# Patient Record
Sex: FEMALE | Race: WHITE | NOT HISPANIC OR LATINO | Employment: OTHER | URBAN - METROPOLITAN AREA
[De-identification: names, ages, dates, MRNs, and addresses within clinical notes are randomized per-mention and may not be internally consistent; named-entity substitution may affect disease eponyms.]

---

## 2019-09-09 ENCOUNTER — TELEPHONE (OUTPATIENT)
Dept: FAMILY MEDICINE | Facility: CLINIC | Age: 84
End: 2019-09-09

## 2019-09-09 NOTE — TELEPHONE ENCOUNTER
----- Message from Eliana Espino sent at 9/9/2019  9:25 AM CDT -----  Contact: Rosanna Raymundo  Pt would like to know can she be seen today? She has been dizzy and nauseated and she also had diarrhea this morning . Please give her a call back asap! She says she feels miserable. Pt# [phone]

## 2019-09-09 NOTE — TELEPHONE ENCOUNTER
----- Message from Juana Dexter LPN sent at 9/9/2019 10:02 AM CDT -----  Contact: Rosanna aRymundo      ----- Message -----  From: Eliana Espino  Sent: 9/9/2019   9:25 AM  To: Aamir [facility] Staff    Pt would like to know can she be seen today? She has been dizzy and nauseated and she also had diarrhea this morning . Please give her a call back asap! She says she feels miserable. Pt# [phone]

## 2019-09-10 ENCOUNTER — OFFICE VISIT (OUTPATIENT)
Dept: FAMILY MEDICINE | Facility: CLINIC | Age: 84
End: 2019-09-10
Payer: MEDICARE

## 2019-09-10 VITALS
HEART RATE: 68 BPM | SYSTOLIC BLOOD PRESSURE: 126 MMHG | BODY MASS INDEX: 31.34 KG/M2 | DIASTOLIC BLOOD PRESSURE: 80 MMHG | WEIGHT: 166 LBS | HEIGHT: 61 IN

## 2019-09-10 DIAGNOSIS — K52.9 GASTROENTERITIS: Primary | ICD-10-CM

## 2019-09-10 PROCEDURE — 99214 OFFICE O/P EST MOD 30 MIN: CPT | Mod: S$GLB,,, | Performed by: PHYSICIAN ASSISTANT

## 2019-09-10 PROCEDURE — 99214 PR OFFICE/OUTPT VISIT, EST, LEVL IV, 30-39 MIN: ICD-10-PCS | Mod: S$GLB,,, | Performed by: PHYSICIAN ASSISTANT

## 2019-09-10 RX ORDER — LORATADINE 10 MG/1
10 TABLET ORAL DAILY
COMMUNITY
Start: 2019-07-09 | End: 2019-10-03 | Stop reason: SDUPTHER

## 2019-09-10 RX ORDER — BUSPIRONE HYDROCHLORIDE 5 MG/1
5 TABLET ORAL DAILY
COMMUNITY
Start: 2019-07-09 | End: 2019-10-03 | Stop reason: SDUPTHER

## 2019-09-10 RX ORDER — ONDANSETRON 8 MG/1
8 TABLET, ORALLY DISINTEGRATING ORAL EVERY 6 HOURS PRN
Qty: 20 TABLET | Refills: 1 | Status: SHIPPED | OUTPATIENT
Start: 2019-09-10 | End: 2019-10-03 | Stop reason: SDUPTHER

## 2019-09-10 RX ORDER — ALLOPURINOL 100 MG/1
100 TABLET ORAL DAILY
COMMUNITY
Start: 2019-07-09 | End: 2019-10-03 | Stop reason: SDUPTHER

## 2019-09-10 RX ORDER — LOSARTAN POTASSIUM 100 MG/1
100 TABLET ORAL DAILY
COMMUNITY
Start: 2019-07-09 | End: 2019-10-03 | Stop reason: SDUPTHER

## 2019-09-10 RX ORDER — HYDROCHLOROTHIAZIDE 12.5 MG/1
12.5 TABLET ORAL DAILY
COMMUNITY
Start: 2019-07-09 | End: 2019-10-03 | Stop reason: SDUPTHER

## 2019-09-10 RX ORDER — AMLODIPINE BESYLATE 10 MG/1
10 TABLET ORAL DAILY
COMMUNITY
Start: 2019-07-09 | End: 2019-10-03 | Stop reason: SDUPTHER

## 2019-09-10 RX ORDER — ROSUVASTATIN CALCIUM 20 MG/1
20 TABLET, FILM COATED ORAL DAILY
COMMUNITY
Start: 2019-07-09 | End: 2019-10-03 | Stop reason: SDUPTHER

## 2019-09-10 RX ORDER — LORAZEPAM 1 MG/1
1 TABLET ORAL 2 TIMES DAILY
COMMUNITY
End: 2019-10-03 | Stop reason: SDUPTHER

## 2019-09-10 RX ORDER — FERROUS SULFATE 325(65) MG
325 TABLET ORAL DAILY
COMMUNITY
Start: 2015-02-13 | End: 2019-10-03 | Stop reason: SDUPTHER

## 2019-09-10 RX ORDER — OMEPRAZOLE 20 MG/1
20 CAPSULE, DELAYED RELEASE ORAL DAILY
COMMUNITY
Start: 2019-07-09 | End: 2019-10-03 | Stop reason: SDUPTHER

## 2019-09-10 RX ORDER — MAGNESIUM 250 MG
250 TABLET ORAL DAILY
COMMUNITY
End: 2019-10-03 | Stop reason: SDUPTHER

## 2019-09-10 NOTE — PATIENT INSTRUCTIONS
Noninfectious Gastroenteritis (Ages 6 Years to Adult)    Gastroenteritis can cause nausea, vomiting, diarrhea, and abdominal cramping. This may occur as a result of food sensitivity, inflammation of your gastrointestinal tract, medicines, stress, or other causes not related to infection. Your symptoms will usually last from 1 to 3 days, but can last longer. Antibiotics are not effective, but simple home treatment will be helpful.  Home care  Medicine  · You may use acetaminophen or NSAID medicines like ibuprofen or naproxen to control fever, unless another medicine is prescribed. (Note: If you have chronic liver or kidney disease, or ever had a stomach ulcer or gastrointestinalI bleeding, talk with your healthcare provider before using these medicines.) Aspirin should never be used in anyone under 18 years of age who is ill with a fever. It may cause severe liver damage. Don't increase your NSAID medicines if you are already taking these medicines for another condition (like arthritis). Don't use NSAIDS if you are on aspirin (such as for heart disease, or after a stroke).  · If medicines for diarrhea or vomiting are prescribed, take only as directed.  General care and preventing spread of the illness  · If symptoms are severe, rest at home for the next 24 hours or until you feel better.  · Hand washing with soap and water is the best way to prevent the spread of infection. Wash your hands after touching anyone who is sick.  · Wash your hands after using the toilet and before meals. Clean the toilet after each use.  · Caffeine, tobacco, and alcohol can make your diarrhea, cramping, and pain worse.  Diet  · Water and clear liquids are important so you do not get dehydrated. Drink a small amount at a time.  · Do not force yourself to eat, especially if you have cramps, vomiting, or diarrhea. When you finally decide to start eating, do not eat large amounts at a time, even if you are hungry.  · If you eat, avoid  fatty, greasy, spicy, or fried foods.  · Do not eat dairy products if you have diarrhea; they can make the diarrhea worse.  During the first 24 hours (the first full day), follow the diet below:  · Beverages: Water, clear liquids, soft drinks without caffeine, like ginger ale; mineral water (plain or flavored); decaffeinated tea and coffee.  · Soups: Clear broth, consommé, and bouillon Sports drinks aren't a good choice because they have too much sugar and not enough electrolytes. In this case, commercially available products called oral rehydration solutions are best.  · Desserts: Plain gelatin, popsicles, and fruit juice bars.  During the next 24 hours (the second day), you may add the following to the above if you have improved. If not, continue what you did the first day:  · Hot cereal, plain toast, bread, rolls, crackers  · Plain noodles, rice, mashed potatoes, chicken noodle or rice soup  · Unsweetened canned fruit (avoid pineapple), bananas  · Limit caffeine and chocolate. No spices or seasonings except salt.  During the next 24 hours  · Gradually resume a normal diet, as you feel better and your symptoms improve.  · If at any time your symptoms start getting worse, go back to clear liquids until you feel better.  Food preparation  · If you have diarrhea, you should not prepare food for others. When you  prepare food for yourself, wash your hands before and after.  · Wash your hands after using cutting boards, countertops, and knives that have been in contact with raw food.  · Keep uncooked meats away from cooked and ready-to-eat foods.  Follow-up care  Follow up with your healthcare provider if you are not improving over the next 2 to 3 days, or as advised. If a stool (diarrhea) sample was taken, call for the results as directed.  When to seek medical care  Call your healthcare provider right away if any of these occur:   · Increasing abdominal pain or constant lower right abdominal pain  · Continued  vomiting (unable to keep liquids down)  · Frequent diarrhea (more than 5 times a day)  · Blood in vomit or stool (black or red color)  · Inability to tolerate solid food after a few days.  · Dark urine, reduced urine output  · Weakness, dizziness  · Drowsiness  · Fever of 100.4ºF (38.0ºC) or higher, or as directed by your healthcare provider  · New rash  Call 911  Call 911 if any of these occur:  · Trouble breathing  · Chest pain  · Confusion  · Severe drowsiness or trouble awakening  · Seizure  · Stiff neck  Date Last Reviewed: 11/16/2015  © 0406-7857 [employer] [address] [place] [address], [place] [address]. All rights reserved. This information is not intended as a substitute for professional medical care. Always follow your healthcare professional's instructions.

## 2019-09-13 ENCOUNTER — TELEPHONE (OUTPATIENT)
Dept: FAMILY MEDICINE | Facility: CLINIC | Age: 84
End: 2019-09-13

## 2019-09-13 NOTE — TELEPHONE ENCOUNTER
Patient left voicemail stating Gilberto requested she give him a call today.      [phone]  -DN

## 2019-09-16 NOTE — TELEPHONE ENCOUNTER
Spoke with pt, pt states she is feeling much better. Every now and then she feels a little light headed but everything else is doing great.

## 2019-09-25 LAB
ALBUMIN SERPL-MCNC: 4.4 G/DL (ref 3.6–5.1)
ALBUMIN/GLOB SERPL: 1.5 (CALC) (ref 1–2.5)
ALP SERPL-CCNC: 55 U/L (ref 33–130)
ALT SERPL-CCNC: 9 U/L (ref 6–29)
AST SERPL-CCNC: 14 U/L (ref 10–35)
BASOPHILS # BLD AUTO: 49 CELLS/UL (ref 0–200)
BASOPHILS NFR BLD AUTO: 0.8 %
BILIRUB SERPL-MCNC: 0.5 MG/DL (ref 0.2–1.2)
BUN SERPL-MCNC: 23 MG/DL (ref 7–25)
BUN/CREAT SERPL: 25 (CALC) (ref 6–22)
CALCIUM SERPL-MCNC: 9.7 MG/DL (ref 8.6–10.4)
CHLORIDE SERPL-SCNC: 104 MMOL/L (ref 98–110)
CHOLEST SERPL-MCNC: 157 MG/DL
CHOLEST/HDLC SERPL: 3.5 (CALC)
CO2 SERPL-SCNC: 27 MMOL/L (ref 20–32)
CREAT SERPL-MCNC: 0.91 MG/DL (ref 0.6–0.88)
EOSINOPHIL # BLD AUTO: 214 CELLS/UL (ref 15–500)
EOSINOPHIL NFR BLD AUTO: 3.5 %
ERYTHROCYTE [DISTWIDTH] IN BLOOD BY AUTOMATED COUNT: 15.7 % (ref 11–15)
GFRSERPLBLD MDRD-ARVRAT: 57 ML/MIN/1.73M2
GLOBULIN SER CALC-MCNC: 3 G/DL (CALC) (ref 1.9–3.7)
GLUCOSE SERPL-MCNC: 119 MG/DL (ref 65–99)
HBA1C MFR BLD: 6.9 % OF TOTAL HGB
HCT VFR BLD AUTO: 35.8 % (ref 35–45)
HDLC SERPL-MCNC: 45 MG/DL
HGB BLD-MCNC: 11.3 G/DL (ref 11.7–15.5)
LDLC SERPL CALC-MCNC: 88 MG/DL (CALC)
LYMPHOCYTES # BLD AUTO: 1537 CELLS/UL (ref 850–3900)
LYMPHOCYTES NFR BLD AUTO: 25.2 %
MCH RBC QN AUTO: 25.8 PG (ref 27–33)
MCHC RBC AUTO-ENTMCNC: 31.6 G/DL (ref 32–36)
MCV RBC AUTO: 81.7 FL (ref 80–100)
MONOCYTES # BLD AUTO: 531 CELLS/UL (ref 200–950)
MONOCYTES NFR BLD AUTO: 8.7 %
NEUTROPHILS # BLD AUTO: 3770 CELLS/UL (ref 1500–7800)
NEUTROPHILS NFR BLD AUTO: 61.8 %
NONHDLC SERPL-MCNC: 112 MG/DL (CALC)
PLATELET # BLD AUTO: 202 THOUSAND/UL (ref 140–400)
PMV BLD REES-ECKER: 12.6 FL (ref 7.5–12.5)
POTASSIUM SERPL-SCNC: 3.9 MMOL/L (ref 3.5–5.3)
PROT SERPL-MCNC: 7.4 G/DL (ref 6.1–8.1)
RBC # BLD AUTO: 4.38 MILLION/UL (ref 3.8–5.1)
SODIUM SERPL-SCNC: 140 MMOL/L (ref 135–146)
TRIGL SERPL-MCNC: 147 MG/DL
WBC # BLD AUTO: 6.1 THOUSAND/UL (ref 3.8–10.8)

## 2019-10-03 ENCOUNTER — OFFICE VISIT (OUTPATIENT)
Dept: FAMILY MEDICINE | Facility: CLINIC | Age: 84
End: 2019-10-03
Payer: MEDICARE

## 2019-10-03 VITALS
BODY MASS INDEX: 31.57 KG/M2 | HEIGHT: 61 IN | WEIGHT: 167.19 LBS | HEART RATE: 61 BPM | OXYGEN SATURATION: 96 % | DIASTOLIC BLOOD PRESSURE: 66 MMHG | SYSTOLIC BLOOD PRESSURE: 124 MMHG

## 2019-10-03 DIAGNOSIS — K52.9 GASTROENTERITIS: ICD-10-CM

## 2019-10-03 DIAGNOSIS — I48.0 PAROXYSMAL ATRIAL FIBRILLATION: ICD-10-CM

## 2019-10-03 DIAGNOSIS — M1A.09X0 IDIOPATHIC CHRONIC GOUT OF MULTIPLE SITES WITHOUT TOPHUS: ICD-10-CM

## 2019-10-03 DIAGNOSIS — I25.10 CORONARY ARTERY DISEASE INVOLVING NATIVE CORONARY ARTERY OF NATIVE HEART WITHOUT ANGINA PECTORIS: ICD-10-CM

## 2019-10-03 DIAGNOSIS — I10 ESSENTIAL HYPERTENSION: ICD-10-CM

## 2019-10-03 DIAGNOSIS — F41.8 OTHER SPECIFIED ANXIETY DISORDERS: ICD-10-CM

## 2019-10-03 DIAGNOSIS — M85.80 OSTEOPENIA, UNSPECIFIED LOCATION: ICD-10-CM

## 2019-10-03 DIAGNOSIS — E78.2 MIXED HYPERLIPIDEMIA: ICD-10-CM

## 2019-10-03 DIAGNOSIS — J30.1 NON-SEASONAL ALLERGIC RHINITIS DUE TO POLLEN: ICD-10-CM

## 2019-10-03 DIAGNOSIS — Z85.3 HISTORY OF LOBULAR CARCINOMA OF BREAST: ICD-10-CM

## 2019-10-03 DIAGNOSIS — Z23 NEED FOR INFLUENZA VACCINATION: ICD-10-CM

## 2019-10-03 DIAGNOSIS — K21.9 GASTROESOPHAGEAL REFLUX DISEASE WITHOUT ESOPHAGITIS: ICD-10-CM

## 2019-10-03 DIAGNOSIS — Z86.2 HISTORY OF ANEMIA: ICD-10-CM

## 2019-10-03 DIAGNOSIS — E11.9 TYPE 2 DIABETES MELLITUS WITHOUT COMPLICATION, WITHOUT LONG-TERM CURRENT USE OF INSULIN: Primary | ICD-10-CM

## 2019-10-03 PROCEDURE — 90662 FLU VACCINE - HIGH DOSE (65+) PRESERVATIVE FREE IM: ICD-10-PCS | Mod: S$GLB,,, | Performed by: FAMILY MEDICINE

## 2019-10-03 PROCEDURE — 99214 OFFICE O/P EST MOD 30 MIN: CPT | Mod: 25,S$GLB,, | Performed by: FAMILY MEDICINE

## 2019-10-03 PROCEDURE — 99214 PR OFFICE/OUTPT VISIT, EST, LEVL IV, 30-39 MIN: ICD-10-PCS | Mod: 25,S$GLB,, | Performed by: FAMILY MEDICINE

## 2019-10-03 PROCEDURE — G0008 ADMIN INFLUENZA VIRUS VAC: HCPCS | Mod: S$GLB,,, | Performed by: FAMILY MEDICINE

## 2019-10-03 PROCEDURE — 90662 IIV NO PRSV INCREASED AG IM: CPT | Mod: S$GLB,,, | Performed by: FAMILY MEDICINE

## 2019-10-03 PROCEDURE — G0008 FLU VACCINE - HIGH DOSE (65+) PRESERVATIVE FREE IM: ICD-10-PCS | Mod: S$GLB,,, | Performed by: FAMILY MEDICINE

## 2019-10-03 RX ORDER — FLUTICASONE PROPIONATE 50 MCG
SPRAY, SUSPENSION (ML) NASAL
COMMUNITY
Start: 2018-06-12 | End: 2019-10-03 | Stop reason: SDUPTHER

## 2019-10-03 RX ORDER — ALLOPURINOL 100 MG/1
100 TABLET ORAL DAILY
Qty: 90 TABLET | Refills: 1 | Status: SHIPPED | OUTPATIENT
Start: 2019-10-03 | End: 2020-08-11 | Stop reason: SDUPTHER

## 2019-10-03 RX ORDER — AMOXICILLIN 500 MG
1 CAPSULE ORAL 2 TIMES DAILY
Qty: 200 CAPSULE | Refills: 1 | Status: SHIPPED | OUTPATIENT
Start: 2019-10-03 | End: 2019-11-04 | Stop reason: CLARIF

## 2019-10-03 RX ORDER — LOSARTAN POTASSIUM 100 MG/1
100 TABLET ORAL DAILY
Qty: 90 TABLET | Refills: 1 | Status: SHIPPED | OUTPATIENT
Start: 2019-10-03 | End: 2020-06-30 | Stop reason: SDUPTHER

## 2019-10-03 RX ORDER — FERROUS SULFATE 325(65) MG
325 TABLET ORAL DAILY
Qty: 100 TABLET | Refills: 1 | Status: ON HOLD | OUTPATIENT
Start: 2019-10-03 | End: 2019-11-04

## 2019-10-03 RX ORDER — AMLODIPINE BESYLATE 10 MG/1
10 TABLET ORAL DAILY
Qty: 90 TABLET | Refills: 1 | Status: ON HOLD | OUTPATIENT
Start: 2019-10-03 | End: 2019-12-18 | Stop reason: SDUPTHER

## 2019-10-03 RX ORDER — FLUTICASONE PROPIONATE 50 MCG
1 SPRAY, SUSPENSION (ML) NASAL 2 TIMES DAILY
Qty: 3 BOTTLE | Refills: 3 | Status: SHIPPED | OUTPATIENT
Start: 2019-10-03 | End: 2020-08-11 | Stop reason: SDUPTHER

## 2019-10-03 RX ORDER — VIT C/E/ZN/COPPR/LUTEIN/ZEAXAN 250MG-90MG
1000 CAPSULE ORAL DAILY
Qty: 100 CAPSULE | Refills: 1 | Status: SHIPPED | OUTPATIENT
Start: 2019-10-03 | End: 2021-02-03 | Stop reason: SDUPTHER

## 2019-10-03 RX ORDER — LORATADINE 10 MG/1
10 TABLET ORAL DAILY
Qty: 90 TABLET | Refills: 1 | Status: SHIPPED | OUTPATIENT
Start: 2019-10-03 | End: 2020-08-11 | Stop reason: SDUPTHER

## 2019-10-03 RX ORDER — OMEPRAZOLE 20 MG/1
20 CAPSULE, DELAYED RELEASE ORAL DAILY
Qty: 90 CAPSULE | Refills: 1 | Status: ON HOLD | OUTPATIENT
Start: 2019-10-03 | End: 2019-12-18 | Stop reason: SDUPTHER

## 2019-10-03 RX ORDER — HYDROCHLOROTHIAZIDE 12.5 MG/1
12.5 TABLET ORAL DAILY
Qty: 90 TABLET | Refills: 1 | Status: SHIPPED | OUTPATIENT
Start: 2019-10-03 | End: 2020-08-11 | Stop reason: SDUPTHER

## 2019-10-03 RX ORDER — BUSPIRONE HYDROCHLORIDE 5 MG/1
5 TABLET ORAL 2 TIMES DAILY
Qty: 180 TABLET | Refills: 1 | Status: SHIPPED | OUTPATIENT
Start: 2019-10-03 | End: 2019-11-04 | Stop reason: CLARIF

## 2019-10-03 RX ORDER — LORAZEPAM 1 MG/1
1 TABLET ORAL 2 TIMES DAILY
Qty: 90 TABLET | Refills: 1 | Status: SHIPPED | OUTPATIENT
Start: 2019-10-03 | End: 2020-01-09 | Stop reason: SDUPTHER

## 2019-10-03 RX ORDER — ONDANSETRON 8 MG/1
8 TABLET, ORALLY DISINTEGRATING ORAL EVERY 6 HOURS PRN
Qty: 20 TABLET | Refills: 1 | Status: SHIPPED | OUTPATIENT
Start: 2019-10-03 | End: 2020-08-11 | Stop reason: SDUPTHER

## 2019-10-03 RX ORDER — ROSUVASTATIN CALCIUM 20 MG/1
20 TABLET, FILM COATED ORAL DAILY
Qty: 90 TABLET | Refills: 1 | Status: SHIPPED | OUTPATIENT
Start: 2019-10-03 | End: 2020-08-11 | Stop reason: SDUPTHER

## 2019-10-03 RX ORDER — VIT C/E/ZN/COPPR/LUTEIN/ZEAXAN 250MG-90MG
1000 CAPSULE ORAL DAILY
COMMUNITY
End: 2019-10-03 | Stop reason: SDUPTHER

## 2019-10-03 RX ORDER — MAGNESIUM 250 MG
250 TABLET ORAL DAILY
Qty: 100 EACH | Refills: 1 | Status: ON HOLD | OUTPATIENT
Start: 2019-10-03 | End: 2019-12-18 | Stop reason: HOSPADM

## 2019-10-03 RX ORDER — AMOXICILLIN 500 MG
1 CAPSULE ORAL DAILY
COMMUNITY
End: 2019-10-03 | Stop reason: SDUPTHER

## 2019-10-03 NOTE — PROGRESS NOTES
SUBJECTIVE:    Patient ID: Rosanna Raymundo is a 86 y.o. female.    Chief Complaint: Hypertension (Lab Review)    This 86-year-old female is still living independently and driving a car and going to the grocery store.  [facility].  Lately she has not been staying during diabetic diet but stays active, wears compression socks.  She saw  her cardiologist and had a echo and EKG recently in both of which were normal.    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  Her A1c is 6.9 today      Orders Only on 09/24/2019   Component Date Value Ref Range Status    Cholesterol 09/24/2019 157  <200 mg/dL Final    HDL 09/24/2019 45* >50 mg/dL Final    Triglycerides 09/24/2019 147  <150 mg/dL Final    LDL Cholesterol 09/24/2019 88  mg/dL (calc) Final    Hdl/Cholesterol Ratio 09/24/2019 3.5  <5.0 (calc) Final    Non HDL Chol. (LDL+VLDL) 09/24/2019 112  <130 mg/dL (calc) Final    Glucose 09/24/2019 119* 65 - 99 mg/dL Final    BUN, Bld 09/24/2019 23  7 - 25 mg/dL Final    Creatinine 09/24/2019 0.91* 0.60 - 0.88 mg/dL Final    eGFR if non African American 09/24/2019 57* > OR = 60 mL/min/1.73m2 Final    eGFR if  09/24/2019 66  > OR = 60 mL/min/1.73m2 Final    BUN/Creatinine Ratio 09/24/2019 25* 6 - 22 (calc) Final    Sodium 09/24/2019 140  135 - 146 mmol/L Final    Potassium 09/24/2019 3.9  3.5 - 5.3 mmol/L Final    Chloride 09/24/2019 104  98 - 110 mmol/L Final    CO2 09/24/2019 27  20 - 32 mmol/L Final    Calcium 09/24/2019 9.7  8.6 - 10.4 mg/dL Final    Total Protein 09/24/2019 7.4  6.1 - 8.1 g/dL Final    Albumin 09/24/2019 4.4  3.6 - 5.1 g/dL Final    Globulin, Total 09/24/2019 3.0  1.9 - 3.7 g/dL (calc) Final    Albumin/Globulin Ratio 09/24/2019 1.5  1.0 - 2.5 (calc) Final    Total Bilirubin 09/24/2019 0.5  0.2 - 1.2 mg/dL Final    Alkaline Phosphatase  09/24/2019 55  33 - 130 U/L Final    AST 09/24/2019 14  10 - 35 U/L Final    ALT 09/24/2019 9  6 - 29 U/L Final    WBC 09/24/2019 6.1  3.8 - 10.8 Thousand/uL Final    RBC 09/24/2019 4.38  3.80 - 5.10 Million/uL Final    Hemoglobin 09/24/2019 11.3* 11.7 - 15.5 g/dL Final    Hematocrit 09/24/2019 35.8  35.0 - 45.0 % Final    Mean Corpuscular Volume 09/24/2019 81.7  80.0 - 100.0 fL Final    Mean Corpuscular Hemoglobin 09/24/2019 25.8* 27.0 - 33.0 pg Final    Mean Corpuscular Hemoglobin Conc 09/24/2019 31.6* 32.0 - 36.0 g/dL Final    RDW 09/24/2019 15.7* 11.0 - 15.0 % Final    Platelets 09/24/2019 202  140 - 400 Thousand/uL Final    MPV 09/24/2019 12.6* 7.5 - 12.5 fL Final    Neutrophils Absolute 09/24/2019 3,770  1,500 - 7,800 cells/uL Final    Lymph # 09/24/2019 1,537  850 - 3,900 cells/uL Final    Mono # 09/24/2019 531  200 - 950 cells/uL Final    Eos # 09/24/2019 214  15 - 500 cells/uL Final    Baso # 09/24/2019 49  0 - 200 cells/uL Final    Neutrophils Relative 09/24/2019 61.8  % Final    Lymph% 09/24/2019 25.2  % Final    Mono% 09/24/2019 8.7  % Final    Eosinophil% 09/24/2019 3.5  % Final    Basophil% 09/24/2019 0.8  % Final    Hemoglobin A1C 09/24/2019 6.9* <5.7 % of total Hgb Final       Past Medical History:   Diagnosis Date    Cancer     Cataract     Diabetes mellitus, type 2     Heart murmur     Hypertension     Occasional tremors      Past Surgical History:   Procedure Laterality Date    ADENOIDECTOMY      Gall bladder revmoval      HYSTERECTOMY      left mastectomy Left 1945    TONSILLECTOMY       Family History   Problem Relation Age of Onset    Heart disease Mother        Marital Status: Single  Alcohol History:  reports that she does not drink alcohol.  Tobacco History:  reports that she has never smoked. She has never used smokeless tobacco.  Drug History:  reports that she does not use drugs.    Review of patient's allergies indicates:   Allergen Reactions     Penicillin g benzathine Other (See Comments)       Current Outpatient Medications:     allopurinol (ZYLOPRIM) 100 MG tablet, Take 1 tablet (100 mg total) by mouth once daily., Disp: 90 tablet, Rfl: 1    amLODIPine (NORVASC) 10 MG tablet, Take 1 tablet (10 mg total) by mouth once daily., Disp: 90 tablet, Rfl: 1    apixaban (ELIQUIS) 2.5 mg Tab, Take 1 tablet (2.5 mg total) by mouth 2 (two) times daily., Disp: 180 tablet, Rfl: 1    busPIRone (BUSPAR) 5 MG Tab, Take 1 tablet (5 mg total) by mouth 2 (two) times daily., Disp: 180 tablet, Rfl: 1    cholecalciferol, vitamin D3, (VITAMIN D3) 1,000 unit capsule, Take 1 capsule (1,000 Units total) by mouth once daily., Disp: 100 capsule, Rfl: 1    CRESTOR 20 mg tablet, Take 1 tablet (20 mg total) by mouth once daily., Disp: 90 tablet, Rfl: 1    fish oil-omega-3 fatty acids 300-1,000 mg capsule, Take 1 capsule by mouth 2 (two) times daily., Disp: 200 capsule, Rfl: 1    fluticasone propionate (FLONASE) 50 mcg/actuation nasal spray, 1 spray (50 mcg total) by Each Nostril route 2 (two) times daily., Disp: 3 Bottle, Rfl: 3    hydroCHLOROthiazide (HYDRODIURIL) 12.5 MG Tab, Take 1 tablet (12.5 mg total) by mouth once daily., Disp: 90 tablet, Rfl: 1    loratadine (CLARITIN) 10 mg tablet, Take 1 tablet (10 mg total) by mouth once daily., Disp: 90 tablet, Rfl: 1    LORazepam (ATIVAN) 1 MG tablet, Take 1 tablet (1 mg total) by mouth 2 (two) times daily., Disp: 90 tablet, Rfl: 1    losartan (COZAAR) 100 MG tablet, Take 1 tablet (100 mg total) by mouth once daily., Disp: 90 tablet, Rfl: 1    magnesium 250 mg Tab, Take 1 tablet (250 mg total) by mouth once daily., Disp: 100 each, Rfl: 1    omeprazole (PRILOSEC) 20 MG capsule, Take 1 capsule (20 mg total) by mouth once daily., Disp: 90 capsule, Rfl: 1    ondansetron (ZOFRAN-ODT) 8 MG TbDL, Take 1 tablet (8 mg total) by mouth every 6 (six) hours as needed., Disp: 20 tablet, Rfl: 1    vit C/E/Zn/coppr/lutein/zeaxan (VISION  "FORMULA-2 ORAL), Take by mouth., Disp: , Rfl:     ferrous sulfate (FEOSOL) 325 mg (65 mg iron) Tab tablet, Take 1 tablet (325 mg total) by mouth once daily., Disp: 100 tablet, Rfl: 1    Review of Systems   Constitutional: Negative for appetite change, chills, fatigue, fever and unexpected weight change.   HENT: Negative for congestion, ear pain, sinus pain, sore throat and trouble swallowing.    Eyes: Negative for pain, discharge and visual disturbance.   Respiratory: Negative for apnea, cough, shortness of breath and wheezing.    Cardiovascular: Negative for chest pain, palpitations and leg swelling.   Gastrointestinal: Negative for abdominal pain, blood in stool, constipation, diarrhea, nausea and vomiting.        Gerd controlled  W/ omeprazole, hi fiber diet   Endocrine: Negative for heat intolerance, polydipsia and polyuria.   Genitourinary: Positive for frequency. Negative for difficulty urinating, dyspareunia, dysuria, hematuria and menstrual problem.        Nocturia 2-3 x night, sl incont . Wears  pads   Musculoskeletal: Negative for arthralgias, back pain, gait problem, joint swelling and myalgias.   Allergic/Immunologic: Negative for environmental allergies, food allergies and immunocompromised state.   Neurological: Negative for dizziness, tremors (coarse intention trmors, uses  a straw  to drink soup and  drinks,), seizures, numbness (lf arm numbness  from mastectomy, ) and headaches.   Psychiatric/Behavioral: Negative for behavioral problems, confusion, hallucinations and suicidal ideas. The patient is not nervous/anxious.           Objective:      Vitals:    10/03/19 0827   BP: 124/66   Pulse: 61   SpO2: 96%   Weight: 75.8 kg (167 lb 3.2 oz)   Height: 5' 0.75" (1.543 m)     Body mass index is 31.85 kg/m².  Physical Exam      Assessment:       1. Type 2 diabetes mellitus without complication, without long-term current use of insulin    2. Need for influenza vaccination    3. Gastroenteritis    4. " Paroxysmal atrial fibrillation    5. Coronary artery disease involving native coronary artery of native heart without angina pectoris    6. Mixed hyperlipidemia    7. History of lobular carcinoma of breast    8. Non-seasonal allergic rhinitis due to pollen    9. Essential hypertension    10. Gastroesophageal reflux disease without esophagitis    11. History of anemia    12. Other specified anxiety disorders    13. Idiopathic chronic gout of multiple sites without tophus    14. Osteopenia, unspecified location         Plan:       Type 2 diabetes mellitus without complication, without long-term current use of insulin  -     Hemoglobin A1c; Future; Expected date: 10/03/2019  A1c is 6.9 indicating adequate diabetic control, she is on no oral medications or insulins  Need for influenza vaccination  -     Influenza - High Dose (65+) (PF) (IM)  Flu shot today    Paroxysmal atrial fibrillation  -     apixaban (ELIQUIS) 2.5 mg Tab; Take 1 tablet (2.5 mg total) by mouth 2 (two) times daily.  Dispense: 180 tablet; Refill: 1  Patient in sinus rhythm today continue Eliquis  Coronary artery disease involving native coronary artery of native heart without angina pectoris  Sees cardiologist q.6 months  Mixed hyperlipidemia  -     CRESTOR 20 mg tablet; Take 1 tablet (20 mg total) by mouth once daily.  Dispense: 90 tablet; Refill: 1  -     fish oil-omega-3 fatty acids 300-1,000 mg capsule; Take 1 capsule by mouth 2 (two) times daily.  Dispense: 200 capsule; Refill: 1  -     Comprehensive metabolic panel; Future; Expected date: 10/03/2019  -     Lipid panel; Future; Expected date: 10/03/2019  Lipids at goal, will fax copy to  in Cardiology  History of lobular carcinoma of breast  In remission times 40 years  Non-seasonal allergic rhinitis due to pollen  -     fluticasone propionate (FLONASE) 50 mcg/actuation nasal spray; 1 spray (50 mcg total) by Each Nostril route 2 (two) times daily.  Dispense: 3 Bottle; Refill: 3  -      loratadine (CLARITIN) 10 mg tablet; Take 1 tablet (10 mg total) by mouth once daily.  Dispense: 90 tablet; Refill: 1    Essential hypertension  -     amLODIPine (NORVASC) 10 MG tablet; Take 1 tablet (10 mg total) by mouth once daily.  Dispense: 90 tablet; Refill: 1  -     hydroCHLOROthiazide (HYDRODIURIL) 12.5 MG Tab; Take 1 tablet (12.5 mg total) by mouth once daily.  Dispense: 90 tablet; Refill: 1  -     losartan (COZAAR) 100 MG tablet; Take 1 tablet (100 mg total) by mouth once daily.  Dispense: 90 tablet; Refill: 1  -     magnesium 250 mg Tab; Take 1 tablet (250 mg total) by mouth once daily.  Dispense: 100 each; Refill: 1  -     CBC auto differential; Future; Expected date: 04/03/2020  -     TSH w/reflex to FT4; Future; Expected date: 10/03/2019  Well controlled with meds  Gastroesophageal reflux disease without esophagitis  -     omeprazole (PRILOSEC) 20 MG capsule; Take 1 capsule (20 mg total) by mouth once daily.  Dispense: 90 capsule; Refill: 1    History of anemia  -     ferrous sulfate (FEOSOL) 325 mg (65 mg iron) Tab tablet; Take 1 tablet (325 mg total) by mouth once daily.  Dispense: 100 tablet; Refill: 1  Hematocrit 35 today continue iron daily as needed  Other specified anxiety disorders  -     busPIRone (BUSPAR) 5 MG Tab; Take 1 tablet (5 mg total) by mouth 2 (two) times daily.  Dispense: 180 tablet; Refill: 1  -     LORazepam (ATIVAN) 1 MG tablet; Take 1 tablet (1 mg total) by mouth 2 (two) times daily.  Dispense: 90 tablet; Refill: 1    Idiopathic chronic gout of multiple sites without tophus  -     allopurinol (ZYLOPRIM) 100 MG tablet; Take 1 tablet (100 mg total) by mouth once daily.  Dispense: 90 tablet; Refill: 1    Osteopenia, unspecified location  -     cholecalciferol, vitamin D3, (VITAMIN D3) 1,000 unit capsule; Take 1 capsule (1,000 Units total) by mouth once daily.  Dispense: 100 capsule; Refill: 1      Follow up in about 6 months (around 4/3/2020) for Diabetic Check-Up.

## 2019-11-04 ENCOUNTER — HOSPITAL ENCOUNTER (OUTPATIENT)
Facility: HOSPITAL | Age: 84
Discharge: HOME | End: 2019-11-05
Attending: EMERGENCY MEDICINE | Admitting: INTERNAL MEDICINE
Payer: MEDICARE

## 2019-11-04 DIAGNOSIS — I48.91 ATRIAL FIBRILLATION WITH RAPID VENTRICULAR RESPONSE: Primary | ICD-10-CM

## 2019-11-04 DIAGNOSIS — I48.91 ATRIAL FIBRILLATION: ICD-10-CM

## 2019-11-04 DIAGNOSIS — R07.9 CHEST PAIN: ICD-10-CM

## 2019-11-04 PROBLEM — R73.9 HYPERGLYCEMIA: Status: ACTIVE | Noted: 2019-11-04

## 2019-11-04 LAB
ALBUMIN SERPL BCP-MCNC: 4.3 G/DL (ref 3.5–5.2)
ALP SERPL-CCNC: 55 U/L (ref 55–135)
ALT SERPL W/O P-5'-P-CCNC: 17 U/L (ref 10–44)
ANION GAP SERPL CALC-SCNC: 11 MMOL/L (ref 8–16)
APTT PPP: 34.6 SEC (ref 23.6–33.3)
AST SERPL-CCNC: 24 U/L (ref 10–40)
BASOPHILS # BLD AUTO: 0.03 K/UL (ref 0–0.2)
BASOPHILS NFR BLD: 0.4 % (ref 0–1.9)
BILIRUB SERPL-MCNC: 0.8 MG/DL (ref 0.1–1)
BNP SERPL-MCNC: 265 PG/ML (ref 0–99)
BUN SERPL-MCNC: 27 MG/DL (ref 8–23)
CALCIUM SERPL-MCNC: 9.3 MG/DL (ref 8.7–10.5)
CHLORIDE SERPL-SCNC: 102 MMOL/L (ref 95–110)
CO2 SERPL-SCNC: 25 MMOL/L (ref 23–29)
CREAT SERPL-MCNC: 1.1 MG/DL (ref 0.5–1.4)
DIFFERENTIAL METHOD: ABNORMAL
EOSINOPHIL # BLD AUTO: 0.1 K/UL (ref 0–0.5)
EOSINOPHIL NFR BLD: 1.5 % (ref 0–8)
ERYTHROCYTE [DISTWIDTH] IN BLOOD BY AUTOMATED COUNT: 17 % (ref 11.5–14.5)
EST. GFR  (AFRICAN AMERICAN): 52.2 ML/MIN/1.73 M^2
EST. GFR  (NON AFRICAN AMERICAN): 45.3 ML/MIN/1.73 M^2
GLUCOSE SERPL-MCNC: 166 MG/DL (ref 70–110)
HCT VFR BLD AUTO: 30.2 % (ref 37–48.5)
HGB BLD-MCNC: 9.4 G/DL (ref 12–16)
IMM GRANULOCYTES # BLD AUTO: 0.03 K/UL (ref 0–0.04)
IMM GRANULOCYTES NFR BLD AUTO: 0.4 % (ref 0–0.5)
INR PPP: 1.1
LYMPHOCYTES # BLD AUTO: 1.2 K/UL (ref 1–4.8)
LYMPHOCYTES NFR BLD: 16.6 % (ref 18–48)
MCH RBC QN AUTO: 24.2 PG (ref 27–31)
MCHC RBC AUTO-ENTMCNC: 31.1 G/DL (ref 32–36)
MCV RBC AUTO: 78 FL (ref 82–98)
MONOCYTES # BLD AUTO: 0.6 K/UL (ref 0.3–1)
MONOCYTES NFR BLD: 8 % (ref 4–15)
NEUTROPHILS # BLD AUTO: 5.4 K/UL (ref 1.8–7.7)
NEUTROPHILS NFR BLD: 73.1 % (ref 38–73)
NRBC BLD-RTO: 0 /100 WBC
PLATELET # BLD AUTO: 233 K/UL (ref 150–350)
PMV BLD AUTO: 10.8 FL (ref 9.2–12.9)
POTASSIUM SERPL-SCNC: 3.7 MMOL/L (ref 3.5–5.1)
PROT SERPL-MCNC: 7.4 G/DL (ref 6–8.4)
PROTHROMBIN TIME: 13.7 SEC (ref 10.6–14.8)
RBC # BLD AUTO: 3.89 M/UL (ref 4–5.4)
SODIUM SERPL-SCNC: 138 MMOL/L (ref 136–145)
TROPONIN I SERPL DL<=0.01 NG/ML-MCNC: 0.03 NG/ML (ref 0.02–0.04)
TROPONIN I SERPL DL<=0.01 NG/ML-MCNC: 0.03 NG/ML (ref 0.02–0.04)
TROPONIN I SERPL DL<=0.01 NG/ML-MCNC: <0.03 NG/ML (ref 0.02–0.04)
TSH SERPL DL<=0.005 MIU/L-ACNC: 0.7 UIU/ML (ref 0.34–5.6)
WBC # BLD AUTO: 7.35 K/UL (ref 3.9–12.7)

## 2019-11-04 PROCEDURE — 85730 THROMBOPLASTIN TIME PARTIAL: CPT

## 2019-11-04 PROCEDURE — 99291 CRITICAL CARE FIRST HOUR: CPT | Mod: 25

## 2019-11-04 PROCEDURE — G0378 HOSPITAL OBSERVATION PER HR: HCPCS

## 2019-11-04 PROCEDURE — 96376 TX/PRO/DX INJ SAME DRUG ADON: CPT

## 2019-11-04 PROCEDURE — 84484 ASSAY OF TROPONIN QUANT: CPT | Mod: 91

## 2019-11-04 PROCEDURE — 96374 THER/PROPH/DIAG INJ IV PUSH: CPT

## 2019-11-04 PROCEDURE — 83880 ASSAY OF NATRIURETIC PEPTIDE: CPT

## 2019-11-04 PROCEDURE — 85025 COMPLETE CBC W/AUTO DIFF WBC: CPT

## 2019-11-04 PROCEDURE — 93005 ELECTROCARDIOGRAM TRACING: CPT

## 2019-11-04 PROCEDURE — 25000003 PHARM REV CODE 250: Performed by: HOSPITALIST

## 2019-11-04 PROCEDURE — 25000003 PHARM REV CODE 250: Performed by: EMERGENCY MEDICINE

## 2019-11-04 PROCEDURE — 84443 ASSAY THYROID STIM HORMONE: CPT

## 2019-11-04 PROCEDURE — 80053 COMPREHEN METABOLIC PANEL: CPT

## 2019-11-04 PROCEDURE — 85610 PROTHROMBIN TIME: CPT

## 2019-11-04 PROCEDURE — 83036 HEMOGLOBIN GLYCOSYLATED A1C: CPT

## 2019-11-04 PROCEDURE — 84484 ASSAY OF TROPONIN QUANT: CPT

## 2019-11-04 PROCEDURE — 36415 COLL VENOUS BLD VENIPUNCTURE: CPT

## 2019-11-04 RX ORDER — IBUPROFEN 200 MG
24 TABLET ORAL
Status: DISCONTINUED | OUTPATIENT
Start: 2019-11-04 | End: 2019-11-05 | Stop reason: HOSPADM

## 2019-11-04 RX ORDER — GLUCAGON 1 MG
1 KIT INJECTION
Status: DISCONTINUED | OUTPATIENT
Start: 2019-11-04 | End: 2019-11-05 | Stop reason: HOSPADM

## 2019-11-04 RX ORDER — ONDANSETRON 2 MG/ML
4 INJECTION INTRAMUSCULAR; INTRAVENOUS EVERY 6 HOURS PRN
Status: DISCONTINUED | OUTPATIENT
Start: 2019-11-04 | End: 2019-11-05 | Stop reason: HOSPADM

## 2019-11-04 RX ORDER — DILTIAZEM HYDROCHLORIDE 5 MG/ML
10 INJECTION INTRAVENOUS
Status: COMPLETED | OUTPATIENT
Start: 2019-11-04 | End: 2019-11-04

## 2019-11-04 RX ORDER — ALLOPURINOL 100 MG/1
100 TABLET ORAL DAILY
Status: DISCONTINUED | OUTPATIENT
Start: 2019-11-05 | End: 2019-11-05 | Stop reason: HOSPADM

## 2019-11-04 RX ORDER — METOPROLOL TARTRATE 25 MG/1
25 TABLET, FILM COATED ORAL 2 TIMES DAILY
Status: DISCONTINUED | OUTPATIENT
Start: 2019-11-04 | End: 2019-11-05

## 2019-11-04 RX ORDER — IBUPROFEN 200 MG
16 TABLET ORAL
Status: DISCONTINUED | OUTPATIENT
Start: 2019-11-04 | End: 2019-11-05 | Stop reason: HOSPADM

## 2019-11-04 RX ORDER — ROSUVASTATIN CALCIUM 20 MG/1
20 TABLET, COATED ORAL NIGHTLY
Status: DISCONTINUED | OUTPATIENT
Start: 2019-11-04 | End: 2019-11-05 | Stop reason: HOSPADM

## 2019-11-04 RX ORDER — SODIUM CHLORIDE 0.9 % (FLUSH) 0.9 %
10 SYRINGE (ML) INJECTION
Status: DISCONTINUED | OUTPATIENT
Start: 2019-11-04 | End: 2019-11-05 | Stop reason: HOSPADM

## 2019-11-04 RX ORDER — DILTIAZEM HCL 1 MG/ML
5 INJECTION, SOLUTION INTRAVENOUS CONTINUOUS
Status: DISCONTINUED | OUTPATIENT
Start: 2019-11-04 | End: 2019-11-04

## 2019-11-04 RX ORDER — POTASSIUM CHLORIDE 20 MEQ/15ML
40 SOLUTION ORAL
Status: DISCONTINUED | OUTPATIENT
Start: 2019-11-04 | End: 2019-11-05 | Stop reason: HOSPADM

## 2019-11-04 RX ORDER — RAMELTEON 8 MG/1
8 TABLET ORAL NIGHTLY PRN
Status: DISCONTINUED | OUTPATIENT
Start: 2019-11-04 | End: 2019-11-05 | Stop reason: HOSPADM

## 2019-11-04 RX ORDER — LANOLIN ALCOHOL/MO/W.PET/CERES
800 CREAM (GRAM) TOPICAL
Status: DISCONTINUED | OUTPATIENT
Start: 2019-11-04 | End: 2019-11-05 | Stop reason: HOSPADM

## 2019-11-04 RX ORDER — BUSPIRONE HYDROCHLORIDE 5 MG/1
10 TABLET ORAL 2 TIMES DAILY
COMMUNITY
End: 2020-08-11 | Stop reason: SDUPTHER

## 2019-11-04 RX ORDER — BUSPIRONE HYDROCHLORIDE 5 MG/1
10 TABLET ORAL 2 TIMES DAILY
Status: DISCONTINUED | OUTPATIENT
Start: 2019-11-04 | End: 2019-11-05 | Stop reason: HOSPADM

## 2019-11-04 RX ORDER — ACETAMINOPHEN 325 MG/1
650 TABLET ORAL EVERY 6 HOURS PRN
Status: DISCONTINUED | OUTPATIENT
Start: 2019-11-04 | End: 2019-11-05 | Stop reason: HOSPADM

## 2019-11-04 RX ORDER — SODIUM,POTASSIUM PHOSPHATES 280-250MG
2 POWDER IN PACKET (EA) ORAL
Status: DISCONTINUED | OUTPATIENT
Start: 2019-11-04 | End: 2019-11-05 | Stop reason: HOSPADM

## 2019-11-04 RX ORDER — DILTIAZEM HCL 1 MG/ML
5 INJECTION, SOLUTION INTRAVENOUS CONTINUOUS
Status: DISCONTINUED | OUTPATIENT
Start: 2019-11-04 | End: 2019-11-05 | Stop reason: HOSPADM

## 2019-11-04 RX ORDER — ACETAMINOPHEN 325 MG/1
650 TABLET ORAL EVERY 4 HOURS PRN
Status: DISCONTINUED | OUTPATIENT
Start: 2019-11-04 | End: 2019-11-05 | Stop reason: HOSPADM

## 2019-11-04 RX ORDER — ASPIRIN 325 MG
325 TABLET ORAL
Status: COMPLETED | OUTPATIENT
Start: 2019-11-04 | End: 2019-11-04

## 2019-11-04 RX ORDER — AMOXICILLIN 500 MG
1 CAPSULE ORAL DAILY
COMMUNITY
End: 2021-02-03 | Stop reason: SDUPTHER

## 2019-11-04 RX ORDER — CHOLECALCIFEROL (VITAMIN D3) 25 MCG
1000 TABLET ORAL DAILY
Status: DISCONTINUED | OUTPATIENT
Start: 2019-11-05 | End: 2019-11-05 | Stop reason: HOSPADM

## 2019-11-04 RX ADMIN — POTASSIUM CHLORIDE 40 MEQ: 20 SOLUTION ORAL at 07:11

## 2019-11-04 RX ADMIN — ASPIRIN 325 MG ORAL TABLET 325 MG: 325 PILL ORAL at 02:11

## 2019-11-04 RX ADMIN — METOPROLOL TARTRATE 25 MG: 25 TABLET ORAL at 06:11

## 2019-11-04 RX ADMIN — Medication 5 MG/HR: at 02:11

## 2019-11-04 RX ADMIN — BUSPIRONE HYDROCHLORIDE 10 MG: 5 TABLET ORAL at 09:11

## 2019-11-04 RX ADMIN — APIXABAN 2.5 MG: 2.5 TABLET, FILM COATED ORAL at 09:11

## 2019-11-04 RX ADMIN — DILTIAZEM HYDROCHLORIDE 10 MG: 5 INJECTION INTRAVENOUS at 01:11

## 2019-11-04 RX ADMIN — ROSUVASTATIN CALCIUM 20 MG: 20 TABLET, FILM COATED ORAL at 09:11

## 2019-11-04 RX ADMIN — Medication 7 MG/HR: at 05:11

## 2019-11-04 NOTE — ASSESSMENT & PLAN NOTE
Patient with a history of underlying coronary disease details unknown  Check serial cardiac enzymes  Cardiac evaluation is pending

## 2019-11-04 NOTE — H&P
[facility] Medicine  History & Physical    Patient Name: Rosanna Raymundo  MRN: [id number]  Admission Date: 11/4/2019  Attending Physician: Bethel Messer DO   Primary Care Provider: Aamir Rhodes MD         Patient information was obtained from patient, relative(s) and ER records.     Subjective:     Principal Problem:Paroxysmal atrial fibrillation    Chief Complaint:   Chief Complaint   Patient presents with    Palpitations     Dizziness today        HPI: Patient is an 87-year-old female who states she is in her usual state of health until earlier today.  At that time patient noticed palpitations and associated dizziness.  Pain denies chest pain or shortness of breath.  Patient states she did have slight chest pressure but thought it was associated with her shortness of breath.  Patient denies abdominal pain nausea vomiting urinary complaints or bowel changes.  Patient came the emergency room via EMS and was found to be in atrial fibrillation with a rate between 130 and 140s.  She was given 10 mg of diltiazem and started on a diltiazem drip.  Her current heart rate is approximately 120s.  Her blood pressure has remained stable.  Patient currently denies chest pain          Past Medical History:   Diagnosis Date    Cancer     Cataract     Diabetes mellitus, type 2     Heart murmur     Hypertension     Occasional tremors        Past Surgical History:   Procedure Laterality Date    ADENOIDECTOMY      Gall bladder revmoval      HYSTERECTOMY      left mastectomy Left 1945    TONSILLECTOMY         Review of patient's allergies indicates:   Allergen Reactions    Penicillin g benzathine Other (See Comments)       No current facility-administered medications on file prior to encounter.      Current Outpatient Medications on File Prior to Encounter   Medication Sig    allopurinol (ZYLOPRIM) 100 MG tablet Take 1 tablet (100 mg total) by mouth once daily.    amLODIPine (NORVASC)  10 MG tablet Take 1 tablet (10 mg total) by mouth once daily.    apixaban (ELIQUIS) 2.5 mg Tab Take 1 tablet (2.5 mg total) by mouth 2 (two) times daily.    busPIRone (BUSPAR) 5 MG Tab Take 10 mg by mouth 2 (two) times daily.    cholecalciferol, vitamin D3, (VITAMIN D3) 1,000 unit capsule Take 1 capsule (1,000 Units total) by mouth once daily.    CRESTOR 20 mg tablet Take 1 tablet (20 mg total) by mouth once daily.    fish oil-omega-3 fatty acids 300-1,000 mg capsule Take 1 capsule by mouth once daily.    hydroCHLOROthiazide (HYDRODIURIL) 12.5 MG Tab Take 1 tablet (12.5 mg total) by mouth once daily.    loratadine (CLARITIN) 10 mg tablet Take 1 tablet (10 mg total) by mouth once daily.    losartan (COZAAR) 100 MG tablet Take 1 tablet (100 mg total) by mouth once daily.    magnesium 250 mg Tab Take 1 tablet (250 mg total) by mouth once daily.    omeprazole (PRILOSEC) 20 MG capsule Take 1 capsule (20 mg total) by mouth once daily.    vit C/E/Zn/coppr/lutein/zeaxan (VISION FORMULA-2 ORAL) Take 1 tablet by mouth 2 (two) times daily.     ferrous sulfate (FEOSOL) 325 mg (65 mg iron) Tab tablet Take 1 tablet (325 mg total) by mouth once daily.    fluticasone propionate (FLONASE) 50 mcg/actuation nasal spray 1 spray (50 mcg total) by Each Nostril route 2 (two) times daily.    LORazepam (ATIVAN) 1 MG tablet Take 1 tablet (1 mg total) by mouth 2 (two) times daily.    ondansetron (ZOFRAN-ODT) 8 MG TbDL Take 1 tablet (8 mg total) by mouth every 6 (six) hours as needed.    [DISCONTINUED] busPIRone (BUSPAR) 5 MG Tab Take 1 tablet (5 mg total) by mouth 2 (two) times daily. (Patient taking differently: Take 10 mg by mouth 2 (two) times daily. )    [DISCONTINUED] fish oil-omega-3 fatty acids 300-1,000 mg capsule Take 1 capsule by mouth 2 (two) times daily. (Patient taking differently: Take 1 capsule by mouth once daily. )     Family History     Problem Relation (Age of Onset)    Heart disease Mother        Tobacco  Use    Smoking status: Never Smoker    Smokeless tobacco: Never Used   Substance and Sexual Activity    Alcohol use: Never     Frequency: Never    Drug use: Never    Sexual activity: Not on file     Review of Systems   Constitutional: Negative for activity change, chills, fatigue and fever.   HENT: Negative for sinus pressure, sore throat and trouble swallowing.    Respiratory: Positive for chest tightness and shortness of breath. Negative for cough and wheezing.    Cardiovascular: Positive for palpitations. Negative for chest pain.   Gastrointestinal: Negative for abdominal pain, constipation, diarrhea, nausea and vomiting.   Genitourinary: Negative for dysuria and frequency.   Musculoskeletal: Negative for back pain and neck pain.   Neurological: Positive for dizziness and light-headedness. Negative for syncope and weakness.   Psychiatric/Behavioral: Negative for confusion and decreased concentration.     Objective:     Vital Signs (Most Recent):  Temp: 98.5 °F (36.9 °C) (11/04/19 1303)  Pulse: (!) 120 (11/04/19 1514)  Resp: (!) 22 (11/04/19 1514)  BP: (!) 114/56 (11/04/19 1514)  SpO2: 95 % (11/04/19 1514) Vital Signs (24h Range):  Temp:  [98.5 °F (36.9 °C)] 98.5 °F (36.9 °C)  Pulse:  [120-131] 120  Resp:  [20-22] 22  SpO2:  [95 %-100 %] 95 %  BP: (114-127)/(56-88) 114/56     Weight: 74.4 kg (164 lb)  Body mass index is 30.99 kg/m².    Physical Exam   Constitutional: She is oriented to person, place, and time. She appears well-developed and well-nourished.   Eyes: Conjunctivae are normal. No scleral icterus.   Sclera non icteric   Neck: Normal range of motion. Neck supple.   Cardiovascular: Normal rate.   Irregular irregular rate 120s   Pulmonary/Chest: Effort normal and breath sounds normal.   Abdominal: Soft. Bowel sounds are normal. She exhibits no distension. There is no tenderness.   Musculoskeletal: Normal range of motion. She exhibits no edema.   Neurological: She is alert and oriented to person,  place, and time.   Patient is alert oriented x3 motor exam is grossly nonfocal     Skin: Skin is warm. No rash noted.   Psychiatric: She has a normal mood and affect. Her behavior is normal.   Nursing note and vitals reviewed.          Significant Labs:   BMP:   Recent Labs   Lab 11/04/19  1344   *      K 3.7      CO2 25   BUN 27*   CREATININE 1.1   CALCIUM 9.3     CBC:   Recent Labs   Lab 11/04/19  1444   WBC 7.35   HGB 9.4*   HCT 30.2*        CMP:   Recent Labs   Lab 11/04/19  1344      K 3.7      CO2 25   *   BUN 27*   CREATININE 1.1   CALCIUM 9.3   PROT 7.4   ALBUMIN 4.3   BILITOT 0.8   ALKPHOS 55   AST 24   ALT 17   ANIONGAP 11   EGFRNONAA 45.3*     Troponin:   Recent Labs   Lab 11/04/19  1344   TROPONINI <0.030       Significant Imaging:  Chest x-ray shows no acute process see report  ECG read by me shows atrial fibrillation no acute    Assessment/Plan:     * Paroxysmal atrial fibrillation  Atrial fibrillation with rapid ventricular response  Patient with a history of paroxysmal atrial fibrillation.  She takes Eliquis as an outpatient.  Her rate has now improved with held high exam infusion.  Will continue and place patient on Cardio a and monitor.  Cardiology evaluation is pending  Rule out acute coronary syndrome      Coronary artery disease  Patient with a history of underlying coronary disease details unknown  Check serial cardiac enzymes  Cardiac evaluation is pending      Essential hypertension  Will monitor      Hyperglycemia  Will check hemoglobin A1c      VTE Risk Mitigation (From admission, onward)         Ordered     apixaban tablet 2.5 mg  2 times daily      11/04/19 1607     Place RODGER hose  Until discontinued      11/04/19 1607     Place sequential compression device  Until discontinued      11/04/19 1607     IP VTE HIGH RISK PATIENT  Once      11/04/19 1607                   Bethel Messer DO  Department of Hospital Medicine   [facility]  [facility]

## 2019-11-04 NOTE — HPI
Patient is an 87-year-old female who states she is in her usual state of health until earlier today.  At that time patient noticed palpitations and associated dizziness.  Pain denies chest pain or shortness of breath.  Patient states she did have slight chest pressure but thought it was associated with her shortness of breath.  Patient denies abdominal pain nausea vomiting urinary complaints or bowel changes.  Patient came the emergency room via EMS and was found to be in atrial fibrillation with a rate between 130 and 140s.  She was given 10 mg of diltiazem and started on a diltiazem drip.  Her current heart rate is approximately 120s.  Her blood pressure has remained stable.  Patient currently denies chest pain

## 2019-11-04 NOTE — ASSESSMENT & PLAN NOTE
Atrial fibrillation with rapid ventricular response  Patient with a history of paroxysmal atrial fibrillation.  She takes Eliquis as an outpatient.  Her rate has now improved with held high exam infusion.  Will continue and place patient on Cardio a and monitor.  Cardiology evaluation is pending  Rule out acute coronary syndrome

## 2019-11-04 NOTE — SUBJECTIVE & OBJECTIVE
Past Medical History:   Diagnosis Date    Cancer     Cataract     Diabetes mellitus, type 2     Heart murmur     Hypertension     Occasional tremors        Past Surgical History:   Procedure Laterality Date    ADENOIDECTOMY      Gall bladder revmoval      HYSTERECTOMY      left mastectomy Left 1945    TONSILLECTOMY         Review of patient's allergies indicates:   Allergen Reactions    Penicillin g benzathine Other (See Comments)       No current facility-administered medications on file prior to encounter.      Current Outpatient Medications on File Prior to Encounter   Medication Sig    allopurinol (ZYLOPRIM) 100 MG tablet Take 1 tablet (100 mg total) by mouth once daily.    amLODIPine (NORVASC) 10 MG tablet Take 1 tablet (10 mg total) by mouth once daily.    apixaban (ELIQUIS) 2.5 mg Tab Take 1 tablet (2.5 mg total) by mouth 2 (two) times daily.    busPIRone (BUSPAR) 5 MG Tab Take 10 mg by mouth 2 (two) times daily.    cholecalciferol, vitamin D3, (VITAMIN D3) 1,000 unit capsule Take 1 capsule (1,000 Units total) by mouth once daily.    CRESTOR 20 mg tablet Take 1 tablet (20 mg total) by mouth once daily.    fish oil-omega-3 fatty acids 300-1,000 mg capsule Take 1 capsule by mouth once daily.    hydroCHLOROthiazide (HYDRODIURIL) 12.5 MG Tab Take 1 tablet (12.5 mg total) by mouth once daily.    loratadine (CLARITIN) 10 mg tablet Take 1 tablet (10 mg total) by mouth once daily.    losartan (COZAAR) 100 MG tablet Take 1 tablet (100 mg total) by mouth once daily.    magnesium 250 mg Tab Take 1 tablet (250 mg total) by mouth once daily.    omeprazole (PRILOSEC) 20 MG capsule Take 1 capsule (20 mg total) by mouth once daily.    vit C/E/Zn/coppr/lutein/zeaxan (VISION FORMULA-2 ORAL) Take 1 tablet by mouth 2 (two) times daily.     ferrous sulfate (FEOSOL) 325 mg (65 mg iron) Tab tablet Take 1 tablet (325 mg total) by mouth once daily.    fluticasone propionate (FLONASE) 50 mcg/actuation nasal  spray 1 spray (50 mcg total) by Each Nostril route 2 (two) times daily.    LORazepam (ATIVAN) 1 MG tablet Take 1 tablet (1 mg total) by mouth 2 (two) times daily.    ondansetron (ZOFRAN-ODT) 8 MG TbDL Take 1 tablet (8 mg total) by mouth every 6 (six) hours as needed.    [DISCONTINUED] busPIRone (BUSPAR) 5 MG Tab Take 1 tablet (5 mg total) by mouth 2 (two) times daily. (Patient taking differently: Take 10 mg by mouth 2 (two) times daily. )    [DISCONTINUED] fish oil-omega-3 fatty acids 300-1,000 mg capsule Take 1 capsule by mouth 2 (two) times daily. (Patient taking differently: Take 1 capsule by mouth once daily. )     Family History     Problem Relation (Age of Onset)    Heart disease Mother        Tobacco Use    Smoking status: Never Smoker    Smokeless tobacco: Never Used   Substance and Sexual Activity    Alcohol use: Never     Frequency: Never    Drug use: Never    Sexual activity: Not on file     Review of Systems   Constitutional: Negative for activity change, chills, fatigue and fever.   HENT: Negative for sinus pressure, sore throat and trouble swallowing.    Respiratory: Positive for chest tightness and shortness of breath. Negative for cough and wheezing.    Cardiovascular: Positive for palpitations. Negative for chest pain.   Gastrointestinal: Negative for abdominal pain, constipation, diarrhea, nausea and vomiting.   Genitourinary: Negative for dysuria and frequency.   Musculoskeletal: Negative for back pain and neck pain.   Neurological: Positive for dizziness and light-headedness. Negative for syncope and weakness.   Psychiatric/Behavioral: Negative for confusion and decreased concentration.     Objective:     Vital Signs (Most Recent):  Temp: 98.5 °F (36.9 °C) (11/04/19 1303)  Pulse: (!) 120 (11/04/19 1514)  Resp: (!) 22 (11/04/19 1514)  BP: (!) 114/56 (11/04/19 1514)  SpO2: 95 % (11/04/19 1514) Vital Signs (24h Range):  Temp:  [98.5 °F (36.9 °C)] 98.5 °F (36.9 °C)  Pulse:  [120-131]  120  Resp:  [20-22] 22  SpO2:  [95 %-100 %] 95 %  BP: (114-127)/(56-88) 114/56     Weight: 74.4 kg (164 lb)  Body mass index is 30.99 kg/m².    Physical Exam   Constitutional: She is oriented to person, place, and time. She appears well-developed and well-nourished.   Eyes: Conjunctivae are normal. No scleral icterus.   Sclera non icteric   Neck: Normal range of motion. Neck supple.   Cardiovascular: Normal rate.   Irregular irregular rate 120s   Pulmonary/Chest: Effort normal and breath sounds normal.   Abdominal: Soft. Bowel sounds are normal. She exhibits no distension. There is no tenderness.   Musculoskeletal: Normal range of motion. She exhibits no edema.   Neurological: She is alert and oriented to person, place, and time.   Patient is alert oriented x3 motor exam is grossly nonfocal     Skin: Skin is warm. No rash noted.   Psychiatric: She has a normal mood and affect. Her behavior is normal.   Nursing note and vitals reviewed.          Significant Labs:   BMP:   Recent Labs   Lab 11/04/19  1344   *      K 3.7      CO2 25   BUN 27*   CREATININE 1.1   CALCIUM 9.3     CBC:   Recent Labs   Lab 11/04/19  1444   WBC 7.35   HGB 9.4*   HCT 30.2*        CMP:   Recent Labs   Lab 11/04/19  1344      K 3.7      CO2 25   *   BUN 27*   CREATININE 1.1   CALCIUM 9.3   PROT 7.4   ALBUMIN 4.3   BILITOT 0.8   ALKPHOS 55   AST 24   ALT 17   ANIONGAP 11   EGFRNONAA 45.3*     Troponin:   Recent Labs   Lab 11/04/19  1344   TROPONINI <0.030       Significant Imaging:  Chest x-ray shows no acute process see report  ECG read by me shows atrial fibrillation no acute

## 2019-11-04 NOTE — ED PROVIDER NOTES
Encounter Date: 11/4/2019       History     Chief Complaint   Patient presents with    Palpitations     Dizziness     87-year-old female with history of non-insulin-dependent diabetes, coronary heart disease, CABG x4, tremors.  Patient presents to the emergency department with complaint of elevated heart rate palpitations chest pressure since this morning.  Patient upon EMS arrival was complaining about dizziness.  Per EMS patient found with elevated heart rate new onset atrial fibrillation with rates of 130 to 140s.  Patient denied any recent illness.  Denies any nausea, vomiting, abdominal pain, no active chest pain at this time.  Patient is followed by Dr. Klein cardiology.        Review of patient's allergies indicates:   Allergen Reactions    Penicillin g benzathine Other (See Comments)     Past Medical History:   Diagnosis Date    Cancer     Cataract     Diabetes mellitus, type 2     Heart murmur     Hypertension     Occasional tremors      Past Surgical History:   Procedure Laterality Date    ADENOIDECTOMY      Gall bladder revmoval      HYSTERECTOMY      left mastectomy Left 1945    TONSILLECTOMY       Family History   Problem Relation Age of Onset    Heart disease Mother      Social History     Tobacco Use    Smoking status: Never Smoker    Smokeless tobacco: Never Used   Substance Use Topics    Alcohol use: Never     Frequency: Never    Drug use: Never     Review of Systems   Constitutional: Negative for chills, fatigue and fever.   HENT: Negative for congestion, postnasal drip, rhinorrhea, sinus pain and trouble swallowing.    Eyes: Negative for photophobia and visual disturbance.   Respiratory: Positive for chest tightness. Negative for shortness of breath and wheezing.    Cardiovascular: Positive for chest pain and palpitations. Negative for leg swelling.   Gastrointestinal: Negative for abdominal pain, blood in stool, nausea and vomiting.   Endocrine: Negative for polydipsia,  polyphagia and polyuria.   Genitourinary: Negative for dysuria, flank pain, urgency, vaginal bleeding and vaginal discharge.   Musculoskeletal: Negative for back pain and gait problem.   Skin: Negative for rash.   Neurological: Positive for dizziness. Negative for tremors, weakness and numbness.   Hematological: Does not bruise/bleed easily.   Psychiatric/Behavioral: Negative for confusion.   All other systems reviewed and are negative.      Physical Exam     Initial Vitals [11/04/19 1303]   BP Pulse Resp Temp SpO2   127/88 (!) 131 20 98.5 °F (36.9 °C) 100 %      MAP       --         Physical Exam    Nursing note and vitals reviewed.  Constitutional: She appears well-developed and well-nourished.   HENT:   Head: Normocephalic and atraumatic.   Nose: Nose normal.   Mouth/Throat: Oropharynx is clear and moist.   Eyes: Conjunctivae and EOM are normal. Pupils are equal, round, and reactive to light.   Neck: Normal range of motion. Neck supple. No thyromegaly present. No tracheal deviation present.   Cardiovascular: Normal rate, normal heart sounds and intact distal pulses. Exam reveals no gallop and no friction rub.    No murmur heard.  Irregular irregular    Pulmonary/Chest: Breath sounds normal. No stridor. No respiratory distress. She has no wheezes. She has no rhonchi. She exhibits no tenderness.   Course breath sounds throughout   Abdominal: Soft. Bowel sounds are normal. She exhibits no mass. There is no rebound and no guarding.   Musculoskeletal: Normal range of motion. She exhibits no edema.   Lymphadenopathy:     She has no cervical adenopathy.   Neurological: She is alert and oriented to person, place, and time. She has normal strength and normal reflexes. GCS score is 15. GCS eye subscore is 4. GCS verbal subscore is 5. GCS motor subscore is 6.   Skin: Skin is warm and dry. Capillary refill takes less than 2 seconds.   Psychiatric: She has a normal mood and affect.         ED Course   Procedures  Labs  Reviewed   CBC W/ AUTO DIFFERENTIAL   COMPREHENSIVE METABOLIC PANEL   TROPONIN I   TROPONIN I   B-TYPE NATRIURETIC PEPTIDE   TSH     EKG Readings: (Independently Interpreted)   Initial Reading: No STEMI. Rhythm: Atrial Fibrillation. Ectopy: No Ectopy.   Atrial fibrillation with RVR rate 129       Imaging Results          X-Ray Chest AP Portable (In process)                  Medical Decision Making:   Initial Assessment:   87-year-old female with complaint of palpitations, dizziness, midsternal chest pressure since this morning found with elevated heart rate.  Differential Diagnosis:   New onset atrial fibrillation, arrhythmia, supraventricular tachycardia, dehydration, electrolyte abnormality  Clinical Tests:   Lab Tests: Ordered and Reviewed  Radiological Study: Ordered and Reviewed  Medical Tests: Ordered and Reviewed              Attending Attestation:         Attending Critical Care:   Critical Care Times:   Direct Patient Care (initial evaluation, reassessments, and time considering the case)................................................................20 minutes.   Additional History from reviewing old medical records or taking additional history from the family, EMS, PCP, etc.......................10 minutes.   Ordering, Reviewing, and Interpreting Diagnostic Studies...............................................................................................................10 minutes.   Documentation..................................................................................................................................................................................10 minutes.   Consultation with other Physicians. .................................................................................................................................................10 minutes.   ==============================================================  · Total Critical Care Time - exclusive of procedural time: 60  minutes.  ==============================================================  Critical care was necessary to treat or prevent imminent or life-threatening deterioration of the following conditions: cardiac arrhythmia.   Critical care was time spent personally by me on the following activities: obtaining history from patient or relative, examination of patient, review of x-rays / CT sent with the patient, review of old charts, ordering lab, x-rays, and/or EKG, development of treatment plan with patient or relative, ordering and performing treatments and interventions, evaluation of patient's response to treatment, discussions with primary provider, discussion with consultants, interpretation of cardiac measurements and re-evaluation of patient's conition.   Critical Care Condition: life-threatening                  Clinical Impression:       ICD-10-CM ICD-9-CM   1. Atrial fibrillation with rapid ventricular response I48.91 427.31   2. Chest pain R07.9 786.50                                Roel Goldman MD  11/04/19 1553

## 2019-11-05 ENCOUNTER — CLINICAL SUPPORT (OUTPATIENT)
Dept: CARDIOLOGY | Facility: HOSPITAL | Age: 84
End: 2019-11-05
Attending: HOSPITALIST
Payer: MEDICARE

## 2019-11-05 VITALS
SYSTOLIC BLOOD PRESSURE: 117 MMHG | HEART RATE: 55 BPM | BODY MASS INDEX: 31.97 KG/M2 | RESPIRATION RATE: 16 BRPM | OXYGEN SATURATION: 99 % | TEMPERATURE: 98 F | WEIGHT: 169.31 LBS | DIASTOLIC BLOOD PRESSURE: 56 MMHG | HEIGHT: 61 IN

## 2019-11-05 PROBLEM — R73.9 HYPERGLYCEMIA: Status: RESOLVED | Noted: 2019-11-04 | Resolved: 2019-11-05

## 2019-11-05 LAB
ANION GAP SERPL CALC-SCNC: 8 MMOL/L (ref 8–16)
BUN SERPL-MCNC: 27 MG/DL (ref 8–23)
CALCIUM SERPL-MCNC: 8.9 MG/DL (ref 8.7–10.5)
CHLORIDE SERPL-SCNC: 103 MMOL/L (ref 95–110)
CO2 SERPL-SCNC: 27 MMOL/L (ref 23–29)
CREAT SERPL-MCNC: 1.1 MG/DL (ref 0.5–1.4)
ERYTHROCYTE [DISTWIDTH] IN BLOOD BY AUTOMATED COUNT: 17.2 % (ref 11.5–14.5)
EST. GFR  (AFRICAN AMERICAN): 52.2 ML/MIN/1.73 M^2
EST. GFR  (NON AFRICAN AMERICAN): 45.3 ML/MIN/1.73 M^2
ESTIMATED AVG GLUCOSE: 154 MG/DL (ref 68–131)
GLUCOSE SERPL-MCNC: 147 MG/DL (ref 70–110)
HBA1C MFR BLD HPLC: 7 % (ref 4.5–6.2)
HCT VFR BLD AUTO: 29.4 % (ref 37–48.5)
HGB BLD-MCNC: 9 G/DL (ref 12–16)
MAGNESIUM SERPL-MCNC: 1.9 MG/DL (ref 1.6–2.6)
MCH RBC QN AUTO: 24 PG (ref 27–31)
MCHC RBC AUTO-ENTMCNC: 30.6 G/DL (ref 32–36)
MCV RBC AUTO: 78 FL (ref 82–98)
PLATELET # BLD AUTO: 239 K/UL (ref 150–350)
PMV BLD AUTO: 10.8 FL (ref 9.2–12.9)
POTASSIUM SERPL-SCNC: 4.2 MMOL/L (ref 3.5–5.1)
RBC # BLD AUTO: 3.75 M/UL (ref 4–5.4)
SODIUM SERPL-SCNC: 138 MMOL/L (ref 136–145)
WBC # BLD AUTO: 5.72 K/UL (ref 3.9–12.7)

## 2019-11-05 PROCEDURE — 25000003 PHARM REV CODE 250: Performed by: HOSPITALIST

## 2019-11-05 PROCEDURE — 80048 BASIC METABOLIC PNL TOTAL CA: CPT

## 2019-11-05 PROCEDURE — G0378 HOSPITAL OBSERVATION PER HR: HCPCS

## 2019-11-05 PROCEDURE — 93306 TTE W/DOPPLER COMPLETE: CPT

## 2019-11-05 PROCEDURE — 85027 COMPLETE CBC AUTOMATED: CPT

## 2019-11-05 PROCEDURE — 36415 COLL VENOUS BLD VENIPUNCTURE: CPT

## 2019-11-05 PROCEDURE — 83735 ASSAY OF MAGNESIUM: CPT

## 2019-11-05 PROCEDURE — 25000003 PHARM REV CODE 250: Performed by: NURSE PRACTITIONER

## 2019-11-05 RX ORDER — METOPROLOL TARTRATE 25 MG/1
12.5 TABLET ORAL 2 TIMES DAILY
Status: DISCONTINUED | OUTPATIENT
Start: 2019-11-05 | End: 2019-11-05 | Stop reason: HOSPADM

## 2019-11-05 RX ORDER — METOPROLOL TARTRATE 25 MG/1
12.5 TABLET, FILM COATED ORAL 2 TIMES DAILY
Qty: 30 TABLET | Refills: 0 | Status: SHIPPED | OUTPATIENT
Start: 2019-11-05 | End: 2020-08-11

## 2019-11-05 RX ADMIN — ALLOPURINOL 100 MG: 100 TABLET ORAL at 09:11

## 2019-11-05 RX ADMIN — APIXABAN 2.5 MG: 2.5 TABLET, FILM COATED ORAL at 09:11

## 2019-11-05 RX ADMIN — BUSPIRONE HYDROCHLORIDE 10 MG: 5 TABLET ORAL at 09:11

## 2019-11-05 RX ADMIN — Medication 1000 UNITS: at 09:11

## 2019-11-05 RX ADMIN — METOPROLOL TARTRATE 12.5 MG: 25 TABLET, FILM COATED ORAL at 10:11

## 2019-11-05 NOTE — PLAN OF CARE
Problem: Fall Injury Risk  Goal: Absence of Fall and Fall-Related Injury  Intervention: Promote Injury-Free Environment  Flowsheets (Taken 11/5/2019 0215)  Safety Promotion/Fall Prevention: bed alarm set; side rails raised x 2; Fall Risk reviewed with patient/family; nonskid shoes/socks when out of bed  Environmental Safety Modification: assistive device/personal items within reach; clutter free environment maintained     Problem: Fall Injury Risk  Goal: Absence of Fall and Fall-Related Injury  Intervention: Promote Injury-Free Environment  Flowsheets (Taken 11/5/2019 0215)  Safety Promotion/Fall Prevention: bed alarm set; side rails raised x 2; Fall Risk reviewed with patient/family; nonskid shoes/socks when out of bed  Environmental Safety Modification: assistive device/personal items within reach; clutter free environment maintained     Problem: Adult Inpatient Plan of Care  Goal: Plan of Care Review  Flowsheets (Taken 11/4/2019 1930)  Plan of Care Reviewed With: patient

## 2019-11-05 NOTE — HOSPITAL COURSE
During her short hospital stay patient converted back to sinus rhythm. She was evaluated by Cardiology who recommended adding 12.5 metoprolol b.i.d. and cleared her for discharge. Patient was already on anticoagulation.  She was advised to follow up with her cardiologist to further optimize her cardiac regimen.     Review of Systems   Constitutional: Negative for activity change, chills, fatigue and fever.   HENT: Negative for sinus pressure, sore throat and trouble swallowing.    Respiratory: Positive for chest tightness and shortness of breath. Negative for cough and wheezing.    Cardiovascular: Positive for palpitations. Negative for chest pain.   Gastrointestinal: Negative for abdominal pain, constipation, diarrhea, nausea and vomiting.   Genitourinary: Negative for dysuria and frequency.   Musculoskeletal: Negative for back pain and neck pain.   Neurological: Positive for dizziness and light-headedness. Negative for syncope and weakness.   Psychiatric/Behavioral: Negative for confusion and decreased concentration.       Physical Exam   Constitutional: She is oriented to person, place, and time. She appears well-developed and well-nourished.   Eyes: Conjunctivae are normal. No scleral icterus.   Sclera non icteric   Neck: Normal range of motion. Neck supple.   Cardiovascular: Normal ratE and rhythm  Pulmonary/Chest: Effort normal and breath sounds normal.   Abdominal: Soft. Bowel sounds are normal. She exhibits no distension. There is no tenderness.   Musculoskeletal: Normal range of motion. She exhibits no edema.   Neurological: She is alert and oriented to person, place, and time.   Patient is alert oriented x3 motor exam is grossly nonfocal

## 2019-11-05 NOTE — PLAN OF CARE
11/05/19 1225   MARTINEZ Message   Medicare Outpatient and Observation Notification regarding financial responsibility Given to patient/caregiver;Explained to patient/caregiver;Signed/date by patient/caregiver   Date MARTINEZ was signed 11/05/19   Time MARTINEZ was signed 1225     Pt stated she has difficulty signing due to tremors and provided verbal consent.

## 2019-11-05 NOTE — DISCHARGE SUMMARY
[facility]  Discharge Summary    DOS:11/05/2019      Patient Name: Rosanna Raymundo  MRN: [id number]  Admission Date: 11/4/2019  Hospital Length of Stay: 0 days  Discharge Date and Time:  11/05/2019 5:12 PM  Attending Physician: No att. providers found   Discharging Provider: Francisca Hernandez MD  Primary Care Provider: Aamir Rhodes MD      HPI:   Patient is an 87-year-old female who states she is in her usual state of health until earlier today.  At that time patient noticed palpitations and associated dizziness.  Pain denies chest pain or shortness of breath.  Patient states she did have slight chest pressure but thought it was associated with her shortness of breath.  Patient denies abdominal pain nausea vomiting urinary complaints or bowel changes.  Patient came the emergency room via EMS and was found to be in atrial fibrillation with a rate between 130 and 140s.  She was given 10 mg of diltiazem and started on a diltiazem drip.  Her current heart rate is approximately 120s.  Her blood pressure has remained stable.  Patient currently denies chest pain          * No surgery found *      Hospital Course:   During her short hospital stay patient converted back to sinus rhythm. She was evaluated by Cardiology who recommended adding 12.5 metoprolol b.i.d. and cleared her for discharge. Patient was already on anticoagulation.  She was advised to follow up with her cardiologist to further optimize her cardiac regimen.     Review of Systems   Constitutional: Negative for activity change, chills, fatigue and fever.   HENT: Negative for sinus pressure, sore throat and trouble swallowing.    Respiratory: Positive for chest tightness and shortness of breath. Negative for cough and wheezing.    Cardiovascular: Positive for palpitations. Negative for chest pain.   Gastrointestinal: Negative for abdominal pain, constipation, diarrhea, nausea and vomiting.   Genitourinary: Negative for  dysuria and frequency.   Musculoskeletal: Negative for back pain and neck pain.   Neurological: Positive for dizziness and light-headedness. Negative for syncope and weakness.   Psychiatric/Behavioral: Negative for confusion and decreased concentration.       Physical Exam   Constitutional: She is oriented to person, place, and time. She appears well-developed and well-nourished.   Eyes: Conjunctivae are normal. No scleral icterus.   Sclera non icteric   Neck: Normal range of motion. Neck supple.   Cardiovascular: Normal ratE and rhythm  Pulmonary/Chest: Effort normal and breath sounds normal.   Abdominal: Soft. Bowel sounds are normal. She exhibits no distension. There is no tenderness.   Musculoskeletal: Normal range of motion. She exhibits no edema.   Neurological: She is alert and oriented to person, place, and time.   Patient is alert oriented x3 motor exam is grossly nonfocal     Consults:   Consults (From admission, onward)        Status Ordering Provider     Inpatient consult to Cardiology  Once     Provider:  Rakesh Klein MD    Completed RACHELE HOFFMAN          No new Assessment & Plan notes have been filed under this hospital service since the last note was generated.  Service: Hospital Medicine    Final Active Diagnoses:    Diagnosis Date Noted POA    PRINCIPAL PROBLEM:  Paroxysmal atrial fibrillation [I48.0] 10/03/2019 Yes    Coronary artery disease [I25.10] 10/03/2019 Yes    Essential hypertension [I10] 10/03/2019 Yes      Problems Resolved During this Admission:    Diagnosis Date Noted Date Resolved POA    Hyperglycemia [R73.9] 11/04/2019 11/05/2019 Yes       Discharged Condition: good    Disposition: Home or Self Care    Follow Up:  Follow-up Information     Rakesh Klein MD In 1 week.    Specialties:  Cardiovascular Disease, Cardiology  Contact information:  [address] [place] [address]  [address]  [facility] [address]  [phone]                 Patient Instructions:      Diet Cardiac      Notify your health care provider if you experience any of the following:  persistent dizziness, light-headedness, or visual disturbances     Activity as tolerated       Significant Diagnostic Studies: Labs:   BMP:   Recent Labs   Lab 11/04/19  1344 11/05/19  0336   * 147*    138   K 3.7 4.2    103   CO2 25 27   BUN 27* 27*   CREATININE 1.1 1.1   CALCIUM 9.3 8.9   MG  --  1.9       Pending Diagnostic Studies:     Procedure Component Value Units Date/Time    EKG 12-lead [[id number]] Collected:  11/04/19 1442    Order Status:  Sent Lab Status:  In process Updated:  11/04/19 1656    Narrative:       Test Reason : I48.91,    Vent. Rate : 123 BPM     Atrial Rate : 122 BPM     P-R Int : 000 ms          QRS Dur : 106 ms      QT Int : 342 ms       P-R-T Axes : 000 032 164 degrees     QTc Int : 489 ms    Atrial fibrillation with rapid ventricular response  Septal infarct (cited on or before 04-NOV-2019)  Abnormal ECG  When compared with ECG of 04-NOV-2019 13:11,  Nonspecific T wave abnormality, worse in Lateral leads    Referred By: [email or web address]   SELF           Confirmed By:     Echo Color Flow Doppler? Yes; Bubble Contrast? No [[id number]]  (Abnormal) Resulted:  11/05/19 0923    Order Status:  Sent Lab Status:  In process Updated:  11/05/19 0923     BSA 1.82 m2      TDI SEPTAL 0.09 m/s      LV LATERAL E/E' RATIO 12.50 m/s      LV SEPTAL E/E' RATIO 11.11 m/s      AORTIC VALVE CUSP SEPERATION 0.79 cm      TDI LATERAL 0.08 m/s      PV PEAK VELOCITY 56.22 cm/s      LVIDD 5.21 cm      IVS 1.55 cm      PW 1.23 cm      Ao root annulus 3.32 cm      LVIDS 3.68 cm      FS 29 %      LV mass 307.33 g      LA size 4.42 cm      RVDD 309.00 cm      Left Ventricle Relative Wall Thickness 0.47 cm      AV mean gradient 11 mmHg      AV valve area 0.88 cm2      AV Velocity Ratio 29.26     AV index (prosthetic) 0.28     E/A ratio 3.23     Mean e' 0.09 m/s      E wave decelartion time 128.46 msec      IVRT 66.30 msec      LVOT  diameter 2.00 cm      LVOT area 3.1 cm2      LVOT peak michael 62.33 m/s      LVOT peak VTI 16.66 cm      Ao peak michael 2.13 m/s      Ao VTI 59.47 cm      LVOT stroke volume 52.31 cm3      AV peak gradient 18 mmHg      E/E' ratio 11.76 m/s      MV Peak E Michael 1.00 m/s      TR Max Michael 2.49 m/s      MV Peak A Michael 0.31 m/s      LV Systolic Volume 32.74 mL      LV Systolic Volume Index 18.6 mL/m2      LV Diastolic Volume 89.72 mL      LV Diastolic Volume Index 50.98 mL/m2      LV Mass Index 175 g/m2      Triscuspid Valve Regurgitation Peak Gradient 25 mmHg     Narrative:       · Concentric left ventricular hypertrophy.       Impression:                Medications:  Reconciled Home Medications:      Medication List      START taking these medications    metoprolol tartrate 25 MG tablet  Commonly known as:  LOPRESSOR  Take 0.5 tablets (12.5 mg total) by mouth 2 (two) times daily.        CONTINUE taking these medications    allopurinol 100 MG tablet  Commonly known as:  ZYLOPRIM  Take 1 tablet (100 mg total) by mouth once daily.     amLODIPine 10 MG tablet  Commonly known as:  NORVASC  Take 1 tablet (10 mg total) by mouth once daily.     apixaban 2.5 mg Tab  Commonly known as:  ELIQUIS  Take 1 tablet (2.5 mg total) by mouth 2 (two) times daily.     busPIRone 5 MG Tab  Commonly known as:  BUSPAR  Take 10 mg by mouth 2 (two) times daily.     cholecalciferol (vitamin D3) 1,000 unit capsule  Commonly known as:  VITAMIN D3  Take 1 capsule (1,000 Units total) by mouth once daily.     CRESTOR 20 MG tablet  Generic drug:  rosuvastatin  Take 1 tablet (20 mg total) by mouth once daily.     fish oil-omega-3 fatty acids 300-1,000 mg capsule  Take 1 capsule by mouth once daily.     fluticasone propionate 50 mcg/actuation nasal spray  Commonly known as:  FLONASE  1 spray (50 mcg total) by Each Nostril route 2 (two) times daily.     hydroCHLOROthiazide 12.5 MG Tab  Commonly known as:  HYDRODIURIL  Take 1 tablet (12.5 mg total) by mouth once  daily.     loratadine 10 mg tablet  Commonly known as:  CLARITIN  Take 1 tablet (10 mg total) by mouth once daily.     LORazepam 1 MG tablet  Commonly known as:  ATIVAN  Take 1 tablet (1 mg total) by mouth 2 (two) times daily.     losartan 100 MG tablet  Commonly known as:  COZAAR  Take 1 tablet (100 mg total) by mouth once daily.     magnesium 250 mg Tab  Take 1 tablet (250 mg total) by mouth once daily.     omeprazole 20 MG capsule  Commonly known as:  PRILOSEC  Take 1 capsule (20 mg total) by mouth once daily.     ondansetron 8 MG Tbdl  Commonly known as:  ZOFRAN-ODT  Take 1 tablet (8 mg total) by mouth every 6 (six) hours as needed.     VISION FORMULA-2 ORAL  Take 1 tablet by mouth 2 (two) times daily.            Indwelling Lines/Drains at time of discharge:   Lines/Drains/Airways     None                 Time spent on the discharge of patient: 27 minutes  Patient was seen and examined on the date of discharge and determined to be suitable for discharge.         Francisca Hernandez MD  Department of Hospital Medicine  [facility]

## 2019-11-05 NOTE — PLAN OF CARE
Problem: Adult Inpatient Plan of Care  Goal: Plan of Care Review  Flowsheets (Taken 11/4/2019 1930)  Plan of Care Reviewed With: patient

## 2019-11-05 NOTE — PLAN OF CARE
11/05/19 1346   Final Note   Assessment Type Final Discharge Note   Anticipated Discharge Disposition Home   Patient with discharge orders for today to home. No CM/DC planning needs noted.

## 2019-11-05 NOTE — CONSULTS
"[facility]  Cardiology  Consult Note    Patient Name: Rosanna Raymundo  MRN: [id number]  Admission Date: 11/4/2019  Hospital Length of Stay: 0 days  Code Status: Full Code   Attending Provider: Bethel Messer DO   Consulting Provider: Breanna Aldridge NP  Primary Care Physician: Aamir Rhodes MD  Principal Problem:Paroxysmal atrial fibrillation    Patient information was obtained from patient, past medical records and ER records.     Inpatient consult to Cardiology  Consult performed by: Rakesh Klein MD  Consult ordered by: Bethel Messer DO        Subjective:     REASON FOR CONSULT:  Atrial fibrillation     HPI:  Ms. Raymundo is an 87 year old female with past medical history significant for paroxysmal atrial fibrillation on Eliquis, CAD s/p CABG, DM, hypertension, dyslipidemia, anemia who presents for complaints of dizziness and palpitations. She states that she was in her usual state of health until yesterday morning. Around 0700, she felt like she has "vertigo." She attempted to carry out her usual activities and began to feel sensation of heart pounding and racing. She decided to proceed to the  ER where she was found to be in atrial fibrillation with RVR. She was given Cardizem drip in the ER and started on metoprolol. She converted to NSR overnight around 0230 last night and Cardizem drip remains off. She denies any chest pain or shortness of breath. She does not routinely feel palpitations, dizziness, or lightheadedness. Denies any syncopal episodes. Denies any bleeding issues. Denies any recent illness. Denies abdominal pain or dysuria. She has keeping up with her exercise routine by walking on the treadmill for 5-15 minutes several times a week.     Past Medical History:   Diagnosis Date    Anticoagulant long-term use     Cancer     Cataract     CHF (congestive heart failure)     Diabetes mellitus, type 2     Heart murmur     Hypertension     Occasional tremors  "       Past Surgical History:   Procedure Laterality Date    ADENOIDECTOMY      Gall bladder revmoval      HYSTERECTOMY      left mastectomy Left 1945    MASTECTOMY Left     OR CABG, ARTERY-VEIN, FOUR  2014    Coronary Artery Bypass, 4    TONSILLECTOMY         Review of patient's allergies indicates:   Allergen Reactions    Penicillin g benzathine Other (See Comments)       No current facility-administered medications on file prior to encounter.      Current Outpatient Medications on File Prior to Encounter   Medication Sig    allopurinol (ZYLOPRIM) 100 MG tablet Take 1 tablet (100 mg total) by mouth once daily.    amLODIPine (NORVASC) 10 MG tablet Take 1 tablet (10 mg total) by mouth once daily.    apixaban (ELIQUIS) 2.5 mg Tab Take 1 tablet (2.5 mg total) by mouth 2 (two) times daily.    busPIRone (BUSPAR) 5 MG Tab Take 10 mg by mouth 2 (two) times daily.    cholecalciferol, vitamin D3, (VITAMIN D3) 1,000 unit capsule Take 1 capsule (1,000 Units total) by mouth once daily.    CRESTOR 20 mg tablet Take 1 tablet (20 mg total) by mouth once daily.    fish oil-omega-3 fatty acids 300-1,000 mg capsule Take 1 capsule by mouth once daily.    hydroCHLOROthiazide (HYDRODIURIL) 12.5 MG Tab Take 1 tablet (12.5 mg total) by mouth once daily.    loratadine (CLARITIN) 10 mg tablet Take 1 tablet (10 mg total) by mouth once daily.    losartan (COZAAR) 100 MG tablet Take 1 tablet (100 mg total) by mouth once daily.    magnesium 250 mg Tab Take 1 tablet (250 mg total) by mouth once daily.    omeprazole (PRILOSEC) 20 MG capsule Take 1 capsule (20 mg total) by mouth once daily.    vit C/E/Zn/coppr/lutein/zeaxan (VISION FORMULA-2 ORAL) Take 1 tablet by mouth 2 (two) times daily.     fluticasone propionate (FLONASE) 50 mcg/actuation nasal spray 1 spray (50 mcg total) by Each Nostril route 2 (two) times daily.    LORazepam (ATIVAN) 1 MG tablet Take 1 tablet (1 mg total) by mouth 2 (two) times daily.    ondansetron  (ZOFRAN-ODT) 8 MG TbDL Take 1 tablet (8 mg total) by mouth every 6 (six) hours as needed.       Scheduled Meds:   allopurinol  100 mg Oral Daily    apixaban  2.5 mg Oral BID    busPIRone  10 mg Oral BID    metoprolol tartrate  25 mg Oral BID    rosuvastatin  20 mg Oral QHS    vitamin D  1,000 Units Oral Daily     Continuous Infusions:   dilTIAZem 5 mg/hr (11/04/19 2330)     PRN Meds:.acetaminophen, acetaminophen, dextrose 50%, dextrose 50%, glucagon (human recombinant), glucose, glucose, magnesium oxide, magnesium oxide, ondansetron, potassium chloride 10%, potassium chloride 10%, potassium chloride 10%, potassium, sodium phosphates, potassium, sodium phosphates, potassium, sodium phosphates, ramelteon, sodium chloride 0.9%    Family History     Problem Relation (Age of Onset)    Heart disease Mother          Tobacco Use    Smoking status: Never Smoker    Smokeless tobacco: Never Used   Substance and Sexual Activity    Alcohol use: Never     Frequency: Never    Drug use: Never    Sexual activity: Not on file       ROS   No significant headaches or sore throat or runny nose.   No recent changes in vision.   No recent changes in hearing.  No dysphagia or odynophagia.  Denies chest pain and shortness of breath at baseline. +dizziness, + palpitations   Denies any cough or hemoptysis.   Denies any abdominal pain, nausea, vomiting, diarrhea or constipation.   Denies any dysuria or polyuria.   Denies any fevers or chills.   Denies any recent significant weight changes.   Denies bleeding diathesis    Objective:     Vital Signs (Most Recent):  Temp: 97.8 °F (36.6 °C) (11/05/19 0414)  Pulse: 75 (11/05/19 0010)  Resp: 19 (11/05/19 0010)  BP: (!) 115/57 (11/05/19 0414)  SpO2: 98 % (11/05/19 0414) Vital Signs (24h Range):  Temp:  [97.8 °F (36.6 °C)-98.5 °F (36.9 °C)] 97.8 °F (36.6 °C)  Pulse:  [] 75  Resp:  [12-47] 19  SpO2:  [92 %-100 %] 98 %  BP: ()/(49-88) 115/57     Weight: 76.8 kg (169 lb 5 oz)  Body  mass index is 31.99 kg/m².    SpO2: 98 %  O2 Device (Oxygen Therapy): nasal cannula      Intake/Output Summary (Last 24 hours) at 11/5/2019 0911  Last data filed at 11/5/2019 0400  Gross per 24 hour   Intake 240 ml   Output --   Net 240 ml       Lines/Drains/Airways     Peripheral Intravenous Line                 Peripheral IV - Single Lumen 11/04/19 2100 Anterior;Proximal;Right Forearm less than 1 day                Physical Exam  HEENT: Normocephalic, atraumatic, PERRL, Conjunctiva pink, no scleral icterus.   CVS: S1S2+, RRR, +SM, rubs or gallops, JVP: Normal.  LUNGS: Clear  ABDOMEN: Soft, NT, BS+  EXTREMITIES: No cyanosis, clubbing or edema  NEURO: AAO X 3.       Significant Labs:   BMP:   Recent Labs   Lab 11/04/19  1344 11/05/19  0336   * 147*    138   K 3.7 4.2    103   CO2 25 27   BUN 27* 27*   CREATININE 1.1 1.1   CALCIUM 9.3 8.9   MG  --  1.9   , CMP   Recent Labs   Lab 11/04/19  1344 11/05/19 0336    138   K 3.7 4.2    103   CO2 25 27   * 147*   BUN 27* 27*   CREATININE 1.1 1.1   CALCIUM 9.3 8.9   PROT 7.4  --    ALBUMIN 4.3  --    BILITOT 0.8  --    ALKPHOS 55  --    AST 24  --    ALT 17  --    ANIONGAP 11 8   ESTGFRAFRICA 52.2* 52.2*   EGFRNONAA 45.3* 45.3*   , CBC   Recent Labs   Lab 11/04/19  1444 11/05/19  0337   WBC 7.35 5.72   HGB 9.4* 9.0*   HCT 30.2* 29.4*    239   , INR   Recent Labs   Lab 11/04/19 2034   INR 1.1   , Lipid Panel No results for input(s): CHOL, HDL, LDLCALC, TRIG, CHOLHDL in the last 48 hours., Troponin   Recent Labs   Lab 11/04/19  1344 11/04/19 2034 11/04/19 2212   TROPONINI <0.030 0.031 0.032   , All pertinent lab results from the last 24 hours have been reviewed. and   Recent Lab Results       11/05/19  0337   11/05/19  0336   11/04/19  2212   11/04/19 2034 11/04/19  1444        Albumin               Alkaline Phosphatase               ALT               Anion Gap   8           aPTT       34.6  Comment:  Therapeutic Range of  67.5-105.1 secs.  Equivalent to Heparin Concentration of anti-Xa 0.3-0.7 IU/ml.         AST               Baso #         0.03     Basophil%         0.4     BILIRUBIN TOTAL               BNP         265  Comment:  Values of less than 100 pg/ml are consistent with non-CHF populations.     BUN, Bld   27           Calcium   8.9           Chloride   103           CO2   27           Creatinine   1.1           Differential Method         Automated     eGFR if    52.2           eGFR if non    45.3  Comment:  Calculation used to obtain the estimated glomerular filtration  rate (eGFR) is the CKD-EPI equation.              Eos #         0.1     Eosinophil%         1.5     Glucose   147           Gran # (ANC)         5.4     Gran%         73.1     Hematocrit 29.4       30.2     Hemoglobin 9.0       9.4     Immature Grans (Abs)         0.03  Comment:  Mild elevation in immature granulocytes is non specific and   can be seen in a variety of conditions including stress response,   acute inflammation, trauma and pregnancy. Correlation with other   laboratory and clinical findings is essential.       Immature Granulocytes         0.4     Coumadin Monitoring INR       1.1  Comment:  Coumadin Therapy:  INR: 2.0-3.0 conventional anticoagulation  INR: 2.5-3.5 intensive anticoagulation         Lymph #         1.2     Lymph%         16.6     Magnesium   1.9           MCH 24.0       24.2     MCHC 30.6       31.1     MCV 78       78     Mono #         0.6     Mono%         8.0     MPV 10.8       10.8     nRBC         0     Platelets 239       233     Potassium   4.2           PROTEIN TOTAL               PT       13.7       RBC 3.75       3.89     RDW 17.2       17.0     Sodium   138           Troponin I     0.032 0.031       TSH               WBC 5.72       7.35                      11/04/19  1344        Albumin 4.3     Alkaline Phosphatase 55     ALT 17     Anion Gap 11     aPTT       AST 24     Baso #        Basophil%       BILIRUBIN TOTAL 0.8  Comment:  For infants and newborns, interpretation of results should be based  on gestational age, weight and in agreement with clinical  observations.  Premature Infant recommended reference ranges:  Up to 24 hours.............<8.0 mg/dL  Up to 48 hours............<12.0 mg/dL  3-5 days..................<15.0 mg/dL  6-29 days.................<15.0 mg/dL       BNP       BUN, Bld 27     Calcium 9.3     Chloride 102     CO2 25     Creatinine 1.1     Differential Method       eGFR if African American 52.2     eGFR if non  45.3  Comment:  Calculation used to obtain the estimated glomerular filtration  rate (eGFR) is the CKD-EPI equation.        Eos #       Eosinophil%       Glucose 166     Gran # (ANC)       Gran%       Hematocrit       Hemoglobin       Immature Grans (Abs)       Immature Granulocytes       Coumadin Monitoring INR       Lymph #       Lymph%       Magnesium       MCH       MCHC       MCV       Mono #       Mono%       MPV       nRBC       Platelets       Potassium 3.7     PROTEIN TOTAL 7.4     PT       RBC       RDW       Sodium 138     Troponin I <0.030     TSH 0.700     WBC             Significant Imaging: X-Ray: CXR: X-Ray Chest 1 View (CXR): No results found for this visit on 11/04/19.  EXAMINATION:  XR CHEST AP PORTABLE    CLINICAL HISTORY:  Chest pain    COMPARISON:  October 2017    FINDINGS:  The heart is borderline enlarged.  There has been previous median sternotomy.  The aortic arch is calcified.    No acute infiltrates or effusions are identified.  No acute osseous abnormality is demonstrated.  Chronic rotator cuff tear is noted on the right.      Impression       1. Chronic findings as discussed.  No acute radiographic abnormalities       Assessment and Plan:     IMPRESSION:    Atrial fibrillation with RVR. Paroxysmal. TSH WNL. Currently converted to NSR.   Bradycardia. Secondary to metoprolol and amiodarone in the past.   Aortic stenosis.  Mild. Aortic valve area 1.47 cm² on echocardiogram done on 4/3/2018.  CAD 99% pLAD, 60% mLAD, 90% pLCx, 90% dLCx,  mRCA. 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   Diabetes. Last HbA1C reportedly 6.2.   Hypertension - controlled.  Dyslipidemia  Anemia. Iron deficient   History of breat CA s/p  Left mastectomy     RECOMMENDATIONS:  1. Follow up on echo  2. Decrease metoprolol to 12.5 mg PO BID in view of bradycardia.  3. Ambulate patient and assess.      Thank you for your consult. I will follow-up with patient. Please contact us if you have any additional questions.    Breanna Aldridge, CANDIE  Cardiology   [facility]    I have personally seen and examined the patient. I reviewed the notes, assessments, and/or procedures performed by Ms Breanna Aldridge, I concur with her documentation of Rosanna Raymundo.  Ok to discharge home from Cardiology stand point on home medications and add Lopressor 12.5 mg BID.   Follow up in clinic in 1-2 weeks or sooner if needed.

## 2019-11-05 NOTE — PROGRESS NOTES
Cardiac Rehab     Rosanna M Zackary   [id number]   11/5/2019         Cardiac Rehab Phase Taught: Phase 1    Teaching Method: Verbal    Handouts: None    Educational Videos: None    Understanding:  History of Previous Information Given, Knowledge indicated by feedback, Learning indicated by feedback and Verbalize understanding    Comments: pt in bed, education on CAD, irregular heart rhythm, cardiac rehab. Pt voiced understanding    Total time spent: 30mins             Michelle Bowen RN

## 2019-11-06 LAB
AORTIC ROOT ANNULUS: 3.32 CM
AORTIC VALVE CUSP SEPERATION: 0.79 CM
AV INDEX (PROSTH): 0.28
AV MEAN GRADIENT: 11 MMHG
AV PEAK GRADIENT: 18 MMHG
AV VALVE AREA: 0.88 CM2
AV VELOCITY RATIO: 29.26
BSA FOR ECHO PROCEDURE: 1.82 M2
CV ECHO LV RWT: 0.47 CM
DOP CALC AO PEAK VEL: 2.13 M/S
DOP CALC AO VTI: 59.47 CM
DOP CALC LVOT AREA: 3.1 CM2
DOP CALC LVOT DIAMETER: 2 CM
DOP CALC LVOT PEAK VEL: 62.33 M/S
DOP CALC LVOT STROKE VOLUME: 52.31 CM3
DOP CALCLVOT PEAK VEL VTI: 16.66 CM
E WAVE DECELERATION TIME: 128.46 MSEC
E/A RATIO: 3.23
E/E' RATIO: 11.76 M/S
ECHO LV POSTERIOR WALL: 1.23 CM (ref 0.6–1.1)
FRACTIONAL SHORTENING: 29 % (ref 28–44)
INTERVENTRICULAR SEPTUM: 1.55 CM (ref 0.6–1.1)
IVRT: 66.3 MSEC
LEFT ATRIUM SIZE: 4.42 CM
LEFT INTERNAL DIMENSION IN SYSTOLE: 3.68 CM (ref 2.1–4)
LEFT VENTRICLE DIASTOLIC VOLUME INDEX: 50.98 ML/M2
LEFT VENTRICLE DIASTOLIC VOLUME: 89.72 ML
LEFT VENTRICLE MASS INDEX: 175 G/M2
LEFT VENTRICLE SYSTOLIC VOLUME INDEX: 18.6 ML/M2
LEFT VENTRICLE SYSTOLIC VOLUME: 32.74 ML
LEFT VENTRICULAR INTERNAL DIMENSION IN DIASTOLE: 5.21 CM (ref 3.5–6)
LEFT VENTRICULAR MASS: 307.33 G
LV LATERAL E/E' RATIO: 12.5 M/S
LV SEPTAL E/E' RATIO: 11.11 M/S
MV PEAK A VEL: 0.31 M/S
MV PEAK E VEL: 1 M/S
PISA TR MAX VEL: 2.49 M/S
PV PEAK VELOCITY: 56.22 CM/S
RIGHT VENTRICULAR END-DIASTOLIC DIMENSION: 309 CM
TDI LATERAL: 0.08 M/S
TDI SEPTAL: 0.09 M/S
TDI: 0.09 M/S
TR MAX PG: 25 MMHG

## 2019-11-07 ENCOUNTER — TELEPHONE (OUTPATIENT)
Dept: FAMILY MEDICINE | Facility: CLINIC | Age: 84
End: 2019-11-07

## 2019-11-07 NOTE — TELEPHONE ENCOUNTER
----- Message from Karlie Penn sent at 11/7/2019 11:29 AM CST -----  Pt wants to let Dr. Rhodes know that she was in the hospital Monday. Pt had A Fib. Dr. Klein was the cardiologist.. Pt #[phone]

## 2019-12-14 ENCOUNTER — HOSPITAL ENCOUNTER (INPATIENT)
Facility: HOSPITAL | Age: 84
LOS: 2 days | Discharge: HOME | DRG: 310 | End: 2019-12-18
Attending: EMERGENCY MEDICINE | Admitting: INTERNAL MEDICINE
Payer: MEDICARE

## 2019-12-14 DIAGNOSIS — K21.9 GASTROESOPHAGEAL REFLUX DISEASE WITHOUT ESOPHAGITIS: ICD-10-CM

## 2019-12-14 DIAGNOSIS — I48.91 ATRIAL FIBRILLATION WITH RAPID VENTRICULAR RESPONSE: Primary | ICD-10-CM

## 2019-12-14 DIAGNOSIS — R07.9 CHEST PAIN: ICD-10-CM

## 2019-12-14 DIAGNOSIS — I48.0 PAROXYSMAL ATRIAL FIBRILLATION: ICD-10-CM

## 2019-12-14 DIAGNOSIS — I48.91 ATRIAL FIBRILLATION: ICD-10-CM

## 2019-12-14 DIAGNOSIS — K92.2 GASTROINTESTINAL HEMORRHAGE, UNSPECIFIED GASTROINTESTINAL HEMORRHAGE TYPE: ICD-10-CM

## 2019-12-14 DIAGNOSIS — I10 ESSENTIAL HYPERTENSION: ICD-10-CM

## 2019-12-14 DIAGNOSIS — I48.91 A-FIB: ICD-10-CM

## 2019-12-14 DIAGNOSIS — Z86.2 HISTORY OF ANEMIA: ICD-10-CM

## 2019-12-14 DIAGNOSIS — R00.1 BRADYCARDIA: ICD-10-CM

## 2019-12-14 LAB
ALBUMIN SERPL BCP-MCNC: 3.6 G/DL (ref 3.5–5.2)
ALP SERPL-CCNC: 45 U/L (ref 55–135)
ALT SERPL W/O P-5'-P-CCNC: 17 U/L (ref 10–44)
ANION GAP SERPL CALC-SCNC: 7 MMOL/L (ref 8–16)
APTT PPP: 25.9 SEC (ref 23.6–33.3)
AST SERPL-CCNC: 18 U/L (ref 10–40)
BASOPHILS # BLD AUTO: 0.04 K/UL (ref 0–0.2)
BASOPHILS NFR BLD: 0.6 % (ref 0–1.9)
BILIRUB SERPL-MCNC: 0.8 MG/DL (ref 0.1–1)
BILIRUB UR QL STRIP: NEGATIVE
BUN SERPL-MCNC: 27 MG/DL (ref 8–23)
CALCIUM SERPL-MCNC: 9.2 MG/DL (ref 8.7–10.5)
CHLORIDE SERPL-SCNC: 104 MMOL/L (ref 95–110)
CK MB SERPL-MCNC: 1.6 NG/ML (ref 0.1–6.5)
CK SERPL-CCNC: 64 U/L (ref 20–180)
CLARITY UR: CLEAR
CO2 SERPL-SCNC: 28 MMOL/L (ref 23–29)
COLOR UR: YELLOW
CREAT SERPL-MCNC: 0.9 MG/DL (ref 0.5–1.4)
DIFFERENTIAL METHOD: ABNORMAL
EOSINOPHIL # BLD AUTO: 0.2 K/UL (ref 0–0.5)
EOSINOPHIL NFR BLD: 2.2 % (ref 0–8)
ERYTHROCYTE [DISTWIDTH] IN BLOOD BY AUTOMATED COUNT: 18.3 % (ref 11.5–14.5)
EST. GFR  (AFRICAN AMERICAN): >60 ML/MIN/1.73 M^2
EST. GFR  (NON AFRICAN AMERICAN): 57.7 ML/MIN/1.73 M^2
FERRITIN SERPL-MCNC: 5 NG/ML (ref 20–300)
GLUCOSE SERPL-MCNC: 127 MG/DL (ref 70–110)
GLUCOSE SERPL-MCNC: 151 MG/DL (ref 70–110)
GLUCOSE SERPL-MCNC: 182 MG/DL (ref 70–110)
GLUCOSE UR QL STRIP: NEGATIVE
HCT VFR BLD AUTO: 27.7 % (ref 37–48.5)
HGB BLD-MCNC: 8.3 G/DL (ref 12–16)
HGB UR QL STRIP: NEGATIVE
IMM GRANULOCYTES # BLD AUTO: 0.03 K/UL (ref 0–0.04)
IMM GRANULOCYTES NFR BLD AUTO: 0.4 % (ref 0–0.5)
INR PPP: 1.4
IRON SERPL-MCNC: 18 UG/DL (ref 30–160)
KETONES UR QL STRIP: NEGATIVE
LEUKOCYTE ESTERASE UR QL STRIP: NEGATIVE
LYMPHOCYTES # BLD AUTO: 1.1 K/UL (ref 1–4.8)
LYMPHOCYTES NFR BLD: 15 % (ref 18–48)
MAGNESIUM SERPL-MCNC: 1.4 MG/DL (ref 1.6–2.6)
MCH RBC QN AUTO: 21.8 PG (ref 27–31)
MCHC RBC AUTO-ENTMCNC: 30 G/DL (ref 32–36)
MCV RBC AUTO: 73 FL (ref 82–98)
MONOCYTES # BLD AUTO: 0.6 K/UL (ref 0.3–1)
MONOCYTES NFR BLD: 7.9 % (ref 4–15)
NEUTROPHILS # BLD AUTO: 5.4 K/UL (ref 1.8–7.7)
NEUTROPHILS NFR BLD: 73.9 % (ref 38–73)
NITRITE UR QL STRIP: NEGATIVE
NRBC BLD-RTO: 0 /100 WBC
PH UR STRIP: 7 [PH] (ref 5–8)
PLATELET # BLD AUTO: 249 K/UL (ref 150–350)
PMV BLD AUTO: 11.3 FL (ref 9.2–12.9)
POTASSIUM SERPL-SCNC: 3.6 MMOL/L (ref 3.5–5.1)
PROT SERPL-MCNC: 6.4 G/DL (ref 6–8.4)
PROT UR QL STRIP: NEGATIVE
PROTHROMBIN TIME: 16.5 SEC (ref 10.6–14.8)
RBC # BLD AUTO: 3.8 M/UL (ref 4–5.4)
RETICS/RBC NFR AUTO: 1.3 % (ref 0.5–2.5)
SATURATED IRON: 4 % (ref 20–50)
SODIUM SERPL-SCNC: 139 MMOL/L (ref 136–145)
SP GR UR STRIP: 1.01 (ref 1–1.03)
TOTAL IRON BINDING CAPACITY: 423 UG/DL (ref 250–450)
TRANSFERRIN SERPL-MCNC: 302 MG/DL (ref 200–375)
TROPONIN I SERPL DL<=0.01 NG/ML-MCNC: <0.03 NG/ML (ref 0.02–0.04)
URN SPEC COLLECT METH UR: NORMAL
UROBILINOGEN UR STRIP-ACNC: NEGATIVE EU/DL
WBC # BLD AUTO: 7.25 K/UL (ref 3.9–12.7)

## 2019-12-14 PROCEDURE — 63600175 PHARM REV CODE 636 W HCPCS: Performed by: EMERGENCY MEDICINE

## 2019-12-14 PROCEDURE — 96366 THER/PROPH/DIAG IV INF ADDON: CPT

## 2019-12-14 PROCEDURE — G0378 HOSPITAL OBSERVATION PER HR: HCPCS

## 2019-12-14 PROCEDURE — 93005 ELECTROCARDIOGRAM TRACING: CPT

## 2019-12-14 PROCEDURE — 99285 EMERGENCY DEPT VISIT HI MDM: CPT | Mod: 25

## 2019-12-14 PROCEDURE — 96367 TX/PROPH/DG ADDL SEQ IV INF: CPT

## 2019-12-14 PROCEDURE — 63600175 PHARM REV CODE 636 W HCPCS: Performed by: INTERNAL MEDICINE

## 2019-12-14 PROCEDURE — 83735 ASSAY OF MAGNESIUM: CPT

## 2019-12-14 PROCEDURE — 82728 ASSAY OF FERRITIN: CPT

## 2019-12-14 PROCEDURE — C9113 INJ PANTOPRAZOLE SODIUM, VIA: HCPCS | Performed by: EMERGENCY MEDICINE

## 2019-12-14 PROCEDURE — 85730 THROMBOPLASTIN TIME PARTIAL: CPT

## 2019-12-14 PROCEDURE — 25000003 PHARM REV CODE 250: Performed by: EMERGENCY MEDICINE

## 2019-12-14 PROCEDURE — 25000003 PHARM REV CODE 250: Performed by: INTERNAL MEDICINE

## 2019-12-14 PROCEDURE — 82962 GLUCOSE BLOOD TEST: CPT

## 2019-12-14 PROCEDURE — 36415 COLL VENOUS BLD VENIPUNCTURE: CPT

## 2019-12-14 PROCEDURE — 85610 PROTHROMBIN TIME: CPT

## 2019-12-14 PROCEDURE — 51701 INSERT BLADDER CATHETER: CPT

## 2019-12-14 PROCEDURE — 84484 ASSAY OF TROPONIN QUANT: CPT

## 2019-12-14 PROCEDURE — 80053 COMPREHEN METABOLIC PANEL: CPT

## 2019-12-14 PROCEDURE — 85045 AUTOMATED RETICULOCYTE COUNT: CPT

## 2019-12-14 PROCEDURE — 81003 URINALYSIS AUTO W/O SCOPE: CPT

## 2019-12-14 PROCEDURE — 83036 HEMOGLOBIN GLYCOSYLATED A1C: CPT

## 2019-12-14 PROCEDURE — 82550 ASSAY OF CK (CPK): CPT

## 2019-12-14 PROCEDURE — 96365 THER/PROPH/DIAG IV INF INIT: CPT

## 2019-12-14 PROCEDURE — 96375 TX/PRO/DX INJ NEW DRUG ADDON: CPT

## 2019-12-14 PROCEDURE — 82553 CREATINE MB FRACTION: CPT

## 2019-12-14 PROCEDURE — 83540 ASSAY OF IRON: CPT

## 2019-12-14 PROCEDURE — 85025 COMPLETE CBC W/AUTO DIFF WBC: CPT

## 2019-12-14 RX ORDER — SODIUM CHLORIDE 0.9 % (FLUSH) 0.9 %
3 SYRINGE (ML) INJECTION EVERY 6 HOURS PRN
Status: DISCONTINUED | OUTPATIENT
Start: 2019-12-14 | End: 2019-12-18 | Stop reason: HOSPADM

## 2019-12-14 RX ORDER — LOSARTAN POTASSIUM 50 MG/1
100 TABLET ORAL DAILY
Status: DISCONTINUED | OUTPATIENT
Start: 2019-12-15 | End: 2019-12-18 | Stop reason: HOSPADM

## 2019-12-14 RX ORDER — HYDROCODONE BITARTRATE AND ACETAMINOPHEN 5; 325 MG/1; MG/1
1 TABLET ORAL EVERY 6 HOURS PRN
Status: DISCONTINUED | OUTPATIENT
Start: 2019-12-14 | End: 2019-12-18 | Stop reason: HOSPADM

## 2019-12-14 RX ORDER — PANTOPRAZOLE SODIUM 40 MG/10ML
80 INJECTION, POWDER, LYOPHILIZED, FOR SOLUTION INTRAVENOUS
Status: COMPLETED | OUTPATIENT
Start: 2019-12-14 | End: 2019-12-14

## 2019-12-14 RX ORDER — ALLOPURINOL 100 MG/1
100 TABLET ORAL DAILY
Status: DISCONTINUED | OUTPATIENT
Start: 2019-12-15 | End: 2019-12-18 | Stop reason: HOSPADM

## 2019-12-14 RX ORDER — PANTOPRAZOLE SODIUM 40 MG/1
40 TABLET, DELAYED RELEASE ORAL DAILY
Status: DISCONTINUED | OUTPATIENT
Start: 2019-12-15 | End: 2019-12-18 | Stop reason: HOSPADM

## 2019-12-14 RX ORDER — BUSPIRONE HYDROCHLORIDE 5 MG/1
10 TABLET ORAL 2 TIMES DAILY
Status: DISCONTINUED | OUTPATIENT
Start: 2019-12-14 | End: 2019-12-18 | Stop reason: HOSPADM

## 2019-12-14 RX ORDER — TALC
6 POWDER (GRAM) TOPICAL NIGHTLY
Status: DISCONTINUED | OUTPATIENT
Start: 2019-12-14 | End: 2019-12-18 | Stop reason: HOSPADM

## 2019-12-14 RX ORDER — LORAZEPAM 1 MG/1
1 TABLET ORAL 2 TIMES DAILY
Status: DISCONTINUED | OUTPATIENT
Start: 2019-12-14 | End: 2019-12-18 | Stop reason: HOSPADM

## 2019-12-14 RX ORDER — MAGNESIUM SULFATE HEPTAHYDRATE 40 MG/ML
2 INJECTION, SOLUTION INTRAVENOUS ONCE
Status: COMPLETED | OUTPATIENT
Start: 2019-12-14 | End: 2019-12-14

## 2019-12-14 RX ORDER — DILTIAZEM HCL 1 MG/ML
5 INJECTION, SOLUTION INTRAVENOUS CONTINUOUS
Status: DISCONTINUED | OUTPATIENT
Start: 2019-12-14 | End: 2019-12-16

## 2019-12-14 RX ORDER — METOPROLOL TARTRATE 25 MG/1
25 TABLET, FILM COATED ORAL 2 TIMES DAILY
Status: DISCONTINUED | OUTPATIENT
Start: 2019-12-14 | End: 2019-12-15

## 2019-12-14 RX ORDER — ROSUVASTATIN CALCIUM 20 MG/1
20 TABLET, COATED ORAL DAILY
Status: DISCONTINUED | OUTPATIENT
Start: 2019-12-14 | End: 2019-12-18 | Stop reason: HOSPADM

## 2019-12-14 RX ORDER — HYDROCHLOROTHIAZIDE 12.5 MG/1
12.5 TABLET ORAL DAILY
Status: DISCONTINUED | OUTPATIENT
Start: 2019-12-15 | End: 2019-12-18 | Stop reason: HOSPADM

## 2019-12-14 RX ORDER — GLUCAGON 1 MG
1 KIT INJECTION
Status: DISCONTINUED | OUTPATIENT
Start: 2019-12-14 | End: 2019-12-18 | Stop reason: HOSPADM

## 2019-12-14 RX ORDER — AMLODIPINE BESYLATE 5 MG/1
10 TABLET ORAL DAILY
Status: DISCONTINUED | OUTPATIENT
Start: 2019-12-15 | End: 2019-12-18 | Stop reason: HOSPADM

## 2019-12-14 RX ORDER — ONDANSETRON 4 MG/1
8 TABLET, ORALLY DISINTEGRATING ORAL EVERY 8 HOURS PRN
Status: DISCONTINUED | OUTPATIENT
Start: 2019-12-14 | End: 2019-12-18 | Stop reason: HOSPADM

## 2019-12-14 RX ORDER — IBUPROFEN 200 MG
24 TABLET ORAL
Status: DISCONTINUED | OUTPATIENT
Start: 2019-12-14 | End: 2019-12-18 | Stop reason: HOSPADM

## 2019-12-14 RX ORDER — ACETAMINOPHEN 325 MG/1
650 TABLET ORAL EVERY 4 HOURS PRN
Status: DISCONTINUED | OUTPATIENT
Start: 2019-12-14 | End: 2019-12-18 | Stop reason: HOSPADM

## 2019-12-14 RX ORDER — LANOLIN ALCOHOL/MO/W.PET/CERES
400 CREAM (GRAM) TOPICAL DAILY
Status: DISCONTINUED | OUTPATIENT
Start: 2019-12-14 | End: 2019-12-18 | Stop reason: HOSPADM

## 2019-12-14 RX ORDER — OMEPRAZOLE 20 MG/1
20 CAPSULE, DELAYED RELEASE ORAL DAILY
Status: ON HOLD | COMMUNITY
End: 2019-12-18 | Stop reason: HOSPADM

## 2019-12-14 RX ORDER — IBUPROFEN 200 MG
16 TABLET ORAL
Status: DISCONTINUED | OUTPATIENT
Start: 2019-12-14 | End: 2019-12-18 | Stop reason: HOSPADM

## 2019-12-14 RX ORDER — ALLOPURINOL 100 MG/1
100 TABLET ORAL DAILY
Status: DISCONTINUED | OUTPATIENT
Start: 2019-12-14 | End: 2019-12-14

## 2019-12-14 RX ADMIN — PANTOPRAZOLE SODIUM 80 MG: 40 INJECTION, POWDER, FOR SOLUTION INTRAVENOUS at 11:12

## 2019-12-14 RX ADMIN — LORAZEPAM 1 MG: 1 TABLET ORAL at 08:12

## 2019-12-14 RX ADMIN — MAGNESIUM SULFATE 2 G: 2 INJECTION INTRAVENOUS at 11:12

## 2019-12-14 RX ADMIN — ROSUVASTATIN CALCIUM 20 MG: 20 TABLET, FILM COATED ORAL at 05:12

## 2019-12-14 RX ADMIN — MELATONIN 6 MG: at 08:12

## 2019-12-14 RX ADMIN — SODIUM CHLORIDE 125 MG: 9 INJECTION, SOLUTION INTRAVENOUS at 02:12

## 2019-12-14 RX ADMIN — DILTIAZEM HYDROCHLORIDE 5 MG/HR: 5 INJECTION INTRAVENOUS at 09:12

## 2019-12-14 RX ADMIN — MAGNESIUM OXIDE 400 MG: 400 TABLET ORAL at 05:12

## 2019-12-14 RX ADMIN — BUSPIRONE HYDROCHLORIDE 10 MG: 5 TABLET ORAL at 08:12

## 2019-12-14 RX ADMIN — METOPROLOL TARTRATE 25 MG: 25 TABLET ORAL at 08:12

## 2019-12-14 NOTE — ED PROVIDER NOTES
Encounter Date: 12/14/2019       History     Chief Complaint   Patient presents with    Dizziness     Cough     Patient with a history of proximal atrial fibrillation.  Patient reports increasing frequency of symptomatic atrial fibrillation.  This morning patient had resting heart rate of 134 associated with palpitations and spinning type of dizziness.  Similar symptoms with atrial fibrillation with rapid ventricular rate in the past.  Patient took her home medications this morning with no improvement.  There is no chest pain. No pleurisy or hemoptysis.  Patient is anticoagulated with Eliquis.  Symptoms continue in the emergency room        Review of patient's allergies indicates:   Allergen Reactions    Penicillin g benzathine Other (See Comments)     Past Medical History:   Diagnosis Date    Anticoagulant long-term use     Cancer     Cataract     CHF (congestive heart failure)     Diabetes mellitus, type 2     Heart murmur     Hypertension     Occasional tremors      Past Surgical History:   Procedure Laterality Date    ADENOIDECTOMY      Gall bladder revmoval      HYSTERECTOMY      left mastectomy Left 1945    MASTECTOMY Left     FL CABG, ARTERY-VEIN, FOUR  2014    Coronary Artery Bypass, 4    TONSILLECTOMY       Family History   Problem Relation Age of Onset    Heart disease Mother      Social History     Tobacco Use    Smoking status: Never Smoker    Smokeless tobacco: Never Used   Substance Use Topics    Alcohol use: Never     Frequency: Never    Drug use: Never     Review of Systems   Constitutional: Negative for chills and fever.   HENT: Negative for congestion.    Eyes: Negative for visual disturbance.   Respiratory: Negative for wheezing.    Cardiovascular: Negative for chest pain and palpitations.   Gastrointestinal: Negative for abdominal pain and vomiting.   Genitourinary: Negative for dysuria.   Musculoskeletal: Negative for joint swelling.   Neurological: Negative for seizures,  syncope and headaches.   Psychiatric/Behavioral: Negative for confusion.       Physical Exam     Initial Vitals [12/14/19 0902]   BP Pulse Resp Temp SpO2   117/65 (!) 111 16 98.2 °F (36.8 °C) 97 %      MAP       --         Physical Exam    Nursing note and vitals reviewed.  Constitutional: She is not diaphoretic. No distress.   HENT:   Head: Normocephalic and atraumatic.   Eyes: Conjunctivae are normal.   Neck: Normal range of motion.   Cardiovascular:   Irregular tachycardic rate and rhythm   Pulmonary/Chest: Breath sounds normal.   Abdominal: Soft. There is no tenderness.   Musculoskeletal: Normal range of motion.   Neurological: She is alert and oriented to person, place, and time. She has normal strength. No cranial nerve deficit or sensory deficit.   No gross deficits   Skin: No rash noted.   Psychiatric: She has a normal mood and affect.         ED Course   Procedures  Labs Reviewed - No data to display       Imaging Results    None          Medical Decision Making:   History:   Old Medical Records: I decided to obtain old medical records.  Clinical Tests:   Lab Tests: Reviewed  Radiological Study: Reviewed  Medical Tests: Reviewed  ED Management:  Patient presents with symptomatic atrial fibrillation with rapid ventricular rate.  Patient pulse was very erratically ranging from .  Patient did convert now to use normal sinus rhythm. Patient now with some mild bradycardia.  I was able to stop Cardizem drip.  Patient is on Eliquis.  Patient reports she had a bowel movement today with absolutely no blood.  Concerning is hemoglobin decreased from 11 to 8 over last 2 months.  I suspect occult GI bleed.  Will give Protonix.  Discussed with Dr. Herrera.  He desires admission for further evaluation as well.                                 Clinical Impression:       ICD-10-CM ICD-9-CM   1. Atrial fibrillation with rapid ventricular response I48.91 427.31   2. Atrial fibrillation I48.91 427.31   3. Bradycardia  R00.1 427.89   4. Gastrointestinal hemorrhage, unspecified gastrointestinal hemorrhage type K92.2 578.9                             Raleigh Fitzgerald MD  12/14/19 1107

## 2019-12-14 NOTE — ED TRIAGE NOTES
EMS states pt has been feeling bad for the past week and having a productive cough as well as an elevated heart rate

## 2019-12-14 NOTE — ED NOTES
Woke up during the night with dizziness. Checked HR with pox and it was 134. Took her morning meds today and HR did not return to normal so she came to ER. AA&O. Skin WDP. Resp regular unlabored. BBS CTA. CM Afib with RVR

## 2019-12-14 NOTE — ED NOTES
CM SB. Dr Fitzgerald at bedside, states hold Cardizem infusion at present. Pt aware of need for urine speciman

## 2019-12-14 NOTE — H&P
[facility] Medicine History & Physical Examination   Patient Name: Rosanna Raymundo  MRN: [id number]  Patient Class: OP- Observation   Admission Date: 12/14/2019  8:59 AM  Length of Stay: 0  Attending Physician: Zion Jones MD  Primary Care Provider: Aamir Rhodes MD  Face-to-Face encounter date: 12/14/2019  Code Status: Full   MPOA: Niece   Chief Complaint: Dizziness (Cough)        Patient information was obtained from patient, past medical records and ER records.   HISTORY OF PRESENT ILLNESS:   Rosanna Raymundo is a 87 y.o. white female who  has a past medical history of Anticoagulant long-term use, Cancer, Cataract, CHF (congestive heart failure), Diabetes mellitus, type 2, Heart murmur, Hypertension, and Occasional tremors.. The patient presented to [facility] on 12/14/2019 with a primary complaint of Dizziness (Cough)  As per the patient, her symptoms started last night with palpitations which she describes as racing of heart and were associated with dizziness while she was changing position from sitting to standing. She was not able to walk. She was seen by her cardiologist as outpatient Dr. Vinson who started Metoprolol 12.5mg BID which she reports good compliance. Otherwise she denies other complaints like cough, congestion, runny nose, chest pain, abdominal pain, diarrhea, constipation.    With worsening of her symptoms, Patient planned to come to the ER where she got an EKG which showed atrial fibrillation (personally reviewed) and troponin level that was negative. I was called for further evaluation. During my encounter, patient was comfortable and denied further episodes of chest pain.        REVIEW OF SYSTEMS:   10 Point Review of System was performed and was found to be negative except for that mentioned already in the HPI above.     PAST MEDICAL HISTORY:     Past Medical History:   Diagnosis Date    Anticoagulant long-term use     Cancer      Cataract     CHF (congestive heart failure)     Diabetes mellitus, type 2     Heart murmur     Hypertension     Occasional tremors        PAST SURGICAL HISTORY:     Past Surgical History:   Procedure Laterality Date    ADENOIDECTOMY      Gall bladder revmoval      HYSTERECTOMY      left mastectomy Left 1945    MASTECTOMY Left     DE CABG, ARTERY-VEIN, FOUR  2014    Coronary Artery Bypass, 4    TONSILLECTOMY         ALLERGIES:   Penicillin g benzathine    FAMILY HISTORY:     Family History   Problem Relation Age of Onset    Heart disease Mother        SOCIAL HISTORY:     Social History     Tobacco Use    Smoking status: Never Smoker    Smokeless tobacco: Never Used   Substance Use Topics    Alcohol use: Never     Frequency: Never        Social History     Substance and Sexual Activity   Sexual Activity Not on file        HOME MEDICATIONS:     Prior to Admission medications    Medication Sig Start Date End Date Taking? Authorizing Provider   allopurinol (ZYLOPRIM) 100 MG tablet Take 1 tablet (100 mg total) by mouth once daily. 10/3/19  Yes Aamir Rhodes MD   amLODIPine (NORVASC) 10 MG tablet Take 1 tablet (10 mg total) by mouth once daily. 10/3/19  Yes Aamir Rhodes MD   apixaban (ELIQUIS) 2.5 mg Tab Take 1 tablet (2.5 mg total) by mouth 2 (two) times daily. 10/3/19  Yes Aamir Rhodes MD   busPIRone (BUSPAR) 5 MG Tab Take 10 mg by mouth 2 (two) times daily.   Yes Historical Provider, MD   cholecalciferol, vitamin D3, (VITAMIN D3) 1,000 unit capsule Take 1 capsule (1,000 Units total) by mouth once daily. 10/3/19  Yes Aamir Rhodes MD   CRESTOR 20 mg tablet Take 1 tablet (20 mg total) by mouth once daily. 10/3/19  Yes Aamir Rhodes MD   fish oil-omega-3 fatty acids 300-1,000 mg capsule Take 1 capsule by mouth once daily.   Yes Historical Provider, MD   fluticasone propionate (FLONASE) 50 mcg/actuation nasal spray 1 spray (50 mcg total) by Each Nostril route 2 (two) times daily.  "10/3/19  Yes Aamir Rhodes MD   hydroCHLOROthiazide (HYDRODIURIL) 12.5 MG Tab Take 1 tablet (12.5 mg total) by mouth once daily. 10/3/19  Yes Aamir Rhodes MD   loratadine (CLARITIN) 10 mg tablet Take 1 tablet (10 mg total) by mouth once daily. 10/3/19  Yes Aamir Rhodes MD   LORazepam (ATIVAN) 1 MG tablet Take 1 tablet (1 mg total) by mouth 2 (two) times daily. 10/3/19  Yes Aamir Rhodes MD   losartan (COZAAR) 100 MG tablet Take 1 tablet (100 mg total) by mouth once daily. 10/3/19  Yes Aamir Rhodes MD   magnesium 250 mg Tab Take 1 tablet (250 mg total) by mouth once daily. 10/3/19  Yes Aamir Rhodes MD   metoprolol tartrate (LOPRESSOR) 25 MG tablet Take 0.5 tablets (12.5 mg total) by mouth 2 (two) times daily. 11/5/19 12/14/19 Yes Francisca Hernandez MD   omeprazole (PRILOSEC) 20 MG capsule Take 20 mg by mouth 2 (two) times daily.   Yes Historical Provider, MD   vit C/E/Zn/coppr/lutein/zeaxan (VISION FORMULA-2 ORAL) Take 1 tablet by mouth 2 (two) times daily.    Yes Historical Provider, MD   omeprazole (PRILOSEC) 20 MG capsule Take 1 capsule (20 mg total) by mouth once daily. 10/3/19   Aamir Rhodes MD   ondansetron (ZOFRAN-ODT) 8 MG TbDL Take 1 tablet (8 mg total) by mouth every 6 (six) hours as needed. 10/3/19   Aamir Rhodes MD         PHYSICAL EXAM:   BP (!) 118/58   Pulse (!) 58   Temp 98.2 °F (36.8 °C) (Oral)   Resp (!) 24   Ht 5' 1" (1.549 m)   Wt 74.8 kg (165 lb)   SpO2 97%   BMI 31.18 kg/m²   Vitals Reviewed  General appearance: Well-developed, well-nourished female in no apparent distress.  Skin: No Rash.   Neuro: Motor and sensory exams grossly intact. Good tone. Power in all 4 extremities 5/5.   HENT: Atraumatic head. Moist mucous membranes of oral cavity.  Eyes: Normal extraocular movements.   Neck: Supple. No evidence of lymphadenopathy. No thyroidomegaly.  Lungs: Clear to auscultation bilaterally. No wheezing present.   Heart: Regular rate and rhythm. S1 and S2 " present with no murmurs/gallop/rub. No pedal edema. No JVD present.   Abdomen: Soft, non-distended, non-tender. No rebound tenderness/guarding. No masses or organomegaly. Bowel sounds are normal. Bladder is not palpable.   Extremities: No cyanosis, clubbing, or edema.  Psych/mental status: Alert and oriented. Cooperative. Responds appropriately to questions.   EMERGENCY DEPARTMENT LABS AND IMAGING:     Labs Reviewed   PROTIME-INR - Abnormal; Notable for the following components:       Result Value    PT 16.5 (*)     All other components within normal limits   CBC W/ AUTO DIFFERENTIAL - Abnormal; Notable for the following components:    RBC 3.80 (*)     Hemoglobin 8.3 (*)     Hematocrit 27.7 (*)     Mean Corpuscular Volume 73 (*)     Mean Corpuscular Hemoglobin 21.8 (*)     Mean Corpuscular Hemoglobin Conc 30.0 (*)     RDW 18.3 (*)     Gran% 73.9 (*)     Lymph% 15.0 (*)     All other components within normal limits   COMPREHENSIVE METABOLIC PANEL - Abnormal; Notable for the following components:    Glucose 151 (*)     BUN, Bld 27 (*)     Alkaline Phosphatase 45 (*)     Anion Gap 7 (*)     eGFR if non  57.7 (*)     All other components within normal limits   MAGNESIUM - Abnormal; Notable for the following components:    Magnesium 1.4 (*)     All other components within normal limits   FERRITIN - Abnormal; Notable for the following components:    Ferritin 5 (*)     All other components within normal limits   IRON AND TIBC - Abnormal; Notable for the following components:    Iron 18 (*)     Saturated Iron 4 (*)     All other components within normal limits   APTT   TROPONIN I   CK-MB   CK   URINALYSIS, REFLEX TO URINE CULTURE    Narrative:     Preferred Collection Type->Urine, Clean Catch  Specimen Source->Urine   RETICULOCYTES   IRON AND TIBC   FERRITIN   RETICULOCYTES   HEMOGLOBIN A1C       X-Ray Chest AP Portable   Final Result      1. Indirect evidence of treated coronary artery disease.   2. No  evidence of adverse change when compared to previous of 11/04/2019.         Electronically signed by: Gurpreet Christine MD   Date:    12/14/2019   Time:    10:08          ASSESSMENT & PLAN:   Rosanna Raymundo is a 87 y.o. female admitted for    1. Atrial fibrillation with RVR: Rate controlled and now back in NSR. Cardiazem Gtt. Increased dose of Metoprolol from 12.5mg to 25mg BID. Holding AC for plan #3. Cards C/s    2. Dizziness: not sure whether it is orthostatic? Or Atrial fibrillation? Or chronic anemia? Will control atrial fibrillation and monitor for falls    3. Anemia - Iron deficiency anemia. Will give 200mg of Iron infusion. Heme occult. C/s GI for further recc. Holding AC    4. Hypertension: HCTZ. Amlodipine, Losartan, metoprolol        DVT Prophylaxis: Hold Heparin/Lovenox for DVT prophylaxis d/t concern of bleeding and will be advised to be as mobile as possible and sit in a chair as tolerated.   ________________________________________________________________________________    Discharge Planning and Disposition: No mobility needs. Ambulating well. Good social support system. Patient will be discharged in 2-3 days  Face-to-Face encounter date: 12/14/2019  Encounter included review of the medical records, interviewing and examining the patient face-to-face, discussion with family and other health care providers including emergency medicine physician, admission orders, interpreting lab/test results and formulating a plan of care.   Medical Decision Making during this encounter was  [_] Low Complexity  x] Moderate Complexity  [] High Complexity  _________________________________________________________________________________    INPATIENT LIST OF MEDICATIONS     Current Facility-Administered Medications:     diltiaZEM 125 mg in D5W 125 mL infusion, 5 mg/hr, Intravenous, Continuous, Raleigh Fitzgerald MD, Stopped at 12/14/19 1005    iron sucrose (VENOFER) 200 mg in sodium chloride 0.9% 100 mL IVPB, 200  mg, Intravenous, Once, Zion Jones MD    Current Outpatient Medications:     allopurinol (ZYLOPRIM) 100 MG tablet, Take 1 tablet (100 mg total) by mouth once daily., Disp: 90 tablet, Rfl: 1    amLODIPine (NORVASC) 10 MG tablet, Take 1 tablet (10 mg total) by mouth once daily., Disp: 90 tablet, Rfl: 1    apixaban (ELIQUIS) 2.5 mg Tab, Take 1 tablet (2.5 mg total) by mouth 2 (two) times daily., Disp: 180 tablet, Rfl: 1    busPIRone (BUSPAR) 5 MG Tab, Take 10 mg by mouth 2 (two) times daily., Disp: , Rfl:     cholecalciferol, vitamin D3, (VITAMIN D3) 1,000 unit capsule, Take 1 capsule (1,000 Units total) by mouth once daily., Disp: 100 capsule, Rfl: 1    CRESTOR 20 mg tablet, Take 1 tablet (20 mg total) by mouth once daily., Disp: 90 tablet, Rfl: 1    fish oil-omega-3 fatty acids 300-1,000 mg capsule, Take 1 capsule by mouth once daily., Disp: , Rfl:     fluticasone propionate (FLONASE) 50 mcg/actuation nasal spray, 1 spray (50 mcg total) by Each Nostril route 2 (two) times daily., Disp: 3 Bottle, Rfl: 3    hydroCHLOROthiazide (HYDRODIURIL) 12.5 MG Tab, Take 1 tablet (12.5 mg total) by mouth once daily., Disp: 90 tablet, Rfl: 1    loratadine (CLARITIN) 10 mg tablet, Take 1 tablet (10 mg total) by mouth once daily., Disp: 90 tablet, Rfl: 1    LORazepam (ATIVAN) 1 MG tablet, Take 1 tablet (1 mg total) by mouth 2 (two) times daily., Disp: 90 tablet, Rfl: 1    losartan (COZAAR) 100 MG tablet, Take 1 tablet (100 mg total) by mouth once daily., Disp: 90 tablet, Rfl: 1    magnesium 250 mg Tab, Take 1 tablet (250 mg total) by mouth once daily., Disp: 100 each, Rfl: 1    metoprolol tartrate (LOPRESSOR) 25 MG tablet, Take 0.5 tablets (12.5 mg total) by mouth 2 (two) times daily., Disp: 30 tablet, Rfl: 0    omeprazole (PRILOSEC) 20 MG capsule, Take 20 mg by mouth 2 (two) times daily., Disp: , Rfl:     vit C/E/Zn/coppr/lutein/zeaxan (VISION FORMULA-2 ORAL), Take 1 tablet by mouth 2 (two) times daily. ,  Disp: , Rfl:     omeprazole (PRILOSEC) 20 MG capsule, Take 1 capsule (20 mg total) by mouth once daily., Disp: 90 capsule, Rfl: 1    ondansetron (ZOFRAN-ODT) 8 MG TbDL, Take 1 tablet (8 mg total) by mouth every 6 (six) hours as needed., Disp: 20 tablet, Rfl: 1      Scheduled Meds:   iron sucrose (VENOFER) IVPB  200 mg Intravenous Once     Continuous Infusions:   dilTIAZem Stopped (12/14/19 1005)     PRN Meds:.      Zion Jones  [facility] Hospitalist  12/14/2019

## 2019-12-15 LAB
ALBUMIN SERPL BCP-MCNC: 3.5 G/DL (ref 3.5–5.2)
ALP SERPL-CCNC: 38 U/L (ref 55–135)
ALT SERPL W/O P-5'-P-CCNC: 15 U/L (ref 10–44)
ANION GAP SERPL CALC-SCNC: 7 MMOL/L (ref 8–16)
AST SERPL-CCNC: 14 U/L (ref 10–40)
BASOPHILS # BLD AUTO: 0.05 K/UL (ref 0–0.2)
BASOPHILS NFR BLD: 0.8 % (ref 0–1.9)
BILIRUB SERPL-MCNC: 0.6 MG/DL (ref 0.1–1)
BUN SERPL-MCNC: 26 MG/DL (ref 8–23)
CALCIUM SERPL-MCNC: 9 MG/DL (ref 8.7–10.5)
CHLORIDE SERPL-SCNC: 104 MMOL/L (ref 95–110)
CHOLEST SERPL-MCNC: 86 MG/DL (ref 120–199)
CHOLEST/HDLC SERPL: 3.1 {RATIO} (ref 2–5)
CO2 SERPL-SCNC: 28 MMOL/L (ref 23–29)
CREAT SERPL-MCNC: 1 MG/DL (ref 0.5–1.4)
DIFFERENTIAL METHOD: ABNORMAL
EOSINOPHIL # BLD AUTO: 0.2 K/UL (ref 0–0.5)
EOSINOPHIL NFR BLD: 3.3 % (ref 0–8)
ERYTHROCYTE [DISTWIDTH] IN BLOOD BY AUTOMATED COUNT: 18.5 % (ref 11.5–14.5)
EST. GFR  (AFRICAN AMERICAN): 58.5 ML/MIN/1.73 M^2
EST. GFR  (NON AFRICAN AMERICAN): 50.8 ML/MIN/1.73 M^2
ESTIMATED AVG GLUCOSE: 160 MG/DL (ref 68–131)
GLUCOSE SERPL-MCNC: 123 MG/DL (ref 70–110)
GLUCOSE SERPL-MCNC: 126 MG/DL (ref 70–110)
GLUCOSE SERPL-MCNC: 138 MG/DL (ref 70–110)
GLUCOSE SERPL-MCNC: 145 MG/DL (ref 70–110)
GLUCOSE SERPL-MCNC: 155 MG/DL (ref 70–110)
HBA1C MFR BLD HPLC: 7.2 % (ref 4.5–6.2)
HCT VFR BLD AUTO: 26.5 % (ref 37–48.5)
HDLC SERPL-MCNC: 28 MG/DL (ref 40–75)
HDLC SERPL: 32.6 % (ref 20–50)
HGB BLD-MCNC: 7.9 G/DL (ref 12–16)
IMM GRANULOCYTES # BLD AUTO: 0.02 K/UL (ref 0–0.04)
IMM GRANULOCYTES NFR BLD AUTO: 0.3 % (ref 0–0.5)
LDLC SERPL CALC-MCNC: 36.4 MG/DL (ref 63–159)
LYMPHOCYTES # BLD AUTO: 1.6 K/UL (ref 1–4.8)
LYMPHOCYTES NFR BLD: 25.7 % (ref 18–48)
MAGNESIUM SERPL-MCNC: 1.7 MG/DL (ref 1.6–2.6)
MCH RBC QN AUTO: 21.8 PG (ref 27–31)
MCHC RBC AUTO-ENTMCNC: 29.8 G/DL (ref 32–36)
MCV RBC AUTO: 73 FL (ref 82–98)
MONOCYTES # BLD AUTO: 0.6 K/UL (ref 0.3–1)
MONOCYTES NFR BLD: 9.9 % (ref 4–15)
NEUTROPHILS # BLD AUTO: 3.7 K/UL (ref 1.8–7.7)
NEUTROPHILS NFR BLD: 60 % (ref 38–73)
NONHDLC SERPL-MCNC: 58 MG/DL
NRBC BLD-RTO: 0 /100 WBC
PLATELET # BLD AUTO: 232 K/UL (ref 150–350)
PMV BLD AUTO: 11.6 FL (ref 9.2–12.9)
POTASSIUM SERPL-SCNC: 3.5 MMOL/L (ref 3.5–5.1)
PROT SERPL-MCNC: 6.3 G/DL (ref 6–8.4)
RBC # BLD AUTO: 3.62 M/UL (ref 4–5.4)
SODIUM SERPL-SCNC: 139 MMOL/L (ref 136–145)
TRIGL SERPL-MCNC: 108 MG/DL (ref 30–150)
WBC # BLD AUTO: 6.14 K/UL (ref 3.9–12.7)

## 2019-12-15 PROCEDURE — 25000003 PHARM REV CODE 250: Performed by: EMERGENCY MEDICINE

## 2019-12-15 PROCEDURE — 25000003 PHARM REV CODE 250: Performed by: INTERNAL MEDICINE

## 2019-12-15 PROCEDURE — 80061 LIPID PANEL: CPT

## 2019-12-15 PROCEDURE — 93005 ELECTROCARDIOGRAM TRACING: CPT

## 2019-12-15 PROCEDURE — 85025 COMPLETE CBC W/AUTO DIFF WBC: CPT

## 2019-12-15 PROCEDURE — 83735 ASSAY OF MAGNESIUM: CPT

## 2019-12-15 PROCEDURE — 80053 COMPREHEN METABOLIC PANEL: CPT

## 2019-12-15 PROCEDURE — 36415 COLL VENOUS BLD VENIPUNCTURE: CPT

## 2019-12-15 PROCEDURE — G0378 HOSPITAL OBSERVATION PER HR: HCPCS

## 2019-12-15 PROCEDURE — 82962 GLUCOSE BLOOD TEST: CPT

## 2019-12-15 RX ORDER — POTASSIUM CHLORIDE 7.45 MG/ML
20 INJECTION INTRAVENOUS
Status: DISCONTINUED | OUTPATIENT
Start: 2019-12-15 | End: 2019-12-18 | Stop reason: HOSPADM

## 2019-12-15 RX ORDER — LANOLIN ALCOHOL/MO/W.PET/CERES
800 CREAM (GRAM) TOPICAL
Status: DISCONTINUED | OUTPATIENT
Start: 2019-12-15 | End: 2019-12-18 | Stop reason: HOSPADM

## 2019-12-15 RX ORDER — POTASSIUM CHLORIDE 7.45 MG/ML
40 INJECTION INTRAVENOUS
Status: DISCONTINUED | OUTPATIENT
Start: 2019-12-15 | End: 2019-12-18 | Stop reason: HOSPADM

## 2019-12-15 RX ORDER — MAGNESIUM SULFATE HEPTAHYDRATE 40 MG/ML
2 INJECTION, SOLUTION INTRAVENOUS
Status: DISCONTINUED | OUTPATIENT
Start: 2019-12-15 | End: 2019-12-18 | Stop reason: HOSPADM

## 2019-12-15 RX ORDER — POTASSIUM CHLORIDE 20 MEQ/1
40 TABLET, EXTENDED RELEASE ORAL
Status: DISCONTINUED | OUTPATIENT
Start: 2019-12-15 | End: 2019-12-18 | Stop reason: HOSPADM

## 2019-12-15 RX ORDER — POTASSIUM CHLORIDE 20 MEQ/1
20 TABLET, EXTENDED RELEASE ORAL
Status: DISCONTINUED | OUTPATIENT
Start: 2019-12-15 | End: 2019-12-18 | Stop reason: HOSPADM

## 2019-12-15 RX ORDER — CALCIUM CHLORIDE IN 0.9 % NACL 1 G/100 ML
1 INTRAVENOUS SOLUTION, PIGGYBACK (ML) INTRAVENOUS
Status: DISCONTINUED | OUTPATIENT
Start: 2019-12-15 | End: 2019-12-18 | Stop reason: HOSPADM

## 2019-12-15 RX ORDER — MAGNESIUM SULFATE HEPTAHYDRATE 40 MG/ML
4 INJECTION, SOLUTION INTRAVENOUS
Status: DISCONTINUED | OUTPATIENT
Start: 2019-12-15 | End: 2019-12-18 | Stop reason: HOSPADM

## 2019-12-15 RX ORDER — DEXTROMETHORPHAN POLISTIREX 30 MG/5 ML
1 SUSPENSION, EXTENDED RELEASE 12 HR ORAL ONCE
Status: COMPLETED | OUTPATIENT
Start: 2019-12-16 | End: 2019-12-16

## 2019-12-15 RX ORDER — MAGNESIUM SULFATE 1 G/100ML
1 INJECTION INTRAVENOUS
Status: DISCONTINUED | OUTPATIENT
Start: 2019-12-15 | End: 2019-12-18 | Stop reason: HOSPADM

## 2019-12-15 RX ORDER — SYRING-NEEDL,DISP,INSUL,0.3 ML 29 G X1/2"
296 SYRINGE, EMPTY DISPOSABLE MISCELLANEOUS EVERY 6 HOURS
Status: COMPLETED | OUTPATIENT
Start: 2019-12-15 | End: 2019-12-16

## 2019-12-15 RX ADMIN — Medication 296 ML: at 07:12

## 2019-12-15 RX ADMIN — MELATONIN 6 MG: at 09:12

## 2019-12-15 RX ADMIN — BUSPIRONE HYDROCHLORIDE 10 MG: 5 TABLET ORAL at 09:12

## 2019-12-15 RX ADMIN — AMLODIPINE BESYLATE 10 MG: 5 TABLET ORAL at 08:12

## 2019-12-15 RX ADMIN — BUSPIRONE HYDROCHLORIDE 10 MG: 5 TABLET ORAL at 08:12

## 2019-12-15 RX ADMIN — PANTOPRAZOLE SODIUM 40 MG: 40 TABLET, DELAYED RELEASE ORAL at 06:12

## 2019-12-15 RX ADMIN — ROSUVASTATIN CALCIUM 20 MG: 20 TABLET, FILM COATED ORAL at 09:12

## 2019-12-15 RX ADMIN — LOSARTAN POTASSIUM 100 MG: 50 TABLET, FILM COATED ORAL at 08:12

## 2019-12-15 RX ADMIN — ALLOPURINOL 100 MG: 100 TABLET ORAL at 08:12

## 2019-12-15 RX ADMIN — MAGNESIUM OXIDE 800 MG: 400 TABLET ORAL at 05:12

## 2019-12-15 RX ADMIN — MAGNESIUM OXIDE 800 MG: 400 TABLET ORAL at 01:12

## 2019-12-15 RX ADMIN — DILTIAZEM HYDROCHLORIDE 5 MG/HR: 5 INJECTION INTRAVENOUS at 10:12

## 2019-12-15 RX ADMIN — MAGNESIUM OXIDE 400 MG: 400 TABLET ORAL at 08:12

## 2019-12-15 RX ADMIN — LORAZEPAM 1 MG: 1 TABLET ORAL at 09:12

## 2019-12-15 RX ADMIN — POTASSIUM CHLORIDE 40 MEQ: 20 TABLET, EXTENDED RELEASE ORAL at 01:12

## 2019-12-15 RX ADMIN — HYDROCHLOROTHIAZIDE 12.5 MG: 12.5 TABLET ORAL at 08:12

## 2019-12-15 NOTE — CONSULTS
[facility]  Cardiology  Consult Note    Patient Name: Rosanna Raymundo  MRN: [id number]  Admission Date: 12/14/2019  Hospital Length of Stay: 0 days  Code Status: Full Code   Attending Provider: Oscar Sears MD   Consulting Provider: Erika Chin NP  Primary Care Physician: Aamir Rhodes MD  Principal Problem:<principal problem not specified>    Patient information was obtained from patient, past medical records and ER records.     Inpatient consult to Cardiology  Consult performed by: Erika Chin NP  Consult ordered by: Raleigh Fitzgerald MD        Subjective:     Chief Complaint: Rosanna Raymundo is a 87 y.o. white female who  has a past medical history of Anticoagulant long-term use for paroxysmal AFIB, Cancer, Cataract, CHF (congestive heart failure), Diabetes mellitus, type 2, Heart murmur, Hypertension, and Occasional tremors.. The patient presented to [facility] on 12/14/2019 with a primary complaint of Dizziness (Cough)  As per the patient, her symptoms started last night with palpitations which she describes as racing of heart and were associated with dizziness while she was changing position from sitting to standing. She was not able to walk. She was seen by her cardiologist as outpatient Dr. Vinson who started Metoprolol 12.5mg BID which she reports good compliance. Otherwise she denies other complaints like cough, congestion, runny nose, chest pain, abdominal pain, diarrhea, constipation.     With worsening of her symptoms, Patient planned to come to the ER where she got an EKG which showed atrial fibrillation (personally reviewed) and troponin level that was negative. I was called for further evaluation. During my encounter, patient was comfortable and denied further episodes of chest pain.      HPI: Ms. Raymundo is an 87 year old female who presented to the ER with complaints of palpitations and dizziness when changing positions. Upon arrival, she was  found to be in Afib with RVR in the 110's and quickly converted to SR when given She was recently started on metoprolol tartrate 12.5 mg po BID by her cardiologist outpatient. Patient denies any chest pain or increased shortness of breath. She has been able to tolerate walking the unit with standby assist with minimal difficulty.   Patient was noted to be bradycardic in the 40's overnight and in the 50's today despite not having metoprolol today.   Patient is also noted to be severely anemic so her anticoagulants have been held.       Past Medical History:   Diagnosis Date    Anticoagulant long-term use     Cancer     Cataract     CHF (congestive heart failure)     Diabetes mellitus, type 2     Heart murmur     Hypertension     Iron deficiency anemia, unspecified     Anemia, iron def.    Occasional tremors        Past Surgical History:   Procedure Laterality Date    ADENOIDECTOMY      Gall bladder revmoval      HYSTERECTOMY      left mastectomy Left 1945    MASTECTOMY Left     [place] CABG, ARTERY-VEIN, FOUR  2014    Coronary Artery Bypass, 4    TONSILLECTOMY         Review of patient's allergies indicates:   Allergen Reactions    Penicillin g benzathine Other (See Comments)       No current facility-administered medications on file prior to encounter.      Current Outpatient Medications on File Prior to Encounter   Medication Sig    allopurinol (ZYLOPRIM) 100 MG tablet Take 1 tablet (100 mg total) by mouth once daily.    amLODIPine (NORVASC) 10 MG tablet Take 1 tablet (10 mg total) by mouth once daily.    apixaban (ELIQUIS) 2.5 mg Tab Take 1 tablet (2.5 mg total) by mouth 2 (two) times daily.    busPIRone (BUSPAR) 5 MG Tab Take 10 mg by mouth 2 (two) times daily.    cholecalciferol, vitamin D3, (VITAMIN D3) 1,000 unit capsule Take 1 capsule (1,000 Units total) by mouth once daily.    CRESTOR 20 mg tablet Take 1 tablet (20 mg total) by mouth once daily.    fish oil-omega-3 fatty acids 300-1,000 mg  capsule Take 1 capsule by mouth once daily.    fluticasone propionate (FLONASE) 50 mcg/actuation nasal spray 1 spray (50 mcg total) by Each Nostril route 2 (two) times daily. (Patient taking differently: 1 spray by Each Nostril route 2 (two) times daily as needed. )    hydroCHLOROthiazide (HYDRODIURIL) 12.5 MG Tab Take 1 tablet (12.5 mg total) by mouth once daily.    loratadine (CLARITIN) 10 mg tablet Take 1 tablet (10 mg total) by mouth once daily.    LORazepam (ATIVAN) 1 MG tablet Take 1 tablet (1 mg total) by mouth 2 (two) times daily.    losartan (COZAAR) 100 MG tablet Take 1 tablet (100 mg total) by mouth once daily.    magnesium 250 mg Tab Take 1 tablet (250 mg total) by mouth once daily. (Patient taking differently: Take 250 mg by mouth every evening. )    metoprolol tartrate (LOPRESSOR) 25 MG tablet Take 0.5 tablets (12.5 mg total) by mouth 2 (two) times daily.    omeprazole (PRILOSEC) 20 MG capsule Take 20 mg by mouth once daily.     vit C/E/Zn/coppr/lutein/zeaxan (VISION FORMULA-2 ORAL) Take 1 tablet by mouth 2 (two) times daily.     omeprazole (PRILOSEC) 20 MG capsule Take 1 capsule (20 mg total) by mouth once daily.    ondansetron (ZOFRAN-ODT) 8 MG TbDL Take 1 tablet (8 mg total) by mouth every 6 (six) hours as needed.     Family History     Problem Relation (Age of Onset)    Heart disease Father    No Known Problems Mother        Tobacco Use    Smoking status: Never Smoker    Smokeless tobacco: Never Used   Substance and Sexual Activity    Alcohol use: Never     Frequency: Never    Drug use: Never    Sexual activity: Not on file     ROS     Review of Systems   Constitution: Negative for diaphoresis and fever.   HENT: Negative for nosebleeds.    Cardiovascular: Negative for chest pain, dyspnea on exertion, leg swelling. Positive for palpitations and orthostasis.   Respiratory: Negative for shortness of breath and wheezing.    Hematologic/Lymphatic: Negative for bleeding problem. Does not  bruise/bleed easily.   Skin: Negative for color change and rash.   Musculoskeletal: Negative for falls and myalgias.   Gastrointestinal: Negative for hematemesis and hematochezia.   Genitourinary: Negative for hematuria.   Neurological: Negative for dizziness and light-headedness.   Psychiatric/Behavioral: Negative for altered mental status and memory loss.       Objective:     Vital Signs (Most Recent):  Temp: 97.9 °F (36.6 °C) (12/15/19 1502)  Pulse: 60 (12/15/19 1502)  Resp: 18 (12/15/19 1502)  BP: (!) 118/56 (12/15/19 1502)  SpO2: 99 % (12/15/19 1502) Vital Signs (24h Range):  Temp:  [97.8 °F (36.6 °C)-98.6 °F (37 °C)] 97.9 °F (36.6 °C)  Pulse:  [55-65] 60  Resp:  [18-20] 18  SpO2:  [94 %-99 %] 99 %  BP: (102-145)/(53-63) 118/56     Weight: 77 kg (169 lb 12.1 oz)  Body mass index is 32.07 kg/m².    SpO2: 99 %  O2 Device (Oxygen Therapy): room air      Intake/Output Summary (Last 24 hours) at 12/15/2019 1518  Last data filed at 12/15/2019 0600  Gross per 24 hour   Intake 460 ml   Output --   Net 460 ml       Lines/Drains/Airways     Peripheral Intravenous Line                 Peripheral IV - Single Lumen 12/14/19 0945 20 G Right Forearm 1 day                Physical Exam     HEENT: Normocephalic, atraumatic,   PERRL, Conjunctiva pink, no scleral icterus.   NECK:  No JVD no carotid bruit  CVS: S1S2+, RRR, no murmurs, rubs or gallops, JVP: Normal.  LUNGS: Clear  ABDOMEN: Soft, NT, BS+  EXTREMITIES: No cyanosis, clubbing or edema  Gu: No vargas catheter, denies dysuria, no hematuria  NEURO: AAO X 3.   PSY :  Normal affect      Significant Labs:   Recent Lab Results       12/15/19  1647   12/15/19  1123   12/15/19  0534   12/15/19  0439   12/14/19  2043        Albumin       3.5       Alkaline Phosphatase       38       ALT       15       Anion Gap       7       AST       14       Baso #       0.05       Basophil%       0.8       BILIRUBIN TOTAL       0.6  Comment:  For infants and newborns, interpretation of results  should be based  on gestational age, weight and in agreement with clinical  observations.  Premature Infant recommended reference ranges:  Up to 24 hours.............<8.0 mg/dL  Up to 48 hours............<12.0 mg/dL  3-5 days..................<15.0 mg/dL  6-29 days.................<15.0 mg/dL         BUN, Bld       26       Calcium       9.0       Chloride       104       Cholesterol       86  Comment:  The National Cholesterol Education Program (NCEP) has set the  following guidelines (reference ranges) for Cholesterol:  Optimal.....................<200 mg/dL  Borderline High.............200-239 mg/dL  High........................> or = 240 mg/dL         CO2       28       Creatinine       1.0       Differential Method       Automated       eGFR if        58.5       eGFR if non        50.8  Comment:  Calculation used to obtain the estimated glomerular filtration  rate (eGFR) is the CKD-EPI equation.          Eos #       0.2       Eosinophil%       3.3       Glucose       123       Gran # (ANC)       3.7       Gran%       60.0       HDL       28  Comment:  The National Cholesterol Education Program (NCEP) has set the  following guidelines (reference values) for HDL Cholesterol:  Low...............<40 mg/dL  Optimal...........>60 mg/dL         Hdl/Cholesterol Ratio       32.6       Hematocrit       26.5       Hemoglobin       7.9       Immature Grans (Abs)       0.02  Comment:  Mild elevation in immature granulocytes is non specific and   can be seen in a variety of conditions including stress response,   acute inflammation, trauma and pregnancy. Correlation with other   laboratory and clinical findings is essential.         Immature Granulocytes       0.3       LDL Cholesterol External       36.4  Comment:  The National Cholesterol Education Program (NCEP) has set the  following guidelines (reference values) for LDL Cholesterol:  Optimal.......................<130 mg/dL  Borderline  High...............130-159 mg/dL  High..........................160-189 mg/dL  Very High.....................>190 mg/dL         Lymph #       1.6       Lymph%       25.7       Magnesium       1.7       MCH       21.8       MCHC       29.8       MCV       73       Mono #       0.6       Mono%       9.9       MPV       11.6       Non-HDL Cholesterol       58  Comment:  Risk category and Non-HDL cholesterol goals:  Coronary heart disease (CHD)or equivalent (10-year risk of CHD >20%):  Non-HDL cholesterol goal     <130 mg/dL  Two or more CHD risk factors and 10-year risk of CHD <= 20%:  Non-HDL cholesterol goal     <160 mg/dL  0 to 1 CHD risk factor:  Non-HDL cholesterol goal     <190 mg/dL         nRBC       0       Platelets       232       POC Glucose 126 138 145   182     Potassium       3.5       PROTEIN TOTAL       6.3       RBC       3.62       RDW       18.5       Sodium       139       Total Cholesterol/HDL Ratio       3.1       Triglycerides       108  Comment:  The National Cholesterol Education Program (NCEP) has set the  following guidelines (reference values) for triglycerides:  Normal......................<150 mg/dL  Borderline High.............150-199 mg/dL  High........................200-499 mg/dL         WBC       6.14                            Significant Imaging:   Chest xray 12/14/19:  Impression:       1. Indirect evidence of treated coronary artery disease.  2. No evidence of adverse change when compared to previous of 11/04/2019.         Assessment and Plan:    1. Atrial fibrillation with RVR- now Sinus rhythm  2. Orthostasis  3. Episode of bradycardia overnight  4. CAD s/p CABG x 7 in 2014  5. HTN  6. Type 2 DM  7. Hx of bradycardia on Beta blocker and amiodarone    PLAN:    1. Patient presented to the ER with afib with RVR in the 110's after cleaning her home all day. She converted to SR with cardizem in the ER.   2. Overnight patient was given metoprolol tartrate 25 mg po. During the night  her HR decreased to the 40's so her AM dose of metoprolol was held. Despite not receiving her AM dose, her HR has been in the 50's most of the day.   3. For now hold beta blockers. She takes no other blockers.   4. Patient also is noted to be quite anemic. Need to consider GI evaluation. For now her eliquis is held.   5. Will follow with you.        Active Diagnoses:    Diagnosis Date Noted POA    Atrial fibrillation with rapid ventricular response [I48.91] 12/14/2019 Yes    Paroxysmal atrial fibrillation [I48.0] 10/03/2019 Yes      Problems Resolved During this Admission:       VTE Risk Mitigation (From admission, onward)         Ordered     IP VTE HIGH RISK PATIENT  Once      12/14/19 1437     Place sequential compression device  Until discontinued      12/14/19 1437     Reason for No Pharmacological VTE Prophylaxis  Once     Question:  Reasons:  Answer:  Risk of Bleeding    12/14/19 1437                Thank you for your consult. I will follow-up with patient. Please contact us if you have any additional questions.    Erika Chin NP  Cardiology   [facility]

## 2019-12-15 NOTE — CARE UPDATE
Spoke to Dr. Marino personally regarding the consult for GI for EGD/colonoscopy.  Discussed case in depth.  Discussed pertinent findings.  Informed that patient has been off of Eliquis since yesterday.  Informed she will put the orders for colon prep and will do an EGD and colonoscopy in the morning tomorrow    Oscar Sears MD  Department of Hospital Medicine   [facility] hospital

## 2019-12-15 NOTE — CONSULTS
Replaced by [facility]  Gastroenterology  Consult Note    Patient Name: Rosanna Raymundo  MRN: [id number]  Admission Date: 12/14/2019  Hospital Length of Stay: 0 days  Code Status: Full Code   Attending Provider: Oscar Sears MD   Consulting Provider: Yenifer Marino MD  Primary Care Physician: Aamir Rhodes MD  Principal Problem:<principal problem not specified>    Inpatient consult to Gastroenterology  Consult performed by: Yenifer Marino MD  Consult ordered by: Oscar Sears MD        Subjective:     HPI: Rosanna Raymundo is a 87 y.o. white female who  has a past medical history of Anticoagulant long-term use, Cancer, Cataract, CHF (congestive heart failure), Diabetes mellitus, type 2, Heart murmur, Hypertension, and Occasional tremors.. The patient presented to [facility] on 12/14/2019 with a primary complaint of Dizziness (Cough)  As per the patient, her symptoms started last night with palpitations which she describes as racing of heart and were associated with dizziness while she was changing position from sitting to standing. She was not able to walk. She was seen by her cardiologist as outpatient Dr. Vinson who started Metoprolol 12.5mg BID which she reports good compliance. Otherwise she denies other complaints like cough, congestion, runny nose, chest pain, abdominal pain, diarrhea, constipation.     With worsening of her symptoms, Patient planned to come to the ER where she got an EKG which showed atrial fibrillation (personally reviewed) and troponin level that was negative. I was called for further evaluation. During my encounter, patient was comfortable and denied further episodes of chest pain.     Patient reports that she had been told that she was anemic in the past but was intolerant of iron, creating diarrhea.  She has formed stool daily and denies melena or hematochezia.     She reports hysterectomy at the age of 12 yo due to rare uterine cancer that was reported @  [facility].  She had seeds or ?radiation/chemotherapy implanted in her pelvis at that time.  She denies recurrence but unclear of monitoring.  She had left sided breast cancer.  She has never had EGD/colonoscopy and denies gerd/ dysphagia or weight loss.    Past Medical History:   Diagnosis Date    Anticoagulant long-term use     Cancer     Cataract     CHF (congestive heart failure)     Diabetes mellitus, type 2     Heart murmur     Hypertension     Iron deficiency anemia, unspecified     Anemia, iron def.    Occasional tremors        Past Surgical History:   Procedure Laterality Date    ADENOIDECTOMY      Gall bladder revmoval      HYSTERECTOMY      left mastectomy Left 1945    MASTECTOMY Left     [place] CABG, ARTERY-VEIN, FOUR  2014    Coronary Artery Bypass, 4    TONSILLECTOMY         Review of patient's allergies indicates:   Allergen Reactions    Penicillin g benzathine Other (See Comments)     Family History     Problem Relation (Age of Onset)    Heart disease Father    No Known Problems Mother        Tobacco Use    Smoking status: Never Smoker    Smokeless tobacco: Never Used   Substance and Sexual Activity    Alcohol use: Never     Frequency: Never    Drug use: Never    Sexual activity: Not on file     Review of Systems     GENERAL: No fever, chills, weight loss  SKIN: No rashes, jaundice, pruritus  HEENT: No rhinorrhea, epistaxis, vision changes.  No trauma, tinnitus, lymphadenopathy or pharyngitis  CV: No chest pain, palpitations, edima or GOODMAN  PULM: No cough or sputum production.  No wheezing.  GI: AS IN HPI  URINARY:  No hematuria, dysuria  MS: No change in muscle or joint pain, weakness, or ROM  Neuro: No focal neurologic changes  PSYCH: No change in mood or personality.  No suicidal ideation.  ENDOCRINE: No fatigue  Objective:     Vital Signs (Most Recent):  Temp: 97.9 °F (36.6 °C) (12/15/19 1502)  Pulse: 60 (12/15/19 1502)  Resp: 18 (12/15/19 1502)  BP: (!) 118/56 (12/15/19  1502)  SpO2: 99 % (12/15/19 1502) Vital Signs (24h Range):  Temp:  [97.8 °F (36.6 °C)-98.6 °F (37 °C)] 97.9 °F (36.6 °C)  Pulse:  [55-65] 60  Resp:  [18-20] 18  SpO2:  [94 %-99 %] 99 %  BP: (102-145)/(53-63) 118/56     Weight: 77 kg (169 lb 12.1 oz) (12/14/19 1500)  Body mass index is 32.07 kg/m².      Intake/Output Summary (Last 24 hours) at 12/15/2019 1743  Last data filed at 12/15/2019 0600  Gross per 24 hour   Intake 360 ml   Output --   Net 360 ml       Lines/Drains/Airways     Peripheral Intravenous Line                 Peripheral IV - Single Lumen 12/14/19 0945 20 G Right Forearm 1 day                Physical Exam  Physical Exam:  General- Patient alert and oriented x3 in NAD  HEENT- PERRLA, EOMI, OP clear, MMM  Neck- No JVD, Lymphadenopathy, Thyromegaly  CV- Regular rate and rhythm, No Murmur/carlos/rubs  Resp- Lungs CTA Bilaterally, No increased WOB  GI- Non tender/non-distended, BS normoactive x4 quads, no HSM  Extrem- No cyanosis, clubbing, edema. Pulses 2+ and symmetric  Neuro- Strength 5/5 flexors/extensors, DTRs 2+ and symmetric, Intact sensation to light touch grossly   Significant Labs:  CBC:   Recent Labs   Lab 12/14/19  0945 12/15/19  0439   WBC 7.25 6.14   HGB 8.3* 7.9*   HCT 27.7* 26.5*    232     CMP:   Recent Labs   Lab 12/15/19  0439   *   CALCIUM 9.0   ALBUMIN 3.5   PROT 6.3      K 3.5   CO2 28      BUN 26*   CREATININE 1.0   ALKPHOS 38*   ALT 15   AST 14   BILITOT 0.6     Coagulation:   Recent Labs   Lab 12/14/19  0945   INR 1.4   APTT 25.9       Significant Imaging:  CXR: I have reviewed all results within the past 24 hours and my personal findings are:   Pulmonary vascularity appears generous in volume.  No evidence of florid pulmonary edema.  Laminar calcification about the aortic knob.  Lungs without evidence of pneumonic infiltrate or significant pleural effusion or atelectatic changes.  Unilateral right-sided glenohumeral joint osteoarthritis and suspected  changes of previous chronic appearing rotator cuff injury changes.  The tracheal lumen appears midline.  Regional skeleton elsewhere appears normal.    Assessment/Plan:     Active Diagnoses:    Diagnosis Date Noted POA    Atrial fibrillation with rapid ventricular response [I48.91] 12/14/2019 Yes    Paroxysmal atrial fibrillation [I48.0] 10/03/2019 Yes      Problems Resolved During this Admission:       1. Atrial fibrillation with RVR- now Sinus rhythm  2. Orthostasis  3. Episode of bradycardia overnight  4. CAD s/p CABG x 7 in 2014  5. HTN  6. Type 2 DM  7. Hx of bradycardia on Beta blocker and amiodarone  8. Anemia of unclear etiology - prior to restarting eliquis , proceed with endoscopic workup for anemia  9. 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 With implantation of pelvic radiation? Seeds  10 . No prior egd/ colonoscopy    Discussed eGd/ colon with patient in am with MAC  Thank you for your consult. I will follow-up with patient. Please contact us if you have any additional questions.    Yenifer Marino MD  Gastroenterology  [facility]

## 2019-12-15 NOTE — PROGRESS NOTES
[facility] Medicine    Progress Note    Patient Name: Rosanna Raymundo  MRN: [id number]  Patient Class: OP- Observation   Admission Date: 12/14/2019  8:59 AM  Length of Stay: 0  Attending Physician: LESLIE PEREZ MD  Primary Care Provider: Aamir Rhodes MD  Face-to-Face encounter date: 12/15/2019  Chief Complaint: Dizziness (Cough)         Patient ID: Rosanna Raymundo is a 87 y.o. female admitted for   Active Hospital Problems    Diagnosis  POA    Atrial fibrillation with rapid ventricular response [I48.91]  Yes    Paroxysmal atrial fibrillation [I48.0]  Yes      Resolved Hospital Problems   No resolved problems to display.      HPI from admission on 12/14/19 by Dr Jones:  Rosanna Raymundo is a 87 y.o. white female who  has a past medical history of Anticoagulant long-term use for paroxysmal AFIB, Cancer, Cataract, CHF (congestive heart failure), Diabetes mellitus, type 2, Heart murmur, Hypertension, and Occasional tremors.. The patient presented to [facility] on 12/14/2019 with a primary complaint of Dizziness (Cough)  As per the patient, her symptoms started last night with palpitations which she describes as racing of heart and were associated with dizziness while she was changing position from sitting to standing. She was not able to walk. She was seen by her cardiologist as outpatient Dr. Vinson who started Metoprolol 12.5mg BID which she reports good compliance. Otherwise she denies other complaints like cough, congestion, runny nose, chest pain, abdominal pain, diarrhea, constipation.     With worsening of her symptoms, Patient planned to come to the ER where she got an EKG which showed atrial fibrillation (personally reviewed) and troponin level that was negative. I was called for further evaluation. During my encounter, patient was comfortable and denied further episodes of chest pain.         Subjective:    Interval History: Patient seen and examined  this afternoon.  Patient sitting in bed comfortably.  She is doing much better as compared to yesterday.  Reports her palpitation has resolved..   Family member, her niece present at bedside.  No concerns/issues overnight reported by the patient or the nursing staff.    Review of Systems All other Review of Systems were found to be negative expect for that mentioned already in HPI.     Objective:     Physical Exam  Vitals:    12/15/19 1051   BP: (!) 122/57   Pulse: (!) 55   Resp: 19   Temp: 98.3 °F (36.8 °C)     Wt Readings from Last 1 Encounters:   12/14/19 77 kg (169 lb 12.1 oz)      Body mass index is 32.07 kg/m².    Vitals reviewed.  Constitutional: No distress. Looks good for her stated age  HENT:   Head: Atraumatic.   Cardiovascular: Normal rate, regular rhythm, 2/6 systolic murmur heard, no lower extremity edema  Pulmonary/Chest: Effort normal. No wheezes.   Abdominal: Soft. Bowel sounds are normal. No distension and no mass. No tenderness  Neurological: Alert.   Skin: Skin is warm and dry.     Following labs were Reviewed   CBC:  Recent Labs   Lab 12/15/19  0439   WBC 6.14   HGB 7.9*   HCT 26.5*        CMP:  Recent Labs   Lab 12/15/19  0439   CALCIUM 9.0   ALBUMIN 3.5   PROT 6.3      K 3.5   CO2 28      BUN 26*   CREATININE 1.0   ALKPHOS 38*   ALT 15   AST 14   BILITOT 0.6     Last 72 hour POCT GLUCOSE  No results found for: POCTGLUCOSE     Microbiology Results (last 7 days)     ** No results found for the last 168 hours. **            X-Ray Chest AP Portable   Final Result      1. Indirect evidence of treated coronary artery disease.   2. No evidence of adverse change when compared to previous of 11/04/2019.         Electronically signed by: Gurpreet Christine MD   Date:    12/14/2019   Time:    10:08            Scheduled Meds:   allopurinol  100 mg Oral Daily    amLODIPine  10 mg Oral Daily    busPIRone  10 mg Oral BID    hydroCHLOROthiazide  12.5 mg Oral Daily    LORazepam  1 mg Oral  BID    losartan  100 mg Oral Daily    magnesium oxide  400 mg Oral Daily    melatonin  6 mg Oral Nightly    metoprolol tartrate  25 mg Oral BID    pantoprazole  40 mg Oral Daily    rosuvastatin  20 mg Oral Daily     Continuous Infusions:   dilTIAZem Stopped (12/14/19 1005)     PRN Meds:.acetaminophen, calcium chloride IVPB, calcium chloride IVPB, calcium chloride IVPB, dextrose 50%, dextrose 50%, glucagon (human recombinant), glucose, glucose, HYDROcodone-acetaminophen, magnesium oxide, magnesium sulfate IVPB, magnesium sulfate IVPB, magnesium sulfate IVPB, magnesium sulfate IVPB, ondansetron, potassium chloride in water, potassium chloride in water, potassium chloride in water, potassium chloride in water, potassium chloride, potassium chloride, potassium chloride, potassium chloride, sodium chloride 0.9%, sodium phosphate IVPB, sodium phosphate IVPB, sodium phosphate IVPB, sodium phosphate IVPB, sodium phosphate IVPB      Assessment & Plan:     Atrial fibrillation with RVR, history of paroxysmal AFib, on long-term anticoagulation  Rate controlled and now back in NSR.   Cardiazem Gtt discontinued while in the ER once patient was converted back to sinus rhythm  Will continue Metoprolol 12.5 b.i.d. as patient received 25 mg yesterday and went into sinus Aj in 40s over night..  Morning dose of metoprolol 25 mg was held  Holding AC for plan #3.   Cards Consulted, awaiting recommendation  Patient is known to Dr. Klein in outpatient setting     Dizziness:   Possibly due to Atrial fibrillation with RVR Or symptomatic anemia  Patient report dizziness is better but not resolved  Discuss symptomatic anemia possibly playing a role as her iron profile shows iron saturation of 4%     Anemia - Iron deficiency anemia.  Received 200mg of Iron infusion x1 yesterday.   Heme occult not reported yet.   Possible GI for consult, patient reports she she has not established with a gastroenterologist in outpatient  setting  Holding Eliquis 2.5 mg b.i.d.  Patient also informed me that she had stopped taking the iron supplement that was prescribed to her as it gives her diarrhea, advised to resume iron supplement 2 tablets at bedtime     Hypertension:   HCTZ. Amlodipine, Losartan, metoprolol      Discharge Planning:   No mobility needs.  Patient is very physically active    Above encounter included review of the medical records, interviewing and examining the patient face-to-face, discussion with family and other health care providers, ordering and interpreting lab/test results and formulating a plan of care.     Medical Decision Making:      [_] Low Complexity  [_] Moderate Complexity  [x] High Complexity      Oscar Sears MD  Department of Hospital Medicine   [facility]

## 2019-12-15 NOTE — PLAN OF CARE
Cardiac, vital signs, lab monitoring. MD consult in am. Bed alarm on. Watch for falls.Strict I&O's.

## 2019-12-16 ENCOUNTER — ANESTHESIA EVENT (OUTPATIENT)
Dept: SURGERY | Facility: HOSPITAL | Age: 84
DRG: 310 | End: 2019-12-16
Payer: MEDICARE

## 2019-12-16 ENCOUNTER — ANESTHESIA (OUTPATIENT)
Dept: SURGERY | Facility: HOSPITAL | Age: 84
DRG: 310 | End: 2019-12-16
Payer: MEDICARE

## 2019-12-16 LAB
ANION GAP SERPL CALC-SCNC: 9 MMOL/L (ref 8–16)
BUN SERPL-MCNC: 25 MG/DL (ref 8–23)
CALCIUM SERPL-MCNC: 9.7 MG/DL (ref 8.7–10.5)
CHLORIDE SERPL-SCNC: 105 MMOL/L (ref 95–110)
CO2 SERPL-SCNC: 26 MMOL/L (ref 23–29)
CREAT SERPL-MCNC: 1 MG/DL (ref 0.5–1.4)
ERYTHROCYTE [DISTWIDTH] IN BLOOD BY AUTOMATED COUNT: 18.7 % (ref 11.5–14.5)
EST. GFR  (AFRICAN AMERICAN): 58.5 ML/MIN/1.73 M^2
EST. GFR  (NON AFRICAN AMERICAN): 50.8 ML/MIN/1.73 M^2
GLUCOSE SERPL-MCNC: 148 MG/DL (ref 70–110)
HCT VFR BLD AUTO: 30.6 % (ref 37–48.5)
HGB BLD-MCNC: 9.1 G/DL (ref 12–16)
MAGNESIUM SERPL-MCNC: 2 MG/DL (ref 1.6–2.6)
MAGNESIUM SERPL-MCNC: 2 MG/DL (ref 1.6–2.6)
MCH RBC QN AUTO: 21.5 PG (ref 27–31)
MCHC RBC AUTO-ENTMCNC: 29.7 G/DL (ref 32–36)
MCV RBC AUTO: 72 FL (ref 82–98)
PLATELET # BLD AUTO: 291 K/UL (ref 150–350)
PMV BLD AUTO: 10.8 FL (ref 9.2–12.9)
POTASSIUM SERPL-SCNC: 3.5 MMOL/L (ref 3.5–5.1)
RBC # BLD AUTO: 4.23 M/UL (ref 4–5.4)
SODIUM SERPL-SCNC: 140 MMOL/L (ref 136–145)
WBC # BLD AUTO: 9.06 K/UL (ref 3.9–12.7)

## 2019-12-16 PROCEDURE — 99900035 HC TECH TIME PER 15 MIN (STAT)

## 2019-12-16 PROCEDURE — 27000221 HC OXYGEN, UP TO 24 HOURS

## 2019-12-16 PROCEDURE — 25000003 PHARM REV CODE 250: Performed by: EMERGENCY MEDICINE

## 2019-12-16 PROCEDURE — 21400001 HC TELEMETRY ROOM

## 2019-12-16 PROCEDURE — 25000003 PHARM REV CODE 250: Performed by: INTERNAL MEDICINE

## 2019-12-16 PROCEDURE — 63600175 PHARM REV CODE 636 W HCPCS: Performed by: INTERNAL MEDICINE

## 2019-12-16 PROCEDURE — 36415 COLL VENOUS BLD VENIPUNCTURE: CPT

## 2019-12-16 PROCEDURE — 80048 BASIC METABOLIC PNL TOTAL CA: CPT

## 2019-12-16 PROCEDURE — 85027 COMPLETE CBC AUTOMATED: CPT

## 2019-12-16 PROCEDURE — 83735 ASSAY OF MAGNESIUM: CPT

## 2019-12-16 PROCEDURE — 21000000 HC CCU ICU ROOM CHARGE

## 2019-12-16 PROCEDURE — 94761 N-INVAS EAR/PLS OXIMETRY MLT: CPT

## 2019-12-16 RX ORDER — METOPROLOL TARTRATE 25 MG/1
12.5 TABLET ORAL 2 TIMES DAILY
Status: DISCONTINUED | OUTPATIENT
Start: 2019-12-16 | End: 2019-12-18 | Stop reason: HOSPADM

## 2019-12-16 RX ORDER — DILTIAZEM HYDROCHLORIDE 30 MG/1
30 TABLET, FILM COATED ORAL EVERY 8 HOURS
Status: DISCONTINUED | OUTPATIENT
Start: 2019-12-16 | End: 2019-12-16

## 2019-12-16 RX ADMIN — METOPROLOL TARTRATE 12.5 MG: 25 TABLET, FILM COATED ORAL at 08:12

## 2019-12-16 RX ADMIN — MAGNESIUM OXIDE 400 MG: 400 TABLET ORAL at 08:12

## 2019-12-16 RX ADMIN — LORAZEPAM 1 MG: 1 TABLET ORAL at 08:12

## 2019-12-16 RX ADMIN — Medication 296 ML: at 12:12

## 2019-12-16 RX ADMIN — ROSUVASTATIN CALCIUM 20 MG: 20 TABLET, FILM COATED ORAL at 08:12

## 2019-12-16 RX ADMIN — AMIODARONE HYDROCHLORIDE 1 MG/MIN: 1.8 INJECTION, SOLUTION INTRAVENOUS at 05:12

## 2019-12-16 RX ADMIN — POTASSIUM CHLORIDE 40 MEQ: 20 TABLET, EXTENDED RELEASE ORAL at 04:12

## 2019-12-16 RX ADMIN — AMIODARONE HYDROCHLORIDE 0.5 MG/MIN: 1.8 INJECTION, SOLUTION INTRAVENOUS at 11:12

## 2019-12-16 RX ADMIN — DILTIAZEM HYDROCHLORIDE 5 MG/HR: 5 INJECTION INTRAVENOUS at 01:12

## 2019-12-16 RX ADMIN — MELATONIN 6 MG: at 08:12

## 2019-12-16 RX ADMIN — AMIODARONE HYDROCHLORIDE 150 MG: 1.5 INJECTION, SOLUTION INTRAVENOUS at 05:12

## 2019-12-16 RX ADMIN — PANTOPRAZOLE SODIUM 40 MG: 40 TABLET, DELAYED RELEASE ORAL at 04:12

## 2019-12-16 RX ADMIN — MINERAL OIL 1 ENEMA: 100 ENEMA RECTAL at 06:12

## 2019-12-16 RX ADMIN — HYDROCHLOROTHIAZIDE 12.5 MG: 12.5 TABLET ORAL at 08:12

## 2019-12-16 RX ADMIN — ALLOPURINOL 100 MG: 100 TABLET ORAL at 08:12

## 2019-12-16 RX ADMIN — BUSPIRONE HYDROCHLORIDE 10 MG: 5 TABLET ORAL at 08:12

## 2019-12-16 RX ADMIN — LOSARTAN POTASSIUM 100 MG: 50 TABLET, FILM COATED ORAL at 08:12

## 2019-12-16 RX ADMIN — AMLODIPINE BESYLATE 10 MG: 5 TABLET ORAL at 08:12

## 2019-12-16 NOTE — HOSPITAL COURSE
Patient was admitted with AFib with RVR and needed a Cardizem drip and did not response and changed to amiodarone drip and later converted to sinus.  Also patient was under evaluation for worsening chronic anemia, also patient was on blood thinner and it was held .  EGD and colonoscopy was done and no source of bleeding found . Found  gastritis and some polyps removed and no signs of bleeding afterwards.  She got blood transfusion and hemoglobin is stable and later discharged in stable condition.  Just before she leaves, she got back into atrial fibrillation and Lopressor 2.5 mg was given and rate was controlled and continue to discharged in stable condition with  amiodarone loading and tapering dose.  Case discussed with Dr. Klein and he will see her in the clinic for further evaluation..  Aspirin was started and Eliquis is to be started on Friday only and patient aware that.  Continued her home Lopressor.

## 2019-12-16 NOTE — PLAN OF CARE
Problem: Fall Injury Risk  Goal: Absence of Fall and Fall-Related Injury  Outcome: Ongoing, Progressing  Intervention: Identify and Manage Contributors to Fall Injury Risk  Flowsheets (Taken 12/16/2019 0128)  Self-Care Promotion: independence encouraged  Medication Review/Management: medications reviewed     Problem: Adult Inpatient Plan of Care  Goal: Plan of Care Review  Outcome: Ongoing, Progressing     Problem: Adult Inpatient Plan of Care  Goal: Optimal Comfort and Wellbeing  Outcome: Ongoing, Progressing  Intervention: Provide Person-Centered Care  Flowsheets (Taken 12/16/2019 0128)  Trust Relationship/Rapport: care explained; questions answered; questions encouraged; reassurance provided; thoughts/feelings acknowledged     Problem: Cardiac Output Decreased  Goal: Effective Cardiac Output  Outcome: Ongoing, Progressing  Intervention: Optimize Cardiac Output  Flowsheets (Taken 12/16/2019 0128)  Head of Bed (HOB): HOB at 20-30 degrees  Environmental Support: calm environment promoted

## 2019-12-16 NOTE — PROGRESS NOTES
[facility] Medicine    Progress Note    Patient Name: Rosanna Raymundo  MRN: [id number]  Patient Class: IP- Inpatient   Admission Date: 12/14/2019  8:59 AM  Length of Stay: 0  Attending Physician: LESLIE PEREZ MD  Primary Care Provider: Aamir Rhodes MD  Face-to-Face encounter date: 12/16/2019  Chief Complaint: Dizziness (Cough)         Patient ID: Rosanna Raymundo is a 87 y.o. female admitted for   Active Hospital Problems    Diagnosis  POA    Atrial fibrillation with rapid ventricular response [I48.91]  Yes    Paroxysmal atrial fibrillation [I48.0]  Yes      Resolved Hospital Problems   No resolved problems to display.      HPI from admission on 12/14/19 by Dr Jones:  Rosanna Raymundo is a 87 y.o. white female who  has a past medical history of Anticoagulant long-term use for paroxysmal AFIB, Cancer, Cataract, CHF (congestive heart failure), Diabetes mellitus, type 2, Heart murmur, Hypertension, and Occasional tremors.. The patient presented to [facility] on 12/14/2019 with a primary complaint of Dizziness (Cough)  As per the patient, her symptoms started last night with palpitations which she describes as racing of heart and were associated with dizziness while she was changing position from sitting to standing. She was not able to walk. She was seen by her cardiologist as outpatient Dr. Vinson who started Metoprolol 12.5mg BID which she reports good compliance. Otherwise she denies other complaints like cough, congestion, runny nose, chest pain, abdominal pain, diarrhea, constipation.     With worsening of her symptoms, Patient planned to come to the ER where she got an EKG which showed atrial fibrillation (personally reviewed) and troponin level that was negative. I was called for further evaluation. During my encounter, patient was comfortable and denied further episodes of chest pain.         Subjective:    Interval History: Patient seen and examined  this afternoon.  Patient sitting in bed comfortably.  She is doing much better as compared to yesterday.  Discussed her AFib overnight.  Despite being on Cardizem drip patient remained in AFib.  Patient states she is ready for pacemaker implant because she needs to get on with her life and this is slowing her down.  Patient denies chest pain, shortness of breath  No concerns/issues overnight reported by the patient or the nursing staff.  Personally discussed case with Dr. Klein.  Changing Cardizem drip to amiodarone drip, patient will be monitored overnight in Cardio a, if patient goes into bradycardia plan for pacemaker implant  Once out of AFib and normal sinus rhythm, GI workup will be completed    Review of Systems All other Review of Systems were found to be negative expect for that mentioned already in HPI.     Objective:     Physical Exam  Vitals:    12/16/19 1500   BP: 116/56   Pulse: (!) 59   Resp: (!) 30   Temp: 97.7   116/56  Wt Readings from Last 1 Encounters:   12/16/19 77.7 kg (171 lb 4.8 oz)      Body mass index is 32.37 kg/m².    Vitals reviewed.  Constitutional: No distress. Looks good for her stated age, pleasant  HENT:   Head: Atraumatic.   Cardiovascular:  irregularly irregular, 2/6 systolic murmur heard, no lower extremity edema  Pulmonary/Chest: Effort normal. No wheezes.   Abdominal: Soft. Bowel sounds are normal. No distension and no mass. No tenderness  Neurological: Alert.   Skin: Skin is warm and dry.     Following labs were Reviewed   CBC:  Recent Labs   Lab 12/16/19  0351   WBC 9.06   HGB 9.1*   HCT 30.6*        CMP:  Recent Labs   Lab 12/16/19  0351   CALCIUM 9.7      K 3.5   CO2 26      BUN 25*   CREATININE 1.0     Last 72 hour POCT GLUCOSE  No results found for: POCTGLUCOSE     Microbiology Results (last 7 days)     ** No results found for the last 168 hours. **            X-Ray Chest AP Portable   Final Result      1. Indirect evidence of treated coronary  artery disease.   2. No evidence of adverse change when compared to previous of 11/04/2019.         Electronically signed by: Gurpreet Christine MD   Date:    12/14/2019   Time:    10:08            Scheduled Meds:   allopurinol  100 mg Oral Daily    amLODIPine  10 mg Oral Daily    busPIRone  10 mg Oral BID    hydroCHLOROthiazide  12.5 mg Oral Daily    LORazepam  1 mg Oral BID    losartan  100 mg Oral Daily    magnesium oxide  400 mg Oral Daily    melatonin  6 mg Oral Nightly    pantoprazole  40 mg Oral Daily    rosuvastatin  20 mg Oral Daily     Continuous Infusions:   dilTIAZem 5 mg/hr (12/16/19 1359)     PRN Meds:.acetaminophen, calcium chloride IVPB, calcium chloride IVPB, calcium chloride IVPB, dextrose 50%, dextrose 50%, glucagon (human recombinant), glucose, glucose, HYDROcodone-acetaminophen, magnesium oxide, magnesium sulfate IVPB, magnesium sulfate IVPB, magnesium sulfate IVPB, magnesium sulfate IVPB, ondansetron, potassium chloride in water, potassium chloride in water, potassium chloride in water, potassium chloride in water, potassium chloride, potassium chloride, potassium chloride, potassium chloride, sodium chloride 0.9%, sodium phosphate IVPB, sodium phosphate IVPB, sodium phosphate IVPB, sodium phosphate IVPB, sodium phosphate IVPB      Assessment & Plan:     Atrial fibrillation with RVR, history of paroxysmal AFib, on long-term anticoagulation  Rate controlled in the ER and now back in AFib with RVR.  Cardiazem Gtt started overnight.  Patient remain in AFib with RVR though asymptomatic  12/16/2019:  Cardizem drip changed to amiodarone drip  Monitor closely in Cardio a, patient tends to become bradycardic with amiodarone, if rate controlled and become bradycardic, pacemaker implant will be considered  GI workup held because of AFib with RVR    Dizziness:   Possibly due to Atrial fibrillation with RVR Or symptomatic anemia  Patient report dizziness is better but not resolved  Discuss  symptomatic anemia possibly playing a role as her iron profile shows iron saturation of 4%     Anemia - Iron deficiency anemia.  Received 200mg of Iron infusion x1 yesterday.   Heme occult not reported yet.   H&H 9.1/30.6, was 7.9/26.5  GI consulted, patient was prepped for EGD and colonoscopy for this morning but due to AFib with RVR workup canceled for now  Holding Eliquis 2.5 mg b.i.d. since admission 12/14/19  Patient also informed me that she had stopped taking the iron supplement that was prescribed to her as it gives her diarrhea, advised to resume iron supplement 2 tablets at bedtime  GI workup after patient in sinus rhythm     Hypertension:   HCTZ. Amlodipine, Losartan, resume metoprolol tartrate 12.5 mg b.i.d.    Discharge Planning:   No mobility needs.  Patient is very physically active    Above encounter included review of the medical records, interviewing and examining the patient face-to-face, discussion with family and other health care providers, ordering and interpreting lab/test results and formulating a plan of care.     Medical Decision Making:      [_] Low Complexity  [_] Moderate Complexity  [x] High Complexity      Oscar Sears MD  Department of Hospital Medicine   [facility]

## 2019-12-16 NOTE — PLAN OF CARE
Cardiac, vital signs, lab monitoring. MD consult in am. Bed alarm on. Watch for falls. NPO after midnight. Procedure in am. Accuchecks per order. Strict I&O's.

## 2019-12-16 NOTE — NURSING
Transferred from cardio B due to afib with RVR and on Cardizem infusion. HR ranging from 110 to 130, will titrate Cardizem gtt as needed. Pt remains NPO for EGD/colonoscopy in am.

## 2019-12-16 NOTE — PROGRESS NOTES
Cardiac Rehab     Rosanna M Zackary   [id number]   12/16/2019         Cardiac Rehab Phase Taught: Phase 1    Teaching Method: Verbal    Handouts: None    Educational Videos: None    Understanding:  Knowledge indicated by feedback, Learning indicated by feedback and Verbalize understanding    Comments: pt in bed, worried about planned tests.     Total Time Spent:15mins            Michelle Bowen RN

## 2019-12-16 NOTE — CARE UPDATE
This note also relates to the following rows which could not be included:  SpO2 - Cannot attach notes to unvalidated device data  Pulse - Cannot attach notes to unvalidated device data  Resp - Cannot attach notes to unvalidated device data  BP - Cannot attach notes to unvalidated device data       12/16/19 0200   Patient Assessment/Suction   Level of Consciousness (AVPU) alert   Respiratory Effort Normal;Unlabored   Expansion/Accessory Muscles/Retractions expansion symmetric;no retractions;no use of accessory muscles   All Lung Fields Breath Sounds clear   Rhythm/Pattern, Respiratory no shortness of breath reported;pattern regular;unlabored   PRE-TX-O2   O2 Device (Oxygen Therapy) room air   Pulse Oximetry Type Continuous   $ Pulse Oximetry - Multiple Charge Pulse Oximetry - Multiple   Respiratory Interventions   Cough And Deep Breathing done with encouragement   Breathing Techniques/Airway Clearance deep/controlled cough encouraged;diaphragmatic breathing promoted   Respiratory Evaluation   $ Care Plan Tech Time 15 min   Evaluation For Transfer   Admitting Diagnosis AFIB   Cardiac Diagnosis AFIB,CAD   Current Surgeries CABG   Home Oxygen   Has Home Oxygen? No   Home Aerosol, MDI, DPI, and Other Treatments/Therapies   Home Respiratory Therapy Per Patient/Review of Chart No

## 2019-12-16 NOTE — PROGRESS NOTES
[facility]  Cardiology  Progress Note    Patient Name: Rosanna Raymundo  MRN: [id number]  Admission Date: 12/14/2019  Hospital Length of Stay: 0 days  Code Status: Full Code   Attending Physician: Oscar Sears MD   Primary Care Physician: Aamir Rhodes MD  Expected Discharge Date:   Principal Problem:<principal problem not specified>    Subjective:       Interval History: Denies any chest pain or shortness of breath. Patient keeps flipping back and forth into atrial fibrillation.     ROS   Denies any bleeding issues.   Denies any abdominal pain.   Denies any focal weakness.   Objective:     Vital Signs (Most Recent):  Temp: 97.7 °F (36.5 °C) (12/16/19 1123)  Pulse: (!) 59 (12/16/19 1500)  Resp: (!) 30 (12/16/19 1500)  BP: 139/60 (12/16/19 1300)  SpO2: (!) 94 % (12/16/19 1300) Vital Signs (24h Range):  Temp:  [97.7 °F (36.5 °C)-98.5 °F (36.9 °C)] 97.7 °F (36.5 °C)  Pulse:  [] 59  Resp:  [18-30] 30  SpO2:  [92 %-100 %] 94 %  BP: (104-149)/(55-95) 139/60     Weight: 77.7 kg (171 lb 4.8 oz)  Body mass index is 32.37 kg/m².    SpO2: (!) 94 %  O2 Device (Oxygen Therapy): room air      Intake/Output Summary (Last 24 hours) at 12/16/2019 1610  Last data filed at 12/16/2019 0556  Gross per 24 hour   Intake 693.83 ml   Output --   Net 693.83 ml       Lines/Drains/Airways     Peripheral Intravenous Line                 Peripheral IV - Single Lumen 12/16/19 0257 22 G Anterior;Proximal;Right Forearm less than 1 day                Scheduled Meds:   allopurinol  100 mg Oral Daily    amLODIPine  10 mg Oral Daily    busPIRone  10 mg Oral BID    hydroCHLOROthiazide  12.5 mg Oral Daily    LORazepam  1 mg Oral BID    losartan  100 mg Oral Daily    magnesium oxide  400 mg Oral Daily    melatonin  6 mg Oral Nightly    pantoprazole  40 mg Oral Daily    rosuvastatin  20 mg Oral Daily     Continuous Infusions:   dilTIAZem 5 mg/hr (12/16/19 3795)     PRN Meds:.acetaminophen, calcium chloride IVPB, calcium  chloride IVPB, calcium chloride IVPB, dextrose 50%, dextrose 50%, glucagon (human recombinant), glucose, glucose, HYDROcodone-acetaminophen, magnesium oxide, magnesium sulfate IVPB, magnesium sulfate IVPB, magnesium sulfate IVPB, magnesium sulfate IVPB, ondansetron, potassium chloride in water, potassium chloride in water, potassium chloride in water, potassium chloride in water, potassium chloride, potassium chloride, potassium chloride, potassium chloride, sodium chloride 0.9%, sodium phosphate IVPB, sodium phosphate IVPB, sodium phosphate IVPB, sodium phosphate IVPB, sodium phosphate IVPB     Physical Exam  HEENT: Normocephalic, atraumatic, PERRL, Conjunctiva pink, no scleral icterus.   CVS: S1S2+, Irregular, ESM+, no rubs or gallops, JVP: Normal.  LUNGS: Clear  ABDOMEN: Soft, NT, BS+  EXTREMITIES: No cyanosis, clubbing or edema  NEURO: AAO X 3.       Significant Labs:   BMP:   Recent Labs   Lab 12/15/19  0439 12/16/19  0351   * 148*    140   K 3.5 3.5    105   CO2 28 26   BUN 26* 25*   CREATININE 1.0 1.0   CALCIUM 9.0 9.7   MG 1.7 2.0  2.0   , CMP   Recent Labs   Lab 12/15/19  0439 12/16/19  0351    140   K 3.5 3.5    105   CO2 28 26   * 148*   BUN 26* 25*   CREATININE 1.0 1.0   CALCIUM 9.0 9.7   PROT 6.3  --    ALBUMIN 3.5  --    BILITOT 0.6  --    ALKPHOS 38*  --    AST 14  --    ALT 15  --    ANIONGAP 7* 9   ESTGFRAFRICA 58.5* 58.5*   EGFRNONAA 50.8* 50.8*   , CBC   Recent Labs   Lab 12/15/19  0439 12/16/19  0351   WBC 6.14 9.06   HGB 7.9* 9.1*   HCT 26.5* 30.6*    291   , INR No results for input(s): INR, PROTIME in the last 48 hours., Lipid Panel   Recent Labs   Lab 12/15/19  0439   CHOL 86*   HDL 28*   LDLCALC 36.4*   TRIG 108   CHOLHDL 32.6    and Troponin No results for input(s): TROPONINI in the last 48 hours.    Significant Imaging: Reviewed  Assessment and Plan:     IMPRESSION:  Paroxysmal atrial fibrillation. CHADS 2: 3. Eliquis on hold. Currently in A  fib with RVR.     Anemia. Fe-deficient. No obvious source of bleeding. GI on board.     H/o Bradycardia.     Aortic stenosis.    CAD 99% pLAD, 60% mLAD, 90% pLCx, 90% dLCx,  mRCA. 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 Sternotomy wound healed well. Currently with no symptoms of active ischemia.     Diabetes.     Hypertension - controlled.    Dyslipidemia    Benign essential tremors.    Drug intolerances: ACE-i - cough    Living will papers state that she is DNR/DNI.      PLAN:  1. Discontinue cardizem and start amiodarone GTT. If patient has significant bradycardia episodes, she would need a PPM since she keeps having episodes of atrial fibrillation with RVR.   2. Continue to hold Eliquis.   3. Discussed with Dr Sears.         Rakesh Klien MD  Cardiology  [facility]

## 2019-12-16 NOTE — PLAN OF CARE
12/16/19 1616   Discharge Assessment   Assessment Type Discharge Planning Assessment   Confirmed/corrected address and phone number on facesheet? Yes   Assessment information obtained from? Patient   Prior to hospitilization cognitive status: Alert/Oriented   Prior to hospitalization functional status: Independent   Current cognitive status: Alert/Oriented   Current Functional Status: Independent   Facility Arrived From: home   Lives With alone  (Family members live around her on the property.)   Able to Return to Prior Arrangements yes   Is patient able to care for self after discharge? Yes   Who are your caregiver(s) and their phone number(s)? Self   Patient's perception of discharge disposition home or selfcare   Readmission Within the Last 30 Days no previous admission in last 30 days   Patient currently being followed by outpatient case management? No   Patient currently receives any other outside agency services? No   Equipment Currently Used at Home none   Part D Coverage Medicare and [employer]   Do you have any problems affording any of your prescribed medications? No   Is the patient taking medications as prescribed? yes   Does the patient have transportation home? Yes   Transportation Anticipated family or friend will provide   Dialysis Name and Scheduled days N/A   Does the patient receive services at the [facility]? No   Discharge Plan A Home  (Patient does not want HH.)   DME Needed Upon Discharge  none   Patient/Family in Agreement with Plan yes

## 2019-12-17 PROBLEM — D50.9 IRON DEFICIENCY ANEMIA: Status: ACTIVE | Noted: 2019-12-17

## 2019-12-17 LAB
ANION GAP SERPL CALC-SCNC: 7 MMOL/L (ref 8–16)
ANION GAP SERPL CALC-SCNC: 7 MMOL/L (ref 8–16)
BUN SERPL-MCNC: 20 MG/DL (ref 8–23)
BUN SERPL-MCNC: 20 MG/DL (ref 8–23)
CALCIUM SERPL-MCNC: 9 MG/DL (ref 8.7–10.5)
CALCIUM SERPL-MCNC: 9 MG/DL (ref 8.7–10.5)
CHLORIDE SERPL-SCNC: 104 MMOL/L (ref 95–110)
CHLORIDE SERPL-SCNC: 104 MMOL/L (ref 95–110)
CO2 SERPL-SCNC: 27 MMOL/L (ref 23–29)
CO2 SERPL-SCNC: 27 MMOL/L (ref 23–29)
CREAT SERPL-MCNC: 0.9 MG/DL (ref 0.5–1.4)
CREAT SERPL-MCNC: 0.9 MG/DL (ref 0.5–1.4)
ERYTHROCYTE [DISTWIDTH] IN BLOOD BY AUTOMATED COUNT: 18.7 % (ref 11.5–14.5)
ERYTHROCYTE [DISTWIDTH] IN BLOOD BY AUTOMATED COUNT: 18.7 % (ref 11.5–14.5)
EST. GFR  (AFRICAN AMERICAN): >60 ML/MIN/1.73 M^2
EST. GFR  (AFRICAN AMERICAN): >60 ML/MIN/1.73 M^2
EST. GFR  (NON AFRICAN AMERICAN): 57.7 ML/MIN/1.73 M^2
EST. GFR  (NON AFRICAN AMERICAN): 57.7 ML/MIN/1.73 M^2
GLUCOSE SERPL-MCNC: 125 MG/DL (ref 70–110)
GLUCOSE SERPL-MCNC: 125 MG/DL (ref 70–110)
HCT VFR BLD AUTO: 26.8 % (ref 37–48.5)
HCT VFR BLD AUTO: 26.8 % (ref 37–48.5)
HGB BLD-MCNC: 8 G/DL (ref 12–16)
HGB BLD-MCNC: 8 G/DL (ref 12–16)
MAGNESIUM SERPL-MCNC: 2 MG/DL (ref 1.6–2.6)
MAGNESIUM SERPL-MCNC: 2 MG/DL (ref 1.6–2.6)
MCH RBC QN AUTO: 22 PG (ref 27–31)
MCH RBC QN AUTO: 22 PG (ref 27–31)
MCHC RBC AUTO-ENTMCNC: 29.9 G/DL (ref 32–36)
MCHC RBC AUTO-ENTMCNC: 29.9 G/DL (ref 32–36)
MCV RBC AUTO: 74 FL (ref 82–98)
MCV RBC AUTO: 74 FL (ref 82–98)
PLATELET # BLD AUTO: 226 K/UL (ref 150–350)
PLATELET # BLD AUTO: 226 K/UL (ref 150–350)
PMV BLD AUTO: 11 FL (ref 9.2–12.9)
PMV BLD AUTO: 11 FL (ref 9.2–12.9)
POTASSIUM SERPL-SCNC: 3.8 MMOL/L (ref 3.5–5.1)
POTASSIUM SERPL-SCNC: 3.8 MMOL/L (ref 3.5–5.1)
RBC # BLD AUTO: 3.64 M/UL (ref 4–5.4)
RBC # BLD AUTO: 3.64 M/UL (ref 4–5.4)
SODIUM SERPL-SCNC: 138 MMOL/L (ref 136–145)
SODIUM SERPL-SCNC: 138 MMOL/L (ref 136–145)
WBC # BLD AUTO: 6.36 K/UL (ref 3.9–12.7)
WBC # BLD AUTO: 6.36 K/UL (ref 3.9–12.7)

## 2019-12-17 PROCEDURE — 85027 COMPLETE CBC AUTOMATED: CPT

## 2019-12-17 PROCEDURE — 37000008 HC ANESTHESIA 1ST 15 MINUTES: Performed by: INTERNAL MEDICINE

## 2019-12-17 PROCEDURE — 45330 DIAGNOSTIC SIGMOIDOSCOPY: CPT | Performed by: INTERNAL MEDICINE

## 2019-12-17 PROCEDURE — 44361 SMALL BOWEL ENDOSCOPY/BIOPSY: CPT | Performed by: INTERNAL MEDICINE

## 2019-12-17 PROCEDURE — 21000000 HC CCU ICU ROOM CHARGE

## 2019-12-17 PROCEDURE — 27200043 HC FORCEPS, BIOPSY: Performed by: INTERNAL MEDICINE

## 2019-12-17 PROCEDURE — 99900035 HC TECH TIME PER 15 MIN (STAT)

## 2019-12-17 PROCEDURE — 63600175 PHARM REV CODE 636 W HCPCS: Performed by: INTERNAL MEDICINE

## 2019-12-17 PROCEDURE — 63600175 PHARM REV CODE 636 W HCPCS: Performed by: NURSE ANESTHETIST, CERTIFIED REGISTERED

## 2019-12-17 PROCEDURE — 27000675 HC TUBING MICRODRIP: Performed by: ANESTHESIOLOGY

## 2019-12-17 PROCEDURE — 27000284 HC CANNULA NASAL: Performed by: ANESTHESIOLOGY

## 2019-12-17 PROCEDURE — 36415 COLL VENOUS BLD VENIPUNCTURE: CPT

## 2019-12-17 PROCEDURE — 25000003 PHARM REV CODE 250: Performed by: NURSE ANESTHETIST, CERTIFIED REGISTERED

## 2019-12-17 PROCEDURE — 21400001 HC TELEMETRY ROOM

## 2019-12-17 PROCEDURE — 27000671 HC TUBING MICROBORE EXT: Performed by: ANESTHESIOLOGY

## 2019-12-17 PROCEDURE — 94761 N-INVAS EAR/PLS OXIMETRY MLT: CPT

## 2019-12-17 PROCEDURE — 27202049 HC PROBE, APC ERBE: Performed by: INTERNAL MEDICINE

## 2019-12-17 PROCEDURE — 83735 ASSAY OF MAGNESIUM: CPT

## 2019-12-17 PROCEDURE — 44369 SMALL BOWEL ENDOSCOPY: CPT | Performed by: INTERNAL MEDICINE

## 2019-12-17 PROCEDURE — 25000003 PHARM REV CODE 250: Performed by: INTERNAL MEDICINE

## 2019-12-17 PROCEDURE — 80048 BASIC METABOLIC PNL TOTAL CA: CPT

## 2019-12-17 PROCEDURE — 27000221 HC OXYGEN, UP TO 24 HOURS

## 2019-12-17 PROCEDURE — 88305 TISSUE EXAM BY PATHOLOGIST: CPT | Mod: TC

## 2019-12-17 PROCEDURE — 37000009 HC ANESTHESIA EA ADD 15 MINS: Performed by: INTERNAL MEDICINE

## 2019-12-17 PROCEDURE — 27202103: Performed by: ANESTHESIOLOGY

## 2019-12-17 RX ORDER — POLYETHYLENE GLYCOL 3350 17 G/17G
340 POWDER, FOR SOLUTION ORAL ONCE
Status: COMPLETED | OUTPATIENT
Start: 2019-12-17 | End: 2019-12-17

## 2019-12-17 RX ORDER — POLYETHYLENE GLYCOL 3350 17 G/17G
340 POWDER, FOR SOLUTION ORAL ONCE
Status: DISCONTINUED | OUTPATIENT
Start: 2019-12-17 | End: 2019-12-17

## 2019-12-17 RX ORDER — PROPOFOL 10 MG/ML
VIAL (ML) INTRAVENOUS
Status: DISCONTINUED | OUTPATIENT
Start: 2019-12-17 | End: 2019-12-17

## 2019-12-17 RX ORDER — METOPROLOL TARTRATE 1 MG/ML
INJECTION, SOLUTION INTRAVENOUS
Status: DISCONTINUED | OUTPATIENT
Start: 2019-12-17 | End: 2019-12-17

## 2019-12-17 RX ORDER — SODIUM CHLORIDE 9 MG/ML
INJECTION, SOLUTION INTRAVENOUS CONTINUOUS PRN
Status: DISCONTINUED | OUTPATIENT
Start: 2019-12-17 | End: 2019-12-17

## 2019-12-17 RX ORDER — PHENYLEPHRINE HYDROCHLORIDE 10 MG/ML
INJECTION INTRAVENOUS
Status: DISCONTINUED | OUTPATIENT
Start: 2019-12-17 | End: 2019-12-17

## 2019-12-17 RX ORDER — AMIODARONE HYDROCHLORIDE 200 MG/1
200 TABLET ORAL 3 TIMES DAILY
Status: DISCONTINUED | OUTPATIENT
Start: 2019-12-17 | End: 2019-12-18 | Stop reason: HOSPADM

## 2019-12-17 RX ADMIN — PROPOFOL 50 MG: 10 INJECTION, EMULSION INTRAVENOUS at 12:12

## 2019-12-17 RX ADMIN — ONDANSETRON 8 MG: 4 TABLET, ORALLY DISINTEGRATING ORAL at 08:12

## 2019-12-17 RX ADMIN — METOPROLOL TARTRATE 5 MG: 1 INJECTION, SOLUTION INTRAVENOUS at 12:12

## 2019-12-17 RX ADMIN — SODIUM CHLORIDE: 900 INJECTION INTRAVENOUS at 12:12

## 2019-12-17 RX ADMIN — AMIODARONE HYDROCHLORIDE 200 MG: 200 TABLET ORAL at 05:12

## 2019-12-17 RX ADMIN — AMIODARONE HYDROCHLORIDE 0.5 MG/MIN: 1.8 INJECTION, SOLUTION INTRAVENOUS at 12:12

## 2019-12-17 RX ADMIN — BUSPIRONE HYDROCHLORIDE 10 MG: 5 TABLET ORAL at 09:12

## 2019-12-17 RX ADMIN — POLYETHYLENE GLYCOL 3350 340 G: 17 POWDER, FOR SOLUTION ORAL at 04:12

## 2019-12-17 RX ADMIN — AMIODARONE HYDROCHLORIDE 200 MG: 200 TABLET ORAL at 09:12

## 2019-12-17 RX ADMIN — ROSUVASTATIN CALCIUM 20 MG: 20 TABLET, FILM COATED ORAL at 09:12

## 2019-12-17 RX ADMIN — PHENYLEPHRINE HYDROCHLORIDE 100 MCG: 10 INJECTION INTRAVENOUS at 12:12

## 2019-12-17 RX ADMIN — PANTOPRAZOLE SODIUM 40 MG: 40 TABLET, DELAYED RELEASE ORAL at 05:12

## 2019-12-17 NOTE — SUBJECTIVE & OBJECTIVE
Interval History:     Review of Systems  Objective:     Vital Signs (Most Recent):  Temp: 98.3 °F (36.8 °C) (12/17/19 1250)  Pulse: (!) 55 (12/17/19 1250)  Resp: 18 (12/17/19 1250)  BP: (!) 114/42 (12/17/19 1250)  SpO2: 97 % (12/17/19 1250) Vital Signs (24h Range):  Temp:  [97.4 °F (36.3 °C)-98.4 °F (36.9 °C)] 98.3 °F (36.8 °C)  Pulse:  [] 55  Resp:  [15-33] 18  SpO2:  [83 %-100 %] 97 %  BP: ()/(32-90) 114/42     Weight: 77.3 kg (170 lb 6.7 oz)  Body mass index is 32.2 kg/m².    Intake/Output Summary (Last 24 hours) at 12/17/2019 1441  Last data filed at 12/17/2019 1221  Gross per 24 hour   Intake 310 ml   Output --   Net 310 ml      Physical Exam   Constitutional: She is oriented to person, place, and time.   HENT:   Head: Normocephalic and atraumatic.   Eyes: Conjunctivae are normal.   Neck: No JVD present.   Cardiovascular: Normal heart sounds.   Pulmonary/Chest: Effort normal and breath sounds normal.   Abdominal: Soft. Bowel sounds are normal.   Neurological: She is alert and oriented to person, place, and time.   Skin: Skin is warm.   Psychiatric: She has a normal mood and affect.   Nursing note and vitals reviewed.      Significant Labs:   CBC:   Recent Labs   Lab 12/16/19  0351 12/17/19  0422   WBC 9.06 6.36  6.36   HGB 9.1* 8.0*  8.0*   HCT 30.6* 26.8*  26.8*    226  226     CMP:   Recent Labs   Lab 12/16/19  0351 12/17/19  0422    138  138   K 3.5 3.8  3.8    104  104   CO2 26 27  27   * 125*  125*   BUN 25* 20  20   CREATININE 1.0 0.9  0.9   CALCIUM 9.7 9.0  9.0   ANIONGAP 9 7*  7*   EGFRNONAA 50.8* 57.7*  57.7*       Significant Imaging: I have reviewed all pertinent imaging results/findings within the past 24 hours.

## 2019-12-17 NOTE — ASSESSMENT & PLAN NOTE
S/p blood transfusion and Currently under evaluation.  EGD with no source of bleeding.A single non-bleeding angiodysplastic lesion in                         the jejunum s/p  argon plasma coagulation     Plan   Follow-up colonoscopy report

## 2019-12-17 NOTE — PROGRESS NOTES
Cardiac Rehab     Rosanna M Zackary   [id number]   12/17/2019         Cardiac Rehab Phase Taught: Phase 1    Teaching Method: Verbal    Handouts: None    Educational Videos: None    Understanding:  Learning indicated by feedback and Verbalize understanding    Comments: pt in bed, awaiting testing today. Discussed postop pacemaker activity. Pt requesting return to cardiac rehab, will follow for further education    Total Time Spent:30mins            Michelle Bowen RN

## 2019-12-17 NOTE — ANESTHESIA POSTPROCEDURE EVALUATION
Anesthesia Post Evaluation    Patient: Rosanna Raymundo    Procedure(s) Performed: Procedure(s) (LRB):  COLONOSCOPY (N/A)  ENTEROSCOPY (N/A)    Final Anesthesia Type: MAC    Patient location during evaluation: GI PACU  Patient participation: Yes- Able to Participate  Level of consciousness: awake and alert, oriented and awake  Post-procedure vital signs: reviewed and stable  Pain management: adequate  Airway patency: patent    PONV status at discharge: No PONV  Anesthetic complications: no      Cardiovascular status: blood pressure returned to baseline, hemodynamically stable and stable  Respiratory status: unassisted, spontaneous ventilation and room air  Hydration status: euvolemic  Follow-up not needed.      Patient states comfortable for the procedure and is without recall of the procedure    Vitals Value Taken Time   /32 12/17/2019 12:40 PM   Temp 36.9 °C (98.4 °F) 12/17/2019 12:40 PM   Pulse 52 12/17/2019 12:40 PM   Resp 18 12/17/2019 12:40 PM   SpO2 95 % 12/17/2019 12:40 PM         No case tracking events are documented in the log.      Pain/Trae Score: No data recorded

## 2019-12-17 NOTE — ANESTHESIA POSTPROCEDURE EVALUATION
Anesthesia Post Evaluation    Patient: Rosanna Raymundo    Procedure(s) Performed: Procedure(s) (LRB):  COLONOSCOPY (N/A)  ENTEROSCOPY (N/A)    [facility] Anesthesia Post Op Evaluation      Vitals Value Taken Time   /42 12/17/2019 12:50 PM   Temp 36.8 °C (98.3 °F) 12/17/2019 12:50 PM   Pulse 55 12/17/2019 12:50 PM   Resp 18 12/17/2019 12:50 PM   SpO2 97 % 12/17/2019 12:50 PM         No case tracking events are documented in the log.      Pain/Trae Score: Trae Score: 10 (12/17/2019 12:50 PM)

## 2019-12-17 NOTE — PROGRESS NOTES
[facility]  Progress Note    Patient Name: Rosanna Raymundo  MRN: [id number]  Patient Class: IP- Inpatient   Admission Date: 12/14/2019  Length of Stay: 1 days  Attending Physician: Macho Ochoa MD  Primary Care Provider: Aamir Rhodes MD        Subjective:     Principal Problem:Paroxysmal atrial fibrillation        HPI:  No notes on file    Overview/Hospital Course:  Patient was admitted with AFib with RVR and needed a Cardizem drip and did not response and changed to amiodarone drip.  Also patient was under evaluation for worsening chronic anemia, patient was on blood thinner  EGD and colonoscopy was done today.  Findings noted. Hemoglobin is 8 today, blood thinners on hold.  Converted to sinus rhythm since yesterday evening      Interval History:     Review of Systems  Objective:     Vital Signs (Most Recent):  Temp: 98.3 °F (36.8 °C) (12/17/19 1250)  Pulse: (!) 55 (12/17/19 1250)  Resp: 18 (12/17/19 1250)  BP: (!) 114/42 (12/17/19 1250)  SpO2: 97 % (12/17/19 1250) Vital Signs (24h Range):  Temp:  [97.4 °F (36.3 °C)-98.4 °F (36.9 °C)] 98.3 °F (36.8 °C)  Pulse:  [] 55  Resp:  [15-33] 18  SpO2:  [83 %-100 %] 97 %  BP: ()/(32-90) 114/42     Weight: 77.3 kg (170 lb 6.7 oz)  Body mass index is 32.2 kg/m².    Intake/Output Summary (Last 24 hours) at 12/17/2019 1441  Last data filed at 12/17/2019 1221  Gross per 24 hour   Intake 310 ml   Output --   Net 310 ml      Physical Exam   Constitutional: She is oriented to person, place, and time.   HENT:   Head: Normocephalic and atraumatic.   Eyes: Conjunctivae are normal.   Neck: No JVD present.   Cardiovascular: Normal heart sounds.   Pulmonary/Chest: Effort normal and breath sounds normal.   Abdominal: Soft. Bowel sounds are normal.   Neurological: She is alert and oriented to person, place, and time.   Skin: Skin is warm.   Psychiatric: She has a normal mood and affect.   Nursing note and vitals reviewed.      Significant  Labs:   CBC:   Recent Labs   Lab 12/16/19  0351 12/17/19  0422   WBC 9.06 6.36  6.36   HGB 9.1* 8.0*  8.0*   HCT 30.6* 26.8*  26.8*    226  226     CMP:   Recent Labs   Lab 12/16/19  0351 12/17/19  0422    138  138   K 3.5 3.8  3.8    104  104   CO2 26 27  27   * 125*  125*   BUN 25* 20  20   CREATININE 1.0 0.9  0.9   CALCIUM 9.7 9.0  9.0   ANIONGAP 9 7*  7*   EGFRNONAA 50.8* 57.7*  57.7*       Significant Imaging: I have reviewed all pertinent imaging results/findings within the past 24 hours.      Assessment/Plan:      * Paroxysmal atrial fibrillation  With RVR at admission and Status post cardizem drip and amiodarone drip .    Plan   Converted back to sinus continue p.o. Amiodarone  Was having episodes bradycardia with amiodarone and possible pacemaker insertion tomorrow by Cardiology  Currently blood than on hold      Iron deficiency anemia   S/p blood transfusion and Currently under evaluation.  EGD with no source of bleeding.A single non-bleeding angiodysplastic lesion in                         the jejunum s/p  argon plasma coagulation     Plan   Follow-up colonoscopy report    Essential hypertension    Currently stable and continue home medication    Mixed hyperlipidemia    Stable and continue home medication    Type 2 diabetes mellitus without complication, without long-term current use of insulin  Stable and continue home medication      Coronary artery disease    Previous bypass surgery 7 vessels. 2014    Plan   No active issue currently and continue home medications    VTE Risk Mitigation (From admission, onward)         Ordered     IP VTE HIGH RISK PATIENT  Once      12/14/19 1437     Place sequential compression device  Until discontinued      12/14/19 1437     Reason for No Pharmacological VTE Prophylaxis  Once     Question:  Reasons:  Answer:  Risk of Bleeding    12/14/19 1437                      Macho Ochoa MD  Department of Hospital Medicine   [place]  [facility]

## 2019-12-17 NOTE — CARE UPDATE
This note also relates to the following rows which could not be included:  SpO2 - Cannot attach notes to unvalidated device data  Pulse - Cannot attach notes to unvalidated device data       12/16/19 2002   Patient Assessment/Suction   Level of Consciousness (AVPU) alert   Respiratory Effort Normal;Unlabored   Expansion/Accessory Muscles/Retractions expansion symmetric;no retractions;no use of accessory muscles   All Lung Fields Breath Sounds clear   Rhythm/Pattern, Respiratory no shortness of breath reported;pattern regular;unlabored   PRE-TX-O2   O2 Device (Oxygen Therapy) nasal cannula   $ Is the patient on Low Flow Oxygen? Yes   Flow (L/min) 2   Pulse Oximetry Type Continuous   $ Pulse Oximetry - Multiple Charge Pulse Oximetry - Multiple   Resp 18   Respiratory Evaluation   $ Care Plan Tech Time 15 min

## 2019-12-17 NOTE — [employer] PATIENT INSTRUCTIONS
Discharge Summary/Instructions after an Endoscopic Procedure  Patient Name: Rosanna Raymundo  Patient MRN: [id number]  Patient YOB: 1932  Tuesday, December 17, 2019  Raleigh Quarles III, MD  RESTRICTIONS:  During your procedure today, you received medications for sedation.  These   medications may affect your judgment, balance and coordination.  Therefore,   for 24 hours, you have the following restrictions:   - DO NOT drive a car, operate machinery, make legal/financial decisions,   sign important papers or drink alcohol.    ACTIVITY:  Today: no heavy lifting, straining or running due to procedural   sedation/anesthesia.  The following day: return to full activity including work.  DIET:  Eat and drink normally unless instructed otherwise.     TREATMENT FOR COMMON SIDE EFFECTS:  - Mild abdominal pain, nausea, belching, bloating or excessive gas:  rest,   eat lightly and use a heating pad.  - Sore Throat: treat with throat lozenges and/or gargle with warm salt   water.  - Because air was used during the procedure, expelling large amounts of air   from your rectum or belching is normal.  - If a bowel prep was taken, you may not have a bowel movement for 1-3 days.    This is normal.  SYMPTOMS TO WATCH FOR AND REPORT TO YOUR PHYSICIAN:  1. Abdominal pain or bloating, other than gas cramps.  2. Chest pain.  3. Back pain.  4. Signs of infection such as: chills or fever occurring within 24 hours   after the procedure.  5. Rectal bleeding, which would show as bright red, maroon, or black stools.   (A tablespoon of blood from the rectum is not serious, especially if   hemorrhoids are present.)  6. Vomiting.  7. Weakness or dizziness.  GO DIRECTLY TO THE NEAREST EMERGENCY ROOM IF YOU HAVE ANY OF THE FOLLOWING:      Difficulty breathing              Chills and/or fever over 101 F   Persistent vomiting and/or vomiting blood   Severe abdominal pain   Severe chest pain   Black, tarry stools   Bleeding- more than  one tablespoon   Any other symptom or condition that you feel may need urgent attention  Your doctor recommends these additional instructions:  If any biopsies were taken, your doctors clinic will contact you in 1 to 2   weeks with any results.  - Return patient to hospital [facility] for ongoing care.  For questions, problems or results please call your physician - Raleigh Quarles III, MD at Work:  [phone].  [facility], EMERGENCY ROOM PHONE NUMBER: [phone]  IF A COMPLICATION OR EMERGENCY SITUATION ARISES AND YOU ARE UNABLE TO REACH   YOUR PHYSICIAN - GO DIRECTLY TO THE EMERGENCY ROOM.  Raleigh Quarles III, MD  12/17/2019 12:50:20 PM  This report has been verified and signed electronically.  [employer]

## 2019-12-17 NOTE — ASSESSMENT & PLAN NOTE
Previous bypass surgery 7 vessels. 2014    Plan   No active issue currently and continue home medications

## 2019-12-17 NOTE — TRANSFER OF CARE
"Anesthesia Transfer of Care Note    Patient: Rosanna Raymundo    Procedure(s) Performed: Procedure(s) (LRB):  COLONOSCOPY (N/A)  ENTEROSCOPY (N/A)    Patient location: GI    Anesthesia Type: MAC    Transport from OR: Transported from OR on room air with adequate spontaneous ventilation    Post pain: adequate analgesia    Post assessment: no apparent anesthetic complications    Post vital signs: stable    Level of consciousness: awake and alert    Nausea/Vomiting: no nausea/vomiting    Complications: none    Transfer of care protocol was followed      Last vitals:   Visit Vitals  BP (!) 93/50 (BP Location: Left arm, Patient Position: Lying)   Pulse 95   Temp 36.5 °C (97.7 °F) (Axillary)   Resp 19   Ht 5' 1" (1.549 m)   Wt 77.3 kg (170 lb 6.7 oz)   SpO2 100%   Breastfeeding? No   BMI 32.20 kg/m²     "

## 2019-12-17 NOTE — ASSESSMENT & PLAN NOTE
With RVR at admission and Status post cardizem drip and amiodarone drip .    Plan   Converted back to sinus continue p.o. Amiodarone  Was having episodes bradycardia with amiodarone and possible pacemaker insertion tomorrow by Cardiology  Currently blood than on hold

## 2019-12-17 NOTE — PROGRESS NOTES
[facility]  Cardiology  Progress Note    Patient Name: Rosanna Raymundo  MRN: [id number]  Admission Date: 12/14/2019  Hospital Length of Stay: 1 days  Code Status: DNR   Attending Physician: Macho Ochoa MD   Primary Care Physician: Aamir Rhodes MD  Expected Discharge Date:   Principal Problem:Paroxysmal atrial fibrillation    Subjective:       Interval History: Denies any chest pain or shortness of breath. Telemetry shows sinus rhythm.     ROS   Denies any bleeding issues.   Denies any abdominal pain.   Denies any focal weakness.   Objective:     Vital Signs (Most Recent):  Temp: 98.3 °F (36.8 °C) (12/17/19 1250)  Pulse: (!) 55 (12/17/19 1250)  Resp: 18 (12/17/19 1250)  BP: (!) 114/42 (12/17/19 1250)  SpO2: 97 % (12/17/19 1250) Vital Signs (24h Range):  Temp:  [97.4 °F (36.3 °C)-98.4 °F (36.9 °C)] 98.3 °F (36.8 °C)  Pulse:  [] 55  Resp:  [15-33] 18  SpO2:  [83 %-100 %] 97 %  BP: ()/(32-90) 114/42     Weight: 77.3 kg (170 lb 6.7 oz)  Body mass index is 32.2 kg/m².    SpO2: 97 %  O2 Device (Oxygen Therapy): nasal cannula      Intake/Output Summary (Last 24 hours) at 12/17/2019 1633  Last data filed at 12/17/2019 1221  Gross per 24 hour   Intake 310 ml   Output --   Net 310 ml       Lines/Drains/Airways     Peripheral Intravenous Line                 Peripheral IV - Single Lumen 12/17/19 1149 Anterior;Right Forearm less than 1 day                Scheduled Meds:   allopurinol  100 mg Oral Daily    amLODIPine  10 mg Oral Daily    busPIRone  10 mg Oral BID    hydroCHLOROthiazide  12.5 mg Oral Daily    LORazepam  1 mg Oral BID    losartan  100 mg Oral Daily    magnesium oxide  400 mg Oral Daily    melatonin  6 mg Oral Nightly    metoprolol tartrate  12.5 mg Oral BID    pantoprazole  40 mg Oral Daily    rosuvastatin  20 mg Oral Daily     Continuous Infusions:   amiodarone in dextrose 5% 0.5 mg/min (12/17/19 1254)     PRN Meds:.acetaminophen, calcium chloride IVPB, calcium  chloride IVPB, calcium chloride IVPB, dextrose 50%, dextrose 50%, glucagon (human recombinant), glucose, glucose, HYDROcodone-acetaminophen, magnesium oxide, magnesium sulfate IVPB, magnesium sulfate IVPB, magnesium sulfate IVPB, magnesium sulfate IVPB, ondansetron, potassium chloride in water, potassium chloride in water, potassium chloride in water, potassium chloride in water, potassium chloride, potassium chloride, potassium chloride, potassium chloride, sodium chloride 0.9%, sodium phosphate IVPB, sodium phosphate IVPB, sodium phosphate IVPB, sodium phosphate IVPB, sodium phosphate IVPB     Physical Exam  HEENT: Normocephalic, atraumatic, PERRL, Conjunctiva pink, no scleral icterus.   CVS: S1S2+, Regular, ESM+, no rubs or gallops, JVP: Normal.  LUNGS: Clear  ABDOMEN: Soft, NT, BS+  EXTREMITIES: No cyanosis, clubbing or edema  NEURO: AAO X 3.       Significant Labs:   BMP:   Recent Labs   Lab 12/16/19  0351 12/17/19 0422   * 125*  125*    138  138   K 3.5 3.8  3.8    104  104   CO2 26 27  27   BUN 25* 20  20   CREATININE 1.0 0.9  0.9   CALCIUM 9.7 9.0  9.0   MG 2.0  2.0 2.0  2.0   , CMP   Recent Labs   Lab 12/16/19  0351 12/17/19 0422    138  138   K 3.5 3.8  3.8    104  104   CO2 26 27  27   * 125*  125*   BUN 25* 20  20   CREATININE 1.0 0.9  0.9   CALCIUM 9.7 9.0  9.0   ANIONGAP 9 7*  7*   ESTGFRAFRICA 58.5* >60.0  >60.0   EGFRNONAA 50.8* 57.7*  57.7*   , CBC   Recent Labs   Lab 12/16/19  0351 12/17/19 0422   WBC 9.06 6.36  6.36   HGB 9.1* 8.0*  8.0*   HCT 30.6* 26.8*  26.8*    226  226   , INR No results for input(s): INR, PROTIME in the last 48 hours., Lipid Panel   No results for input(s): CHOL, HDL, LDLCALC, TRIG, CHOLHDL in the last 48 hours. and Troponin No results for input(s): TROPONINI in the last 48 hours.    Significant Imaging: Reviewed  Assessment and Plan:     IMPRESSION:  Paroxysmal atrial fibrillation. CHADS 2: 3. Eliquis  on hold. Currently in Sinus rhythm.      Anemia. Fe-deficient. No obvious source of bleeding. GI on board. S/p EGD on 12/17/19 with cauterization and angioplasia. H&H acceptable. Colonoscopy planned tomorrow.     H/o Bradycardia.     Aortic stenosis.    CAD 99% pLAD, 60% mLAD, 90% pLCx, 90% dLCx,  mRCA. 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 Sternotomy wound healed well. Currently with no symptoms of active ischemia.     Diabetes.     Hypertension - controlled.    Dyslipidemia    Benign essential tremors.    Drug intolerances: ACE-i - cough    Living will papers state that she is DNR/DNI.      PLAN:  1. Start Amiodarone 200 mg PO TID and dc when GTT complete.   2. Continue current regimen otherwise.   3. Resume anticoagulation when acceptable with GI team.         Rakesh Klein MD  Cardiology  [facility]

## 2019-12-17 NOTE — ANESTHESIA POSTPROCEDURE EVALUATION
Anesthesia Post Evaluation    Patient: Rosanna Raymundo    Procedure(s) Performed: Procedure(s) (LRB):  COLONOSCOPY (N/A)  ENTEROSCOPY (N/A)    Final Anesthesia Type: MAC    Patient location during evaluation: GI PACU  Patient participation: Yes- Able to Participate  Level of consciousness: awake and alert, oriented and awake  Post-procedure vital signs: reviewed and stable  Pain management: adequate  Airway patency: patent    PONV status at discharge: No PONV  Anesthetic complications: no      Cardiovascular status: blood pressure returned to baseline, hemodynamically stable and stable  Respiratory status: unassisted, spontaneous ventilation and room air  Hydration status: euvolemic  Follow-up not needed.    Patient states that she was comfortable for the procedure and is without recall of the procedure      Vitals Value Taken Time   /42 12/17/2019 12:50 PM   Temp 36.8 °C (98.3 °F) 12/17/2019 12:50 PM   Pulse 55 12/17/2019 12:50 PM   Resp 18 12/17/2019 12:50 PM   SpO2 97 % 12/17/2019 12:50 PM         No case tracking events are documented in the log.      Pain/Trae Score: Rtae Score: 10 (12/17/2019 12:50 PM)

## 2019-12-17 NOTE — ANESTHESIA PREPROCEDURE EVALUATION
12/17/2019  Rosanna Raymundo is a 87 y.o., female.  Patient Active Problem List   Diagnosis    Paroxysmal atrial fibrillation    Coronary artery disease    Type 2 diabetes mellitus without complication, without long-term current use of insulin    Mixed hyperlipidemia    History of lobular carcinoma of breast    Non-seasonal allergic rhinitis due to pollen    Essential hypertension    Gastroesophageal reflux disease without esophagitis    History of anemia    Other specified anxiety disorders    Atrial fibrillation with rapid ventricular response       Past Surgical History:   Procedure Laterality Date    ADENOIDECTOMY      Gall bladder revmoval      HYSTERECTOMY      left mastectomy Left 1945    MASTECTOMY Left     NV CABG, ARTERY-VEIN, FOUR  2014    Coronary Artery Bypass, 4    TONSILLECTOMY        Performed Procedure     TRANSTHORACIC ECHO (TTE) COMPLETE   Conclusion     · Mild eccentric left ventricular hypertrophy.  · Normal left ventricular systolic function. The estimated ejection fraction is 60%  · Normal LV diastolic function.  · Mild left atrial enlargement.  · Severe aortic valve stenosis.  · Aortic valve area is 0.88 cm2; peak velocity is 2.13 m/s; mean gradient is 11 mmHg.  · Mild mitral sclerosis.  · Mild-to-moderate mitral regurgitation.          Tobacco Use:  The patient  reports that she has never smoked. She has never used smokeless tobacco.     Results for orders placed or performed during the hospital encounter of 12/14/19   EKG 12-lead    Collection Time: 12/15/19 10:17 PM    Narrative    Test Reason : I48.91,    Vent. Rate : 138 BPM     Atrial Rate : 138 BPM     P-R Int : 000 ms          QRS Dur : 108 ms      QT Int : 342 ms       P-R-T Axes : 000 062 236 degrees     QTc Int : 518 ms    Atrial fibrillation with rapid ventricular response  Anterior infarct ,age  undetermined  ST and T wave abnormality, consider inferolateral ischemia  Abnormal ECG  No previous ECGs available  Confirmed by Xi Gunter MD ([id number]) on 12/16/2019 9:57:15 AM    Referred By: [email or web address]   SELF           Confirmed By:Xi Gunter MD        Imaging Results          X-Ray Chest AP Portable (Final result)  Result time 12/14/19 10:08:08    Final result by Gurpreet Christine Jr., MD (12/14/19 10:08:08)                 Impression:      1. Indirect evidence of treated coronary artery disease.  2. No evidence of adverse change when compared to previous of 11/04/2019.      Electronically signed by: Gurpreet Christine MD  Date:    12/14/2019  Time:    10:08             Narrative:    EXAMINATION:  XR CHEST AP PORTABLE    CLINICAL HISTORY:  Dizziness;    TECHNIQUE:  Single frontal view of the chest was performed.    COMPARISON:  Comparison study 11/04/2019.    FINDINGS:  Postop changes of previous coronary artery bypass surgery are noted as manifested by line of sternotomy sutures about the anterior chest wall.  Heart appears normal in size without evidence of any significant widening.  Pulmonary vascularity appears generous in volume.  No evidence of florid pulmonary edema.  Laminar calcification about the aortic knob.  Lungs without evidence of pneumonic infiltrate or significant pleural effusion or atelectatic changes.  Unilateral right-sided glenohumeral joint osteoarthritis and suspected changes of previous chronic appearing rotator cuff injury changes.  The tracheal lumen appears midline.  Regional skeleton elsewhere appears normal.                                 Lab Results   Component Value Date    WBC 6.36 12/17/2019    WBC 6.36 12/17/2019    HGB 8.0 (L) 12/17/2019    HGB 8.0 (L) 12/17/2019    HCT 26.8 (L) 12/17/2019    HCT 26.8 (L) 12/17/2019    MCV 74 (L) 12/17/2019    MCV 74 (L) 12/17/2019     12/17/2019     12/17/2019     BMP  Lab Results   Component Value Date     12/17/2019      12/17/2019    K 3.8 12/17/2019    K 3.8 12/17/2019     12/17/2019     12/17/2019    CO2 27 12/17/2019    CO2 27 12/17/2019    BUN 20 12/17/2019    BUN 20 12/17/2019    CREATININE 0.9 12/17/2019    CREATININE 0.9 12/17/2019    CALCIUM 9.0 12/17/2019    CALCIUM 9.0 12/17/2019    ANIONGAP 7 (L) 12/17/2019    ANIONGAP 7 (L) 12/17/2019    ESTGFRAFRICA >60.0 12/17/2019    ESTGFRAFRICA >60.0 12/17/2019    EGFRNONAA 57.7 (A) 12/17/2019    EGFRNONAA 57.7 (A) 12/17/2019       TRANSTHORACIC ECHO (TTE) COMPLETE   Conclusion     · Mild eccentric left ventricular hypertrophy.  · Normal left ventricular systolic function. The estimated ejection fraction is 60%  · Normal LV diastolic function.  · Mild left atrial enlargement.  · Severe aortic valve stenosis.  · Aortic valve area is 0.88 cm2; peak velocity is 2.13 m/s; mean gradient is 11 mmHg.  · Mild mitral sclerosis.  · Mild-to-moderate mitral regurgitation.          Anesthesia Evaluation    I have reviewed the Patient Summary Reports.    I have reviewed the Nursing Notes.   I have reviewed the Medications.     Review of Systems  Anesthesia Hx:  No problems with previous Anesthesia Denies Hx of Anesthetic complications  History of prior surgery of interest to airway management or planning: heart surgery. Previous anesthesia: General Denies Family Hx of Anesthesia complications.   Denies Personal Hx of Anesthesia complications.   Social:  Non-Smoker, No Alcohol Use    Hematology/Oncology:         -- Anemia: Hematology Comments: Eliquis Tx  --  Cancer in past history:  Breast left axillary node dissection no lymphedema chemotherapy, radiation and surgery    EENT/Dental:   chronic allergic rhinitis   Cardiovascular:   Hypertension, well controlled CAD asymptomatic CABG/stent Dysrhythmias atrial fibrillation CHF ECG has been reviewed. AF with RVR amiodarone drip with heart rate less than 100 bpm at this time   Pulmonary:  Pulmonary Normal    Renal/:  Renal/ Normal      Hepatic/GI:   GERD Patient denies active N/V or intestinal obtruction   Musculoskeletal:  Musculoskeletal Normal    Neurological:  Neurology Normal Essential Tremors    Endocrine:   Diabetes, poorly controlled, type 2, using insulin    Dermatological:  Skin Normal    Psych:   Psychiatric History anxiety depression          Physical Exam  General:  Well nourished    Airway/Jaw/Neck:  Airway Findings: Mouth Opening: Normal Tongue: Normal  General Airway Assessment: Adult  Mallampati: III  Improves to III with phonation.  TM Distance: < 4 cm  Jaw/Neck Findings:  Neck ROM: Normal ROM      Dental:  Dental Findings: Upper partial dentures    Chest/Lungs:  Chest/Lungs Findings: Clear to auscultation, Normal Respiratory Rate     Heart/Vascular:  Heart Findings: Rate: Normal  Rhythm: Irregularly Irregular  Sounds: Normal  Vascular Findings: Normal    Abdomen:  Abdomen Findings: Normal    Musculoskeletal:  Musculoskeletal Findings: Normal   Skin:  Skin Findings: Normal    Mental Status:  Mental Status Findings:  Cooperative, Alert and Oriented         Anesthesia Plan  Type of Anesthesia, risks & benefits discussed:  Anesthesia Type:  MAC  Patient's Preference: MAC  Intra-op Monitoring Plan: standard ASA monitors  Intra-op Monitoring Plan Comments:   Post Op Pain Control Plan: per primary service following discharge from PACU  Post Op Pain Control Plan Comments:   Induction:    Beta Blocker:  Patient is on a Beta-Blocker and has received one dose within the past 24 hours (No further documentation required).       Informed Consent: Patient understands risks and agrees with Anesthesia plan.  Questions answered. Anesthesia consent signed with patient.  ASA Score: 4     Day of Surgery Review of History & Physical:        Anesthesia Plan Notes:   MAC        Ready For Surgery From Anesthesia Perspective.

## 2019-12-17 NOTE — [employer]ATION PATIENT INSTRUCTIONS
Discharge Summary/Instructions after an Endoscopic Procedure  Patient Name: Rosanna Raymundo  Patient MRN: [id number]  Patient YOB: 1932  Tuesday, December 17, 2019  Raleigh Quarles III, MD  RESTRICTIONS:  During your procedure today, you received medications for sedation.  These   medications may affect your judgment, balance and coordination.  Therefore,   for 24 hours, you have the following restrictions:   - DO NOT drive a car, operate machinery, make legal/financial decisions,   sign important papers or drink alcohol.    ACTIVITY:  Today: no heavy lifting, straining or running due to procedural   sedation/anesthesia.  The following day: return to full activity including work.  DIET:  Eat and drink normally unless instructed otherwise.     TREATMENT FOR COMMON SIDE EFFECTS:  - Mild abdominal pain, nausea, belching, bloating or excessive gas:  rest,   eat lightly and use a heating pad.  - Sore Throat: treat with throat lozenges and/or gargle with warm salt   water.  - Because air was used during the procedure, expelling large amounts of air   from your rectum or belching is normal.  - If a bowel prep was taken, you may not have a bowel movement for 1-3 days.    This is normal.  SYMPTOMS TO WATCH FOR AND REPORT TO YOUR PHYSICIAN:  1. Abdominal pain or bloating, other than gas cramps.  2. Chest pain.  3. Back pain.  4. Signs of infection such as: chills or fever occurring within 24 hours   after the procedure.  5. Rectal bleeding, which would show as bright red, maroon, or black stools.   (A tablespoon of blood from the rectum is not serious, especially if   hemorrhoids are present.)  6. Vomiting.  7. Weakness or dizziness.  GO DIRECTLY TO THE NEAREST EMERGENCY ROOM IF YOU HAVE ANY OF THE FOLLOWING:      Difficulty breathing              Chills and/or fever over 101 F   Persistent vomiting and/or vomiting blood   Severe abdominal pain   Severe chest pain   Black, tarry stools   Bleeding- more than  one tablespoon   Any other symptom or condition that you feel may need urgent attention  Your doctor recommends these additional instructions:  If any biopsies were taken, your doctors clinic will contact you in 1 to 2   weeks with any results.  - Additional prep today and repeat colonoscopy tomorrow  - Return patient to hospital [facility] for ongoing care.  For questions, problems or results please call your physician - Raleigh Quarles III, MD at Work:  [phone].  [facility], EMERGENCY ROOM PHONE NUMBER: [phone]  IF A COMPLICATION OR EMERGENCY SITUATION ARISES AND YOU ARE UNABLE TO REACH   YOUR PHYSICIAN - GO DIRECTLY TO THE EMERGENCY ROOM.  Raleigh Quarles III, MD  12/17/2019 12:52:49 PM  This report has been verified and signed electronically.  [employer]

## 2019-12-18 VITALS
HEIGHT: 61 IN | HEART RATE: 114 BPM | SYSTOLIC BLOOD PRESSURE: 114 MMHG | RESPIRATION RATE: 18 BRPM | OXYGEN SATURATION: 99 % | DIASTOLIC BLOOD PRESSURE: 56 MMHG | TEMPERATURE: 98 F | BODY MASS INDEX: 32.1 KG/M2 | WEIGHT: 170 LBS

## 2019-12-18 LAB
ANION GAP SERPL CALC-SCNC: 9 MMOL/L (ref 8–16)
ANION GAP SERPL CALC-SCNC: 9 MMOL/L (ref 8–16)
BUN SERPL-MCNC: 12 MG/DL (ref 8–23)
BUN SERPL-MCNC: 12 MG/DL (ref 8–23)
CALCIUM SERPL-MCNC: 8.7 MG/DL (ref 8.7–10.5)
CALCIUM SERPL-MCNC: 8.7 MG/DL (ref 8.7–10.5)
CHLORIDE SERPL-SCNC: 107 MMOL/L (ref 95–110)
CHLORIDE SERPL-SCNC: 107 MMOL/L (ref 95–110)
CO2 SERPL-SCNC: 25 MMOL/L (ref 23–29)
CO2 SERPL-SCNC: 25 MMOL/L (ref 23–29)
CREAT SERPL-MCNC: 0.9 MG/DL (ref 0.5–1.4)
CREAT SERPL-MCNC: 0.9 MG/DL (ref 0.5–1.4)
ERYTHROCYTE [DISTWIDTH] IN BLOOD BY AUTOMATED COUNT: 19 % (ref 11.5–14.5)
ERYTHROCYTE [DISTWIDTH] IN BLOOD BY AUTOMATED COUNT: 19 % (ref 11.5–14.5)
EST. GFR  (AFRICAN AMERICAN): >60 ML/MIN/1.73 M^2
EST. GFR  (AFRICAN AMERICAN): >60 ML/MIN/1.73 M^2
EST. GFR  (NON AFRICAN AMERICAN): 57.7 ML/MIN/1.73 M^2
EST. GFR  (NON AFRICAN AMERICAN): 57.7 ML/MIN/1.73 M^2
GLUCOSE SERPL-MCNC: 119 MG/DL (ref 70–110)
GLUCOSE SERPL-MCNC: 119 MG/DL (ref 70–110)
HCT VFR BLD AUTO: 30.8 % (ref 37–48.5)
HCT VFR BLD AUTO: 30.8 % (ref 37–48.5)
HGB BLD-MCNC: 9 G/DL (ref 12–16)
HGB BLD-MCNC: 9 G/DL (ref 12–16)
MAGNESIUM SERPL-MCNC: 1.7 MG/DL (ref 1.6–2.6)
MAGNESIUM SERPL-MCNC: 1.7 MG/DL (ref 1.6–2.6)
MCH RBC QN AUTO: 21.8 PG (ref 27–31)
MCH RBC QN AUTO: 21.8 PG (ref 27–31)
MCHC RBC AUTO-ENTMCNC: 29.2 G/DL (ref 32–36)
MCHC RBC AUTO-ENTMCNC: 29.2 G/DL (ref 32–36)
MCV RBC AUTO: 75 FL (ref 82–98)
MCV RBC AUTO: 75 FL (ref 82–98)
PLATELET # BLD AUTO: 238 K/UL (ref 150–350)
PLATELET # BLD AUTO: 238 K/UL (ref 150–350)
PMV BLD AUTO: 10.6 FL (ref 9.2–12.9)
PMV BLD AUTO: 10.6 FL (ref 9.2–12.9)
POTASSIUM SERPL-SCNC: 3.2 MMOL/L (ref 3.5–5.1)
POTASSIUM SERPL-SCNC: 3.2 MMOL/L (ref 3.5–5.1)
RBC # BLD AUTO: 4.12 M/UL (ref 4–5.4)
RBC # BLD AUTO: 4.12 M/UL (ref 4–5.4)
SODIUM SERPL-SCNC: 141 MMOL/L (ref 136–145)
SODIUM SERPL-SCNC: 141 MMOL/L (ref 136–145)
WBC # BLD AUTO: 8.3 K/UL (ref 3.9–12.7)
WBC # BLD AUTO: 8.3 K/UL (ref 3.9–12.7)

## 2019-12-18 PROCEDURE — 80048 BASIC METABOLIC PNL TOTAL CA: CPT

## 2019-12-18 PROCEDURE — 27201089 HC SNARE, DISP (ANY): Performed by: INTERNAL MEDICINE

## 2019-12-18 PROCEDURE — 88305 TISSUE EXAM BY PATHOLOGIST: CPT | Mod: TC,59

## 2019-12-18 PROCEDURE — 45388 COLONOSCOPY W/ABLATION: CPT | Performed by: INTERNAL MEDICINE

## 2019-12-18 PROCEDURE — 99153 MOD SED SAME PHYS/QHP EA: CPT | Performed by: INTERNAL MEDICINE

## 2019-12-18 PROCEDURE — 85027 COMPLETE CBC AUTOMATED: CPT

## 2019-12-18 PROCEDURE — 25000003 PHARM REV CODE 250: Performed by: INTERNAL MEDICINE

## 2019-12-18 PROCEDURE — 94761 N-INVAS EAR/PLS OXIMETRY MLT: CPT

## 2019-12-18 PROCEDURE — 99152 MOD SED SAME PHYS/QHP 5/>YRS: CPT | Performed by: INTERNAL MEDICINE

## 2019-12-18 PROCEDURE — 63600175 PHARM REV CODE 636 W HCPCS: Performed by: INTERNAL MEDICINE

## 2019-12-18 PROCEDURE — 36415 COLL VENOUS BLD VENIPUNCTURE: CPT

## 2019-12-18 PROCEDURE — 27201114 HC TRAP (ANY): Performed by: INTERNAL MEDICINE

## 2019-12-18 PROCEDURE — 45385 COLONOSCOPY W/LESION REMOVAL: CPT | Performed by: INTERNAL MEDICINE

## 2019-12-18 PROCEDURE — 83735 ASSAY OF MAGNESIUM: CPT

## 2019-12-18 RX ORDER — AMIODARONE HYDROCHLORIDE 200 MG/1
TABLET ORAL
Qty: 141 TABLET | Refills: 0 | Status: ON HOLD | OUTPATIENT
Start: 2019-12-18 | End: 2020-01-03 | Stop reason: HOSPADM

## 2019-12-18 RX ORDER — METOPROLOL TARTRATE 1 MG/ML
2.5 INJECTION, SOLUTION INTRAVENOUS ONCE
Status: COMPLETED | OUTPATIENT
Start: 2019-12-18 | End: 2019-12-18

## 2019-12-18 RX ORDER — LANOLIN ALCOHOL/MO/W.PET/CERES
400 CREAM (GRAM) TOPICAL DAILY
Qty: 30 TABLET | Refills: 2 | COMMUNITY
Start: 2019-12-19 | End: 2020-01-09 | Stop reason: SDUPTHER

## 2019-12-18 RX ORDER — FENTANYL CITRATE 50 UG/ML
INJECTION, SOLUTION INTRAMUSCULAR; INTRAVENOUS
Status: COMPLETED | OUTPATIENT
Start: 2019-12-18 | End: 2019-12-18

## 2019-12-18 RX ORDER — NAPROXEN SODIUM 220 MG/1
81 TABLET, FILM COATED ORAL DAILY
Qty: 30 TABLET | Refills: 3 | Status: SHIPPED | OUTPATIENT
Start: 2019-12-18 | End: 2021-02-03 | Stop reason: SDUPTHER

## 2019-12-18 RX ORDER — OMEPRAZOLE 20 MG/1
40 CAPSULE, DELAYED RELEASE ORAL DAILY
Qty: 30 CAPSULE | Refills: 1 | Status: SHIPPED | OUTPATIENT
Start: 2019-12-18 | End: 2019-12-23 | Stop reason: SDUPTHER

## 2019-12-18 RX ORDER — MIDAZOLAM HYDROCHLORIDE 1 MG/ML
INJECTION INTRAMUSCULAR; INTRAVENOUS
Status: COMPLETED | OUTPATIENT
Start: 2019-12-18 | End: 2019-12-18

## 2019-12-18 RX ORDER — AMLODIPINE BESYLATE 10 MG/1
5 TABLET ORAL DAILY
Qty: 90 TABLET | Refills: 1
Start: 2019-12-18 | End: 2020-08-11 | Stop reason: SDUPTHER

## 2019-12-18 RX ADMIN — LOSARTAN POTASSIUM 100 MG: 50 TABLET, FILM COATED ORAL at 10:12

## 2019-12-18 RX ADMIN — POTASSIUM CHLORIDE 40 MEQ: 20 TABLET, EXTENDED RELEASE ORAL at 05:12

## 2019-12-18 RX ADMIN — AMIODARONE HYDROCHLORIDE 200 MG: 200 TABLET ORAL at 10:12

## 2019-12-18 RX ADMIN — PANTOPRAZOLE SODIUM 40 MG: 40 TABLET, DELAYED RELEASE ORAL at 05:12

## 2019-12-18 RX ADMIN — FENTANYL CITRATE 25 MCG: 50 INJECTION INTRAMUSCULAR; INTRAVENOUS at 07:12

## 2019-12-18 RX ADMIN — MIDAZOLAM HYDROCHLORIDE 2 MG: 1 INJECTION, SOLUTION INTRAMUSCULAR; INTRAVENOUS at 07:12

## 2019-12-18 RX ADMIN — METOPROLOL TARTRATE 2.5 MG: 1 INJECTION, SOLUTION INTRAVENOUS at 12:12

## 2019-12-18 RX ADMIN — AMIODARONE HYDROCHLORIDE 200 MG: 200 TABLET ORAL at 03:12

## 2019-12-18 RX ADMIN — HYDROCHLOROTHIAZIDE 12.5 MG: 12.5 TABLET ORAL at 10:12

## 2019-12-18 RX ADMIN — ALLOPURINOL 100 MG: 100 TABLET ORAL at 10:12

## 2019-12-18 RX ADMIN — METOPROLOL TARTRATE 12.5 MG: 25 TABLET, FILM COATED ORAL at 10:12

## 2019-12-18 RX ADMIN — MAGNESIUM OXIDE 400 MG: 400 TABLET ORAL at 10:12

## 2019-12-18 RX ADMIN — MIDAZOLAM HYDROCHLORIDE 1 MG: 1 INJECTION, SOLUTION INTRAMUSCULAR; INTRAVENOUS at 07:12

## 2019-12-18 RX ADMIN — BUSPIRONE HYDROCHLORIDE 10 MG: 5 TABLET ORAL at 10:12

## 2019-12-18 RX ADMIN — FENTANYL CITRATE 50 MCG: 50 INJECTION INTRAMUSCULAR; INTRAVENOUS at 07:12

## 2019-12-18 RX ADMIN — MAGNESIUM OXIDE 800 MG: 400 TABLET ORAL at 05:12

## 2019-12-18 NOTE — CARE UPDATE
This note also relates to the following rows which could not be included:  SpO2 - Cannot attach notes to unvalidated device data  Pulse - Cannot attach notes to unvalidated device data  Resp - Cannot attach notes to unvalidated device data       12/17/19 5509   Patient Assessment/Suction   Level of Consciousness (AVPU) alert   Respiratory Effort Normal;Unlabored   Expansion/Accessory Muscles/Retractions expansion symmetric;no retractions;no use of accessory muscles   All Lung Fields Breath Sounds clear   Rhythm/Pattern, Respiratory no shortness of breath reported;pattern regular;unlabored   PRE-TX-O2   O2 Device (Oxygen Therapy) nasal cannula   $ Is the patient on Low Flow Oxygen? Yes   Flow (L/min) 0   Pulse Oximetry Type Continuous   Respiratory Evaluation   $ Care Plan Tech Time 15 min

## 2019-12-18 NOTE — PROGRESS NOTES
[facility]  Cardiology  Progress Note    Patient Name: Rosanna Raymundo  MRN: [id number]  Admission Date: 12/14/2019  Hospital Length of Stay: 2 days  Code Status: DNR   Attending Physician: Macho Ochoa MD   Primary Care Physician: Aamir Rhodes MD  Expected Discharge Date:   Principal Problem:Paroxysmal atrial fibrillation    Subjective:       Interval History:   Patient remains in SR with PVCs, rate 70-80s.   States she feels well.  Colonoscopy today without any acute findings. Anemia is stable.     ROS   Denies any bleeding issues.   Denies any abdominal pain.   Denies any focal weakness.   Objective:     Vital Signs (Most Recent):  Temp: 98.1 °F (36.7 °C) (12/18/19 0700)  Pulse: 61 (12/18/19 0845)  Resp: 18 (12/18/19 0845)  BP: 106/64 (12/18/19 0825)  SpO2: 99 % (12/18/19 0845) Vital Signs (24h Range):  Temp:  [97.7 °F (36.5 °C)-98.4 °F (36.9 °C)] 98.1 °F (36.7 °C)  Pulse:  [] 61  Resp:  [16-44] 18  SpO2:  [85 %-100 %] 99 %  BP: ()/(32-86) 106/64     Weight: 77.1 kg (169 lb 15.6 oz)  Body mass index is 32.12 kg/m².    SpO2: 99 %  O2 Device (Oxygen Therapy): room air      Intake/Output Summary (Last 24 hours) at 12/18/2019 1034  Last data filed at 12/18/2019 0810  Gross per 24 hour   Intake 600 ml   Output 4500 ml   Net -3900 ml       Lines/Drains/Airways     Peripheral Intravenous Line                 Peripheral IV - Single Lumen 12/17/19 1149 Anterior;Right Forearm less than 1 day                Scheduled Meds:   allopurinol  100 mg Oral Daily    amiodarone  200 mg Oral TID    amLODIPine  10 mg Oral Daily    busPIRone  10 mg Oral BID    hydroCHLOROthiazide  12.5 mg Oral Daily    LORazepam  1 mg Oral BID    losartan  100 mg Oral Daily    magnesium oxide  400 mg Oral Daily    melatonin  6 mg Oral Nightly    metoprolol tartrate  12.5 mg Oral BID    pantoprazole  40 mg Oral Daily    rosuvastatin  20 mg Oral Daily     Continuous Infusions:    PRN Meds:.acetaminophen,  calcium chloride IVPB, calcium chloride IVPB, calcium chloride IVPB, dextrose 50%, dextrose 50%, glucagon (human recombinant), glucose, glucose, HYDROcodone-acetaminophen, magnesium oxide, magnesium sulfate IVPB, magnesium sulfate IVPB, magnesium sulfate IVPB, magnesium sulfate IVPB, ondansetron, potassium chloride in water, potassium chloride in water, potassium chloride in water, potassium chloride in water, potassium chloride, potassium chloride, potassium chloride, potassium chloride, sodium chloride 0.9%, sodium phosphate IVPB, sodium phosphate IVPB, sodium phosphate IVPB, sodium phosphate IVPB, sodium phosphate IVPB     Physical Exam  HEENT: Normocephalic, atraumatic, PERRL, Conjunctiva pink, no scleral icterus.   CVS: S1S2+, Regular, ESM+, no rubs or gallops, JVP: Normal.  LUNGS: Clear  ABDOMEN: Soft, NT, BS+  EXTREMITIES: No cyanosis, clubbing or edema  NEURO: AAO X 3.       Significant Labs:   BMP:   Recent Labs   Lab 12/17/19  0422 12/18/19  0444   *  125* 119*  119*     138 141  141   K 3.8  3.8 3.2*  3.2*     104 107  107   CO2 27  27 25  25   BUN 20  20 12  12   CREATININE 0.9  0.9 0.9  0.9   CALCIUM 9.0  9.0 8.7  8.7   MG 2.0  2.0 1.7  1.7   , CMP   Recent Labs   Lab 12/17/19  0422 12/18/19  0444     138 141  141   K 3.8  3.8 3.2*  3.2*     104 107  107   CO2 27  27 25  25   *  125* 119*  119*   BUN 20  20 12  12   CREATININE 0.9  0.9 0.9  0.9   CALCIUM 9.0  9.0 8.7  8.7   ANIONGAP 7*  7* 9  9   ESTGFRAFRICA >60.0  >60.0 >60.0  >60.0   EGFRNONAA 57.7*  57.7* 57.7*  57.7*   , CBC   Recent Labs   Lab 12/17/19 0422 12/18/19  0444   WBC 6.36  6.36 8.30  8.30   HGB 8.0*  8.0* 9.0*  9.0*   HCT 26.8*  26.8* 30.8*  30.8*     226 238  238   , INR No results for input(s): INR, PROTIME in the last 48 hours., Lipid Panel   No results for input(s): CHOL, HDL, LDLCALC, TRIG, CHOLHDL in the last 48 hours. and Troponin No  results for input(s): TROPONINI in the last 48 hours.    Significant Imaging: Reviewed  Assessment and Plan:     IMPRESSION:  Paroxysmal atrial fibrillation. CHADS 2: 3. Eliquis on hold. Currently in Sinus rhythm.      Anemia. Fe-deficient. No obvious source of bleeding. GI on board. S/p EGD on 12/17/19 with cauterization and angioplasia. H&H acceptable. Colonoscopy planned tomorrow.     H/o Bradycardia.     Aortic stenosis.    CAD 99% pLAD, 60% mLAD, 90% pLCx, 90% dLCx,  mRCA. 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 Sternotomy wound healed well. Currently with no symptoms of active ischemia.     Diabetes.     Hypertension - controlled.    Dyslipidemia    Benign essential tremors.    Drug intolerances: ACE-i - cough    Living will papers state that she is DNR/DNI.    Hypokalemia. Replaced.     PLAN:  1. Resume Eliquis 2.5 mg twice daily starting on Friday (Discussed with Dr. Quarles).   2. Should be stable for discharge today from our standpoint on amiodarone 200 mg three times a day for 3 days, then decrease to 200 mg twice daily, Eliquis 2.5 mg twice daily (starting on Friday 12/20/2019), and ASA 81 mg once daily. Continue other home meds as ordered.   3. Patient can follow up in our clinic in 1 or 2 weeks.         Breanna Aldridge NP  Cardiology  [facility]      I have personally seen and examined the patient. I reviewed the notes, assessments, and/or procedures performed by Ms Breanna Aldridge, I concur with her documentation of Rosanna Raymundo.

## 2019-12-18 NOTE — PROVATION PATIENT INSTRUCTIONS
Discharge Summary/Instructions after an Endoscopic Procedure  Patient Name: Rosanna Raymundo  Patient MRN: [id number]  Patient YOB: 1932  Wednesday, December 18, 2019  Raleigh Quarles III, MD  RESTRICTIONS:  During your procedure today, you received medications for sedation.  These   medications may affect your judgment, balance and coordination.  Therefore,   for 24 hours, you have the following restrictions:   - DO NOT drive a car, operate machinery, make legal/financial decisions,   sign important papers or drink alcohol.    ACTIVITY:  Today: no heavy lifting, straining or running due to procedural   sedation/anesthesia.  The following day: return to full activity including work.  DIET:  Eat and drink normally unless instructed otherwise.     TREATMENT FOR COMMON SIDE EFFECTS:  - Mild abdominal pain, nausea, belching, bloating or excessive gas:  rest,   eat lightly and use a heating pad.  - Sore Throat: treat with throat lozenges and/or gargle with warm salt   water.  - Because air was used during the procedure, expelling large amounts of air   from your rectum or belching is normal.  - If a bowel prep was taken, you may not have a bowel movement for 1-3 days.    This is normal.  SYMPTOMS TO WATCH FOR AND REPORT TO YOUR PHYSICIAN:  1. Abdominal pain or bloating, other than gas cramps.  2. Chest pain.  3. Back pain.  4. Signs of infection such as: chills or fever occurring within 24 hours   after the procedure.  5. Rectal bleeding, which would show as bright red, maroon, or black stools.   (A tablespoon of blood from the rectum is not serious, especially if   hemorrhoids are present.)  6. Vomiting.  7. Weakness or dizziness.  GO DIRECTLY TO THE NEAREST EMERGENCY ROOM IF YOU HAVE ANY OF THE FOLLOWING:      Difficulty breathing              Chills and/or fever over 101 F   Persistent vomiting and/or vomiting blood   Severe abdominal pain   Severe chest pain   Black, tarry stools   Bleeding- more than  one tablespoon   Any other symptom or condition that you feel may need urgent attention  Your doctor recommends these additional instructions:  If any biopsies were taken, your doctors clinic will contact you in 1 to 2   weeks with any results.  - Restart anticoagulation in next 1-2 days  - No ibuprofen, naproxen, or other non-steroidal anti-inflammatory drugs for   2 weeks after polyp removal.   - Return patient to hospital [facility] for ongoing care.  For questions, problems or results please call your physician - Raleigh Quarles III, MD at Work:  [phone].  [facility], EMERGENCY ROOM PHONE NUMBER: [phone]  IF A COMPLICATION OR EMERGENCY SITUATION ARISES AND YOU ARE UNABLE TO REACH   YOUR PHYSICIAN - GO DIRECTLY TO THE EMERGENCY ROOM.  Raleigh Quarles III, MD  12/18/2019 8:03:10 AM  This report has been verified and signed electronically.  [employer]

## 2019-12-18 NOTE — PLAN OF CARE
Problem: Fall Injury Risk  Goal: Absence of Fall and Fall-Related Injury  Outcome: Ongoing, Progressing     Problem: Adult Inpatient Plan of Care  Goal: Plan of Care Review  Outcome: Ongoing, Progressing  Goal: Patient-Specific Goal (Individualization)  Outcome: Ongoing, Progressing  Goal: Absence of Hospital-Acquired Illness or Injury  Outcome: Ongoing, Progressing  Goal: Optimal Comfort and Wellbeing  Outcome: Ongoing, Progressing  Goal: Readiness for Transition of Care  Outcome: Ongoing, Progressing  Goal: Rounds/Family Conference  Outcome: Ongoing, Progressing     Problem: Diabetes Comorbidity  Goal: Blood Glucose Level Within Desired Range  Outcome: Ongoing, Progressing     Problem: Cardiac Output Decreased  Goal: Effective Cardiac Output  Outcome: Ongoing, Progressing  Intervention: Optimize Cardiac Output  Flowsheets (Taken 12/18/2019 1418)  Head of Bed (HOB): HOB elevated  Environmental Support: calm environment promoted

## 2019-12-18 NOTE — DISCHARGE SUMMARY
[facility] Medicine  Discharge Summary      Patient Name: Rosanna Raymundo  MRN: [id number]  Admission Date: 12/14/2019  Hospital Length of Stay: 2 days  Discharge Date and Time:  12/18/2019 5:08 PM  Attending Physician: No att. providers found   Discharging Provider: Macho Ochoa MD  Primary Care Provider: Aamir Rhodes MD      HPI:   Rosanna Raymundo is a 87 y.o. white female who  has a past medical history of Anticoagulant long-term use, Cancer, Cataract, CHF (congestive heart failure), Diabetes mellitus, type 2, Heart murmur, Hypertension, and Occasional tremors.. The patient presented to [facility] on 12/14/2019 with a primary complaint of Dizziness (Cough)  As per the patient, her symptoms started last night with palpitations which she describes as racing of heart and were associated with dizziness while she was changing position from sitting to standing. She was not able to walk. She was seen by her cardiologist as outpatient Dr. Vinson who started Metoprolol 12.5mg BID which she reports good compliance. Otherwise she denies other complaints like cough, congestion, runny nose, chest pain, abdominal pain, diarrhea, constipation.     With worsening of her symptoms, Patient planned to come to the ER where she got an EKG which showed atrial fibrillation (personally reviewed) and troponin level that was negative. I was called for further evaluation. During my encounter, patient was comfortable and denied further episodes of chest pain.       Procedure(s) (LRB):  COLONOSCOPY (N/A)      Hospital Course:   Patient was admitted with AFib with RVR and needed a Cardizem drip and did not response and changed to amiodarone drip and later converted to sinus.  Also patient was under evaluation for worsening chronic anemia, also patient was on blood thinner and it was held .  EGD and colonoscopy was done and no source of bleeding found . Found  gastritis and some polyps  removed and no signs of bleeding afterwards.  She got blood transfusion and hemoglobin is stable and later discharged in stable condition.  Just before she leaves, she got back into atrial fibrillation and Lopressor 2.5 mg was given and rate was controlled and continue to discharged in stable condition with  amiodarone loading and tapering dose.  Case discussed with Dr. Klein and he will see her in the clinic for further evaluation..  Aspirin was started and Eliquis is to be started on Friday only and patient aware that.  Continued her home Lopressor.     Consults:   Consults (From admission, onward)        Status Ordering Provider     Inpatient consult to Anesthesiology  Once     Provider:  Pipe Romo MD    Acknowledged LEYDI MARINO     Inpatient consult to Cardiology  Once     Provider:  Rakesh Klein MD    Completed HODAN ZALDIVAR     Inpatient consult to Gastroenterology  Once     Provider:  Leydi Marino MD    Completed LESLIE PEREZ     Inpatient consult to Hospitalist  Once     Provider:  Zion Lyon MD    Acknowledged HODAN ZALDIVAR          No new Assessment & Plan notes have been filed under this hospital service since the last note was generated.  Service: Hospital Medicine    Final Active Diagnoses:    Diagnosis Date Noted POA    PRINCIPAL PROBLEM:  Paroxysmal atrial fibrillation [I48.0] 10/03/2019 Yes    Iron deficiency anemia [D50.9] 12/17/2019 Unknown    Coronary artery disease [I25.10] 10/03/2019 Yes    Type 2 diabetes mellitus without complication, without long-term current use of insulin [E11.9] 10/03/2019 Yes    Essential hypertension [I10] 10/03/2019 Yes    Mixed hyperlipidemia [E78.2] 10/03/2019 Yes      Problems Resolved During this Admission:       Discharged Condition: good    Disposition: Home or Self Care    Follow Up:  Follow-up Information     Aamir Rhodes MD In 1 week.    Specialty:  Family Medicine  Contact information:  [address]  [address]  [facility]  [place] [address]  [phone]             Rakesh Klein MD In 1 week.    Specialties:  Cardiovascular Disease, Cardiology  Contact information:  [address] [facility]  [address]  [place] [address]  [phone]                 Patient Instructions:      Diet Cardiac     Notify your health care provider if you experience any of the following:  severe uncontrolled pain     Activity as tolerated       Significant Diagnostic Studies: Labs:   CMP   Recent Labs   Lab 12/17/19  0422 12/18/19  0444     138 141  141   K 3.8  3.8 3.2*  3.2*     104 107  107   CO2 27  27 25  25   *  125* 119*  119*   BUN 20  20 12  12   CREATININE 0.9  0.9 0.9  0.9   CALCIUM 9.0  9.0 8.7  8.7   ANIONGAP 7*  7* 9  9   ESTGFRAFRICA >60.0  >60.0 >60.0  >60.0   EGFRNONAA 57.7*  57.7* 57.7*  57.7*    and CBC   Recent Labs   Lab 12/17/19 0422 12/18/19 0444   WBC 6.36  6.36 8.30  8.30   HGB 8.0*  8.0* 9.0*  9.0*   HCT 26.8*  26.8* 30.8*  30.8*     226 238  238       Pending Diagnostic Studies:     Procedure Component Value Units Date/Time    Specimen to Pathology - Surgery [[id number]] Collected:  12/18/19 0811    Order Status:  Sent Lab Status:  No result     Specimen:  Tissue          Medications:  Reconciled Home Medications:      Medication List      START taking these medications    amiodarone 200 MG Tab  Commonly known as:  PACERONE  Take 1 tablet (200 mg total) by mouth 3 (three) times daily for 7 days, THEN 1 tablet (200 mg total) 2 (two) times daily.  Start taking on:  December 18, 2019     aspirin 81 MG Chew  Take 1 tablet (81 mg total) by mouth once daily.     magnesium oxide 400 mg (241.3 mg magnesium) tablet  Commonly known as:  MAG-OX  Take 1 tablet (400 mg total) by mouth once daily.  Start taking on:  December 19, 2019  Replaces:  magnesium 250 mg Tab        CHANGE how you take these medications    amLODIPine 10 MG tablet  Commonly  known as:  NORVASC  Take 0.5 tablets (5 mg total) by mouth once daily.  What changed:  how much to take     apixaban 2.5 mg Tab  Commonly known as:  ELIQUIS  Take 1 tablet (2.5 mg total) by mouth 2 (two) times daily.  Start taking on:  December 20, 2019  What changed:  These instructions start on December 20, 2019. If you are unsure what to do until then, ask your doctor or other care provider.     fluticasone propionate 50 mcg/actuation nasal spray  Commonly known as:  FLONASE  1 spray (50 mcg total) by Each Nostril route 2 (two) times daily.  What changed:    · when to take this  · reasons to take this     omeprazole 20 MG capsule  Commonly known as:  PRILOSEC  Take 2 capsules (40 mg total) by mouth once daily.  What changed:    · how much to take  · Another medication with the same name was removed. Continue taking this medication, and follow the directions you see here.        CONTINUE taking these medications    allopurinol 100 MG tablet  Commonly known as:  ZYLOPRIM  Take 1 tablet (100 mg total) by mouth once daily.     busPIRone 5 MG Tab  Commonly known as:  BUSPAR  Take 10 mg by mouth 2 (two) times daily.     cholecalciferol (vitamin D3) 25 mcg (1,000 unit) capsule  Commonly known as:  Vitamin D3  Take 1 capsule (1,000 Units total) by mouth once daily.     Crestor 20 MG tablet  Generic drug:  rosuvastatin  Take 1 tablet (20 mg total) by mouth once daily.     fish oil-omega-3 fatty acids 300-1,000 mg capsule  Take 1 capsule by mouth once daily.     hydroCHLOROthiazide 12.5 MG Tab  Commonly known as:  HYDRODIURIL  Take 1 tablet (12.5 mg total) by mouth once daily.     loratadine 10 mg tablet  Commonly known as:  CLARITIN  Take 1 tablet (10 mg total) by mouth once daily.     LORazepam 1 MG tablet  Commonly known as:  Ativan  Take 1 tablet (1 mg total) by mouth 2 (two) times daily.     losartan 100 MG tablet  Commonly known as:  COZAAR  Take 1 tablet (100 mg total) by mouth once daily.     metoprolol tartrate 25  MG tablet  Commonly known as:  LOPRESSOR  Take 0.5 tablets (12.5 mg total) by mouth 2 (two) times daily.     ondansetron 8 MG Tbdl  Commonly known as:  ZOFRAN-ODT  Take 1 tablet (8 mg total) by mouth every 6 (six) hours as needed.     VISION FORMULA-2 ORAL  Take 1 tablet by mouth 2 (two) times daily.        STOP taking these medications    magnesium 250 mg Tab  Replaced by:  magnesium oxide 400 mg (241.3 mg magnesium) tablet            Indwelling Lines/Drains at time of discharge:   Lines/Drains/Airways     None               Physical Exam:  General- Patient alert and oriented x3 in NAD  HEENT- PERRLA, EOMI, OP clear, MMM  Neck- No JVD, Lymphadenopathy, Thyromegaly  CV- Regular rate and rhythm, No Murmur/carlos/rubs  Resp- Lungs CTA Bilaterally, No increased WOB  GI- Non tender/non-distended, BS normoactive x4 quads, no HSM  Extrem- No cyanosis, clubbing, edema. Pulses 2+ and symmetric  Neuro- Strength 5/5 flexors/extensors,  Skin-  No masses, rashes or lesions noted on cursory skin exam.  Time spent on the discharge of patient: 35 minutes  Patient was seen and examined on the date of discharge and determined to be suitable for discharge.         Macho Ochoa MD  Department of Hospital Medicine  [facility]

## 2019-12-18 NOTE — HPI
Roasnna Raymundo is a 87 y.o. white female who  has a past medical history of Anticoagulant long-term use, Cancer, Cataract, CHF (congestive heart failure), Diabetes mellitus, type 2, Heart murmur, Hypertension, and Occasional tremors.. The patient presented to [facility] on 12/14/2019 with a primary complaint of Dizziness (Cough)  As per the patient, her symptoms started last night with palpitations which she describes as racing of heart and were associated with dizziness while she was changing position from sitting to standing. She was not able to walk. She was seen by her cardiologist as outpatient Dr. Vinson who started Metoprolol 12.5mg BID which she reports good compliance. Otherwise she denies other complaints like cough, congestion, runny nose, chest pain, abdominal pain, diarrhea, constipation.     With worsening of her symptoms, Patient planned to come to the ER where she got an EKG which showed atrial fibrillation (personally reviewed) and troponin level that was negative. I was called for further evaluation. During my encounter, patient was comfortable and denied further episodes of chest pain.

## 2019-12-18 NOTE — PROGRESS NOTES
"[facility]  Adult Nutrition   Progress Note (Initial Assessment)     SUMMARY     Recommendations  Recommendation/Intervention: 1.) Continue current diet as tolerated 2.)  to assist with meal choices daily  Goals: 1.) Pt to meet >75% EEN via PO diet  Nutrition Goal Status: new    Reason for Assessment    Reason For Assessment: length of stay  Diagnosis: other (see comments)(afib w/ RVR)  Relevant Medical History: DM, HTN, CAD s/p CABG, CHF    Nutrition Risk Screen    Nutrition Risk Screen: no indicators present     MST Score: 0  Have you recently lost weight without trying?: No  Weight loss score: 0  Have you been eating poorly because of a decreased appetite?: No  Appetite score: 0       Nutrition/Diet History    Patient Reported Diet/Restrictions/Preferences: diabetic diet  Spiritual, Cultural Beliefs, Holiness Practices, Values that Affect Care: no  Food Allergies: NKFA  Factors Affecting Nutritional Intake: None identified at this time    Anthropometrics    Temp: 98.1 °F (36.7 °C)  Height Method: Stated  Height: 5' 1" (154.9 cm)  Height (inches): 61 in  Weight Method: Standard Scale  Weight: 77.1 kg (169 lb 15.6 oz)  Weight (lb): 169.98 lb  Ideal Body Weight (IBW), Female: 105 lb  % Ideal Body Weight, Female (lb): 161.89 lb  BMI (Calculated): 32.1  BMI Grade: 30 - 34.9- obesity - grade I       Weight History:  Wt Readings from Last 10 Encounters:   12/18/19 77.1 kg (169 lb 15.6 oz)   11/04/19 76.8 kg (169 lb 5 oz)   10/03/19 75.8 kg (167 lb 3.2 oz)   09/10/19 75.3 kg (166 lb)       Lab/Procedures/Meds: Pertinent Labs Reviewed  Clinical Chemistry:  Recent Labs   Lab 12/15/19  0439  12/18/19  0444      < > 141  141   K 3.5   < > 3.2*  3.2*      < > 107  107   CO2 28   < > 25  25   *   < > 119*  119*   BUN 26*   < > 12  12   CREATININE 1.0   < > 0.9  0.9   CALCIUM 9.0   < > 8.7  8.7   PROT 6.3  --   --    ALBUMIN 3.5  --   --    BILITOT 0.6  --   --    ALKPHOS 38*  " --   --    AST 14  --   --    ALT 15  --   --    ANIONGAP 7*   < > 9  9   ESTGFRAFRICA 58.5*   < > >60.0  >60.0   EGFRNONAA 50.8*   < > 57.7*  57.7*   MG 1.7   < > 1.7  1.7    < > = values in this interval not displayed.     CBC:   Recent Labs   Lab 12/18/19  0444   WBC 8.30  8.30   RBC 4.12  4.12   HGB 9.0*  9.0*   HCT 30.8*  30.8*     238   MCV 75*  75*   MCH 21.8*  21.8*   MCHC 29.2*  29.2*     Lipid Panel:  Recent Labs   Lab 12/15/19  0439   CHOL 86*   HDL 28*   LDLCALC 36.4*   TRIG 108   CHOLHDL 32.6     Cardiac Profile:  Recent Labs   Lab 12/14/19  0945   CPK 64   CPKMB 1.6   TROPONINI <0.030     Diabetes:  Recent Labs   Lab 12/14/19  0945   HGBA1C 7.2*       Medications: Pertinent Medications reviewed  Scheduled Meds:   allopurinol  100 mg Oral Daily    amiodarone  200 mg Oral TID    amLODIPine  10 mg Oral Daily    busPIRone  10 mg Oral BID    hydroCHLOROthiazide  12.5 mg Oral Daily    LORazepam  1 mg Oral BID    losartan  100 mg Oral Daily    magnesium oxide  400 mg Oral Daily    melatonin  6 mg Oral Nightly    metoprolol tartrate  12.5 mg Oral BID    pantoprazole  40 mg Oral Daily    rosuvastatin  20 mg Oral Daily     Continuous Infusions:  PRN Meds:.acetaminophen, calcium chloride IVPB, calcium chloride IVPB, calcium chloride IVPB, dextrose 50%, dextrose 50%, glucagon (human recombinant), glucose, glucose, HYDROcodone-acetaminophen, magnesium oxide, magnesium sulfate IVPB, magnesium sulfate IVPB, magnesium sulfate IVPB, magnesium sulfate IVPB, ondansetron, potassium chloride in water, potassium chloride in water, potassium chloride in water, potassium chloride in water, potassium chloride, potassium chloride, potassium chloride, potassium chloride, sodium chloride 0.9%, sodium phosphate IVPB, sodium phosphate IVPB, sodium phosphate IVPB, sodium phosphate IVPB, sodium phosphate IVPB    Estimated/Assessed Needs    Weight Used For Calorie Calculations: 77.1 kg (169 lb 15.6  oz)  Energy Calorie Requirements (kcal): 1927 (25 kcal/kg)  Energy Need Method: Kcal/kg  Protein Requirements: 72 g (1.5 g/kg) per IBW  Weight Used For Protein Calculations: 48 kg (105 lb 13.1 oz)(IBW)     Estimated Fluid Requirement Method: RDA Method  RDA Method (mL): 1927       Nutrition Prescription Ordered    Current Diet Order: 2000 ADA    Evaluation of Received Nutrient/Fluid Intake    Energy Calories Required: meeting needs  Protein Required: meeting needs  Fluid Required: meeting needs  Tolerance: tolerating     Intake/Output Summary (Last 24 hours) at 12/18/2019 1039  Last data filed at 12/18/2019 0810  Gross per 24 hour   Intake 600 ml   Output 4500 ml   Net -3900 ml       % Meal Intake: 75 - 100 %    Dietitian Rounds Brief    Pt assessed 2' LOS. Pt just returned s/p colonoscopy--results noted. Able to tolerate meal this AM. No N/V reported. LBM 12/17. Pt states DM is diet controlled--reporting good glycemic control (a1c 7.2%). No education needs identified at this time.    Nutrition Risk    Level of Risk/Frequency of Follow-up: moderate       Monitor and Evaluation    Food and Nutrient Intake: food and beverage intake, energy intake  Food and Nutrient Adminstration: diet order  Physical Activity and Function: nutrition-related ADLs and IADLs  Anthropometric Measurements: weight change, weight  Biochemical Data, Medical Tests and Procedures: gastrointestinal profile, electrolyte and renal panel, glucose/endocrine profile  Nutrition-Focused Physical Findings: overall appearance     Nutrition Follow-Up    RD Follow-up?: Yes    Paulina Arevalo RD 12/18/2019 10:39 AM

## 2019-12-18 NOTE — NURSING
Pt stable for discharge. Discharge instructions reviewed, all questions answered pt voiced understanding. Pt discharged via wheelchair with all personal belongings via private vehicle.

## 2019-12-19 ENCOUNTER — TELEPHONE (OUTPATIENT)
Dept: FAMILY MEDICINE | Facility: CLINIC | Age: 84
End: 2019-12-19

## 2019-12-19 NOTE — TELEPHONE ENCOUNTER
Spoke with pt she got out of the hosp yesterday. She said she hemoglobin was low. She had to get 2 pints of blood. She received an iron infusion. She picked up her new rx. She will bring bottles to the appt. Pt rescheduled

## 2019-12-19 NOTE — TELEPHONE ENCOUNTER
----- Message from June Fragoso sent at 12/19/2019  9:54 AM CST -----  Contact: pt  Needs a hospital follow up appt    [phone]

## 2019-12-23 ENCOUNTER — OFFICE VISIT (OUTPATIENT)
Dept: FAMILY MEDICINE | Facility: CLINIC | Age: 84
End: 2019-12-23
Payer: MEDICARE

## 2019-12-23 VITALS
WEIGHT: 171.63 LBS | BODY MASS INDEX: 32.4 KG/M2 | HEIGHT: 61 IN | HEART RATE: 60 BPM | SYSTOLIC BLOOD PRESSURE: 116 MMHG | DIASTOLIC BLOOD PRESSURE: 66 MMHG

## 2019-12-23 DIAGNOSIS — D50.0 ANEMIA DUE TO BLOOD LOSS: ICD-10-CM

## 2019-12-23 DIAGNOSIS — K21.9 GASTROESOPHAGEAL REFLUX DISEASE WITHOUT ESOPHAGITIS: ICD-10-CM

## 2019-12-23 DIAGNOSIS — Z09 HOSPITAL DISCHARGE FOLLOW-UP: ICD-10-CM

## 2019-12-23 DIAGNOSIS — I48.0 PAROXYSMAL ATRIAL FIBRILLATION: Primary | ICD-10-CM

## 2019-12-23 PROCEDURE — 99496 TRANSJ CARE MGMT HIGH F2F 7D: CPT | Mod: S$GLB,,, | Performed by: PHYSICIAN ASSISTANT

## 2019-12-23 PROCEDURE — 99496 TRANSITIONAL CARE MANAGE SERVICE 7 DAY DISCHARGE: ICD-10-PCS | Mod: S$GLB,,, | Performed by: PHYSICIAN ASSISTANT

## 2019-12-23 RX ORDER — FERROUS SULFATE 325(65) MG
325 TABLET, DELAYED RELEASE (ENTERIC COATED) ORAL 2 TIMES DAILY
COMMUNITY
End: 2021-02-03 | Stop reason: SDUPTHER

## 2019-12-23 RX ORDER — OMEPRAZOLE 20 MG/1
40 CAPSULE, DELAYED RELEASE ORAL DAILY
Qty: 180 CAPSULE | Refills: 1 | Status: SHIPPED | OUTPATIENT
Start: 2019-12-23 | End: 2020-08-11 | Stop reason: SDUPTHER

## 2019-12-23 NOTE — PROGRESS NOTES
"  SUBJECTIVE:    Patient ID: Rosanna Raymundo is a 87 y.o. female.    Chief Complaint: Hospital Follow Up (Admitted on 12/14/2019.....mlr)    This is an 87-year-old white female who presents today for hospital follow-up.  She was discharged on December 18th, 5 days prior to today.  The discharge summary and all records were available in the chart for my review. Summary as below, presenting complaint was shortness of breath and cough:    "Patient was admitted with AFib with RVR and needed a Cardizem drip and did not response and changed to amiodarone drip and later converted to sinus.  Also patient was under evaluation for worsening chronic anemia, also patient was on blood thinner and it was held .  EGD and colonoscopy was done and no source of bleeding found . Found  gastritis and some polyps removed and no signs of bleeding afterwards.  She got blood transfusion and hemoglobin is stable and later discharged in stable condition.  Just before she leaves, she got back into atrial fibrillation and Lopressor 2.5 mg was given and rate was controlled and continue to discharged in stable condition with  amiodarone loading and tapering dose.  Case discussed with Dr. Klein and he will see her in the clinic for further evaluation..  Aspirin was started and Eliquis is to be started on Friday only and patient aware that.  Continued her home Lopressor."    She reports that she is finally starting to feel more like herself. She has a followup scheduled with Dr. Evans and again with Dr. Palomares next week. She has no indication that her heart is out of rhythm now.      No results displayed because visit has over 200 results.      Admission on 11/04/2019, Discharged on 11/05/2019   Component Date Value Ref Range Status    WBC 11/04/2019 7.35  3.90 - 12.70 K/uL Final    RBC 11/04/2019 3.89* 4.00 - 5.40 M/uL Final    Hemoglobin 11/04/2019 9.4* 12.0 - 16.0 g/dL Final    Hematocrit 11/04/2019 30.2* 37.0 - 48.5 % " Final    Mean Corpuscular Volume 11/04/2019 78* 82 - 98 fL Final    Mean Corpuscular Hemoglobin 11/04/2019 24.2* 27.0 - 31.0 pg Final    Mean Corpuscular Hemoglobin Conc 11/04/2019 31.1* 32.0 - 36.0 g/dL Final    RDW 11/04/2019 17.0* 11.5 - 14.5 % Final    Platelets 11/04/2019 233  150 - 350 K/uL Final    MPV 11/04/2019 10.8  9.2 - 12.9 fL Final    Immature Granulocytes 11/04/2019 0.4  0.0 - 0.5 % Final    Gran # (ANC) 11/04/2019 5.4  1.8 - 7.7 K/uL Final    Immature Grans (Abs) 11/04/2019 0.03  0.00 - 0.04 K/uL Final    Lymph # 11/04/2019 1.2  1.0 - 4.8 K/uL Final    Mono # 11/04/2019 0.6  0.3 - 1.0 K/uL Final    Eos # 11/04/2019 0.1  0.0 - 0.5 K/uL Final    Baso # 11/04/2019 0.03  0.00 - 0.20 K/uL Final    nRBC 11/04/2019 0  0 /100 WBC Final    Gran% 11/04/2019 73.1* 38.0 - 73.0 % Final    Lymph% 11/04/2019 16.6* 18.0 - 48.0 % Final    Mono% 11/04/2019 8.0  4.0 - 15.0 % Final    Eosinophil% 11/04/2019 1.5  0.0 - 8.0 % Final    Basophil% 11/04/2019 0.4  0.0 - 1.9 % Final    Differential Method 11/04/2019 Automated   Final    Sodium 11/04/2019 138  136 - 145 mmol/L Final    Potassium 11/04/2019 3.7  3.5 - 5.1 mmol/L Final    Chloride 11/04/2019 102  95 - 110 mmol/L Final    CO2 11/04/2019 25  23 - 29 mmol/L Final    Glucose 11/04/2019 166* 70 - 110 mg/dL Final    BUN, Bld 11/04/2019 27* 8 - 23 mg/dL Final    Creatinine 11/04/2019 1.1  0.5 - 1.4 mg/dL Final    Calcium 11/04/2019 9.3  8.7 - 10.5 mg/dL Final    Total Protein 11/04/2019 7.4  6.0 - 8.4 g/dL Final    Albumin 11/04/2019 4.3  3.5 - 5.2 g/dL Final    Total Bilirubin 11/04/2019 0.8  0.1 - 1.0 mg/dL Final    Alkaline Phosphatase 11/04/2019 55  55 - 135 U/L Final    AST 11/04/2019 24  10 - 40 U/L Final    ALT 11/04/2019 17  10 - 44 U/L Final    Anion Gap 11/04/2019 11  8 - 16 mmol/L Final    eGFR if  11/04/2019 52.2* >60 mL/min/1.73 m^2 Final    eGFR if non African American 11/04/2019 45.3* >60 mL/min/1.73  m^2 Final    Troponin I 11/04/2019 <0.030  0.020 - 0.040 ng/mL Final    BNP 11/04/2019 265* 0 - 99 pg/mL Final    TSH 11/04/2019 0.700  0.340 - 5.600 uIU/mL Final    PT 11/04/2019 13.7  10.6 - 14.8 sec Final    INR 11/04/2019 1.1   Final    aPTT 11/04/2019 34.6* 23.6 - 33.3 sec Final    Troponin I 11/04/2019 0.031  0.020 - 0.040 ng/mL Final    BSA 11/05/2019 1.82  m2 Final    TDI SEPTAL 11/05/2019 0.09  m/s Final    LV LATERAL E/E' RATIO 11/05/2019 12.50  m/s Final    LV SEPTAL E/E' RATIO 11/05/2019 11.11  m/s Final    AORTIC VALVE CUSP SEPERATION 11/05/2019 0.79  cm Final    TDI LATERAL 11/05/2019 0.08  m/s Final    PV PEAK VELOCITY 11/05/2019 56.22  cm/s Final    LVIDD 11/05/2019 5.21  3.5 - 6.0 cm Final    IVS 11/05/2019 1.55* 0.6 - 1.1 cm Final    PW 11/05/2019 1.23* 0.6 - 1.1 cm Final    Ao root annulus 11/05/2019 3.32  cm Final    LVIDS 11/05/2019 3.68  2.1 - 4.0 cm Final    FS 11/05/2019 29  28 - 44 % Final    LV mass 11/05/2019 307.33  g Final    LA size 11/05/2019 4.42  cm Final    RVDD 11/05/2019 309.00  cm Final    Left Ventricle Relative Wall Thick* 11/05/2019 0.47  cm Final    AV mean gradient 11/05/2019 11  mmHg Final    AV valve area 11/05/2019 0.88  cm2 Final    AV Velocity Ratio 11/05/2019 29.26   Final    AV index (prosthetic) 11/05/2019 0.28   Final    E/A ratio 11/05/2019 3.23   Final    Mean e' 11/05/2019 0.09  m/s Final    E wave decelartion time 11/05/2019 128.46  msec Final    IVRT 11/05/2019 66.30  msec Final    LVOT diameter 11/05/2019 2.00  cm Final    LVOT area 11/05/2019 3.1  cm2 Final    LVOT peak michael 11/05/2019 62.33  m/s Final    LVOT peak VTI 11/05/2019 16.66  cm Final    Ao peak michael 11/05/2019 2.13  m/s Final    Ao VTI 11/05/2019 59.47  cm Final    LVOT stroke volume 11/05/2019 52.31  cm3 Final    AV peak gradient 11/05/2019 18  mmHg Final    E/E' ratio 11/05/2019 11.76  m/s Final    MV Peak E Michael 11/05/2019 1.00  m/s Final    TR Max Michael  11/05/2019 2.49  m/s Final    MV Peak A Michael 11/05/2019 0.31  m/s Final    LV Systolic Volume 11/05/2019 32.74  mL Final    LV Systolic Volume Index 11/05/2019 18.6  mL/m2 Final    LV Diastolic Volume 11/05/2019 89.72  mL Final    LV Diastolic Volume Index 11/05/2019 50.98  mL/m2 Final    LV Mass Index 11/05/2019 175  g/m2 Final    Triscuspid Valve Regurgitation Pea* 11/05/2019 25  mmHg Final    Hemoglobin A1C 11/04/2019 7.0* 4.5 - 6.2 % Final    Estimated Avg Glucose 11/04/2019 154* 68 - 131 mg/dL Final    Troponin I 11/04/2019 0.032  0.020 - 0.040 ng/mL Final    Sodium 11/05/2019 138  136 - 145 mmol/L Final    Potassium 11/05/2019 4.2  3.5 - 5.1 mmol/L Final    Chloride 11/05/2019 103  95 - 110 mmol/L Final    CO2 11/05/2019 27  23 - 29 mmol/L Final    Glucose 11/05/2019 147* 70 - 110 mg/dL Final    BUN, Bld 11/05/2019 27* 8 - 23 mg/dL Final    Creatinine 11/05/2019 1.1  0.5 - 1.4 mg/dL Final    Calcium 11/05/2019 8.9  8.7 - 10.5 mg/dL Final    Anion Gap 11/05/2019 8  8 - 16 mmol/L Final    eGFR if  11/05/2019 52.2* >60 mL/min/1.73 m^2 Final    eGFR if non African American 11/05/2019 45.3* >60 mL/min/1.73 m^2 Final    Magnesium 11/05/2019 1.9  1.6 - 2.6 mg/dL Final    WBC 11/05/2019 5.72  3.90 - 12.70 K/uL Final    RBC 11/05/2019 3.75* 4.00 - 5.40 M/uL Final    Hemoglobin 11/05/2019 9.0* 12.0 - 16.0 g/dL Final    Hematocrit 11/05/2019 29.4* 37.0 - 48.5 % Final    Mean Corpuscular Volume 11/05/2019 78* 82 - 98 fL Final    Mean Corpuscular Hemoglobin 11/05/2019 24.0* 27.0 - 31.0 pg Final    Mean Corpuscular Hemoglobin Conc 11/05/2019 30.6* 32.0 - 36.0 g/dL Final    RDW 11/05/2019 17.2* 11.5 - 14.5 % Final    Platelets 11/05/2019 239  150 - 350 K/uL Final    MPV 11/05/2019 10.8  9.2 - 12.9 fL Final   Orders Only on 09/24/2019   Component Date Value Ref Range Status    Cholesterol 09/24/2019 157  <200 mg/dL Final    HDL 09/24/2019 45* >50 mg/dL Final    Triglycerides  09/24/2019 147  <150 mg/dL Final    LDL Cholesterol 09/24/2019 88  mg/dL (calc) Final    Hdl/Cholesterol Ratio 09/24/2019 3.5  <5.0 (calc) Final    Non HDL Chol. (LDL+VLDL) 09/24/2019 112  <130 mg/dL (calc) Final    Glucose 09/24/2019 119* 65 - 99 mg/dL Final    BUN, Bld 09/24/2019 23  7 - 25 mg/dL Final    Creatinine 09/24/2019 0.91* 0.60 - 0.88 mg/dL Final    eGFR if non African American 09/24/2019 57* > OR = 60 mL/min/1.73m2 Final    eGFR if  09/24/2019 66  > OR = 60 mL/min/1.73m2 Final    BUN/Creatinine Ratio 09/24/2019 25* 6 - 22 (calc) Final    Sodium 09/24/2019 140  135 - 146 mmol/L Final    Potassium 09/24/2019 3.9  3.5 - 5.3 mmol/L Final    Chloride 09/24/2019 104  98 - 110 mmol/L Final    CO2 09/24/2019 27  20 - 32 mmol/L Final    Calcium 09/24/2019 9.7  8.6 - 10.4 mg/dL Final    Total Protein 09/24/2019 7.4  6.1 - 8.1 g/dL Final    Albumin 09/24/2019 4.4  3.6 - 5.1 g/dL Final    Globulin, Total 09/24/2019 3.0  1.9 - 3.7 g/dL (calc) Final    Albumin/Globulin Ratio 09/24/2019 1.5  1.0 - 2.5 (calc) Final    Total Bilirubin 09/24/2019 0.5  0.2 - 1.2 mg/dL Final    Alkaline Phosphatase 09/24/2019 55  33 - 130 U/L Final    AST 09/24/2019 14  10 - 35 U/L Final    ALT 09/24/2019 9  6 - 29 U/L Final    WBC 09/24/2019 6.1  3.8 - 10.8 Thousand/uL Final    RBC 09/24/2019 4.38  3.80 - 5.10 Million/uL Final    Hemoglobin 09/24/2019 11.3* 11.7 - 15.5 g/dL Final    Hematocrit 09/24/2019 35.8  35.0 - 45.0 % Final    Mean Corpuscular Volume 09/24/2019 81.7  80.0 - 100.0 fL Final    Mean Corpuscular Hemoglobin 09/24/2019 25.8* 27.0 - 33.0 pg Final    Mean Corpuscular Hemoglobin Conc 09/24/2019 31.6* 32.0 - 36.0 g/dL Final    RDW 09/24/2019 15.7* 11.0 - 15.0 % Final    Platelets 09/24/2019 202  140 - 400 Thousand/uL Final    MPV 09/24/2019 12.6* 7.5 - 12.5 fL Final    Neutrophils Absolute 09/24/2019 3,770  1,500 - 7,800 cells/uL Final    Lymph # 09/24/2019 1,537  850 - 3,900  cells/uL Final    Mono # 09/24/2019 531  200 - 950 cells/uL Final    Eos # 09/24/2019 214  15 - 500 cells/uL Final    Baso # 09/24/2019 49  0 - 200 cells/uL Final    Neutrophils Relative 09/24/2019 61.8  % Final    Lymph% 09/24/2019 25.2  % Final    Mono% 09/24/2019 8.7  % Final    Eosinophil% 09/24/2019 3.5  % Final    Basophil% 09/24/2019 0.8  % Final    Hemoglobin A1C 09/24/2019 6.9* <5.7 % of total Hgb Final       Past Medical History:   Diagnosis Date    Anticoagulant long-term use     Cancer     Cataract     CHF (congestive heart failure)     Diabetes mellitus, type 2     Heart murmur     Hypertension     Iron deficiency anemia, unspecified     Anemia, iron def.    Occasional tremors      Past Surgical History:   Procedure Laterality Date    ADENOIDECTOMY      APPENDECTOMY      COLONOSCOPY N/A 12/17/2019    Procedure: COLONOSCOPY;  Surgeon: Raleigh Quarles III, MD;  Location: [facility];  Service: Endoscopy;  Laterality: N/A;    COLONOSCOPY N/A 12/18/2019    Procedure: COLONOSCOPY;  Surgeon: Raleigh Quarles III, MD;  Location: [facility] ENDO;  Service: Endoscopy;  Laterality: N/A;    Gall bladder revmoval      HYSTERECTOMY      left mastectomy Left 1945    MASTECTOMY Left     AK CABG, ARTERY-VEIN, FOUR  2014    Coronary Artery Bypass, 4    SMALL BOWEL ENTEROSCOPY N/A 12/17/2019    Procedure: ENTEROSCOPY;  Surgeon: Raleigh Quarles III, MD;  Location: [facility];  Service: Endoscopy;  Laterality: N/A;    TONSILLECTOMY       Family History   Problem Relation Age of Onset    No Known Problems Mother     Heart disease Father        Marital Status:   Alcohol History:  reports that she does not drink alcohol.  Tobacco History:  reports that she has never smoked. She has never used smokeless tobacco.  Drug History:  reports that she does not use drugs.    Review of patient's allergies indicates:   Allergen Reactions    Penicillin g benzathine Other (See Comments)        Current Outpatient Medications:     allopurinol (ZYLOPRIM) 100 MG tablet, Take 1 tablet (100 mg total) by mouth once daily., Disp: 90 tablet, Rfl: 1    amiodarone (PACERONE) 200 MG Tab, Take 1 tablet (200 mg total) by mouth 3 (three) times daily for 7 days, THEN 1 tablet (200 mg total) 2 (two) times daily., Disp: 141 tablet, Rfl: 0    amLODIPine (NORVASC) 10 MG tablet, Take 0.5 tablets (5 mg total) by mouth once daily., Disp: 90 tablet, Rfl: 1    apixaban (ELIQUIS) 2.5 mg Tab, Take 1 tablet (2.5 mg total) by mouth 2 (two) times daily., Disp: 180 tablet, Rfl: 1    aspirin 81 MG Chew, Take 1 tablet (81 mg total) by mouth once daily., Disp: 30 tablet, Rfl: 3    busPIRone (BUSPAR) 5 MG Tab, Take 10 mg by mouth 2 (two) times daily., Disp: , Rfl:     cholecalciferol, vitamin D3, (VITAMIN D3) 1,000 unit capsule, Take 1 capsule (1,000 Units total) by mouth once daily., Disp: 100 capsule, Rfl: 1    CRESTOR 20 mg tablet, Take 1 tablet (20 mg total) by mouth once daily., Disp: 90 tablet, Rfl: 1    ferrous sulfate 325 (65 FE) MG EC tablet, Take 325 mg by mouth 2 (two) times daily., Disp: , Rfl:     fish oil-omega-3 fatty acids 300-1,000 mg capsule, Take 1 capsule by mouth once daily., Disp: , Rfl:     fluticasone propionate (FLONASE) 50 mcg/actuation nasal spray, 1 spray (50 mcg total) by Each Nostril route 2 (two) times daily. (Patient taking differently: 1 spray by Each Nostril route 2 (two) times daily as needed. ), Disp: 3 Bottle, Rfl: 3    hydroCHLOROthiazide (HYDRODIURIL) 12.5 MG Tab, Take 1 tablet (12.5 mg total) by mouth once daily., Disp: 90 tablet, Rfl: 1    loratadine (CLARITIN) 10 mg tablet, Take 1 tablet (10 mg total) by mouth once daily., Disp: 90 tablet, Rfl: 1    LORazepam (ATIVAN) 1 MG tablet, Take 1 tablet (1 mg total) by mouth 2 (two) times daily., Disp: 90 tablet, Rfl: 1    losartan (COZAAR) 100 MG tablet, Take 1 tablet (100 mg total) by mouth once daily., Disp: 90 tablet, Rfl: 1     "magnesium oxide (MAG-OX) 400 mg (241.3 mg magnesium) tablet, Take 1 tablet (400 mg total) by mouth once daily., Disp: 30 tablet, Rfl: 2    metoprolol tartrate (LOPRESSOR) 25 MG tablet, Take 0.5 tablets (12.5 mg total) by mouth 2 (two) times daily., Disp: 30 tablet, Rfl: 0    omeprazole (PRILOSEC) 20 MG capsule, Take 2 capsules (40 mg total) by mouth once daily., Disp: 180 capsule, Rfl: 1    ondansetron (ZOFRAN-ODT) 8 MG TbDL, Take 1 tablet (8 mg total) by mouth every 6 (six) hours as needed., Disp: 20 tablet, Rfl: 1    vit C/E/Zn/coppr/lutein/zeaxan (VISION FORMULA-2 ORAL), Take 1 tablet by mouth 2 (two) times daily. , Disp: , Rfl:     Review of Systems   Constitutional: Positive for fatigue (improving). Negative for appetite change, chills, fever and unexpected weight change.   HENT: Negative for congestion.    Respiratory: Negative for cough, chest tightness and shortness of breath.    Cardiovascular: Negative for chest pain and palpitations.   Gastrointestinal: Negative for abdominal distention and abdominal pain.   Endocrine: Negative for cold intolerance and heat intolerance.   Genitourinary: Negative for difficulty urinating and dysuria.   Musculoskeletal: Negative for arthralgias and back pain.   Neurological: Negative for dizziness, weakness and headaches.          Objective:      Vitals:    12/23/19 1441   BP: 116/66   Pulse: 60   Weight: 77.8 kg (171 lb 9.6 oz)   Height: 5' 1" (1.549 m)     Physical Exam   Constitutional: She is oriented to person, place, and time. She appears well-developed and well-nourished. No distress.   HENT:   Head: Normocephalic and atraumatic.   Eyes: Pupils are equal, round, and reactive to light. Conjunctivae and EOM are normal.   Neck: Normal range of motion. Neck supple. No thyromegaly present.   Cardiovascular: Normal rate, regular rhythm, normal heart sounds and intact distal pulses.   Pulmonary/Chest: Effort normal and breath sounds normal.   Abdominal: Soft. Bowel " sounds are normal. She exhibits no distension. There is no tenderness.   Musculoskeletal: Normal range of motion.   Neurological: She is alert and oriented to person, place, and time. No cranial nerve deficit.   Skin: Skin is warm and dry. No erythema.   Psychiatric: She has a normal mood and affect.         Assessment:       1. Paroxysmal atrial fibrillation    2. Hospital discharge follow-up    3. Anemia due to blood loss    4. Gastroesophageal reflux disease without esophagitis         Plan:       Paroxysmal atrial fibrillation  Comments:  she appears to be doing very well now. Heart back in normal rhythm. Rate controlled as well. She is back on eliquis.    Hospital discharge follow-up  Comments:  meds have been reconciled and reviewed. hospital discharge summary also reviewed. She is stable and appears back at her baseline now.    Anemia due to blood loss  Comments:  EGD and c-scope completed. No active bleeding identified. will continue as is with iron and f/u with GI as scheduled.    Gastroesophageal reflux disease without esophagitis  -     omeprazole (PRILOSEC) 20 MG capsule; Take 2 capsules (40 mg total) by mouth once daily.  Dispense: 180 capsule; Refill: 1      Follow up if symptoms worsen or fail to improve, for as scheduled with Dr. Rhodes.        12/23/2019 Casper Pratt PA-C

## 2019-12-31 ENCOUNTER — HOSPITAL ENCOUNTER (INPATIENT)
Facility: HOSPITAL | Age: 84
LOS: 1 days | Discharge: HOME | DRG: 243 | End: 2020-01-03
Attending: EMERGENCY MEDICINE | Admitting: FAMILY MEDICINE
Payer: MEDICARE

## 2019-12-31 DIAGNOSIS — I49.9 ARRHYTHMIA: ICD-10-CM

## 2019-12-31 DIAGNOSIS — I48.0 PAROXYSMAL ATRIAL FIBRILLATION: ICD-10-CM

## 2019-12-31 DIAGNOSIS — R00.1 SYMPTOMATIC BRADYCARDIA: Primary | ICD-10-CM

## 2019-12-31 PROBLEM — I50.9 CHF (CONGESTIVE HEART FAILURE): Chronic | Status: ACTIVE | Noted: 2019-12-31

## 2019-12-31 PROBLEM — I50.32 CHRONIC DIASTOLIC CONGESTIVE HEART FAILURE: Chronic | Status: ACTIVE | Noted: 2019-12-31

## 2019-12-31 PROBLEM — E11.9 TYPE 2 DIABETES MELLITUS WITHOUT COMPLICATION, WITHOUT LONG-TERM CURRENT USE OF INSULIN: Chronic | Status: ACTIVE | Noted: 2019-10-03

## 2019-12-31 PROBLEM — Z85.3 HISTORY OF LOBULAR CARCINOMA OF BREAST: Chronic | Status: ACTIVE | Noted: 2019-10-03

## 2019-12-31 PROBLEM — I25.10 CORONARY ARTERY DISEASE: Chronic | Status: ACTIVE | Noted: 2019-10-03

## 2019-12-31 PROBLEM — I10 ESSENTIAL HYPERTENSION: Chronic | Status: ACTIVE | Noted: 2019-10-03

## 2019-12-31 PROBLEM — I50.32 CHRONIC DIASTOLIC CONGESTIVE HEART FAILURE: Status: ACTIVE | Noted: 2019-12-31

## 2019-12-31 PROBLEM — Z86.2 HISTORY OF ANEMIA: Chronic | Status: ACTIVE | Noted: 2019-10-03

## 2019-12-31 PROBLEM — E78.2 MIXED HYPERLIPIDEMIA: Chronic | Status: ACTIVE | Noted: 2019-10-03

## 2019-12-31 PROBLEM — Z79.01 ANTICOAGULANT LONG-TERM USE: Chronic | Status: ACTIVE | Noted: 2019-12-31

## 2019-12-31 PROBLEM — R25.1 OCCASIONAL TREMORS: Chronic | Status: ACTIVE | Noted: 2019-12-31

## 2019-12-31 PROBLEM — I35.0 SEVERE AORTIC STENOSIS: Chronic | Status: ACTIVE | Noted: 2019-12-31

## 2019-12-31 LAB
ALBUMIN SERPL BCP-MCNC: 3.8 G/DL (ref 3.5–5.2)
ALP SERPL-CCNC: 55 U/L (ref 55–135)
ALT SERPL W/O P-5'-P-CCNC: 18 U/L (ref 10–44)
ANION GAP SERPL CALC-SCNC: 14 MMOL/L (ref 8–16)
ANION GAP SERPL CALC-SCNC: 9 MMOL/L (ref 8–16)
AST SERPL-CCNC: 19 U/L (ref 10–40)
BASOPHILS # BLD AUTO: 0.06 K/UL (ref 0–0.2)
BASOPHILS NFR BLD: 0.7 % (ref 0–1.9)
BILIRUB SERPL-MCNC: 0.8 MG/DL (ref 0.1–1)
BNP SERPL-MCNC: 750 PG/ML (ref 0–99)
BUN SERPL-MCNC: 21 MG/DL (ref 6–30)
BUN SERPL-MCNC: 23 MG/DL (ref 8–23)
CALCIUM SERPL-MCNC: 8.6 MG/DL (ref 8.7–10.5)
CHLORIDE SERPL-SCNC: 106 MMOL/L (ref 95–110)
CHLORIDE SERPL-SCNC: 106 MMOL/L (ref 95–110)
CO2 SERPL-SCNC: 25 MMOL/L (ref 23–29)
CREAT SERPL-MCNC: 1 MG/DL (ref 0.5–1.4)
CREAT SERPL-MCNC: 1 MG/DL (ref 0.5–1.4)
DIFFERENTIAL METHOD: ABNORMAL
EOSINOPHIL # BLD AUTO: 0.1 K/UL (ref 0–0.5)
EOSINOPHIL NFR BLD: 1.5 % (ref 0–8)
ERYTHROCYTE [DISTWIDTH] IN BLOOD BY AUTOMATED COUNT: 25.7 % (ref 11.5–14.5)
EST. GFR  (AFRICAN AMERICAN): 58.5 ML/MIN/1.73 M^2
EST. GFR  (NON AFRICAN AMERICAN): 50.8 ML/MIN/1.73 M^2
GLUCOSE SERPL-MCNC: 126 MG/DL (ref 70–110)
GLUCOSE SERPL-MCNC: 128 MG/DL (ref 70–110)
GLUCOSE SERPL-MCNC: 148 MG/DL (ref 70–110)
GLUCOSE SERPL-MCNC: 154 MG/DL (ref 70–110)
HCT VFR BLD AUTO: 33.3 % (ref 37–48.5)
HCT VFR BLD CALC: 32 %PCV (ref 36–54)
HGB BLD-MCNC: 9.8 G/DL (ref 12–16)
IMM GRANULOCYTES # BLD AUTO: 0.04 K/UL (ref 0–0.04)
IMM GRANULOCYTES NFR BLD AUTO: 0.5 % (ref 0–0.5)
LYMPHOCYTES # BLD AUTO: 1.2 K/UL (ref 1–4.8)
LYMPHOCYTES NFR BLD: 14.5 % (ref 18–48)
MCH RBC QN AUTO: 22.8 PG (ref 27–31)
MCHC RBC AUTO-ENTMCNC: 29.4 G/DL (ref 32–36)
MCV RBC AUTO: 78 FL (ref 82–98)
MONOCYTES # BLD AUTO: 0.6 K/UL (ref 0.3–1)
MONOCYTES NFR BLD: 7 % (ref 4–15)
NEUTROPHILS # BLD AUTO: 6.4 K/UL (ref 1.8–7.7)
NEUTROPHILS NFR BLD: 75.8 % (ref 38–73)
NRBC BLD-RTO: 0 /100 WBC
PLATELET # BLD AUTO: 279 K/UL (ref 150–350)
PMV BLD AUTO: 10.9 FL (ref 9.2–12.9)
POC IONIZED CALCIUM: 1.17 MMOL/L (ref 1.06–1.42)
POC TCO2 (MEASURED): 25 MMOL/L (ref 23–29)
POTASSIUM BLD-SCNC: 3.6 MMOL/L (ref 3.5–5.1)
POTASSIUM SERPL-SCNC: 3.5 MMOL/L (ref 3.5–5.1)
PROT SERPL-MCNC: 6.8 G/DL (ref 6–8.4)
RBC # BLD AUTO: 4.29 M/UL (ref 4–5.4)
SAMPLE: ABNORMAL
SODIUM BLD-SCNC: 140 MMOL/L (ref 136–145)
SODIUM SERPL-SCNC: 140 MMOL/L (ref 136–145)
T4 FREE SERPL-MCNC: 0.95 NG/DL (ref 0.71–1.51)
TROPONIN I SERPL DL<=0.01 NG/ML-MCNC: <0.03 NG/ML
TROPONIN I SERPL DL<=0.01 NG/ML-MCNC: <0.03 NG/ML
TSH SERPL DL<=0.005 MIU/L-ACNC: 2.02 UIU/ML (ref 0.34–5.6)
WBC # BLD AUTO: 8.42 K/UL (ref 3.9–12.7)

## 2019-12-31 PROCEDURE — 99285 EMERGENCY DEPT VISIT HI MDM: CPT | Mod: 25

## 2019-12-31 PROCEDURE — G0378 HOSPITAL OBSERVATION PER HR: HCPCS

## 2019-12-31 PROCEDURE — 85025 COMPLETE CBC W/AUTO DIFF WBC: CPT

## 2019-12-31 PROCEDURE — 99900035 HC TECH TIME PER 15 MIN (STAT)

## 2019-12-31 PROCEDURE — 84484 ASSAY OF TROPONIN QUANT: CPT | Mod: 91

## 2019-12-31 PROCEDURE — 84443 ASSAY THYROID STIM HORMONE: CPT

## 2019-12-31 PROCEDURE — 80053 COMPREHEN METABOLIC PANEL: CPT

## 2019-12-31 PROCEDURE — 25000003 PHARM REV CODE 250

## 2019-12-31 PROCEDURE — 93005 ELECTROCARDIOGRAM TRACING: CPT

## 2019-12-31 PROCEDURE — 94761 N-INVAS EAR/PLS OXIMETRY MLT: CPT

## 2019-12-31 PROCEDURE — 25000003 PHARM REV CODE 250: Performed by: FAMILY MEDICINE

## 2019-12-31 PROCEDURE — 36415 COLL VENOUS BLD VENIPUNCTURE: CPT

## 2019-12-31 PROCEDURE — 83880 ASSAY OF NATRIURETIC PEPTIDE: CPT

## 2019-12-31 PROCEDURE — 63600175 PHARM REV CODE 636 W HCPCS

## 2019-12-31 PROCEDURE — 84439 ASSAY OF FREE THYROXINE: CPT

## 2019-12-31 RX ORDER — NAPROXEN SODIUM 220 MG/1
81 TABLET, FILM COATED ORAL DAILY
Status: DISCONTINUED | OUTPATIENT
Start: 2020-01-01 | End: 2019-12-31

## 2019-12-31 RX ORDER — FERROUS SULFATE 325(65) MG
325 TABLET ORAL 2 TIMES DAILY
Status: DISCONTINUED | OUTPATIENT
Start: 2019-12-31 | End: 2020-01-03 | Stop reason: HOSPADM

## 2019-12-31 RX ORDER — IBUPROFEN 200 MG
24 TABLET ORAL
Status: DISCONTINUED | OUTPATIENT
Start: 2019-12-31 | End: 2020-01-03 | Stop reason: HOSPADM

## 2019-12-31 RX ORDER — FUROSEMIDE 10 MG/ML
20 INJECTION INTRAMUSCULAR; INTRAVENOUS
Status: COMPLETED | OUTPATIENT
Start: 2019-12-31 | End: 2019-12-31

## 2019-12-31 RX ORDER — ONDANSETRON 2 MG/ML
4 INJECTION INTRAMUSCULAR; INTRAVENOUS EVERY 8 HOURS PRN
Status: DISCONTINUED | OUTPATIENT
Start: 2019-12-31 | End: 2020-01-03 | Stop reason: HOSPADM

## 2019-12-31 RX ORDER — TALC
6 POWDER (GRAM) TOPICAL NIGHTLY PRN
Status: DISCONTINUED | OUTPATIENT
Start: 2019-12-31 | End: 2020-01-03 | Stop reason: HOSPADM

## 2019-12-31 RX ORDER — PANTOPRAZOLE SODIUM 40 MG/1
40 TABLET, DELAYED RELEASE ORAL DAILY
Status: DISCONTINUED | OUTPATIENT
Start: 2020-01-01 | End: 2020-01-03 | Stop reason: HOSPADM

## 2019-12-31 RX ORDER — AMLODIPINE BESYLATE 5 MG/1
5 TABLET ORAL DAILY
Status: DISCONTINUED | OUTPATIENT
Start: 2020-01-01 | End: 2020-01-03 | Stop reason: HOSPADM

## 2019-12-31 RX ORDER — FLUTICASONE PROPIONATE 50 MCG
1 SPRAY, SUSPENSION (ML) NASAL 2 TIMES DAILY
Status: DISCONTINUED | OUTPATIENT
Start: 2019-12-31 | End: 2020-01-03 | Stop reason: HOSPADM

## 2019-12-31 RX ORDER — BUSPIRONE HYDROCHLORIDE 5 MG/1
10 TABLET ORAL 2 TIMES DAILY
Status: DISCONTINUED | OUTPATIENT
Start: 2019-12-31 | End: 2020-01-03 | Stop reason: HOSPADM

## 2019-12-31 RX ORDER — ALLOPURINOL 100 MG/1
100 TABLET ORAL DAILY
Status: DISCONTINUED | OUTPATIENT
Start: 2019-12-31 | End: 2020-01-03 | Stop reason: HOSPADM

## 2019-12-31 RX ORDER — POLYETHYLENE GLYCOL 3350 17 G/17G
17 POWDER, FOR SOLUTION ORAL DAILY
Status: DISCONTINUED | OUTPATIENT
Start: 2019-12-31 | End: 2020-01-03 | Stop reason: HOSPADM

## 2019-12-31 RX ORDER — IPRATROPIUM BROMIDE AND ALBUTEROL SULFATE 2.5; .5 MG/3ML; MG/3ML
3 SOLUTION RESPIRATORY (INHALATION) EVERY 6 HOURS PRN
Status: DISCONTINUED | OUTPATIENT
Start: 2019-12-31 | End: 2020-01-03 | Stop reason: HOSPADM

## 2019-12-31 RX ORDER — ACETAMINOPHEN 325 MG/1
650 TABLET ORAL EVERY 4 HOURS PRN
Status: DISCONTINUED | OUTPATIENT
Start: 2019-12-31 | End: 2020-01-02

## 2019-12-31 RX ORDER — METOPROLOL TARTRATE 25 MG/1
12.5 TABLET ORAL 2 TIMES DAILY
Status: DISCONTINUED | OUTPATIENT
Start: 2019-12-31 | End: 2019-12-31

## 2019-12-31 RX ORDER — SODIUM,POTASSIUM PHOSPHATES 280-250MG
2 POWDER IN PACKET (EA) ORAL
Status: DISCONTINUED | OUTPATIENT
Start: 2019-12-31 | End: 2020-01-03 | Stop reason: HOSPADM

## 2019-12-31 RX ORDER — ASPIRIN 325 MG
325 TABLET ORAL
Status: COMPLETED | OUTPATIENT
Start: 2019-12-31 | End: 2019-12-31

## 2019-12-31 RX ORDER — POTASSIUM CHLORIDE 20 MEQ/15ML
20 SOLUTION ORAL
Status: DISCONTINUED | OUTPATIENT
Start: 2019-12-31 | End: 2020-01-03 | Stop reason: HOSPADM

## 2019-12-31 RX ORDER — SODIUM CHLORIDE 0.9 % (FLUSH) 0.9 %
2 SYRINGE (ML) INJECTION
Status: DISCONTINUED | OUTPATIENT
Start: 2019-12-31 | End: 2020-01-03 | Stop reason: HOSPADM

## 2019-12-31 RX ORDER — POTASSIUM CHLORIDE 20 MEQ/15ML
40 SOLUTION ORAL
Status: DISCONTINUED | OUTPATIENT
Start: 2019-12-31 | End: 2020-01-03 | Stop reason: HOSPADM

## 2019-12-31 RX ORDER — LORAZEPAM 1 MG/1
1 TABLET ORAL EVERY 12 HOURS PRN
Status: DISCONTINUED | OUTPATIENT
Start: 2019-12-31 | End: 2020-01-03 | Stop reason: HOSPADM

## 2019-12-31 RX ORDER — LANOLIN ALCOHOL/MO/W.PET/CERES
800 CREAM (GRAM) TOPICAL
Status: DISCONTINUED | OUTPATIENT
Start: 2019-12-31 | End: 2020-01-03 | Stop reason: HOSPADM

## 2019-12-31 RX ORDER — ACETAMINOPHEN 325 MG/1
650 TABLET ORAL EVERY 8 HOURS PRN
Status: DISCONTINUED | OUTPATIENT
Start: 2019-12-31 | End: 2020-01-02

## 2019-12-31 RX ORDER — GLUCAGON 1 MG
1 KIT INJECTION
Status: DISCONTINUED | OUTPATIENT
Start: 2019-12-31 | End: 2020-01-03 | Stop reason: HOSPADM

## 2019-12-31 RX ORDER — IBUPROFEN 200 MG
16 TABLET ORAL
Status: DISCONTINUED | OUTPATIENT
Start: 2019-12-31 | End: 2020-01-03 | Stop reason: HOSPADM

## 2019-12-31 RX ORDER — ROSUVASTATIN CALCIUM 20 MG/1
20 TABLET, COATED ORAL NIGHTLY
Status: DISCONTINUED | OUTPATIENT
Start: 2019-12-31 | End: 2020-01-03 | Stop reason: HOSPADM

## 2019-12-31 RX ADMIN — ALLOPURINOL 100 MG: 100 TABLET ORAL at 02:12

## 2019-12-31 RX ADMIN — FERROUS SULFATE TAB 325 MG (65 MG ELEMENTAL FE) 325 MG: 325 (65 FE) TAB at 09:12

## 2019-12-31 RX ADMIN — BUSPIRONE HYDROCHLORIDE 10 MG: 5 TABLET ORAL at 09:12

## 2019-12-31 RX ADMIN — POLYETHYLENE GLYCOL 3350 17 G: 17 POWDER, FOR SOLUTION ORAL at 02:12

## 2019-12-31 RX ADMIN — ROSUVASTATIN CALCIUM 20 MG: 20 TABLET, FILM COATED ORAL at 09:12

## 2019-12-31 RX ADMIN — POTASSIUM CHLORIDE 20 MEQ: 20 SOLUTION ORAL at 02:12

## 2019-12-31 RX ADMIN — ASPIRIN 325 MG ORAL TABLET 325 MG: 325 PILL ORAL at 10:12

## 2019-12-31 RX ADMIN — FUROSEMIDE 20 MG: 10 INJECTION, SOLUTION INTRAMUSCULAR; INTRAVENOUS at 12:12

## 2019-12-31 NOTE — ASSESSMENT & PLAN NOTE
Telemetry  Symptoms improved with improved bradycardia  Held amiodarone  cw home meds. Held some BP meds  US carotid pending  TSH/T4 pending  Cardio consult, Dr. Klein  NPO midnight for possible pacemaker placement  PT/OT

## 2019-12-31 NOTE — NURSING
PT IS HAVING FREQUENT 2 - 3 SECOND PAUSES WHILE ALREADY RUNNING SINUS JORDYN 50-55. PT. ASYMPTOMATIC AT THIS TIME. DR. HERNANDEZ WAS MADE AWARE AND ORDERED EXTERNAL PACER PADS PLACED ON PT. UNTIL CARDIOLOGY SEES PT. WILL CONTINUE TO MONITOR CLOSLEY

## 2019-12-31 NOTE — H&P
[facility]  History & Physical    Patient Name: Rosanna Raymundo  MRN: [id number]  Admission Date: 12/31/2019  Attending Physician: Aamir Sevilla MD   Primary Care Provider: Aamir Rhodes MD         Patient information was obtained from patient, past medical records and ER records.     Subjective:     Principal Problem:Symptomatic bradycardia    Chief Complaint:   Chief Complaint   Patient presents with    Weakness        HPI: 87-year-old female history of PE AFib on Eliquis, CAD, NIDDM, hypertension, tremors, HFpEF, anemia comes in for generalized weakness.  Patient was recently discharged from the hospital about 2 weeks ago for AFib with RVR and started on amiodarone and was continued on p.o. amiodarone at discharge. Patient reports that after being discharged from hospital she was doing well.  About 1 week ago she started feeling extremely weak and tired.  Found the heart rate was in the 30s to 40s.  Denies any focal neurological deficits.  Reports she called her cardiologist, Dr. Klein, last Wednesday who recommended patient to decrease amiodarone to half dose.  Patient reports that she continued to have lightheadedness and generalized weakness.  Continue checking her heart rate and continued to be in 30 to 40s.  She did discontinued amiodarone on Friday.  Throughout the weekend she continued to have presyncopal episodes and generalized weakness with heart rates in the 30s to 40s.  Denies any fever, chills, nausea, vomiting, chest pain, shortness of breath, abdominal pain, dysuria, hematuria, constipation, diarrhea.  She woke up this morning and reports that she cannot take the weakness and tiredness any more and she called EMS.  When EMS arrived, patient was found to have a heart rate 35.    In the ED, patient was found to have a heart rate in the 40s to 50s.  Patient reports that her symptoms have nearly resolved in the ED.    Of note, patient reports that she is  very active and has 5 acres of land that she takes care of horses at.  Reports that she had discussed with Cardiology for possible pacemaker placement in the past.    Past Medical History:   Diagnosis Date    Anticoagulant long-term use     Cancer     Cataract     CHF (congestive heart failure)     Diabetes mellitus, type 2     Heart murmur     Hypertension     Iron deficiency anemia, unspecified     Anemia, iron def.    Occasional tremors        Past Surgical History:   Procedure Laterality Date    ADENOIDECTOMY      APPENDECTOMY      COLONOSCOPY N/A 12/17/2019    Procedure: COLONOSCOPY;  Surgeon: Raleigh Quarles III, MD;  Location: [facility] ENDO;  Service: Endoscopy;  Laterality: N/A;    COLONOSCOPY N/A 12/18/2019    Procedure: COLONOSCOPY;  Surgeon: Raleigh Quarles III, MD;  Location: [facility] ENDO;  Service: Endoscopy;  Laterality: N/A;    Gall bladder revmoval      HYSTERECTOMY      left mastectomy Left 1945    MASTECTOMY Left     SD CABG, ARTERY-VEIN, FOUR  2014    Coronary Artery Bypass, 4    SMALL BOWEL ENTEROSCOPY N/A 12/17/2019    Procedure: ENTEROSCOPY;  Surgeon: Raleigh Quarles III, MD;  Location: [facility] ENDO;  Service: Endoscopy;  Laterality: N/A;    TONSILLECTOMY         Review of patient's allergies indicates:   Allergen Reactions    Penicillin g benzathine Other (See Comments)       No current facility-administered medications on file prior to encounter.      Current Outpatient Medications on File Prior to Encounter   Medication Sig    allopurinol (ZYLOPRIM) 100 MG tablet Take 1 tablet (100 mg total) by mouth once daily.    amiodarone (PACERONE) 200 MG Tab Take 1 tablet (200 mg total) by mouth 3 (three) times daily for 7 days, THEN 1 tablet (200 mg total) 2 (two) times daily.    amLODIPine (NORVASC) 10 MG tablet Take 0.5 tablets (5 mg total) by mouth once daily.    apixaban (ELIQUIS) 2.5 mg Tab Take 1 tablet (2.5 mg total) by mouth 2 (two) times daily.    aspirin 81 MG  Chew Take 1 tablet (81 mg total) by mouth once daily.    busPIRone (BUSPAR) 5 MG Tab Take 10 mg by mouth 2 (two) times daily.    cholecalciferol, vitamin D3, (VITAMIN D3) 1,000 unit capsule Take 1 capsule (1,000 Units total) by mouth once daily.    CRESTOR 20 mg tablet Take 1 tablet (20 mg total) by mouth once daily. (Patient taking differently: Take 20 mg by mouth every evening. )    ferrous sulfate 325 (65 FE) MG EC tablet Take 325 mg by mouth 2 (two) times daily.    fish oil-omega-3 fatty acids 300-1,000 mg capsule Take 1 capsule by mouth once daily.    fluticasone propionate (FLONASE) 50 mcg/actuation nasal spray 1 spray (50 mcg total) by Each Nostril route 2 (two) times daily. (Patient taking differently: 1 spray by Each Nostril route 2 (two) times daily as needed. )    hydroCHLOROthiazide (HYDRODIURIL) 12.5 MG Tab Take 1 tablet (12.5 mg total) by mouth once daily.    loratadine (CLARITIN) 10 mg tablet Take 1 tablet (10 mg total) by mouth once daily.    losartan (COZAAR) 100 MG tablet Take 1 tablet (100 mg total) by mouth once daily.    magnesium oxide (MAG-OX) 400 mg (241.3 mg magnesium) tablet Take 1 tablet (400 mg total) by mouth once daily.    metoprolol tartrate (LOPRESSOR) 25 MG tablet Take 0.5 tablets (12.5 mg total) by mouth 2 (two) times daily.    omeprazole (PRILOSEC) 20 MG capsule Take 2 capsules (40 mg total) by mouth once daily.    ondansetron (ZOFRAN-ODT) 8 MG TbDL Take 1 tablet (8 mg total) by mouth every 6 (six) hours as needed.    vit C/E/Zn/coppr/lutein/zeaxan (VISION FORMULA-2 ORAL) Take 1 tablet by mouth 2 (two) times daily.     LORazepam (ATIVAN) 1 MG tablet Take 1 tablet (1 mg total) by mouth 2 (two) times daily.     Family History     Problem Relation (Age of Onset)    Heart disease Father    No Known Problems Mother        Tobacco Use    Smoking status: Never Smoker    Smokeless tobacco: Never Used   Substance and Sexual Activity    Alcohol use: Never     Frequency:  Never    Drug use: Never    Sexual activity: Not Currently     Review of Systems   Constitutional: Negative for chills, fatigue, fever and unexpected weight change.   HENT: Negative for ear pain, rhinorrhea, sneezing and sore throat.    Eyes: Negative for visual disturbance.   Respiratory: Negative for cough, chest tightness and shortness of breath.    Cardiovascular: Negative for chest pain.   Gastrointestinal: Negative for abdominal pain, constipation, diarrhea, nausea and vomiting.   Endocrine: Negative for polyuria.   Genitourinary: Negative for dysuria and hematuria.   Neurological: Positive for weakness and light-headedness. Negative for seizures, syncope, speech difficulty and headaches.     Objective:     Vital Signs (Most Recent):  Temp: 99 °F (37.2 °C) (12/31/19 1001)  Pulse: (!) 52 (12/31/19 1230)  Resp: 20 (12/31/19 1230)  BP: (!) 114/59 (12/31/19 1230)  SpO2: 99 % (12/31/19 1230) Vital Signs (24h Range):  Temp:  [99 °F (37.2 °C)] 99 °F (37.2 °C)  Pulse:  [45-56] 52  Resp:  [17-20] 20  SpO2:  [97 %-100 %] 99 %  BP: (114-153)/(56-65) 114/59     Weight: 71.7 kg (158 lb)  Body mass index is 29.85 kg/m².    Physical Exam   Constitutional: She is oriented to person, place, and time. She appears well-developed and well-nourished. No distress.   Tremors   HENT:   Head: Normocephalic and atraumatic.   Right Ear: External ear normal.   Left Ear: External ear normal.   Nose: Nose normal.   Mouth/Throat: Oropharynx is clear and moist. No oropharyngeal exudate.   Eyes: Pupils are equal, round, and reactive to light. Conjunctivae and EOM are normal. Right eye exhibits no discharge. Left eye exhibits no discharge. No scleral icterus.   Neck: Normal range of motion. Neck supple. No thyromegaly present.   Cardiovascular: Regular rhythm, normal heart sounds and intact distal pulses. Exam reveals no gallop and no friction rub.   No murmur heard.  Bradycardic   Pulmonary/Chest: Effort normal and breath sounds normal. No  respiratory distress. She has no wheezes. She has no rales. She exhibits no tenderness.   Abdominal: Soft. Bowel sounds are normal. She exhibits no distension and no mass. There is no tenderness. There is no rebound and no guarding. No hernia.   Musculoskeletal: Normal range of motion. She exhibits no edema or tenderness.   Lymphadenopathy:     She has no cervical adenopathy.   Neurological: She is alert and oriented to person, place, and time.   Skin: Skin is warm. She is not diaphoretic.   Psychiatric: She has a normal mood and affect. Her behavior is normal. Judgment and thought content normal.   Nursing note and vitals reviewed.        CRANIAL NERVES     CN III, IV, VI   Pupils are equal, round, and reactive to light.  Extraocular motions are normal.        Significant Labs:   CBC:   Recent Labs   Lab 12/31/19  1021 12/31/19  1021   WBC 8.42  --    HGB 9.8*  --    HCT 33.3* 32*     --      CMP:   Recent Labs   Lab 12/31/19  1021      K 3.5      CO2 25   *   BUN 23   CREATININE 1.0   CALCIUM 8.6*   PROT 6.8   ALBUMIN 3.8   BILITOT 0.8   ALKPHOS 55   AST 19   ALT 18   ANIONGAP 9   EGFRNONAA 50.8*     Cardiac Markers:   Recent Labs   Lab 12/31/19  1021   *     Troponin:   Recent Labs   Lab 12/31/19  1021   TROPONINI <0.030       EKG  My personal interpretation: No acute ST elevation or depression appreciated. Bradycardia    Significant Imaging:     X-Ray Chest AP Portable [[id number]] Resulted: 12/31/19 1038   Order Status: Completed Updated: 12/31/19 1040   Narrative:     EXAMINATION:  XR CHEST AP PORTABLE    CLINICAL HISTORY:  Chest pain    COMPARISON:  December 14, 2019    FINDINGS:  Heart size is upper normal.  There has been previous median sternotomy.  The aortic arch is calcified.    No acute infiltrates or effusions are identified.  No acute osseous abnormalities are demonstrated.  Chronic right rotator cuff tear is noted.   Impression:       1. No acute radiographic  abnormalities.  Chronic findings as above.         Assessment/Plan:     Active Hospital Problems    Diagnosis    *Symptomatic bradycardia    Occasional tremors    Anticoagulant long-term use    Chronic diastolic congestive heart failure    Paroxysmal atrial fibrillation    Coronary artery disease    Type 2 diabetes mellitus without complication, without long-term current use of insulin    Mixed hyperlipidemia    History of lobular carcinoma of breast    Essential hypertension    History of anemia       * Symptomatic bradycardia  Telemetry  Symptoms improved with improved bradycardia  Held amiodarone  cw home meds. Held some BP meds  US carotid pending  TSH/T4 pending  Cardio consult, Dr. Klein  NPO midnight for possible pacemaker placement  PT/OT    VTE Risk Mitigation (From admission, onward)         Ordered     apixaban tablet 2.5 mg  2 times daily      12/31/19 1223     Reason for no Mechanical VTE Prophylaxis  Once     Question:  Reasons:  Answer:  Physician Provided (leave comment)    12/31/19 1223     IP VTE HIGH RISK PATIENT  Once      12/31/19 1223                   Aamir Sevilla MD  Department of Hospital Medicine   [facility]  Date of service: 12/31/2019 1:34 PM

## 2019-12-31 NOTE — NURSING
PACER PADS PLACED. PT. STATED SHE HAS A A COUPLE OF DIZZY SPELLS EARLIER AS WELL. FAMILY AT BEDSIDE WILL CONTINUE TO MONITOR.

## 2019-12-31 NOTE — ED PROVIDER NOTES
Encounter Date: 12/31/2019       History     Chief Complaint   Patient presents with    Weakness     HPI   87-year-old female with history of heart failure with preserved EF, diabetes, hypertension, paroxysmal atrial fibrillation on Eliquis who presents with lightheadedness.  Patient states her symptoms initially began Friday but significantly worsened today.  No chest pain or shortness of breath associated.  She states she feels woozy and felt presyncopal prior to calling EMS.  On arrival, the patient was bradycardic to the 40s per EMS.  She denies any falls, and has no focal numbness or weakness. Denies nausea, vomiting, diarrhea.  She has had a normal appetite and ate a full breakfast this morning.    Of note, she was recently discharged on 12/18 at which time she was admitted for AFib with RVR requiring Cardizem drip followed by amiodarone drip that converted her to sinus rhythm. She was discharged on amiodarone at that time.  She did call her cardiologist on Friday when her symptoms began, who recommended that she hold her amiodarone and she has not taken this medication since.    Review of patient's allergies indicates:   Allergen Reactions    Penicillin g benzathine Other (See Comments)     Past Medical History:   Diagnosis Date    Anticoagulant long-term use     Cancer     Cataract     CHF (congestive heart failure)     Diabetes mellitus, type 2     Heart murmur     Hypertension     Iron deficiency anemia, unspecified     Anemia, iron def.    Occasional tremors      Past Surgical History:   Procedure Laterality Date    ADENOIDECTOMY      APPENDECTOMY      COLONOSCOPY N/A 12/17/2019    Procedure: COLONOSCOPY;  Surgeon: Raleigh Quarles III, MD;  Location: [facility] ENDO;  Service: Endoscopy;  Laterality: N/A;    COLONOSCOPY N/A 12/18/2019    Procedure: COLONOSCOPY;  Surgeon: Raleigh Quarles III, MD;  Location: [facility];  Service: Endoscopy;  Laterality: N/A;    Gall bladder revmoval       HYSTERECTOMY      left mastectomy Left 1945    MASTECTOMY Left     [place] CABG, ARTERY-VEIN, FOUR  2014    Coronary Artery Bypass, 4    SMALL BOWEL ENTEROSCOPY N/A 12/17/2019    Procedure: ENTEROSCOPY;  Surgeon: Raleigh Quarles III, MD;  Location: [facility];  Service: Endoscopy;  Laterality: N/A;    TONSILLECTOMY       Family History   Problem Relation Age of Onset    No Known Problems Mother     Heart disease Father      Social History     Tobacco Use    Smoking status: Never Smoker    Smokeless tobacco: Never Used   Substance Use Topics    Alcohol use: Never     Frequency: Never    Drug use: Never     Review of Systems   Constitutional: Negative for fever.   HENT: Negative for sore throat.    Respiratory: Negative for shortness of breath.    Cardiovascular: Negative for chest pain.   Gastrointestinal: Negative for nausea.   Genitourinary: Negative for dysuria.   Musculoskeletal: Negative for back pain.   Skin: Negative for rash.   Neurological: Positive for light-headedness. Negative for weakness.   Hematological: Does not bruise/bleed easily.       Physical Exam     Initial Vitals [12/31/19 1001]   BP Pulse Resp Temp SpO2   (!) 153/65 (!) 54 20 99 °F (37.2 °C) 97 %      MAP       --         Physical Exam    Constitutional: She appears well-developed and well-nourished. She is not diaphoretic. No distress.   HENT:   Head: Normocephalic and atraumatic.   Eyes: EOM are normal. Pupils are equal, round, and reactive to light. Right eye exhibits no discharge. Left eye exhibits no discharge.   Neck: Normal range of motion. Neck supple.   Cardiovascular: Normal rate, regular rhythm and normal heart sounds. Exam reveals no gallop and no friction rub.    No murmur heard.  Pulmonary/Chest: Breath sounds normal. No respiratory distress. She has no wheezes. She has no rhonchi. She has no rales.   Abdominal: Soft. She exhibits no distension. There is no tenderness. There is no rebound and no guarding.    Musculoskeletal: She exhibits no edema or tenderness.   Neurological: She is alert and oriented to person, place, and time.   Skin: Skin is warm and dry.   Psychiatric: She has a normal mood and affect. Thought content normal.         ED Course   Procedures  Labs Reviewed   CBC W/ AUTO DIFFERENTIAL   COMPREHENSIVE METABOLIC PANEL   TROPONIN I   B-TYPE NATRIURETIC PEPTIDE   TROPONIN I   ISTAT CHEM8          Imaging Results          X-Ray Chest AP Portable (In process)                  Medical Decision Making:   Initial Assessment:   87-year-old female with history of hypertension, CHF, AFib on Eliquis who presents with lightheadedness  Here, the patient is hypertensive to systolic of 153 with pulse in the mid 50s.  She is satting well on room air.  She does continue to feel mild lightheadedness but improved from her previous presyncopal episode.  On bedside echocardiogram she appears to have a moderately reduced EF which is inconsistent with her previous echo in November demonstrating EF of 60%.  Her EKG demonstrates normal sinus bradycardia with first-degree heart block, no ST elevation.  The symptoms could be secondary to bradycardia which could be medication induced secondary to amlodipine, metoprolol, and amiodarone which she recently discontinued.  Pending cardiac workup and plan for admission to cardiology unit.    Brett Mariscal MD  Resident, PGY-3  12/31/2019 10:20 AM    Differential Diagnosis:   Symptomatic bradycardia, orthostatic hypotension, ACS, CHF exacerbation    PGY3 UPDATE:  VS remain bradycardic with stable BP. Her troponin is negative and BNP is elevated in the 700s, from previous in the 200s. CMP and CBC overall unremarkable. I administered 20mg lasix IV as she likely does have some volume overload. The pt was admitted to cardiology service under [facility] medicine for further monitoring.    Brett Mariscal MD  Resident, PGY-3  12/31/2019 12:34 PM                                   Clinical Impression:        ICD-10-CM ICD-9-CM   1. Symptomatic bradycardia R00.1 427.89                             Brett Mariscal MD  Resident  12/31/19 1234

## 2019-12-31 NOTE — ED NOTES
Pt presents to ED with complaint of feeling weak and dizzy this AM, pt is currently awake and alert without distress or complaint on stretcher, VSS, bradycardia noted per EMS PTA in 30s, currently high 40s-50s with stable BP, pt received 150 ml NS PTA by EMS, pt states that her normal heart rate is in the 50s

## 2019-12-31 NOTE — HPI
87-year-old female history of PE AFib on Eliquis, CAD, NIDDM, hypertension, tremors, HFpEF, anemia comes in for generalized weakness.  Patient was recently discharged from the hospital about 2 weeks ago for AFib with RVR and started on amiodarone and was continued on p.o. amiodarone at discharge. Patient reports that after being discharged from hospital she was doing well.  About 1 week ago she started feeling extremely weak and tired.  Found the heart rate was in the 30s to 40s.  Denies any focal neurological deficits.  Reports she called her cardiologist, Dr. Klein, last Wednesday who recommended patient to decrease amiodarone to half dose.  Patient reports that she continued to have lightheadedness and generalized weakness.  Continue checking her heart rate and continued to be in 30 to 40s.  She did discontinued amiodarone on Friday.  Throughout the weekend she continued to have presyncopal episodes and generalized weakness with heart rates in the 30s to 40s.  Denies any fever, chills, nausea, vomiting, chest pain, shortness of breath, abdominal pain, dysuria, hematuria, constipation, diarrhea.  She woke up this morning and reports that she cannot take the weakness and tiredness any more and she called EMS.  When EMS arrived, patient was found to have a heart rate 35.    In the ED, patient was found to have a heart rate in the 40s to 50s.  Patient reports that her symptoms have nearly resolved in the ED.    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  Reports that she had discussed with Cardiology for possible pacemaker placement in the past.

## 2019-12-31 NOTE — SUBJECTIVE & OBJECTIVE
Past Medical History:   Diagnosis Date    Anticoagulant long-term use     Cancer     Cataract     CHF (congestive heart failure)     Diabetes mellitus, type 2     Heart murmur     Hypertension     Iron deficiency anemia, unspecified     Anemia, iron def.    Occasional tremors        Past Surgical History:   Procedure Laterality Date    ADENOIDECTOMY      APPENDECTOMY      COLONOSCOPY N/A 12/17/2019    Procedure: COLONOSCOPY;  Surgeon: Raleigh Quarles III, MD;  Location: [facility] ENDO;  Service: Endoscopy;  Laterality: N/A;    COLONOSCOPY N/A 12/18/2019    Procedure: COLONOSCOPY;  Surgeon: Raleigh Quarles III, MD;  Location: [facility] ENDO;  Service: Endoscopy;  Laterality: N/A;    Gall bladder revmoval      HYSTERECTOMY      left mastectomy Left 1945    MASTECTOMY Left     CT CABG, ARTERY-VEIN, FOUR  2014    Coronary Artery Bypass, 4    SMALL BOWEL ENTEROSCOPY N/A 12/17/2019    Procedure: ENTEROSCOPY;  Surgeon: Raleigh Quarles III, MD;  Location: [facility];  Service: Endoscopy;  Laterality: N/A;    TONSILLECTOMY         Review of patient's allergies indicates:   Allergen Reactions    Penicillin g benzathine Other (See Comments)       No current facility-administered medications on file prior to encounter.      Current Outpatient Medications on File Prior to Encounter   Medication Sig    allopurinol (ZYLOPRIM) 100 MG tablet Take 1 tablet (100 mg total) by mouth once daily.    amiodarone (PACERONE) 200 MG Tab Take 1 tablet (200 mg total) by mouth 3 (three) times daily for 7 days, THEN 1 tablet (200 mg total) 2 (two) times daily.    amLODIPine (NORVASC) 10 MG tablet Take 0.5 tablets (5 mg total) by mouth once daily.    apixaban (ELIQUIS) 2.5 mg Tab Take 1 tablet (2.5 mg total) by mouth 2 (two) times daily.    aspirin 81 MG Chew Take 1 tablet (81 mg total) by mouth once daily.    busPIRone (BUSPAR) 5 MG Tab Take 10 mg by mouth 2 (two) times daily.    cholecalciferol, vitamin D3, (VITAMIN  D3) 1,000 unit capsule Take 1 capsule (1,000 Units total) by mouth once daily.    CRESTOR 20 mg tablet Take 1 tablet (20 mg total) by mouth once daily. (Patient taking differently: Take 20 mg by mouth every evening. )    ferrous sulfate 325 (65 FE) MG EC tablet Take 325 mg by mouth 2 (two) times daily.    fish oil-omega-3 fatty acids 300-1,000 mg capsule Take 1 capsule by mouth once daily.    fluticasone propionate (FLONASE) 50 mcg/actuation nasal spray 1 spray (50 mcg total) by Each Nostril route 2 (two) times daily. (Patient taking differently: 1 spray by Each Nostril route 2 (two) times daily as needed. )    hydroCHLOROthiazide (HYDRODIURIL) 12.5 MG Tab Take 1 tablet (12.5 mg total) by mouth once daily.    loratadine (CLARITIN) 10 mg tablet Take 1 tablet (10 mg total) by mouth once daily.    losartan (COZAAR) 100 MG tablet Take 1 tablet (100 mg total) by mouth once daily.    magnesium oxide (MAG-OX) 400 mg (241.3 mg magnesium) tablet Take 1 tablet (400 mg total) by mouth once daily.    metoprolol tartrate (LOPRESSOR) 25 MG tablet Take 0.5 tablets (12.5 mg total) by mouth 2 (two) times daily.    omeprazole (PRILOSEC) 20 MG capsule Take 2 capsules (40 mg total) by mouth once daily.    ondansetron (ZOFRAN-ODT) 8 MG TbDL Take 1 tablet (8 mg total) by mouth every 6 (six) hours as needed.    vit C/E/Zn/coppr/lutein/zeaxan (VISION FORMULA-2 ORAL) Take 1 tablet by mouth 2 (two) times daily.     LORazepam (ATIVAN) 1 MG tablet Take 1 tablet (1 mg total) by mouth 2 (two) times daily.     Family History     Problem Relation (Age of Onset)    Heart disease Father    No Known Problems Mother        Tobacco Use    Smoking status: Never Smoker    Smokeless tobacco: Never Used   Substance and Sexual Activity    Alcohol use: Never     Frequency: Never    Drug use: Never    Sexual activity: Not Currently     Review of Systems   Constitutional: Negative for chills, fatigue, fever and unexpected weight change.    HENT: Negative for ear pain, rhinorrhea, sneezing and sore throat.    Eyes: Negative for visual disturbance.   Respiratory: Negative for cough, chest tightness and shortness of breath.    Cardiovascular: Negative for chest pain.   Gastrointestinal: Negative for abdominal pain, constipation, diarrhea, nausea and vomiting.   Endocrine: Negative for polyuria.   Genitourinary: Negative for dysuria and hematuria.   Neurological: Positive for weakness and light-headedness. Negative for seizures, syncope, speech difficulty and headaches.     Objective:     Vital Signs (Most Recent):  Temp: 99 °F (37.2 °C) (12/31/19 1001)  Pulse: (!) 52 (12/31/19 1230)  Resp: 20 (12/31/19 1230)  BP: (!) 114/59 (12/31/19 1230)  SpO2: 99 % (12/31/19 1230) Vital Signs (24h Range):  Temp:  [99 °F (37.2 °C)] 99 °F (37.2 °C)  Pulse:  [45-56] 52  Resp:  [17-20] 20  SpO2:  [97 %-100 %] 99 %  BP: (114-153)/(56-65) 114/59     Weight: 71.7 kg (158 lb)  Body mass index is 29.85 kg/m².    Physical Exam   Constitutional: She is oriented to person, place, and time. She appears well-developed and well-nourished. No distress.   Tremors   HENT:   Head: Normocephalic and atraumatic.   Right Ear: External ear normal.   Left Ear: External ear normal.   Nose: Nose normal.   Mouth/Throat: Oropharynx is clear and moist. No oropharyngeal exudate.   Eyes: Pupils are equal, round, and reactive to light. Conjunctivae and EOM are normal. Right eye exhibits no discharge. Left eye exhibits no discharge. No scleral icterus.   Neck: Normal range of motion. Neck supple. No thyromegaly present.   Cardiovascular: Regular rhythm, normal heart sounds and intact distal pulses. Exam reveals no gallop and no friction rub.   No murmur heard.  Bradycardic   Pulmonary/Chest: Effort normal and breath sounds normal. No respiratory distress. She has no wheezes. She has no rales. She exhibits no tenderness.   Abdominal: Soft. Bowel sounds are normal. She exhibits no distension and no  mass. There is no tenderness. There is no rebound and no guarding. No hernia.   Musculoskeletal: Normal range of motion. She exhibits no edema or tenderness.   Lymphadenopathy:     She has no cervical adenopathy.   Neurological: She is alert and oriented to person, place, and time.   Skin: Skin is warm. She is not diaphoretic.   Psychiatric: She has a normal mood and affect. Her behavior is normal. Judgment and thought content normal.   Nursing note and vitals reviewed.        CRANIAL NERVES     CN III, IV, VI   Pupils are equal, round, and reactive to light.  Extraocular motions are normal.        Significant Labs:   CBC:   Recent Labs   Lab 12/31/19  1021 12/31/19  1021   WBC 8.42  --    HGB 9.8*  --    HCT 33.3* 32*     --      CMP:   Recent Labs   Lab 12/31/19  1021      K 3.5      CO2 25   *   BUN 23   CREATININE 1.0   CALCIUM 8.6*   PROT 6.8   ALBUMIN 3.8   BILITOT 0.8   ALKPHOS 55   AST 19   ALT 18   ANIONGAP 9   EGFRNONAA 50.8*     Cardiac Markers:   Recent Labs   Lab 12/31/19  1021   *     Troponin:   Recent Labs   Lab 12/31/19  1021   TROPONINI <0.030       EKG  My personal interpretation: No acute ST elevation or depression appreciated. Bradycardia    Significant Imaging:     X-Ray Chest AP Portable [[id number]] Resulted: 12/31/19 1038   Order Status: Completed Updated: 12/31/19 1040   Narrative:     EXAMINATION:  XR CHEST AP PORTABLE    CLINICAL HISTORY:  Chest pain    COMPARISON:  December 14, 2019    FINDINGS:  Heart size is upper normal.  There has been previous median sternotomy.  The aortic arch is calcified.    No acute infiltrates or effusions are identified.  No acute osseous abnormalities are demonstrated.  Chronic right rotator cuff tear is noted.   Impression:       1. No acute radiographic abnormalities.  Chronic findings as above.

## 2019-12-31 NOTE — ED TRIAGE NOTES
EMS states pt was feeling weak and dizzy this AM. EMS states pt was bradycardic with a rate in the 30's pta

## 2020-01-01 LAB
ANION GAP SERPL CALC-SCNC: 10 MMOL/L (ref 8–16)
APTT PPP: 32.7 SEC (ref 23.6–33.3)
BUN SERPL-MCNC: 30 MG/DL (ref 8–23)
CALCIUM SERPL-MCNC: 9.4 MG/DL (ref 8.7–10.5)
CHLORIDE SERPL-SCNC: 104 MMOL/L (ref 95–110)
CO2 SERPL-SCNC: 26 MMOL/L (ref 23–29)
CREAT SERPL-MCNC: 1.1 MG/DL (ref 0.5–1.4)
ERYTHROCYTE [DISTWIDTH] IN BLOOD BY AUTOMATED COUNT: 25.9 % (ref 11.5–14.5)
EST. GFR  (AFRICAN AMERICAN): 52.2 ML/MIN/1.73 M^2
EST. GFR  (NON AFRICAN AMERICAN): 45.3 ML/MIN/1.73 M^2
GLUCOSE SERPL-MCNC: 116 MG/DL (ref 70–110)
GLUCOSE SERPL-MCNC: 117 MG/DL (ref 70–110)
GLUCOSE SERPL-MCNC: 129 MG/DL (ref 70–110)
GLUCOSE SERPL-MCNC: 135 MG/DL (ref 70–110)
GLUCOSE SERPL-MCNC: 147 MG/DL (ref 70–110)
HCT VFR BLD AUTO: 33.4 % (ref 37–48.5)
HGB BLD-MCNC: 10.2 G/DL (ref 12–16)
INR PPP: 1.1
MAGNESIUM SERPL-MCNC: 1.9 MG/DL (ref 1.6–2.6)
MCH RBC QN AUTO: 23.6 PG (ref 27–31)
MCHC RBC AUTO-ENTMCNC: 30.5 G/DL (ref 32–36)
MCV RBC AUTO: 77 FL (ref 82–98)
MRSA SCREEN BY PCR: NEGATIVE
PHOSPHATE SERPL-MCNC: 4.1 MG/DL (ref 2.7–4.5)
PLATELET # BLD AUTO: 237 K/UL (ref 150–350)
PMV BLD AUTO: 10.6 FL (ref 9.2–12.9)
POTASSIUM SERPL-SCNC: 3.7 MMOL/L (ref 3.5–5.1)
PROTHROMBIN TIME: 13.3 SEC (ref 10.6–14.8)
RBC # BLD AUTO: 4.32 M/UL (ref 4–5.4)
SODIUM SERPL-SCNC: 140 MMOL/L (ref 136–145)
WBC # BLD AUTO: 5.4 K/UL (ref 3.9–12.7)

## 2020-01-01 PROCEDURE — 80048 BASIC METABOLIC PNL TOTAL CA: CPT

## 2020-01-01 PROCEDURE — 25000003 PHARM REV CODE 250: Performed by: FAMILY MEDICINE

## 2020-01-01 PROCEDURE — 36415 COLL VENOUS BLD VENIPUNCTURE: CPT

## 2020-01-01 PROCEDURE — 97162 PT EVAL MOD COMPLEX 30 MIN: CPT

## 2020-01-01 PROCEDURE — 85027 COMPLETE CBC AUTOMATED: CPT

## 2020-01-01 PROCEDURE — 25000242 PHARM REV CODE 250 ALT 637 W/ HCPCS: Performed by: FAMILY MEDICINE

## 2020-01-01 PROCEDURE — 94761 N-INVAS EAR/PLS OXIMETRY MLT: CPT

## 2020-01-01 PROCEDURE — 99900035 HC TECH TIME PER 15 MIN (STAT)

## 2020-01-01 PROCEDURE — 82962 GLUCOSE BLOOD TEST: CPT

## 2020-01-01 PROCEDURE — 87641 MR-STAPH DNA AMP PROBE: CPT

## 2020-01-01 PROCEDURE — 84100 ASSAY OF PHOSPHORUS: CPT

## 2020-01-01 PROCEDURE — 85730 THROMBOPLASTIN TIME PARTIAL: CPT

## 2020-01-01 PROCEDURE — 85610 PROTHROMBIN TIME: CPT

## 2020-01-01 PROCEDURE — 63600175 PHARM REV CODE 636 W HCPCS: Performed by: STUDENT IN AN ORGANIZED HEALTH CARE EDUCATION/TRAINING PROGRAM

## 2020-01-01 PROCEDURE — G0378 HOSPITAL OBSERVATION PER HR: HCPCS

## 2020-01-01 PROCEDURE — 83735 ASSAY OF MAGNESIUM: CPT

## 2020-01-01 RX ORDER — VANCOMYCIN HCL IN 5 % DEXTROSE 1G/250ML
1000 PLASTIC BAG, INJECTION (ML) INTRAVENOUS
Status: DISCONTINUED | OUTPATIENT
Start: 2020-01-01 | End: 2020-01-02 | Stop reason: HOSPADM

## 2020-01-01 RX ORDER — FUROSEMIDE 10 MG/ML
20 INJECTION INTRAMUSCULAR; INTRAVENOUS ONCE
Status: COMPLETED | OUTPATIENT
Start: 2020-01-01 | End: 2020-01-01

## 2020-01-01 RX ADMIN — BUSPIRONE HYDROCHLORIDE 10 MG: 5 TABLET ORAL at 09:01

## 2020-01-01 RX ADMIN — FERROUS SULFATE TAB 325 MG (65 MG ELEMENTAL FE) 325 MG: 325 (65 FE) TAB at 09:01

## 2020-01-01 RX ADMIN — AMLODIPINE BESYLATE 5 MG: 5 TABLET ORAL at 09:01

## 2020-01-01 RX ADMIN — POTASSIUM CHLORIDE 40 MEQ: 20 SOLUTION ORAL at 11:01

## 2020-01-01 RX ADMIN — ROSUVASTATIN CALCIUM 20 MG: 20 TABLET, FILM COATED ORAL at 09:01

## 2020-01-01 RX ADMIN — FLUTICASONE PROPIONATE 50 MCG: 50 SPRAY, METERED NASAL at 09:01

## 2020-01-01 RX ADMIN — ALLOPURINOL 100 MG: 100 TABLET ORAL at 09:01

## 2020-01-01 RX ADMIN — FUROSEMIDE 20 MG: 10 INJECTION, SOLUTION INTRAMUSCULAR; INTRAVENOUS at 11:01

## 2020-01-01 NOTE — SUBJECTIVE & OBJECTIVE
Interval History: HR 's on tele. Seen by Cardiology with plans for pacemaker placement likely tomorrow.  Pt seen and examined. She states that she feels well. Denies any CP, palpations, dizziness, lightheadedness, SOB, N/V/D, headaches, fever or chills.       Review of Systems   Per interval history, all other systems reviewed and negative.     Objective:     Vital Signs (Most Recent):  Temp: 97.8 °F (36.6 °C) (01/01/20 1124)  Pulse: 97 (01/01/20 1124)  Resp: 18 (01/01/20 1124)  BP: 124/82 (01/01/20 1124)  SpO2: 98 % (01/01/20 1124) Vital Signs (24h Range):  Temp:  [97.5 °F (36.4 °C)-98.3 °F (36.8 °C)] 97.8 °F (36.6 °C)  Pulse:  [] 97  Resp:  [16-38] 18  SpO2:  [97 %-100 %] 98 %  BP: (109-142)/(58-82) 124/82     Weight: 71.7 kg (158 lb 1.1 oz)  Body mass index is 29.87 kg/m².    Intake/Output Summary (Last 24 hours) at 1/1/2020 1430  Last data filed at 1/1/2020 1237  Gross per 24 hour   Intake 400 ml   Output 800 ml   Net -400 ml      Physical Exam   Constitutional: She is oriented to person, place, and time. She appears well-developed and well-nourished. No distress.   Tremors   HENT:   Head: Normocephalic and atraumatic.   Right Ear: External ear normal.   Left Ear: External ear normal.   Nose: Nose normal.   Mouth/Throat: Oropharynx is clear and moist. No oropharyngeal exudate.   Eyes: Pupils are equal, round, and reactive to light. Conjunctivae and EOM are normal. Right eye exhibits no discharge. Left eye exhibits no discharge. No scleral icterus.   Neck: Normal range of motion. Neck supple. No thyromegaly present.   Cardiovascular: Regular rhythm, normal heart sounds and intact distal pulses. Exam reveals no gallop and no friction rub.   No murmur heard.  IRR   Pulmonary/Chest: Effort normal and breath sounds normal. No respiratory distress. She has no wheezes. She has no rales. She exhibits no tenderness.   Abdominal: Soft. Bowel sounds are normal. She exhibits no distension and no mass. There is  no tenderness. There is no rebound and no guarding. No hernia.   Musculoskeletal: Normal range of motion. She exhibits no edema or tenderness.   Lymphadenopathy:     She has no cervical adenopathy.   Neurological: She is alert and oriented to person, place, and time.   Skin: Skin is warm. She is not diaphoretic.   Psychiatric: She has a normal mood and affect. Her behavior is normal. Judgment and thought content normal.   Nursing note and vitals reviewed.      Significant Labs:   CBC:   Recent Labs   Lab 12/31/19  1021 12/31/19  1021 01/01/20  1111   WBC 8.42  --  5.40   HGB 9.8*  --  10.2*   HCT 33.3* 32* 33.4*     --  237     CMP:   Recent Labs   Lab 12/31/19  1021 01/01/20  0346    140   K 3.5 3.7    104   CO2 25 26   * 117*   BUN 23 30*   CREATININE 1.0 1.1   CALCIUM 8.6* 9.4   PROT 6.8  --    ALBUMIN 3.8  --    BILITOT 0.8  --    ALKPHOS 55  --    AST 19  --    ALT 18  --    ANIONGAP 9 10   EGFRNONAA 50.8* 45.3*     Cardiac Markers:   Recent Labs   Lab 12/31/19  1021   *     Troponin:   Recent Labs   Lab 12/31/19  1021 12/31/19  1638   TROPONINI <0.030 <0.030     TSH:   Recent Labs   Lab 12/31/19  1021   TSH 2.020     All pertinent labs within the past 24 hours have been reviewed.    Significant Imaging: I have reviewed all pertinent imaging results/findings within the past 24 hours.   CXR  Impression       1. No acute radiographic abnormalities.  Chronic findings as above.       CUS  Impression       No hemodynamically significant internal carotid artery stenosis.

## 2020-01-01 NOTE — HOSPITAL COURSE
Spoke with Cardiology, defer on placing pacemaker at this time due to Eliquis.  Eliquis is currently being held for possible pacemaker placement in the near future.

## 2020-01-01 NOTE — PT/OT/SLP EVAL
Physical Therapy Evaluation and Discharge Note    Patient Name:  Rosanna Raymundo   MRN:  [id number]    Recommendations:     Discharge Recommendations:  home   Discharge Equipment Recommendations: none   Barriers to discharge: None    Assessment:     Rosanna Raymundo is a 87 y.o. female admitted with a medical diagnosis of Symptomatic bradycardia. Pt in restroom upon PT initially entering room. PT allowing pt to finish before returning, finding pt sitting in chair. Pt answering all history questions appropriately, demonstrating 4+/5 strength throughout BLE, performing all transfers with independence, and walking 220 ft with independence. Pt also negotiating 5 steps with unilateral HR and Spv A.  At this time, patient is functioning at their prior level of function and does not require further acute PT services.     Recent Surgery: Procedure(s) (LRB):  INSERTION, PACEMAKER (Right)      Plan:     During this hospitalization, patient does not require further acute PT services.  Please re-consult if situation changes.      Subjective     Chief Complaint: none  Patient/Family Comments/goals: home  Pain/Comfort:  · Pain Rating 1: 0/10    Patients cultural, spiritual, Restorationism conflicts given the current situation:      Living Environment:  Lives in a 1 story house with 1 SINGH and B+ HR. Cares for her elder sister who lives next door  Prior to admission, patients level of function was independent.  Equipment used at home: none.  DME owned (not currently used): none.  Upon discharge, patient will have assistance from self.    Objective:     Communicated with nsg prior to session.  Patient found up in chair with telemetry upon PT entry to room.    General Precautions: Standard,     Orthopedic Precautions:N/A   Braces: N/A     Exams:  · Cognitive Exam:  Patient is oriented to Person, Place, Time and Situation  · Sensation:    · -       Intact  · RLE ROM: WNL  · RLE Strength: WFL  · LLE ROM: WNL  · LLE Strength:  WFL    Functional Mobility:  · Transfers:     · Sit to Stand:  independence with no AD  · Gait: 220 ft w/ independence  · Balance: normal  · Stairs:  Pt ascended/descended 5 stair(s) with No Assistive Device with left handrail with Supervision or Set-up Assistance.     AM-PAC 6 CLICK MOBILITY  Total Score:24       Therapeutic Activities and Exercises:   none    AM-PAC 6 CLICK MOBILITY  Total Score:24     Patient left up in chair with all lines intact and call button in reach.    GOALS:   Multidisciplinary Problems     Physical Therapy Goals     Not on file                History:     Past Medical History:   Diagnosis Date    Anticoagulant long-term use     Cancer     Cataract     CHF (congestive heart failure)     Diabetes mellitus, type 2     Heart murmur     Hypertension     Iron deficiency anemia, unspecified     Anemia, iron def.    Occasional tremors        Past Surgical History:   Procedure Laterality Date    ADENOIDECTOMY      APPENDECTOMY      COLONOSCOPY N/A 12/17/2019    Procedure: COLONOSCOPY;  Surgeon: Raleigh Quarles III, MD;  Location: [facility];  Service: Endoscopy;  Laterality: N/A;    COLONOSCOPY N/A 12/18/2019    Procedure: COLONOSCOPY;  Surgeon: Raleigh Quarles III, MD;  Location: [facility];  Service: Endoscopy;  Laterality: N/A;    Gall bladder revmoval      HYSTERECTOMY      left mastectomy Left 1945    MASTECTOMY Left     CA CABG, ARTERY-VEIN, FOUR  2014    Coronary Artery Bypass, 4    SMALL BOWEL ENTEROSCOPY N/A 12/17/2019    Procedure: ENTEROSCOPY;  Surgeon: Raleigh Quarles III, MD;  Location: [facility];  Service: Endoscopy;  Laterality: N/A;    TONSILLECTOMY         Time Tracking:     PT Received On: 01/01/20  PT Start Time: 1154     PT Stop Time: 1208  PT Total Time (min): 14 min     Billable Minutes: Evaluation 14 minutes       [place] Verdugo, PT  01/01/2020

## 2020-01-01 NOTE — PLAN OF CARE
01/01/20 0804   Patient Assessment/Suction   Level of Consciousness (AVPU) alert   Respiratory Effort Unlabored   All Lung Fields Breath Sounds clear   Rhythm/Pattern, Respiratory no shortness of breath reported   PRE-TX-O2   O2 Device (Oxygen Therapy) room air   SpO2 97 %   Pulse Oximetry Type Continuous   $ Pulse Oximetry - Multiple Charge Pulse Oximetry - Multiple   Pulse 107   Resp 16   /77   Aerosol Therapy   $ Aerosol Therapy Charges PRN treatment not required

## 2020-01-01 NOTE — PROGRESS NOTES
[facility] Medicine  Progress Note    Patient Name: Rosanna Raymundo  MRN: [id number]  Patient Class: OP- Observation   Admission Date: 12/31/2019  Length of Stay: 0 days  Attending Physician: Sarah Walter DO  Primary Care Provider: Aamir Rhodes MD      Subjective:     Principal Problem:Symptomatic bradycardia   Chief Complaint   Patient presents with    Weakness       Interval History: HR 's on tele. Seen by Cardiology with plans for pacemaker placement likely tomorrow.  Pt seen and examined. She states that she feels well. Denies any CP, palpations, dizziness, lightheadedness, SOB, N/V/D, headaches, fever or chills.       Review of Systems   Per interval history, all other systems reviewed and negative.     Objective:     Vital Signs (Most Recent):  Temp: 97.8 °F (36.6 °C) (01/01/20 1124)  Pulse: 97 (01/01/20 1124)  Resp: 18 (01/01/20 1124)  BP: 124/82 (01/01/20 1124)  SpO2: 98 % (01/01/20 1124) Vital Signs (24h Range):  Temp:  [97.5 °F (36.4 °C)-98.3 °F (36.8 °C)] 97.8 °F (36.6 °C)  Pulse:  [] 97  Resp:  [16-38] 18  SpO2:  [97 %-100 %] 98 %  BP: (109-142)/(58-82) 124/82     Weight: 71.7 kg (158 lb 1.1 oz)  Body mass index is 29.87 kg/m².    Intake/Output Summary (Last 24 hours) at 1/1/2020 1430  Last data filed at 1/1/2020 1237  Gross per 24 hour   Intake 400 ml   Output 800 ml   Net -400 ml      Physical Exam   Constitutional: She is oriented to person, place, and time. She appears well-developed and well-nourished. No distress.   Head: Normocephalic and atraumatic.   Mouth/Throat: Oropharynx is clear and moist. No oropharyngeal exudate.   Eyes: Pupils are equal, round, and reactive to light. Conjunctivae and EOM are normal. Right eye exhibits no discharge. Left eye exhibits no discharge. No scleral icterus.   Neck: Normal range of motion. Neck supple. No thyromegaly present.   Cardiovascular: IRR   Pulmonary/Chest: Effort normal and breath sounds normal. No  respiratory distress. She has no wheezes. She has no rales.   Abdominal: Soft. Bowel sounds are normal. ntnd   Musculoskeletal: Normal range of motion. She exhibits no edema or tenderness.   Neurological: She is alert and oriented to person, place, and time.     Nursing note and vitals reviewed.      Significant Labs:   CBC:   Recent Labs   Lab 12/31/19  1021 12/31/19  1021 01/01/20  1111   WBC 8.42  --  5.40   HGB 9.8*  --  10.2*   HCT 33.3* 32* 33.4*     --  237     CMP:   Recent Labs   Lab 12/31/19  1021 01/01/20  0346    140   K 3.5 3.7    104   CO2 25 26   * 117*   BUN 23 30*   CREATININE 1.0 1.1   CALCIUM 8.6* 9.4   PROT 6.8  --    ALBUMIN 3.8  --    BILITOT 0.8  --    ALKPHOS 55  --    AST 19  --    ALT 18  --    ANIONGAP 9 10   EGFRNONAA 50.8* 45.3*     Cardiac Markers:   Recent Labs   Lab 12/31/19  1021   *     Troponin:   Recent Labs   Lab 12/31/19  1021 12/31/19  1638   TROPONINI <0.030 <0.030     TSH:   Recent Labs   Lab 12/31/19  1021   TSH 2.020     All pertinent labs within the past 24 hours have been reviewed.    Significant Imaging: I have reviewed all pertinent imaging results/findings within the past 24 hours.   CXR  Impression       1. No acute radiographic abnormalities.  Chronic findings as above.       CUS  Impression       No hemodynamically significant internal carotid artery stenosis.           Assessment/Plan:      * Symptomatic bradycardia  Telemetry  Symptoms improved with improved bradycardia  Held amiodarone, metoprolol and eliquis  US carotid: no significant stenosis   TSH/T4 wnl  Cardio consult, plans for pacemaker tomorrow  NPO midnight for pacemaker placement  PT/OT      VTE Risk Mitigation (From admission, onward)         Ordered     Reason for no Mechanical VTE Prophylaxis  Once     Question:  Reasons:  Answer:  Physician Provided (leave comment)    12/31/19 1223     IP VTE HIGH RISK PATIENT  Once      12/31/19 1223                Sarah Walter,  DO  Department of Hospital Medicine   [facility]

## 2020-01-01 NOTE — CARE UPDATE
This note also relates to the following rows which could not be included:  SpO2 - Cannot attach notes to unvalidated device data  Pulse - Cannot attach notes to unvalidated device data  Resp - Cannot attach notes to unvalidated device data       12/31/19 1945   Patient Assessment/Suction   Level of Consciousness (AVPU) alert   Respiratory Effort Normal;Unlabored   Expansion/Accessory Muscles/Retractions expansion symmetric;no retractions;no use of accessory muscles   All Lung Fields Breath Sounds clear   Rhythm/Pattern, Respiratory no shortness of breath reported;pattern regular;unlabored   PRE-TX-O2   O2 Device (Oxygen Therapy) room air   Pulse Oximetry Type Continuous   $ Pulse Oximetry - Multiple Charge Pulse Oximetry - Multiple   Aerosol Therapy   $ Aerosol Therapy Charges PRN treatment not required   Daily Review of Necessity (SVN) completed   Respiratory Interventions   Cough And Deep Breathing done with encouragement   Breathing Techniques/Airway Clearance deep/controlled cough encouraged;diaphragmatic breathing promoted   Respiratory Evaluation   $ Care Plan Tech Time 15 min   Evaluation For New Orders   Admitting Diagnosis Aj cardia   Cardiac Diagnosis Aj cardia, A Fib, CHF   Home Oxygen   Has Home Oxygen? No   Home Aerosol, MDI, DPI, and Other Treatments/Therapies   Home Respiratory Therapy Per Patient/Review of Chart No

## 2020-01-01 NOTE — CONSULTS
[facility]  Cardiology  Consult Note    Patient Name: Rosanna Raymundo  MRN: [id number]  Admission Date: 12/31/2019  Hospital Length of Stay: 0 days  Code Status: Full Code   Attending Provider: Aamir Sevilla MD   Consulting Provider: Fly Barragan MD  Primary Care Physician: Aamir Rhodes MD  Principal Problem:Symptomatic bradycardia    Patient information was obtained from patient and ER records.     Inpatient consult to Cardiology  Consult performed by: Fly Barragan MD  Consult ordered by: Aamir Sevilla MD        Subjective:     Chief Complaint:  87-year-old lady with fatigue and dizziness     HPI:  87-year-old lady known to have paroxysmal atrial fibrillation, status post 7 vessel bypass surgery, significant aortic stenosis and  left mastectomy with several admissions recently with symptomatic bradycardic spells secondary to treatment of her atrial fibrillation with rapid ventricular response.  She has is past 1 week she has had dizziness and fatigue and she could not take anymore.  Her amiodarone was decreased to 100 mg once a day about a week ago.  She is not experiencing any angina.  She had 1 spell she thought she was going to pass out while she was sitting in a sofa.  Pacemaker implantation apparently has been discussed in the past  Past Medical History:   Diagnosis Date    Anticoagulant long-term use     Cancer     Cataract     CHF (congestive heart failure)     Diabetes mellitus, type 2     Heart murmur     Hypertension     Iron deficiency anemia, unspecified     Anemia, iron def.    Occasional tremors        Past Surgical History:   Procedure Laterality Date    ADENOIDECTOMY      APPENDECTOMY      COLONOSCOPY N/A 12/17/2019    Procedure: COLONOSCOPY;  Surgeon: Raleigh Quarles III, MD;  Location: [facility];  Service: Endoscopy;  Laterality: N/A;    COLONOSCOPY N/A 12/18/2019    Procedure: COLONOSCOPY;  Surgeon: Raleigh Quarles III, MD;   Location: [facility] ENDO;  Service: Endoscopy;  Laterality: N/A;    Gall bladder revmoval      HYSTERECTOMY      left mastectomy Left 1945    MASTECTOMY Left     DE CABG, ARTERY-VEIN, FOUR  2014    Coronary Artery Bypass, 4    SMALL BOWEL ENTEROSCOPY N/A 12/17/2019    Procedure: ENTEROSCOPY;  Surgeon: Raleigh Quarles III, MD;  Location: [facility];  Service: Endoscopy;  Laterality: N/A;    TONSILLECTOMY         Review of patient's allergies indicates:   Allergen Reactions    Penicillin g benzathine Other (See Comments)       No current facility-administered medications on file prior to encounter.      Current Outpatient Medications on File Prior to Encounter   Medication Sig    allopurinol (ZYLOPRIM) 100 MG tablet Take 1 tablet (100 mg total) by mouth once daily.    amiodarone (PACERONE) 200 MG Tab Take 1 tablet (200 mg total) by mouth 3 (three) times daily for 7 days, THEN 1 tablet (200 mg total) 2 (two) times daily.    amLODIPine (NORVASC) 10 MG tablet Take 0.5 tablets (5 mg total) by mouth once daily.    apixaban (ELIQUIS) 2.5 mg Tab Take 1 tablet (2.5 mg total) by mouth 2 (two) times daily.    aspirin 81 MG Chew Take 1 tablet (81 mg total) by mouth once daily.    busPIRone (BUSPAR) 5 MG Tab Take 10 mg by mouth 2 (two) times daily.    cholecalciferol, vitamin D3, (VITAMIN D3) 1,000 unit capsule Take 1 capsule (1,000 Units total) by mouth once daily.    CRESTOR 20 mg tablet Take 1 tablet (20 mg total) by mouth once daily. (Patient taking differently: Take 20 mg by mouth every evening. )    ferrous sulfate 325 (65 FE) MG EC tablet Take 325 mg by mouth 2 (two) times daily.    fish oil-omega-3 fatty acids 300-1,000 mg capsule Take 1 capsule by mouth once daily.    fluticasone propionate (FLONASE) 50 mcg/actuation nasal spray 1 spray (50 mcg total) by Each Nostril route 2 (two) times daily. (Patient taking differently: 1 spray by Each Nostril route 2 (two) times daily as needed. )     hydroCHLOROthiazide (HYDRODIURIL) 12.5 MG Tab Take 1 tablet (12.5 mg total) by mouth once daily.    loratadine (CLARITIN) 10 mg tablet Take 1 tablet (10 mg total) by mouth once daily.    losartan (COZAAR) 100 MG tablet Take 1 tablet (100 mg total) by mouth once daily.    magnesium oxide (MAG-OX) 400 mg (241.3 mg magnesium) tablet Take 1 tablet (400 mg total) by mouth once daily.    metoprolol tartrate (LOPRESSOR) 25 MG tablet Take 0.5 tablets (12.5 mg total) by mouth 2 (two) times daily.    omeprazole (PRILOSEC) 20 MG capsule Take 2 capsules (40 mg total) by mouth once daily.    ondansetron (ZOFRAN-ODT) 8 MG TbDL Take 1 tablet (8 mg total) by mouth every 6 (six) hours as needed.    vit C/E/Zn/coppr/lutein/zeaxan (VISION FORMULA-2 ORAL) Take 1 tablet by mouth 2 (two) times daily.     LORazepam (ATIVAN) 1 MG tablet Take 1 tablet (1 mg total) by mouth 2 (two) times daily.     Family History     Problem Relation (Age of Onset)    Heart disease Father    No Known Problems Mother        Tobacco Use    Smoking status: Never Smoker    Smokeless tobacco: Never Used   Substance and Sexual Activity    Alcohol use: Never     Frequency: Never    Drug use: Never    Sexual activity: Not Currently     ROS  Objective:     Vital Signs (Most Recent):  Temp: 98.3 °F (36.8 °C) (12/31/19 1500)  Pulse: (!) 53 (12/31/19 1500)  Resp: (!) 26 (12/31/19 1500)  BP: 132/60 (12/31/19 1500)  SpO2: 99 % (12/31/19 1500) Vital Signs (24h Range):  Temp:  [98.3 °F (36.8 °C)-99 °F (37.2 °C)] 98.3 °F (36.8 °C)  Pulse:  [45-56] 53  Resp:  [17-26] 26  SpO2:  [97 %-100 %] 99 %  BP: (114-153)/(56-65) 132/60     Weight: 71.7 kg (158 lb 1.1 oz)  Body mass index is 29.87 kg/m².    SpO2: 99 %  O2 Device (Oxygen Therapy): room air      Intake/Output Summary (Last 24 hours) at 12/31/2019 1835  Last data filed at 12/31/2019 1400  Gross per 24 hour   Intake --   Output 400 ml   Net -400 ml       Lines/Drains/Airways     Peripheral Intravenous Line                  Peripheral IV - Single Lumen 12/31/19 1021 20 G Right Forearm less than 1 day                Physical Exam   Constitutional: She is oriented to person, place, and time. She appears well-developed and well-nourished.   Cardiovascular: Regular rhythm. Bradycardia present.   Murmur heard.   Medium-pitched harsh midsystolic murmur is present with a grade of 3/6 at the upper right sternal border.  Pulmonary/Chest: Breath sounds normal. No respiratory distress.   S/p L Mastectomy   Neurological: She is alert and oriented to person, place, and time. No cranial nerve deficit.       Significant Labs:   CMP   Recent Labs   Lab 12/31/19  1021      K 3.5      CO2 25   *   BUN 23   CREATININE 1.0   CALCIUM 8.6*   PROT 6.8   ALBUMIN 3.8   BILITOT 0.8   ALKPHOS 55   AST 19   ALT 18   ANIONGAP 9   ESTGFRAFRICA 58.5*   EGFRNONAA 50.8*   , CBC   Recent Labs   Lab 12/31/19  1021 12/31/19  1021   WBC 8.42  --    HGB 9.8*  --    HCT 33.3* 32*     --    , INR No results for input(s): INR, PROTIME in the last 48 hours., Lipid Panel No results for input(s): CHOL, HDL, LDLCALC, TRIG, CHOLHDL in the last 48 hours. and Troponin   Recent Labs   Lab 12/31/19  1021 12/31/19  1638   TROPONINI <0.030 <0.030       Significant Imaging:   Assessment and Plan:  Paroxysmal atrial fibrillation.  Tachy-rob syndrome currently with symptomatic bradycardia and pauses of up to 3 sec  Significant aortic stenosis  Coronary artery disease status post 7 vessel CABG stable  Status post left mastectomy.  Recommendations  Observe  cosider pacemaker to prevent bradycardia.  Hold eliquis  Do not think Aortic stenosis bad enough of to cause present symptoms.  Will d/w  her usual cardiologist.  To treat her asymptomatic atrial fibrillation I believe a pacemaker is indicated.  We will hold her Eliquis stop amiodarone and metoprolol and continue observation at least for 48 hr after the last dose of Eliquis to decide on  need for pacemaker therapy.       Active Diagnoses:    Diagnosis Date Noted POA    PRINCIPAL PROBLEM:  Symptomatic bradycardia [R00.1] 12/31/2019 Yes    Occasional tremors [R25.1] 12/31/2019 Yes     Chronic    Anticoagulant long-term use [Z79.01] 12/31/2019 Not Applicable     Chronic    Chronic diastolic congestive heart failure [I50.32] 12/31/2019 Yes     Chronic    Severe aortic stenosis [I35.0] 12/31/2019 Yes     Chronic    Paroxysmal atrial fibrillation [I48.0] 10/03/2019 Yes     Chronic    Coronary artery disease [I25.10] 10/03/2019 Yes     Chronic    Type 2 diabetes mellitus without complication, without long-term current use of insulin [E11.9] 10/03/2019 Yes     Chronic    Mixed hyperlipidemia [E78.2] 10/03/2019 Yes     Chronic    History of lobular carcinoma of breast [Z85.3] 10/03/2019 Not Applicable     Chronic    Essential hypertension [I10] 10/03/2019 Yes     Chronic    History of anemia [Z86.2] 10/03/2019 Not Applicable     Chronic      Problems Resolved During this Admission:       VTE Risk Mitigation (From admission, onward)         Ordered     Reason for no Mechanical VTE Prophylaxis  Once     Question:  Reasons:  Answer:  Physician Provided (leave comment)    12/31/19 1223     IP VTE HIGH RISK PATIENT  Once      12/31/19 1223                Thank you for your consult. I will follow-up with patient. Please contact us if you have any additional questions.    Fly Barragan MD  Cardiology   [facility]

## 2020-01-01 NOTE — PLAN OF CARE
01/01/20 1053   MARTINEZ Message   Medicare Outpatient and Observation Notification regarding financial responsibility Given to patient/caregiver;Explained to patient/caregiver;Signed/date by patient/caregiver   Date MARTINEZ was signed 01/01/20   Time MARTINEZ was signed 0945     Introduced self and asked pt if ok to take few min to discuss Medicare form, and ok with pt. Explained that pt in observation status and Medicare wants to inform them of the MOON form, pt verbalized an understanding to form, form signed by pt and form scanned into CM notes. Copy made of signed form for pt and copy delivered to pt.

## 2020-01-01 NOTE — PROGRESS NOTES
[facility] hospital  Cardiology  Progress Note    Patient Name: Rosanna Raymundo  MRN: [id number]  Admission Date: 12/31/2019  Hospital Length of Stay: 0 days  Code Status: Full Code   Attending Physician: Sarah Walter DO   Primary Care Physician: Aamir Rhodes MD  Expected Discharge Date:   Principal Problem:Symptomatic bradycardia    Subjective:     Hospital Course:  Interval History: This morning resting heart rate in 90 to 100.   No major symptoms.     ROS  Objective:     Vital Signs (Most Recent):  Temp: 97.5 °F (36.4 °C) (01/01/20 0804)  Pulse: 107 (01/01/20 0804)  Resp: 16 (01/01/20 0804)  BP: 109/77 (01/01/20 0804)  SpO2: 97 % (01/01/20 0804) Vital Signs (24h Range):  Temp:  [97.5 °F (36.4 °C)-98.3 °F (36.8 °C)] 97.5 °F (36.4 °C)  Pulse:  [] 107  Resp:  [16-38] 16  SpO2:  [97 %-100 %] 97 %  BP: (109-142)/(56-77) 109/77     Weight: 71.7 kg (158 lb 1.1 oz)  Body mass index is 29.87 kg/m².    SpO2: 97 %  O2 Device (Oxygen Therapy): room air      Intake/Output Summary (Last 24 hours) at 1/1/2020 1031  Last data filed at 1/1/2020 0800  Gross per 24 hour   Intake 400 ml   Output 400 ml   Net 0 ml       Lines/Drains/Airways     Peripheral Intravenous Line                 Peripheral IV - Single Lumen 12/31/19 1021 20 G Right Forearm 1 day                Physical ExamUnchanged murmur, only heart rate is higher    Significant Labs:   BMP:   Recent Labs   Lab 12/31/19  1021 01/01/20  0346   * 117*    140   K 3.5 3.7    104   CO2 25 26   BUN 23 30*   CREATININE 1.0 1.1   CALCIUM 8.6* 9.4   MG  --  1.9   , INR No results for input(s): INR, PROTIME in the last 48 hours. and Troponin   Recent Labs   Lab 12/31/19  1021 12/31/19  1638   TROPONINI <0.030 <0.030       Significant Imaging:   Assessment and Plan:  Tachy-Aj syndrome  Paroxysmal Atrial fibrillation.  Plan  Have d/w , we both are in agreement that a DDD pacemaker is indicated for prevention of symptomatic  bradycardia and effective treatment of paroxysmal afib.  Have explained in detail pacemaker procedure and complications. I have also told her to contrpl afib we still need to use medicine.  Informed consent obtained.     Brief HPI:     Active Diagnoses:    Diagnosis Date Noted POA    PRINCIPAL PROBLEM:  Symptomatic bradycardia [R00.1] 12/31/2019 Yes    Occasional tremors [R25.1] 12/31/2019 Yes     Chronic    Anticoagulant long-term use [Z79.01] 12/31/2019 Not Applicable     Chronic    Chronic diastolic congestive heart failure [I50.32] 12/31/2019 Yes     Chronic    Severe aortic stenosis [I35.0] 12/31/2019 Yes     Chronic    Paroxysmal atrial fibrillation [I48.0] 10/03/2019 Yes     Chronic    Coronary artery disease [I25.10] 10/03/2019 Yes     Chronic    Type 2 diabetes mellitus without complication, without long-term current use of insulin [E11.9] 10/03/2019 Yes     Chronic    Mixed hyperlipidemia [E78.2] 10/03/2019 Yes     Chronic    History of lobular carcinoma of breast [Z85.3] 10/03/2019 Not Applicable     Chronic    Essential hypertension [I10] 10/03/2019 Yes     Chronic    History of anemia [Z86.2] 10/03/2019 Not Applicable     Chronic      Problems Resolved During this Admission:       VTE Risk Mitigation (From admission, onward)         Ordered     Reason for no Mechanical VTE Prophylaxis  Once     Question:  Reasons:  Answer:  Physician Provided (leave comment)    12/31/19 1223     IP VTE HIGH RISK PATIENT  Once      12/31/19 1223                Fly Barragan MD  Cardiology  [facility]

## 2020-01-02 LAB
ANION GAP SERPL CALC-SCNC: 12 MMOL/L (ref 8–16)
BUN SERPL-MCNC: 25 MG/DL (ref 8–23)
CALCIUM SERPL-MCNC: 9.4 MG/DL (ref 8.7–10.5)
CHLORIDE SERPL-SCNC: 106 MMOL/L (ref 95–110)
CO2 SERPL-SCNC: 24 MMOL/L (ref 23–29)
CREAT SERPL-MCNC: 1 MG/DL (ref 0.5–1.4)
ERYTHROCYTE [DISTWIDTH] IN BLOOD BY AUTOMATED COUNT: 26.1 % (ref 11.5–14.5)
EST. GFR  (AFRICAN AMERICAN): 58.5 ML/MIN/1.73 M^2
EST. GFR  (NON AFRICAN AMERICAN): 50.8 ML/MIN/1.73 M^2
GLUCOSE SERPL-MCNC: 114 MG/DL (ref 70–110)
GLUCOSE SERPL-MCNC: 127 MG/DL (ref 70–110)
GLUCOSE SERPL-MCNC: 128 MG/DL (ref 70–110)
HCT VFR BLD AUTO: 34.4 % (ref 37–48.5)
HGB BLD-MCNC: 10.5 G/DL (ref 12–16)
MAGNESIUM SERPL-MCNC: 1.5 MG/DL (ref 1.6–2.6)
MCH RBC QN AUTO: 23.3 PG (ref 27–31)
MCHC RBC AUTO-ENTMCNC: 30.5 G/DL (ref 32–36)
MCV RBC AUTO: 76 FL (ref 82–98)
PHOSPHATE SERPL-MCNC: 4.3 MG/DL (ref 2.7–4.5)
PLATELET # BLD AUTO: 237 K/UL (ref 150–350)
PMV BLD AUTO: 10.3 FL (ref 9.2–12.9)
POTASSIUM SERPL-SCNC: 3.6 MMOL/L (ref 3.5–5.1)
RBC # BLD AUTO: 4.5 M/UL (ref 4–5.4)
SODIUM SERPL-SCNC: 142 MMOL/L (ref 136–145)
WBC # BLD AUTO: 6.66 K/UL (ref 3.9–12.7)

## 2020-01-02 PROCEDURE — 83735 ASSAY OF MAGNESIUM: CPT

## 2020-01-02 PROCEDURE — 25000003 PHARM REV CODE 250: Performed by: FAMILY MEDICINE

## 2020-01-02 PROCEDURE — C1898 LEAD, PMKR, OTHER THAN TRANS: HCPCS | Performed by: INTERNAL MEDICINE

## 2020-01-02 PROCEDURE — 99900035 HC TECH TIME PER 15 MIN (STAT)

## 2020-01-02 PROCEDURE — 27201423 OPTIME MED/SURG SUP & DEVICES STERILE SUPPLY: Performed by: INTERNAL MEDICINE

## 2020-01-02 PROCEDURE — 63600175 PHARM REV CODE 636 W HCPCS: Performed by: INTERNAL MEDICINE

## 2020-01-02 PROCEDURE — 33208 INSRT HEART PM ATRIAL & VENT: CPT

## 2020-01-02 PROCEDURE — 25000003 PHARM REV CODE 250: Performed by: INTERNAL MEDICINE

## 2020-01-02 PROCEDURE — 36415 COLL VENOUS BLD VENIPUNCTURE: CPT

## 2020-01-02 PROCEDURE — 99153 MOD SED SAME PHYS/QHP EA: CPT | Performed by: INTERNAL MEDICINE

## 2020-01-02 PROCEDURE — 80048 BASIC METABOLIC PNL TOTAL CA: CPT

## 2020-01-02 PROCEDURE — 99152 MOD SED SAME PHYS/QHP 5/>YRS: CPT | Performed by: INTERNAL MEDICINE

## 2020-01-02 PROCEDURE — C1894 INTRO/SHEATH, NON-LASER: HCPCS | Performed by: INTERNAL MEDICINE

## 2020-01-02 PROCEDURE — 94761 N-INVAS EAR/PLS OXIMETRY MLT: CPT

## 2020-01-02 PROCEDURE — 85027 COMPLETE CBC AUTOMATED: CPT

## 2020-01-02 PROCEDURE — 84100 ASSAY OF PHOSPHORUS: CPT

## 2020-01-02 PROCEDURE — 21400001 HC TELEMETRY ROOM

## 2020-01-02 PROCEDURE — C1785 PMKR, DUAL, RATE-RESP: HCPCS | Performed by: INTERNAL MEDICINE

## 2020-01-02 PROCEDURE — 93005 ELECTROCARDIOGRAM TRACING: CPT

## 2020-01-02 DEVICE — PACING LEAD
Type: IMPLANTABLE DEVICE | Site: HEART | Status: FUNCTIONAL
Brand: TENDRIL™

## 2020-01-02 DEVICE — PULSE GENERATOR
Type: IMPLANTABLE DEVICE | Site: HEART | Status: FUNCTIONAL
Brand: ASSURITY MRI™

## 2020-01-02 RX ORDER — LIDOCAINE HYDROCHLORIDE 10 MG/ML
INJECTION, SOLUTION EPIDURAL; INFILTRATION; INTRACAUDAL; PERINEURAL
Status: DISCONTINUED | OUTPATIENT
Start: 2020-01-02 | End: 2020-01-02 | Stop reason: HOSPADM

## 2020-01-02 RX ORDER — HYDROCODONE BITARTRATE AND ACETAMINOPHEN 5; 325 MG/1; MG/1
1 TABLET ORAL EVERY 4 HOURS PRN
Status: DISCONTINUED | OUTPATIENT
Start: 2020-01-02 | End: 2020-01-03 | Stop reason: HOSPADM

## 2020-01-02 RX ORDER — METOPROLOL TARTRATE 25 MG/1
25 TABLET, FILM COATED ORAL 2 TIMES DAILY
Status: DISCONTINUED | OUTPATIENT
Start: 2020-01-02 | End: 2020-01-03 | Stop reason: HOSPADM

## 2020-01-02 RX ORDER — ACETAMINOPHEN 325 MG/1
650 TABLET ORAL EVERY 4 HOURS PRN
Status: DISCONTINUED | OUTPATIENT
Start: 2020-01-02 | End: 2020-01-03 | Stop reason: HOSPADM

## 2020-01-02 RX ORDER — VANCOMYCIN HYDROCHLORIDE 1 G/20ML
INJECTION, POWDER, LYOPHILIZED, FOR SOLUTION INTRAVENOUS
Status: DISCONTINUED | OUTPATIENT
Start: 2020-01-02 | End: 2020-01-02 | Stop reason: HOSPADM

## 2020-01-02 RX ORDER — METOPROLOL TARTRATE 1 MG/ML
INJECTION, SOLUTION INTRAVENOUS
Status: DISCONTINUED | OUTPATIENT
Start: 2020-01-02 | End: 2020-01-02 | Stop reason: HOSPADM

## 2020-01-02 RX ORDER — FENTANYL CITRATE 50 UG/ML
INJECTION, SOLUTION INTRAMUSCULAR; INTRAVENOUS
Status: DISCONTINUED | OUTPATIENT
Start: 2020-01-02 | End: 2020-01-02 | Stop reason: HOSPADM

## 2020-01-02 RX ORDER — AMIODARONE HYDROCHLORIDE 200 MG/1
200 TABLET ORAL DAILY
Status: DISCONTINUED | OUTPATIENT
Start: 2020-01-03 | End: 2020-01-03 | Stop reason: HOSPADM

## 2020-01-02 RX ORDER — MIDAZOLAM HYDROCHLORIDE 1 MG/ML
INJECTION INTRAMUSCULAR; INTRAVENOUS
Status: DISCONTINUED | OUTPATIENT
Start: 2020-01-02 | End: 2020-01-02 | Stop reason: HOSPADM

## 2020-01-02 RX ORDER — MUPIROCIN 20 MG/G
OINTMENT TOPICAL
Status: DISCONTINUED | OUTPATIENT
Start: 2020-01-02 | End: 2020-01-02 | Stop reason: HOSPADM

## 2020-01-02 RX ADMIN — VANCOMYCIN HYDROCHLORIDE 1000 MG: 1 INJECTION, POWDER, LYOPHILIZED, FOR SOLUTION INTRAVENOUS at 10:01

## 2020-01-02 RX ADMIN — METOPROLOL TARTRATE 25 MG: 25 TABLET ORAL at 08:01

## 2020-01-02 RX ADMIN — FERROUS SULFATE TAB 325 MG (65 MG ELEMENTAL FE) 325 MG: 325 (65 FE) TAB at 08:01

## 2020-01-02 RX ADMIN — BUSPIRONE HYDROCHLORIDE 10 MG: 5 TABLET ORAL at 08:01

## 2020-01-02 RX ADMIN — ROSUVASTATIN CALCIUM 20 MG: 20 TABLET, FILM COATED ORAL at 08:01

## 2020-01-02 RX ADMIN — FLUTICASONE PROPIONATE 50 MCG: 50 SPRAY, METERED NASAL at 08:01

## 2020-01-02 NOTE — PT/OT/SLP PROGRESS
Occupational Therapy      Patient Name:  Rosanna Raymundo   MRN:  [id number]    Patient not seen today secondary to Unavailable (Comment)(Pt having pacemaker placed today.). Will follow-up next service date.    Su Garcia OT  1/2/2020

## 2020-01-02 NOTE — PLAN OF CARE
Nursing Transfer Note      1/2/2020     Transfer To: [id number]    Transfer via stretcher    Transfer with cardiac monitoring    Transported by Estela TAYLOR     Medicines sent: none    Chart send with patient: Yes    Notified: friend

## 2020-01-02 NOTE — PROGRESS NOTES
[facility] Medicine  Progress Note    Patient Name: Rosanna Raymundo  MRN: [id number]  Patient Class: OP- Observation   Admission Date: 12/31/2019  Length of Stay: 0 days  Attending Physician: Sarah Walter DO  Primary Care Provider: Aamir Rhodes MD        Subjective:     Principal Problem:Symptomatic bradycardia  Chief Complaint   Patient presents with    Weakness       Interval History: afib rate 's. NPO for pacemaker placement later this morning. Denies chest pain, chest pressure, palpitations, shortness of breath, cough, nausea, vomiting, dizziness, lightheadedness, changes sensation or strength.    Review of Systems   Per interval history, all other systems reviewed and negative.     Objective:     Vital Signs (Most Recent):  Temp: 98.5 °F (36.9 °C) (01/02/20 0755)  Pulse: (!) 123 (01/02/20 0500)  Resp: 19 (01/02/20 0500)  BP: 125/74 (01/02/20 0755)  SpO2: 99 % (01/02/20 0500) Vital Signs (24h Range):  Temp:  [97.8 °F (36.6 °C)-98.9 °F (37.2 °C)] 98.5 °F (36.9 °C)  Pulse:  [] 123  Resp:  [17-29] 19  SpO2:  [95 %-99 %] 99 %  BP: (116-150)/(56-83) 125/74     Weight: 71.4 kg (157 lb 6.5 oz)  Body mass index is 29.74 kg/m².    Intake/Output Summary (Last 24 hours) at 1/2/2020 0856  Last data filed at 1/1/2020 1800  Gross per 24 hour   Intake 600 ml   Output 1200 ml   Net -600 ml      Physical Exam   Constitutional: She is oriented to person, place, and time. She appears well-developed and well-nourished. No distress.   Tremors   HENT:   Head: Normocephalic and atraumatic.   Right Ear: External ear normal.   Left Ear: External ear normal.   Nose: Nose normal.   Mouth/Throat: Oropharynx is clear and moist. No oropharyngeal exudate.   Eyes: Pupils are equal, round, and reactive to light. Conjunctivae and EOM are normal. Right eye exhibits no discharge. Left eye exhibits no discharge. No scleral icterus.   Neck: Normal range of motion. Neck supple. No thyromegaly present.    Cardiovascular: Normal heart sounds and intact distal pulses. Exam reveals no gallop and no friction rub.   No murmur heard.  IRR tachy   Pulmonary/Chest: Effort normal and breath sounds normal. No respiratory distress. She has no wheezes. She has no rales. She exhibits no tenderness.   Abdominal: Soft. Bowel sounds are normal. She exhibits no distension and no mass. There is no tenderness. There is no rebound and no guarding. No hernia.   Musculoskeletal: Normal range of motion. She exhibits no edema or tenderness.   Lymphadenopathy:     She has no cervical adenopathy.   Neurological: She is alert and oriented to person, place, and time.   Skin: Skin is warm. She is not diaphoretic.   Psychiatric: She has a normal mood and affect. Her behavior is normal. Judgment and thought content normal.   Nursing note and vitals reviewed.      Significant Labs:   CBC:   Recent Labs   Lab 12/31/19  1021 12/31/19  1021 01/01/20  1111 01/02/20  0345   WBC 8.42  --  5.40 6.66   HGB 9.8*  --  10.2* 10.5*   HCT 33.3* 32* 33.4* 34.4*     --  237 237     CMP:   Recent Labs   Lab 12/31/19  1021 01/01/20  0346 01/02/20  0345    140 142   K 3.5 3.7 3.6    104 106   CO2 25 26 24   * 117* 114*   BUN 23 30* 25*   CREATININE 1.0 1.1 1.0   CALCIUM 8.6* 9.4 9.4   PROT 6.8  --   --    ALBUMIN 3.8  --   --    BILITOT 0.8  --   --    ALKPHOS 55  --   --    AST 19  --   --    ALT 18  --   --    ANIONGAP 9 10 12   EGFRNONAA 50.8* 45.3* 50.8*     Magnesium:   Recent Labs   Lab 01/01/20  0346 01/02/20  0345   MG 1.9 1.5*     Troponin:   Recent Labs   Lab 12/31/19  1021 12/31/19  1638   TROPONINI <0.030 <0.030     TSH:   Recent Labs   Lab 12/31/19  1021   TSH 2.020     All pertinent labs within the past 24 hours have been reviewed.    Significant Imaging: no new images today      Assessment/Plan:      * Symptomatic bradycardia  Telemetry  Held home medications:  Amiodarone, metoprolol and Eliquis  NPO for pacemaker placement  today  US carotid no significant stenosis  TSH/T4 within normal limits  Cardio consult, Dr. Klein  PT/OT      CAD s/p 7vCABG in 2014  HTN     No chest pain     BP Stable     C/w home amlodipine, statin      DM2     a1c 7.2 on 12/14/2019     BG wnl         VTE Risk Mitigation (From admission, onward)         Ordered     Reason for no Mechanical VTE Prophylaxis  Once     Question:  Reasons:  Answer:  Physician Provided (leave comment)    12/31/19 1223     IP VTE HIGH RISK PATIENT  Once      12/31/19 1223                  Sarah Walter DO  Department of Hospital Medicine   [facility]

## 2020-01-02 NOTE — PROGRESS NOTES
[facility]  Cardiology  Progress Note    Patient Name: Rosanna Raymundo  MRN: [id number]  Admission Date: 12/31/2019  Hospital Length of Stay: 0 days  Code Status: Full Code   Attending Physician: Sarah Walter DO   Primary Care Physician: Aamir Rhodes MD  Expected Discharge Date:   Principal Problem:Symptomatic bradycardia    Subjective:       Interval History: Patient had dual chamber pacemaker placed earlier today. She denies any chest pain or shortness of breath. Rhythm is atrial fibrillation with rate 110s-120.     ROS  Objective:     Vital Signs (Most Recent):  Temp: 98.6 °F (37 °C) (01/02/20 1600)  Pulse: 104 (01/02/20 1605)  Resp: 17 (01/02/20 1605)  BP: 116/62 (01/02/20 1600)  SpO2: 99 % (01/02/20 1605) Vital Signs (24h Range):  Temp:  [97.8 °F (36.6 °C)-98.9 °F (37.2 °C)] 98.6 °F (37 °C)  Pulse:  [] 104  Resp:  [10-42] 17  SpO2:  [91 %-100 %] 99 %  BP: ()/(46-96) 116/62     Weight: 71.4 kg (157 lb 6.5 oz)  Body mass index is 29.74 kg/m².    SpO2: 99 %  O2 Device (Oxygen Therapy): nasal cannula      Intake/Output Summary (Last 24 hours) at 1/2/2020 1607  Last data filed at 1/1/2020 1800  Gross per 24 hour   Intake 300 ml   Output 400 ml   Net -100 ml       Lines/Drains/Airways     Peripheral Intravenous Line                 Peripheral IV - Single Lumen 12/31/19 1021 20 G Right Forearm 2 days         Peripheral IV - Single Lumen 01/01/20 1435 20 G Posterior;Right Forearm 1 day                Scheduled Meds:   allopurinol  100 mg Oral Daily    amLODIPine  5 mg Oral Daily    busPIRone  10 mg Oral BID    ferrous sulfate  325 mg Oral BID    fluticasone propionate  1 spray Each Nostril BID    pantoprazole  40 mg Oral Daily    polyethylene glycol  17 g Oral Daily    rosuvastatin  20 mg Oral QHS    [START ON 1/3/2020] vancomycin (VANCOCIN) IVPB  1,250 mg Intravenous Once     Continuous Infusions:  PRN Meds:.acetaminophen, albuterol-ipratropium, dextrose 50%, dextrose 50%,  glucagon (human recombinant), glucose, glucose, HYDROcodone-acetaminophen, LORazepam, magnesium oxide, magnesium oxide, melatonin, ondansetron, potassium chloride 10%, potassium chloride 10%, potassium chloride 10%, potassium, sodium phosphates, potassium, sodium phosphates, potassium, sodium phosphates, sodium chloride 0.9%, vancomycin - pharmacy to dose     Physical Exam  Constitutional: She is oriented to person, place, and time. She appears well-developed and well-nourished.   Cardiovascular: Regular rhythm. Bradycardia present.   Murmur heard. Right CW dressing site is CDI with ice pack and sling in place .   Medium-pitched harsh midsystolic murmur is present with a grade of 3/6 at the upper right sternal border.  Pulmonary/Chest: Breath sounds normal. No respiratory distress.   S/p L Mastectomy   Neurological: She is alert and oriented to person, place, and time. No cranial nerve deficit.   Significant Labs:   ABG: No results for input(s): PH, PCO2, HCO3, POCSATURATED, BE in the last 48 hours., Blood Culture: No results for input(s): LABBLOO in the last 48 hours., BMP:   Recent Labs   Lab 01/01/20  0346 01/02/20  0345   * 114*    142   K 3.7 3.6    106   CO2 26 24   BUN 30* 25*   CREATININE 1.1 1.0   CALCIUM 9.4 9.4   MG 1.9 1.5*   , CMP   Recent Labs   Lab 01/01/20  0346 01/02/20  0345    142   K 3.7 3.6    106   CO2 26 24   * 114*   BUN 30* 25*   CREATININE 1.1 1.0   CALCIUM 9.4 9.4   ANIONGAP 10 12   ESTGFRAFRICA 52.2* 58.5*   EGFRNONAA 45.3* 50.8*   , CBC   Recent Labs   Lab 01/01/20  1111 01/02/20  0345   WBC 5.40 6.66   HGB 10.2* 10.5*   HCT 33.4* 34.4*    237   , INR   Recent Labs   Lab 01/01/20  1111   INR 1.1   , Lipid Panel No results for input(s): CHOL, HDL, LDLCALC, TRIG, CHOLHDL in the last 48 hours., Troponin   Recent Labs   Lab 12/31/19  1638   TROPONINI <0.030   , All pertinent lab results from the last 24 hours have been reviewed. and   Recent Lab  Results       01/02/20  0505   01/02/20  0345   01/01/20  2044   01/01/20  1612        Anion Gap   12         BUN, Bld   25         Calcium   9.4         Chloride   106         CO2   24         Creatinine   1.0         eGFR if    58.5         eGFR if non    50.8  Comment:  Calculation used to obtain the estimated glomerular filtration  rate (eGFR) is the CKD-EPI equation.            Glucose   114         Hematocrit   34.4         Hemoglobin   10.5         Magnesium   1.5         MCH   23.3         MCHC   30.5         MCV   76         MPV   10.3         Phosphorus   4.3         Platelets   237         POC Glucose 128   147 135     Potassium   3.6         RBC   4.50         RDW   26.1         Sodium   142         WBC   6.66               Significant Imaging: X-Ray: CXR: X-Ray Chest 1 View (CXR): No results found for this visit on 12/31/19.  Assessment and Plan:     IMPRESSION:    Paroxysmal atrial fibrillation.  Tachy-rob syndrome currently with symptomatic bradycardia and pauses of up to 3 sec. Sp PPM [place] device. CXR shows PM in good position.   Significant aortic stenosis  Coronary artery disease status post 7 vessel CABG stable  Status post left mastectomy    PLAN:  1. Resume amiodarone 200 mg once daily.  2. Maintain bedrest for 12 hours post procedure.         Breanna Aldridge NP  Cardiology  [facility]

## 2020-01-02 NOTE — PLAN OF CARE
01/02/20 0723   Patient Assessment/Suction   Level of Consciousness (AVPU) alert   Respiratory Effort Unlabored   Expansion/Accessory Muscles/Retractions no use of accessory muscles   All Lung Fields Breath Sounds clear   Rhythm/Pattern, Respiratory no shortness of breath reported   PRE-TX-O2   O2 Device (Oxygen Therapy) room air   SpO2 100 %   Pulse Oximetry Type Continuous   $ Pulse Oximetry - Multiple Charge Pulse Oximetry - Multiple   Pulse (!) 118   Resp (!) 42   Aerosol Therapy   $ Aerosol Therapy Charges PRN treatment not required

## 2020-01-02 NOTE — PROGRESS NOTES
VANCOMYCIN PHARMACOKINETIC NOTE:  Vancomycin Day # 1    Objective/Assessment:    Diagnosis/Indication for Vancomycin: Surgical Prophylaxis     87 y.o., female; Actual Body Weight = 71.4 kg (157 lb 6.5 oz).    The patient has the following labs:     1/2/2020 Estimated Creatinine Clearance: 35.8 mL/min (based on SCr of 1 mg/dL). Lab Results   Component Value Date    BUN 25 (H) 01/02/2020       Lab Results   Component Value Date    WBC 6.66 01/02/2020          Plan:  Adjust vancomycin dose and/or frequency based on the patient's actual weight and renal function:  Initiate Vancomycin 1250 mg IV x 1 dose 24 hours after pre-op dose.  Orders have been entered into patient's chart.    Vancomycin dose = 17.5 mg/kg actual body weight    Vancomycin trough level has been ordered for prior to the 2nd dose.    Thank you for allowing us to participate in this patient's care.     Abad Stephens 1/2/2020 12:42 PM  Department of Pharmacy  Ext [phone]

## 2020-01-02 NOTE — CARE UPDATE
01/01/20 2210   Patient Assessment/Suction   Level of Consciousness (AVPU) alert   Respiratory Effort Unlabored   Expansion/Accessory Muscles/Retractions no use of accessory muscles   All Lung Fields Breath Sounds clear   Rhythm/Pattern, Respiratory unlabored   Cough Frequency no cough   PRE-TX-O2   O2 Device (Oxygen Therapy) room air   SpO2 99 %   Pulse Oximetry Type Continuous   $ Pulse Oximetry - Multiple Charge Pulse Oximetry - Multiple   Pulse (!) 58   Resp 17   Positioning   Head of Bed (HOB) HOB at 20-30 degrees   Aerosol Therapy   $ Aerosol Therapy Charges PRN treatment not required

## 2020-01-02 NOTE — SUBJECTIVE & OBJECTIVE
Interval History: afib rate 's. NPO for pacemaker placement later this morning. Denies chest pain, chest pressure, palpitations, shortness of breath, cough, nausea, vomiting, dizziness, lightheadedness, changes sensation or strength.    Review of Systems   Per interval history, all other systems reviewed and negative.     Objective:     Vital Signs (Most Recent):  Temp: 98.5 °F (36.9 °C) (01/02/20 0755)  Pulse: (!) 123 (01/02/20 0500)  Resp: 19 (01/02/20 0500)  BP: 125/74 (01/02/20 0755)  SpO2: 99 % (01/02/20 0500) Vital Signs (24h Range):  Temp:  [97.8 °F (36.6 °C)-98.9 °F (37.2 °C)] 98.5 °F (36.9 °C)  Pulse:  [] 123  Resp:  [17-29] 19  SpO2:  [95 %-99 %] 99 %  BP: (116-150)/(56-83) 125/74     Weight: 71.4 kg (157 lb 6.5 oz)  Body mass index is 29.74 kg/m².    Intake/Output Summary (Last 24 hours) at 1/2/2020 0856  Last data filed at 1/1/2020 1800  Gross per 24 hour   Intake 600 ml   Output 1200 ml   Net -600 ml      Physical Exam   Constitutional: She is oriented to person, place, and time. She appears well-developed and well-nourished. No distress.   Tremors   HENT:   Head: Normocephalic and atraumatic.   Right Ear: External ear normal.   Left Ear: External ear normal.   Nose: Nose normal.   Mouth/Throat: Oropharynx is clear and moist. No oropharyngeal exudate.   Eyes: Pupils are equal, round, and reactive to light. Conjunctivae and EOM are normal. Right eye exhibits no discharge. Left eye exhibits no discharge. No scleral icterus.   Neck: Normal range of motion. Neck supple. No thyromegaly present.   Cardiovascular: Normal heart sounds and intact distal pulses. Exam reveals no gallop and no friction rub.   No murmur heard.  IRR tachy   Pulmonary/Chest: Effort normal and breath sounds normal. No respiratory distress. She has no wheezes. She has no rales. She exhibits no tenderness.   Abdominal: Soft. Bowel sounds are normal. She exhibits no distension and no mass. There is no tenderness. There is no  rebound and no guarding. No hernia.   Musculoskeletal: Normal range of motion. She exhibits no edema or tenderness.   Lymphadenopathy:     She has no cervical adenopathy.   Neurological: She is alert and oriented to person, place, and time.   Skin: Skin is warm. She is not diaphoretic.   Psychiatric: She has a normal mood and affect. Her behavior is normal. Judgment and thought content normal.   Nursing note and vitals reviewed.      Significant Labs:   CBC:   Recent Labs   Lab 12/31/19  1021 12/31/19  1021 01/01/20  1111 01/02/20  0345   WBC 8.42  --  5.40 6.66   HGB 9.8*  --  10.2* 10.5*   HCT 33.3* 32* 33.4* 34.4*     --  237 237     CMP:   Recent Labs   Lab 12/31/19  1021 01/01/20  0346 01/02/20  0345    140 142   K 3.5 3.7 3.6    104 106   CO2 25 26 24   * 117* 114*   BUN 23 30* 25*   CREATININE 1.0 1.1 1.0   CALCIUM 8.6* 9.4 9.4   PROT 6.8  --   --    ALBUMIN 3.8  --   --    BILITOT 0.8  --   --    ALKPHOS 55  --   --    AST 19  --   --    ALT 18  --   --    ANIONGAP 9 10 12   EGFRNONAA 50.8* 45.3* 50.8*     Magnesium:   Recent Labs   Lab 01/01/20  0346 01/02/20  0345   MG 1.9 1.5*     Troponin:   Recent Labs   Lab 12/31/19  1021 12/31/19  1638   TROPONINI <0.030 <0.030     TSH:   Recent Labs   Lab 12/31/19  1021   TSH 2.020     All pertinent labs within the past 24 hours have been reviewed.    Significant Imaging: no new images today

## 2020-01-02 NOTE — PROGRESS NOTES
Cardiac Rehab     Rosanna M Zackary   [id number]   1/2/2020         Cardiac Rehab Phase Taught: Phase 1    Teaching Method: Verbal    Handouts: None    Educational Videos: None    Understanding:  Knowledge indicated by feedback, Learning indicated by feedback and Verbalize understanding    Comments: pt resting in bed, waiting for PM placement. Will follow with pt postop    Total Time Spent:15mins            Michelle Bowen RN

## 2020-01-03 VITALS
WEIGHT: 156.75 LBS | SYSTOLIC BLOOD PRESSURE: 121 MMHG | HEART RATE: 62 BPM | RESPIRATION RATE: 18 BRPM | DIASTOLIC BLOOD PRESSURE: 75 MMHG | HEIGHT: 61 IN | OXYGEN SATURATION: 100 % | BODY MASS INDEX: 29.59 KG/M2 | TEMPERATURE: 98 F

## 2020-01-03 LAB
ANION GAP SERPL CALC-SCNC: 11 MMOL/L (ref 8–16)
BUN SERPL-MCNC: 21 MG/DL (ref 8–23)
CALCIUM SERPL-MCNC: 8.7 MG/DL (ref 8.7–10.5)
CHLORIDE SERPL-SCNC: 104 MMOL/L (ref 95–110)
CO2 SERPL-SCNC: 23 MMOL/L (ref 23–29)
CREAT SERPL-MCNC: 0.8 MG/DL (ref 0.5–1.4)
ERYTHROCYTE [DISTWIDTH] IN BLOOD BY AUTOMATED COUNT: 26.6 % (ref 11.5–14.5)
EST. GFR  (AFRICAN AMERICAN): >60 ML/MIN/1.73 M^2
EST. GFR  (NON AFRICAN AMERICAN): >60 ML/MIN/1.73 M^2
GLUCOSE SERPL-MCNC: 113 MG/DL (ref 70–110)
GLUCOSE SERPL-MCNC: 120 MG/DL (ref 70–110)
GLUCOSE SERPL-MCNC: 130 MG/DL (ref 70–110)
HCT VFR BLD AUTO: 35.2 % (ref 37–48.5)
HGB BLD-MCNC: 10.6 G/DL (ref 12–16)
MAGNESIUM SERPL-MCNC: 1.6 MG/DL (ref 1.6–2.6)
MCH RBC QN AUTO: 23.2 PG (ref 27–31)
MCHC RBC AUTO-ENTMCNC: 30.1 G/DL (ref 32–36)
MCV RBC AUTO: 77 FL (ref 82–98)
PHOSPHATE SERPL-MCNC: 3.6 MG/DL (ref 2.7–4.5)
PLATELET # BLD AUTO: 183 K/UL (ref 150–350)
PMV BLD AUTO: 11.6 FL (ref 9.2–12.9)
POTASSIUM SERPL-SCNC: 3.7 MMOL/L (ref 3.5–5.1)
RBC # BLD AUTO: 4.56 M/UL (ref 4–5.4)
SODIUM SERPL-SCNC: 138 MMOL/L (ref 136–145)
VANCOMYCIN TROUGH SERPL-MCNC: 7 UG/ML (ref 10–22)
WBC # BLD AUTO: 8.39 K/UL (ref 3.9–12.7)

## 2020-01-03 PROCEDURE — 85027 COMPLETE CBC AUTOMATED: CPT

## 2020-01-03 PROCEDURE — 83735 ASSAY OF MAGNESIUM: CPT

## 2020-01-03 PROCEDURE — 94761 N-INVAS EAR/PLS OXIMETRY MLT: CPT

## 2020-01-03 PROCEDURE — 63600175 PHARM REV CODE 636 W HCPCS: Performed by: INTERNAL MEDICINE

## 2020-01-03 PROCEDURE — 25000003 PHARM REV CODE 250: Performed by: NURSE PRACTITIONER

## 2020-01-03 PROCEDURE — 25000003 PHARM REV CODE 250: Performed by: FAMILY MEDICINE

## 2020-01-03 PROCEDURE — 80202 ASSAY OF VANCOMYCIN: CPT

## 2020-01-03 PROCEDURE — 36415 COLL VENOUS BLD VENIPUNCTURE: CPT

## 2020-01-03 PROCEDURE — 97530 THERAPEUTIC ACTIVITIES: CPT

## 2020-01-03 PROCEDURE — 97165 OT EVAL LOW COMPLEX 30 MIN: CPT

## 2020-01-03 PROCEDURE — 80048 BASIC METABOLIC PNL TOTAL CA: CPT

## 2020-01-03 PROCEDURE — 25000003 PHARM REV CODE 250: Performed by: INTERNAL MEDICINE

## 2020-01-03 PROCEDURE — 84100 ASSAY OF PHOSPHORUS: CPT

## 2020-01-03 PROCEDURE — 82962 GLUCOSE BLOOD TEST: CPT

## 2020-01-03 RX ORDER — AMIODARONE HYDROCHLORIDE 200 MG/1
200 TABLET ORAL DAILY
Qty: 30 TABLET | Refills: 11 | Status: SHIPPED | OUTPATIENT
Start: 2020-01-04 | End: 2020-06-15 | Stop reason: SDUPTHER

## 2020-01-03 RX ADMIN — POTASSIUM CHLORIDE 20 MEQ: 20 SOLUTION ORAL at 05:01

## 2020-01-03 RX ADMIN — PANTOPRAZOLE SODIUM 40 MG: 40 TABLET, DELAYED RELEASE ORAL at 05:01

## 2020-01-03 RX ADMIN — VANCOMYCIN HYDROCHLORIDE 1250 MG: 1 INJECTION, POWDER, LYOPHILIZED, FOR SOLUTION INTRAVENOUS at 11:01

## 2020-01-03 RX ADMIN — FLUTICASONE PROPIONATE 50 MCG: 50 SPRAY, METERED NASAL at 08:01

## 2020-01-03 RX ADMIN — AMLODIPINE BESYLATE 5 MG: 5 TABLET ORAL at 08:01

## 2020-01-03 RX ADMIN — BUSPIRONE HYDROCHLORIDE 10 MG: 5 TABLET ORAL at 08:01

## 2020-01-03 RX ADMIN — FERROUS SULFATE TAB 325 MG (65 MG ELEMENTAL FE) 325 MG: 325 (65 FE) TAB at 08:01

## 2020-01-03 RX ADMIN — METOPROLOL TARTRATE 25 MG: 25 TABLET ORAL at 08:01

## 2020-01-03 RX ADMIN — MAGNESIUM OXIDE 800 MG: 400 TABLET ORAL at 05:01

## 2020-01-03 RX ADMIN — POTASSIUM CHLORIDE 20 MEQ: 20 SOLUTION ORAL at 07:01

## 2020-01-03 RX ADMIN — AMIODARONE HYDROCHLORIDE 200 MG: 200 TABLET ORAL at 08:01

## 2020-01-03 RX ADMIN — ALLOPURINOL 100 MG: 100 TABLET ORAL at 08:01

## 2020-01-03 NOTE — PLAN OF CARE
Plan of care reviewed with the patient. Cardiac, vital signs, and lab monitoring. Increase activity as tolerated. Bed alarm on. Watch for falls.  Accuchecks per order. Strict I&O. Daily weights. Possible discharge today.

## 2020-01-03 NOTE — PT/OT/SLP EVAL
Occupational Therapy   Evaluation and Discharge Note    Name: Rosanna Raymundo  MRN: [id number]  Admitting Diagnosis:  Symptomatic bradycardia 1 Day Post-Op    Recommendations:     Discharge Recommendations: home  Discharge Equipment Recommendations:  none  Barriers to discharge:  None    Assessment:     Rosanna Raymundo is a 87 y.o. female with a medical diagnosis of Symptomatic bradycardia. At this time, patient is functioning at their prior level of function and does not require further acute OT services.     Plan:     During this hospitalization, patient does not require further acute OT services.  Please re-consult if situation changes.    · Plan of Care Reviewed with: patient    Subjective     Chief Complaint: no complaints  Patient/Family Comments/goals: home alone    Occupational Profile:  Living Environment: one story house, one step to enter with handrail, step in shower with wall bars  Previous level of function: independent, drives   Roles and Routines: lives alone, very active  Equipment Used at home:  none  Assistance upon Discharge: none needed    Pain/Comfort:  · Pain Rating 1: 0/10  · Pain Rating Post-Intervention 1: 0/10    Patients cultural, spiritual, Moravian conflicts given the current situation: no    Objective:     Communicated with: Nursing prior to session.  Patient found up in chair with peripheral IV, telemetry, blood pressure cuff, pulse ox (continuous) upon OT entry to room.    General Precautions: Standard, fall, pacemaker   Orthopedic Precautions:N/A   Braces: UE Sling     Occupational Performance:    Bed Mobility:  NT due to pt being in chair     Functional Mobility/Transfers:  · Patient completed Sit <> Stand Transfer with independence  with  no assistive device   · Patient completed Bed <> Chair Transfer using Step Transfer technique with independence with no assistive device  · Patient completed Toilet Transfer Step Transfer technique with independence with  no  AD  · Functional Mobility: able to ambulate without LOB independent; OT only providing SBA due to IV pole and pt with  R arm sling; ambulated from chair to bathroom and back ~ 2 x 13 feet      Activities of Daily Living:  · Feeding:  independence    · Grooming: independence     · Lower Body Dressing: independence    · Toileting: independence      Cognitive/Visual Perceptual:  Cognitive/Psychosocial Skills:     -       Oriented to: Person, Place, Time and Situation   -       Follows Commands/attention:Follows multistep  commands  -       Communication: clear/fluent  -       Memory: No Deficits noted  -       Safety awareness/insight to disability: intact   -       Mood/Affect/Coping skills/emotional control: Appropriate to situation, Cooperative, Happy and Pleasant  Visual/Perceptual:      -Intact wears glasses    Physical Exam:  Balance: -       intact sitting and standing dynamically  Postural examination/scapula alignment:    -       No postural abnormalities identified  Dominant hand: -       right  Upper Extremity Range of Motion:     -       Right Upper Extremity: shoulder AROM NT due to pacemaker precautions; pt in sling'  -       Left Upper Extremity: WNL  Upper Extremity Strength:    -       Right Upper Extremity: shoulder and elbow NT due to pacemaker precautions; wrist and hand WNL   -       Left Upper Extremity: WNL   Strength:    -       Right Upper Extremity: WNL  -       Left Upper Extremity: WNL  Fine Motor Coordination:    -       Intact  Gross motor coordination:   WFL    AMPAC 6 Click ADL:  AMPAC Total Score: 24    Treatment & Education: Pt will full understanding with return demonstration of performing her ADL's without cues needed for pacemaker precaution compliance  Education:    Patient left up in chair with all lines intact and call button in reach    GOALS:   Multidisciplinary Problems     Occupational Therapy Goals     Not on file                History:     Past Medical History:    Diagnosis Date    Anticoagulant long-term use     Cancer     Cataract     CHF (congestive heart failure)     Diabetes mellitus, type 2     Heart murmur     Hypertension     Iron deficiency anemia, unspecified     Anemia, iron def.    Occasional tremors        Past Surgical History:   Procedure Laterality Date    ADENOIDECTOMY      APPENDECTOMY      COLONOSCOPY N/A 12/17/2019    Procedure: COLONOSCOPY;  Surgeon: Raleigh Quarles III, MD;  Location: [facility];  Service: Endoscopy;  Laterality: N/A;    COLONOSCOPY N/A 12/18/2019    Procedure: COLONOSCOPY;  Surgeon: Raleigh Quarles III, MD;  Location: [facility];  Service: Endoscopy;  Laterality: N/A;    Gall bladder revmoval      HYSTERECTOMY      left mastectomy Left 1945    MASTECTOMY Left     HI CABG, ARTERY-VEIN, FOUR  2014    Coronary Artery Bypass, 4    SMALL BOWEL ENTEROSCOPY N/A 12/17/2019    Procedure: ENTEROSCOPY;  Surgeon: Raleigh Quarles III, MD;  Location: [facility];  Service: Endoscopy;  Laterality: N/A;    TONSILLECTOMY         Time Tracking:     OT Date of Treatment: 01/03/20  OT Start Time: 1155  OT Stop Time: 1214  OT Total Time (min): 19 min    Billable Minutes:Evaluation 9  Therapeutic Activity 10    Chinyere Jarrett OT  1/3/833498:54 PM

## 2020-01-03 NOTE — PROGRESS NOTES
Cardiac Rehab     Rosanna Pinaholz   [id number]   1/3/2020         Cardiac Rehab Phase Taught: Phase 1    Teaching Method: Verbal    Handouts: None    Educational Videos: None    Understanding:  Knowledge indicated by feedback, Learning indicated by feedback and Verbalize understanding    Comments: pt in bed. Review of postop PM education. Pt voiced understanding. Will follow    Total Time Spent:30mins            Michelle Bowen RN

## 2020-01-03 NOTE — DISCHARGE SUMMARY
[facility]  Discharge Summary      Patient Name: Rosanna Raymundo  MRN: [id number]  Admission Date: 12/31/2019  Hospital Length of Stay: 1 days  Discharge Date and Time: 1/3/2020  2:19 PM  Attending Physician: Batsheva att. providers found   Discharging Provider: Sarah Walter DO  Primary Care Provider: Aamir Rhodes MD      HPI:   87-year-old female history of PE AFib on Eliquis, CAD, NIDDM, hypertension, tremors, HFpEF, anemia comes in for generalized weakness.  Patient was recently discharged from the hospital about 2 weeks ago for AFib with RVR and started on amiodarone and was continued on p.o. amiodarone at discharge. Patient reports that after being discharged from hospital she was doing well.  About 1 week ago she started feeling extremely weak and tired.  Found the heart rate was in the 30s to 40s.  Denies any focal neurological deficits.  Reports she called her cardiologist, Dr. Klein, last Wednesday who recommended patient to decrease amiodarone to half dose.  Patient reports that she continued to have lightheadedness and generalized weakness.  Continue checking her heart rate and continued to be in 30 to 40s.  She did discontinued amiodarone on Friday.  Throughout the weekend she continued to have presyncopal episodes and generalized weakness with heart rates in the 30s to 40s.  Denies any fever, chills, nausea, vomiting, chest pain, shortness of breath, abdominal pain, dysuria, hematuria, constipation, diarrhea.  She woke up this morning and reports that she cannot take the weakness and tiredness any more and she called EMS.  When EMS arrived, patient was found to have a heart rate 35.    In the ED, patient was found to have a heart rate in the 40s to 50s.  Patient reports that her symptoms have nearly resolved in the ED.    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  Reports that she had discussed with  "Cardiology for possible pacemaker placement in the past.    Procedure(s) (LRB):  INSERTION, PACEMAKER (Right)      Hospital Course:   87-year-old female with past medical history of paroxysmal AFib with recent start of amiodarone presented to emergency department complaining of weakness and bradycardia.  Patient's home medications metoprolol and amiodarone were held on admission.  Cardiology was consulted and pacemaker placement was performed.  Patient was returned to floor after procedure and monitored overnight.  Patient remained hemodynamically stable and was appropriate for discharge home.  She is recommended to wear arm sling with minimal movement for the next 10 days and will be able to shower in 3 days.  She is recommended to follow up with Cardiology in 1-2 weeks.  Physical exam on day of discharge  /75   Pulse 62   Temp 97.7 °F (36.5 °C) (Oral)   Resp 18   Ht 5' 1" (1.549 m)   Wt 71.1 kg (156 lb 12 oz)   SpO2 100%   Breastfeeding? No   BMI 29.62 kg/m²   Gen: nad, sitting in chair  CV: rrr + murmur  Resp: CTA B/l  AB: +bs soft nontender nondistended  Msk:  Right chest wall dressing clean dry intact, arm resting comfortably in sling.  Skin: dry, warm      Consults:   Consults (From admission, onward)        Status Ordering Provider     Inpatient consult to Cardiology  Once     Provider:  Rakesh Klein MD    Completed CRISTY SEVILLA     Inpatient consult to Hospitalist  Once     Provider:  Cristy Sevilla MD    Acknowledged CRISTY SEVILLA          No new Assessment & Plan notes have been filed under this hospital service since the last note was generated.  Service: Hospital Medicine    Final Active Diagnoses:    Diagnosis Date Noted POA    PRINCIPAL PROBLEM:  Symptomatic bradycardia [R00.1] 12/31/2019 Yes    Occasional tremors [R25.1] 12/31/2019 Yes     Chronic    Anticoagulant long-term use [Z79.01] 12/31/2019 Not Applicable     Chronic    Chronic diastolic congestive " heart failure [I50.32] 12/31/2019 Yes     Chronic    Severe aortic stenosis [I35.0] 12/31/2019 Yes     Chronic    Paroxysmal atrial fibrillation [I48.0] 10/03/2019 Yes     Chronic    Coronary artery disease [I25.10] 10/03/2019 Yes     Chronic    Type 2 diabetes mellitus without complication, without long-term current use of insulin [E11.9] 10/03/2019 Yes     Chronic    Mixed hyperlipidemia [E78.2] 10/03/2019 Yes     Chronic    History of lobular carcinoma of breast [Z85.3] 10/03/2019 Not Applicable     Chronic    Essential hypertension [I10] 10/03/2019 Yes     Chronic    History of anemia [Z86.2] 10/03/2019 Not Applicable     Chronic      Problems Resolved During this Admission:       Discharged Condition: stable    Disposition: Home or Self Care    Follow Up:  Follow-up Information     Rakesh Klein MD.    Specialties:  Cardiovascular Disease, Cardiology  Why:  follow up in Dr. Klein's clinic in 7-10 days.   Contact information:  [address]  [address]  [place] [address]  [phone]             Schedule an appointment as soon as possible for a visit with Aamir Rhodes MD.    Specialty:  Family Medicine  Why:  As needed  Contact information:  [address]  [address]  [place] [address]  [phone]                 Patient Instructions:      Diet Cardiac     Notify your health care provider if you experience any of the following:  temperature >100.4     Notify your health care provider if you experience any of the following:  severe uncontrolled pain     Notify your health care provider if you experience any of the following:  severe persistent headache     Notify your health care provider if you experience any of the following:  difficulty breathing or increased cough     Notify your health care provider if you experience any of the following:  persistent dizziness, light-headedness, or visual disturbances     Activity as tolerated   Order Comments: Advised  to wear the sling at night for total of 10 days.  OK to shower starting Sunday 1/5/20       Significant Diagnostic Studies: Labs: All labs within the past 24 hours have been reviewed    Pending Diagnostic Studies:     None         Medications:  Reconciled Home Medications:      Medication List      CHANGE how you take these medications    amiodarone 200 MG Tab  Commonly known as:  PACERONE  Take 1 tablet (200 mg total) by mouth once daily.  Start taking on:  January 4, 2020  What changed:  See the new instructions.     Crestor 20 MG tablet  Generic drug:  rosuvastatin  Take 1 tablet (20 mg total) by mouth once daily.  What changed:  when to take this     fluticasone propionate 50 mcg/actuation nasal spray  Commonly known as:  FLONASE  1 spray (50 mcg total) by Each Nostril route 2 (two) times daily.  What changed:    · when to take this  · reasons to take this        CONTINUE taking these medications    allopurinol 100 MG tablet  Commonly known as:  ZYLOPRIM  Take 1 tablet (100 mg total) by mouth once daily.     amLODIPine 10 MG tablet  Commonly known as:  NORVASC  Take 0.5 tablets (5 mg total) by mouth once daily.     apixaban 2.5 mg Tab  Commonly known as:  ELIQUIS  Take 1 tablet (2.5 mg total) by mouth 2 (two) times daily.     aspirin 81 MG Chew  Take 1 tablet (81 mg total) by mouth once daily.     busPIRone 5 MG Tab  Commonly known as:  BUSPAR  Take 10 mg by mouth 2 (two) times daily.     cholecalciferol (vitamin D3) 25 mcg (1,000 unit) capsule  Commonly known as:  Vitamin D3  Take 1 capsule (1,000 Units total) by mouth once daily.     ferrous sulfate 325 (65 FE) MG EC tablet  Take 325 mg by mouth 2 (two) times daily.     fish oil-omega-3 fatty acids 300-1,000 mg capsule  Take 1 capsule by mouth once daily.     hydroCHLOROthiazide 12.5 MG Tab  Commonly known as:  HYDRODIURIL  Take 1 tablet (12.5 mg total) by mouth once daily.     loratadine 10 mg tablet  Commonly known as:  CLARITIN  Take 1 tablet (10 mg total)  by mouth once daily.     LORazepam 1 MG tablet  Commonly known as:  Ativan  Take 1 tablet (1 mg total) by mouth 2 (two) times daily.     losartan 100 MG tablet  Commonly known as:  COZAAR  Take 1 tablet (100 mg total) by mouth once daily.     magnesium oxide 400 mg (241.3 mg magnesium) tablet  Commonly known as:  MAG-OX  Take 1 tablet (400 mg total) by mouth once daily.     metoprolol tartrate 25 MG tablet  Commonly known as:  LOPRESSOR  Take 0.5 tablets (12.5 mg total) by mouth 2 (two) times daily.     omeprazole 20 MG capsule  Commonly known as:  PRILOSEC  Take 2 capsules (40 mg total) by mouth once daily.     ondansetron 8 MG Tbdl  Commonly known as:  ZOFRAN-ODT  Take 1 tablet (8 mg total) by mouth every 6 (six) hours as needed.     VISION FORMULA-2 ORAL  Take 1 tablet by mouth 2 (two) times daily.            Indwelling Lines/Drains at time of discharge:   Lines/Drains/Airways     None                 Time spent on the discharge of patient: 35 minutes  Patient was seen and examined on the date of discharge and determined to be suitable for discharge.         Sarah Walter DO  Department of Hospital Medicine  [facility]

## 2020-01-03 NOTE — PLAN OF CARE
01/02/20 2305   Patient Assessment/Suction   Respiratory Effort Unlabored   All Lung Fields Breath Sounds clear   Rhythm/Pattern, Respiratory pattern regular   PRE-TX-O2   O2 Device (Oxygen Therapy) room air   SpO2 99 %   Pulse Oximetry Type Continuous   Pulse 60   Resp 17   Aerosol Therapy   $ Aerosol Therapy Charges PRN treatment not required

## 2020-01-03 NOTE — PLAN OF CARE
01/03/20 1250   Final Note   Assessment Type Final Discharge Note   Anticipated Discharge Disposition Home   Patient with discharge orders for today to home. No CM/DC planning needs noted.

## 2020-01-03 NOTE — PLAN OF CARE
This note also relates to the following rows which could not be included:  SpO2 - Cannot attach notes to unvalidated device data  Pulse - Cannot attach notes to unvalidated device data  Resp - Cannot attach notes to unvalidated device data       01/03/20 0657   PRE-TX-O2   O2 Device (Oxygen Therapy) room air   Pulse Oximetry Type Continuous   $ Pulse Oximetry - Multiple Charge Pulse Oximetry - Multiple

## 2020-01-03 NOTE — PROGRESS NOTES
[facility]  Cardiology  Progress Note    Patient Name: Rosanna Raymundo  MRN: [id number]  Admission Date: 12/31/2019  Hospital Length of Stay: 1 days  Code Status: Full Code   Attending Physician: Sarah Walter DO   Primary Care Physician: Aamir Rhodes MD  Expected Discharge Date:   Principal Problem:Symptomatic bradycardia    Subjective:       Interval History:  Patient denies any chest pain or shortness of breath. She converted to paced rhythm in the 60s last night at 11pm.   The right CW pacer site is clean and dry with dressing intact. No redness or swelling noted.     ROS  Objective:     Vital Signs (Most Recent):  Temp: 97.9 °F (36.6 °C) (01/03/20 0720)  Pulse: 62 (01/03/20 0900)  Resp: 18 (01/03/20 0900)  BP: 117/63 (01/03/20 0800)  SpO2: 100 % (01/03/20 0900) Vital Signs (24h Range):  Temp:  [97.9 °F (36.6 °C)-98.9 °F (37.2 °C)] 97.9 °F (36.6 °C)  Pulse:  [] 62  Resp:  [10-33] 18  SpO2:  [91 %-100 %] 100 %  BP: ()/(46-96) 117/63     Weight: 71.1 kg (156 lb 12 oz)  Body mass index is 29.62 kg/m².    SpO2: 100 %  O2 Device (Oxygen Therapy): room air      Intake/Output Summary (Last 24 hours) at 1/3/2020 1110  Last data filed at 1/3/2020 0600  Gross per 24 hour   Intake 480 ml   Output --   Net 480 ml       Lines/Drains/Airways     Peripheral Intravenous Line                 Peripheral IV - Single Lumen 01/01/20 1435 20 G Posterior;Right Forearm 1 day                Scheduled Meds:   allopurinol  100 mg Oral Daily    amiodarone  200 mg Oral Daily    amLODIPine  5 mg Oral Daily    busPIRone  10 mg Oral BID    ferrous sulfate  325 mg Oral BID    fluticasone propionate  1 spray Each Nostril BID    metoprolol tartrate  25 mg Oral BID    pantoprazole  40 mg Oral Daily    polyethylene glycol  17 g Oral Daily    rosuvastatin  20 mg Oral QHS    vancomycin (VANCOCIN) IVPB  1,250 mg Intravenous Once     Continuous Infusions:  PRN Meds:.acetaminophen, albuterol-ipratropium,  dextrose 50%, dextrose 50%, glucagon (human recombinant), glucose, glucose, HYDROcodone-acetaminophen, LORazepam, magnesium oxide, magnesium oxide, melatonin, ondansetron, potassium chloride 10%, potassium chloride 10%, potassium chloride 10%, potassium, sodium phosphates, potassium, sodium phosphates, potassium, sodium phosphates, sodium chloride 0.9%     Physical Exam  Constitutional: She is oriented to person, place, and time. She appears well-developed and well-nourished.   Cardiovascular: Regular paced rhythm rhythm.   Murmur heard. Right CW dressing site is CDI with sling in place    Medium-pitched harsh midsystolic murmur is present with a grade of 3/6 at the upper right sternal border.  Pulmonary/Chest: Breath sounds normal. No respiratory distress.   S/p L Mastectomy   Neurological: She is alert and oriented to person, place, and time. No cranial nerve deficit.   Significant Labs:   ABG: No results for input(s): PH, PCO2, HCO3, POCSATURATED, BE in the last 48 hours., Blood Culture: No results for input(s): LABBLOO in the last 48 hours., BMP:   Recent Labs   Lab 01/02/20  0345 01/03/20  0307   * 113*    138   K 3.6 3.7    104   CO2 24 23   BUN 25* 21   CREATININE 1.0 0.8   CALCIUM 9.4 8.7   MG 1.5* 1.6   , CMP   Recent Labs   Lab 01/02/20  0345 01/03/20  0307    138   K 3.6 3.7    104   CO2 24 23   * 113*   BUN 25* 21   CREATININE 1.0 0.8   CALCIUM 9.4 8.7   ANIONGAP 12 11   ESTGFRAFRICA 58.5* >60.0   EGFRNONAA 50.8* >60.0   , CBC   Recent Labs   Lab 01/01/20  1111 01/02/20  0345 01/03/20  0307   WBC 5.40 6.66 8.39   HGB 10.2* 10.5* 10.6*   HCT 33.4* 34.4* 35.2*    237 183   , INR   Recent Labs   Lab 01/01/20  1111   INR 1.1   , Lipid Panel No results for input(s): CHOL, HDL, LDLCALC, TRIG, CHOLHDL in the last 48 hours., Troponin   No results for input(s): TROPONINI in the last 48 hours., All pertinent lab results from the last 24 hours have been reviewed. and    Recent Lab Results       01/03/20  1101   01/03/20  0917   01/03/20  0540   01/03/20  0307   01/02/20 2039        Anion Gap       11       BUN, Bld       21       Calcium       8.7       Chloride       104       CO2       23       Creatinine       0.8       eGFR if        >60.0       eGFR if non        >60.0  Comment:  Calculation used to obtain the estimated glomerular filtration  rate (eGFR) is the CKD-EPI equation.          Glucose       113       Hematocrit       35.2       Hemoglobin       10.6       Magnesium       1.6       MCH       23.2       MCHC       30.1       MCV       77       MPV       11.6       Phosphorus       3.6       Platelets       183       POC Glucose 130   120   127     Potassium       3.7       RBC       4.56       RDW       26.6       Sodium       138       Vancomycin-Trough   7.0           WBC       8.39                            Significant Imaging: X-Ray: CXR: X-Ray Chest 1 View (CXR): No results found for this visit on 12/31/19.  Assessment and Plan:     IMPRESSION:    Paroxysmal atrial fibrillation.  Tachy-rob syndrome currently with symptomatic bradycardia and pauses of up to 3 sec. Sp PPM [place] device. CXR shows PM in good position.   Significant aortic stenosis  Coronary artery disease status post 7 vessel CABG stable  Status post left mastectomy    PLAN:  1. Advised patient to start back her Eliquis tomorrow.   2. She is stable for discharge from our standpoint. She tolerated ambulating in the hallway without any problem.  3. She can shower on Sunday.  4. Advised to wear the sling at night for total of 10 days.   5. Follow up in our clinic with Dr. Klein in 7-10 days      Breanna Aldridge NP  Cardiology  [facility]

## 2020-01-03 NOTE — PLAN OF CARE
Problem: Fall Injury Risk  Goal: Absence of Fall and Fall-Related Injury  Outcome: Ongoing, Progressing  Intervention: Promote Injury-Free Environment  Flowsheets (Taken 1/3/2020 2946)  Safety Promotion/Fall Prevention: assistive device/personal item within reach; bed alarm set; commode/urinal/bedpan at bedside  Environmental Safety Modification: assistive device/personal items within reach     Problem: Adult Inpatient Plan of Care  Goal: Plan of Care Review  Outcome: Ongoing, Progressing     Problem: Adult Inpatient Plan of Care  Goal: Optimal Comfort and Wellbeing  Outcome: Ongoing, Progressing  Intervention: Provide Person-Centered Care  Flowsheets (Taken 1/3/2020 0537)  Trust Relationship/Rapport: care explained; choices provided; questions answered; questions encouraged

## 2020-01-03 NOTE — PROGRESS NOTES
Patient doing well s/p pacemaker placement. HR paced 60 on tele.   Likely discharge later today pending Cardiology Ok.    Sarah Walter D.O.  01/03/2020

## 2020-01-06 ENCOUNTER — TELEPHONE (OUTPATIENT)
Dept: FAMILY MEDICINE | Facility: CLINIC | Age: 85
End: 2020-01-06

## 2020-01-06 NOTE — TELEPHONE ENCOUNTER
----- Message from Cata Cramer sent at 1/6/2020  8:56 AM CST -----  Pt needs to get a hfu appt. She was discharged on the 3rd. She was at [facility] for a pacemaker.   [phone]

## 2020-01-06 NOTE — TELEPHONE ENCOUNTER
Post hosp call,:Spoke with pt she was in the hosp for a pacemaker. No new meds but some medicine changes were made. She will bring all her bottles. She is feeling a lot better. She has a lot more energy. Pt already schedule with Gilberto.

## 2020-01-09 ENCOUNTER — OFFICE VISIT (OUTPATIENT)
Dept: FAMILY MEDICINE | Facility: CLINIC | Age: 85
End: 2020-01-09
Payer: MEDICARE

## 2020-01-09 VITALS
DIASTOLIC BLOOD PRESSURE: 62 MMHG | SYSTOLIC BLOOD PRESSURE: 120 MMHG | HEIGHT: 61 IN | BODY MASS INDEX: 30.28 KG/M2 | WEIGHT: 160.38 LBS | HEART RATE: 68 BPM

## 2020-01-09 DIAGNOSIS — I48.0 PAROXYSMAL ATRIAL FIBRILLATION: Chronic | ICD-10-CM

## 2020-01-09 DIAGNOSIS — R00.1 SYMPTOMATIC BRADYCARDIA: ICD-10-CM

## 2020-01-09 DIAGNOSIS — Z09 HOSPITAL DISCHARGE FOLLOW-UP: ICD-10-CM

## 2020-01-09 DIAGNOSIS — I10 ESSENTIAL HYPERTENSION: Primary | ICD-10-CM

## 2020-01-09 DIAGNOSIS — F41.8 OTHER SPECIFIED ANXIETY DISORDERS: ICD-10-CM

## 2020-01-09 PROCEDURE — 99496 TRANSJ CARE MGMT HIGH F2F 7D: CPT | Mod: S$GLB,,, | Performed by: PHYSICIAN ASSISTANT

## 2020-01-09 PROCEDURE — 99496 TRANSITIONAL CARE MANAGE SERVICE 7 DAY DISCHARGE: ICD-10-PCS | Mod: S$GLB,,, | Performed by: PHYSICIAN ASSISTANT

## 2020-01-09 RX ORDER — METOPROLOL TARTRATE 25 MG/1
25 TABLET, FILM COATED ORAL 2 TIMES DAILY
Qty: 180 TABLET | Refills: 1 | Status: CANCELLED | OUTPATIENT
Start: 2020-01-09 | End: 2020-07-07

## 2020-01-09 RX ORDER — LANOLIN ALCOHOL/MO/W.PET/CERES
400 CREAM (GRAM) TOPICAL DAILY
Qty: 90 TABLET | Refills: 1 | Status: SHIPPED | OUTPATIENT
Start: 2020-01-09 | End: 2020-07-07

## 2020-01-09 RX ORDER — LORAZEPAM 1 MG/1
1 TABLET ORAL 2 TIMES DAILY
Qty: 90 TABLET | Refills: 1 | Status: SHIPPED | OUTPATIENT
Start: 2020-01-09 | End: 2020-08-11 | Stop reason: SDUPTHER

## 2020-01-09 RX ORDER — AMIODARONE HYDROCHLORIDE 200 MG/1
200 TABLET ORAL DAILY
Qty: 90 TABLET | Refills: 0 | Status: CANCELLED | OUTPATIENT
Start: 2020-01-09 | End: 2020-04-08

## 2020-01-09 NOTE — PROGRESS NOTES
"  SUBJECTIVE:    Patient ID: Rosanna Raymundo is a 87 y.o. female.    Chief Complaint: Hospital Follow Up (Pt was admitted on 12/31/2019 for Symptomatic bradycardia .....mlr) and Medication Refill (Med bottles present and reconicled.....Pt requested printed copies of Rx to bring to [facility].....mr)    87-year-old very pleasant white female presents today for hospital discharge follow-up.  She was admitted for symptomatic bradycardia, her heart rate was found to be around 35 in the ED.  The hospital records, labs, and workup are available for my review in the epic chart.  Discharge summary is as follows:     "87-year-old female with past medical history of paroxysmal AFib with recent start of amiodarone presented to emergency department complaining of weakness and bradycardia.  Patient's home medications metoprolol and amiodarone were held on admission.  Cardiology was consulted and pacemaker placement was performed.  Patient was returned to floor after procedure and monitored overnight.  Patient remained hemodynamically stable and was appropriate for discharge home.  She is recommended to wear arm sling with minimal movement for the next 10 days and will be able to shower in 3 days.  She is recommended to follow up with Cardiology in 1-2 weeks"    She continues to follow closely with her cardiologist, Dr. Evans. She is back on amiodarone at 200mg BID, toprol 25mg BID. Since discharge she denies having had any further profound weakness or lightheadedness. Incision looks good. She has a followup appt with cards Next Friday.        Admission on 12/31/2019, Discharged on 01/03/2020   Component Date Value Ref Range Status    WBC 12/31/2019 8.42  3.90 - 12.70 K/uL Final    RBC 12/31/2019 4.29  4.00 - 5.40 M/uL Final    Hemoglobin 12/31/2019 9.8* 12.0 - 16.0 g/dL Final    Hematocrit 12/31/2019 33.3* 37.0 - 48.5 % Final    Mean Corpuscular Volume 12/31/2019 78* 82 - 98 fL Final    Mean Corpuscular " Hemoglobin 12/31/2019 22.8* 27.0 - 31.0 pg Final    Mean Corpuscular Hemoglobin Conc 12/31/2019 29.4* 32.0 - 36.0 g/dL Final    RDW 12/31/2019 25.7* 11.5 - 14.5 % Final    Platelets 12/31/2019 279  150 - 350 K/uL Final    MPV 12/31/2019 10.9  9.2 - 12.9 fL Final    Immature Granulocytes 12/31/2019 0.5  0.0 - 0.5 % Final    Gran # (ANC) 12/31/2019 6.4  1.8 - 7.7 K/uL Final    Immature Grans (Abs) 12/31/2019 0.04  0.00 - 0.04 K/uL Final    Lymph # 12/31/2019 1.2  1.0 - 4.8 K/uL Final    Mono # 12/31/2019 0.6  0.3 - 1.0 K/uL Final    Eos # 12/31/2019 0.1  0.0 - 0.5 K/uL Final    Baso # 12/31/2019 0.06  0.00 - 0.20 K/uL Final    nRBC 12/31/2019 0  0 /100 WBC Final    Gran% 12/31/2019 75.8* 38.0 - 73.0 % Final    Lymph% 12/31/2019 14.5* 18.0 - 48.0 % Final    Mono% 12/31/2019 7.0  4.0 - 15.0 % Final    Eosinophil% 12/31/2019 1.5  0.0 - 8.0 % Final    Basophil% 12/31/2019 0.7  0.0 - 1.9 % Final    Differential Method 12/31/2019 Automated   Final    Sodium 12/31/2019 140  136 - 145 mmol/L Final    Potassium 12/31/2019 3.5  3.5 - 5.1 mmol/L Final    Chloride 12/31/2019 106  95 - 110 mmol/L Final    CO2 12/31/2019 25  23 - 29 mmol/L Final    Glucose 12/31/2019 128* 70 - 110 mg/dL Final    BUN, Bld 12/31/2019 23  8 - 23 mg/dL Final    Creatinine 12/31/2019 1.0  0.5 - 1.4 mg/dL Final    Calcium 12/31/2019 8.6* 8.7 - 10.5 mg/dL Final    Total Protein 12/31/2019 6.8  6.0 - 8.4 g/dL Final    Albumin 12/31/2019 3.8  3.5 - 5.2 g/dL Final    Total Bilirubin 12/31/2019 0.8  0.1 - 1.0 mg/dL Final    Alkaline Phosphatase 12/31/2019 55  55 - 135 U/L Final    AST 12/31/2019 19  10 - 40 U/L Final    ALT 12/31/2019 18  10 - 44 U/L Final    Anion Gap 12/31/2019 9  8 - 16 mmol/L Final    eGFR if  12/31/2019 58.5* >60 mL/min/1.73 m^2 Final    eGFR if non African American 12/31/2019 50.8* >60 mL/min/1.73 m^2 Final    Troponin I 12/31/2019 <0.030  <=0.040 ng/mL Final    BNP 12/31/2019 750* 0  - 99 pg/mL Final    Troponin I 12/31/2019 <0.030  <=0.040 ng/mL Final    POC Glucose 12/31/2019 126* 70 - 110 mg/dL Final    POC BUN 12/31/2019 21  6 - 30 mg/dL Final    POC Creatinine 12/31/2019 1.0  0.5 - 1.4 mg/dL Final    POC Sodium 12/31/2019 140  136 - 145 mmol/L Final    POC Potassium 12/31/2019 3.6  3.5 - 5.1 mmol/L Final    POC Chloride 12/31/2019 106  95 - 110 mmol/L Final    POC TCO2 (MEASURED) 12/31/2019 25  23 - 29 mmol/L Final    POC Anion Gap 12/31/2019 14  8 - 16 mmol/L Final    POC Ionized Calcium 12/31/2019 1.17  1.06 - 1.42 mmol/L Final    POC Hematocrit 12/31/2019 32* 36 - 54 %PCV Final    Sample 12/31/2019 VENOUS   Final    TSH 12/31/2019 2.020  0.340 - 5.600 uIU/mL Final    Free T4 12/31/2019 0.95  0.71 - 1.51 ng/dL Final    POC Glucose 12/31/2019 154* 70 - 110 Final    POC Glucose 12/31/2019 148* 70 - 110 Final    Sodium 01/01/2020 140  136 - 145 mmol/L Final    Potassium 01/01/2020 3.7  3.5 - 5.1 mmol/L Final    Chloride 01/01/2020 104  95 - 110 mmol/L Final    CO2 01/01/2020 26  23 - 29 mmol/L Final    Glucose 01/01/2020 117* 70 - 110 mg/dL Final    BUN, Bld 01/01/2020 30* 8 - 23 mg/dL Final    Creatinine 01/01/2020 1.1  0.5 - 1.4 mg/dL Final    Calcium 01/01/2020 9.4  8.7 - 10.5 mg/dL Final    Anion Gap 01/01/2020 10  8 - 16 mmol/L Final    eGFR if African American 01/01/2020 52.2* >60 mL/min/1.73 m^2 Final    eGFR if non African American 01/01/2020 45.3* >60 mL/min/1.73 m^2 Final    Magnesium 01/01/2020 1.9  1.6 - 2.6 mg/dL Final    Phosphorus 01/01/2020 4.1  2.7 - 4.5 mg/dL Final    WBC 01/01/2020 5.40  3.90 - 12.70 K/uL Final    RBC 01/01/2020 4.32  4.00 - 5.40 M/uL Final    Hemoglobin 01/01/2020 10.2* 12.0 - 16.0 g/dL Final    Hematocrit 01/01/2020 33.4* 37.0 - 48.5 % Final    Mean Corpuscular Volume 01/01/2020 77* 82 - 98 fL Final    Mean Corpuscular Hemoglobin 01/01/2020 23.6* 27.0 - 31.0 pg Final    Mean Corpuscular Hemoglobin Conc 01/01/2020 30.5*  32.0 - 36.0 g/dL Final    RDW 01/01/2020 25.9* 11.5 - 14.5 % Final    Platelets 01/01/2020 237  150 - 350 K/uL Final    MPV 01/01/2020 10.6  9.2 - 12.9 fL Final    POC Glucose 01/01/2020 116* 70 - 110 Final    aPTT 01/01/2020 32.7  23.6 - 33.3 sec Final    PT 01/01/2020 13.3  10.6 - 14.8 sec Final    INR 01/01/2020 1.1   Final    MRSA SCREEN BY PCR 01/01/2020 Negative  Negative Final    POC Glucose 01/01/2020 129* 70 - 110 Final    POC Glucose 01/01/2020 135* 70 - 110 Final    POC Glucose 01/01/2020 147* 70 - 110 Final    Sodium 01/02/2020 142  136 - 145 mmol/L Final    Potassium 01/02/2020 3.6  3.5 - 5.1 mmol/L Final    Chloride 01/02/2020 106  95 - 110 mmol/L Final    CO2 01/02/2020 24  23 - 29 mmol/L Final    Glucose 01/02/2020 114* 70 - 110 mg/dL Final    BUN, Bld 01/02/2020 25* 8 - 23 mg/dL Final    Creatinine 01/02/2020 1.0  0.5 - 1.4 mg/dL Final    Calcium 01/02/2020 9.4  8.7 - 10.5 mg/dL Final    Anion Gap 01/02/2020 12  8 - 16 mmol/L Final    eGFR if African American 01/02/2020 58.5* >60 mL/min/1.73 m^2 Final    eGFR if non African American 01/02/2020 50.8* >60 mL/min/1.73 m^2 Final    Magnesium 01/02/2020 1.5* 1.6 - 2.6 mg/dL Final    Phosphorus 01/02/2020 4.3  2.7 - 4.5 mg/dL Final    WBC 01/02/2020 6.66  3.90 - 12.70 K/uL Final    RBC 01/02/2020 4.50  4.00 - 5.40 M/uL Final    Hemoglobin 01/02/2020 10.5* 12.0 - 16.0 g/dL Final    Hematocrit 01/02/2020 34.4* 37.0 - 48.5 % Final    Mean Corpuscular Volume 01/02/2020 76* 82 - 98 fL Final    Mean Corpuscular Hemoglobin 01/02/2020 23.3* 27.0 - 31.0 pg Final    Mean Corpuscular Hemoglobin Conc 01/02/2020 30.5* 32.0 - 36.0 g/dL Final    RDW 01/02/2020 26.1* 11.5 - 14.5 % Final    Platelets 01/02/2020 237  150 - 350 K/uL Final    MPV 01/02/2020 10.3  9.2 - 12.9 fL Final    POC Glucose 01/02/2020 128* 70 - 110 Final    POC Glucose 01/02/2020 127* 70 - 110 Final    Sodium 01/03/2020 138  136 - 145 mmol/L Final    Potassium  01/03/2020 3.7  3.5 - 5.1 mmol/L Final    Chloride 01/03/2020 104  95 - 110 mmol/L Final    CO2 01/03/2020 23  23 - 29 mmol/L Final    Glucose 01/03/2020 113* 70 - 110 mg/dL Final    BUN, Bld 01/03/2020 21  8 - 23 mg/dL Final    Creatinine 01/03/2020 0.8  0.5 - 1.4 mg/dL Final    Calcium 01/03/2020 8.7  8.7 - 10.5 mg/dL Final    Anion Gap 01/03/2020 11  8 - 16 mmol/L Final    eGFR if African American 01/03/2020 >60.0  >60 mL/min/1.73 m^2 Final    eGFR if non African American 01/03/2020 >60.0  >60 mL/min/1.73 m^2 Final    Magnesium 01/03/2020 1.6  1.6 - 2.6 mg/dL Final    Phosphorus 01/03/2020 3.6  2.7 - 4.5 mg/dL Final    WBC 01/03/2020 8.39  3.90 - 12.70 K/uL Final    RBC 01/03/2020 4.56  4.00 - 5.40 M/uL Final    Hemoglobin 01/03/2020 10.6* 12.0 - 16.0 g/dL Final    Hematocrit 01/03/2020 35.2* 37.0 - 48.5 % Final    Mean Corpuscular Volume 01/03/2020 77* 82 - 98 fL Final    Mean Corpuscular Hemoglobin 01/03/2020 23.2* 27.0 - 31.0 pg Final    Mean Corpuscular Hemoglobin Conc 01/03/2020 30.1* 32.0 - 36.0 g/dL Final    RDW 01/03/2020 26.6* 11.5 - 14.5 % Final    Platelets 01/03/2020 183  150 - 350 K/uL Final    MPV 01/03/2020 11.6  9.2 - 12.9 fL Final    POC Glucose 01/03/2020 120* 70 - 110 Final    Vancomycin-Trough 01/03/2020 7.0* 10.0 - 22.0 ug/mL Final    POC Glucose 01/03/2020 130* 70 - 110 Final   No results displayed because visit has over 200 results.      Admission on 11/04/2019, Discharged on 11/05/2019   Component Date Value Ref Range Status    WBC 11/04/2019 7.35  3.90 - 12.70 K/uL Final    RBC 11/04/2019 3.89* 4.00 - 5.40 M/uL Final    Hemoglobin 11/04/2019 9.4* 12.0 - 16.0 g/dL Final    Hematocrit 11/04/2019 30.2* 37.0 - 48.5 % Final    Mean Corpuscular Volume 11/04/2019 78* 82 - 98 fL Final    Mean Corpuscular Hemoglobin 11/04/2019 24.2* 27.0 - 31.0 pg Final    Mean Corpuscular Hemoglobin Conc 11/04/2019 31.1* 32.0 - 36.0 g/dL Final    RDW 11/04/2019 17.0* 11.5 - 14.5 %  Final    Platelets 11/04/2019 233  150 - 350 K/uL Final    MPV 11/04/2019 10.8  9.2 - 12.9 fL Final    Immature Granulocytes 11/04/2019 0.4  0.0 - 0.5 % Final    Gran # (ANC) 11/04/2019 5.4  1.8 - 7.7 K/uL Final    Immature Grans (Abs) 11/04/2019 0.03  0.00 - 0.04 K/uL Final    Lymph # 11/04/2019 1.2  1.0 - 4.8 K/uL Final    Mono # 11/04/2019 0.6  0.3 - 1.0 K/uL Final    Eos # 11/04/2019 0.1  0.0 - 0.5 K/uL Final    Baso # 11/04/2019 0.03  0.00 - 0.20 K/uL Final    nRBC 11/04/2019 0  0 /100 WBC Final    Gran% 11/04/2019 73.1* 38.0 - 73.0 % Final    Lymph% 11/04/2019 16.6* 18.0 - 48.0 % Final    Mono% 11/04/2019 8.0  4.0 - 15.0 % Final    Eosinophil% 11/04/2019 1.5  0.0 - 8.0 % Final    Basophil% 11/04/2019 0.4  0.0 - 1.9 % Final    Differential Method 11/04/2019 Automated   Final    Sodium 11/04/2019 138  136 - 145 mmol/L Final    Potassium 11/04/2019 3.7  3.5 - 5.1 mmol/L Final    Chloride 11/04/2019 102  95 - 110 mmol/L Final    CO2 11/04/2019 25  23 - 29 mmol/L Final    Glucose 11/04/2019 166* 70 - 110 mg/dL Final    BUN, Bld 11/04/2019 27* 8 - 23 mg/dL Final    Creatinine 11/04/2019 1.1  0.5 - 1.4 mg/dL Final    Calcium 11/04/2019 9.3  8.7 - 10.5 mg/dL Final    Total Protein 11/04/2019 7.4  6.0 - 8.4 g/dL Final    Albumin 11/04/2019 4.3  3.5 - 5.2 g/dL Final    Total Bilirubin 11/04/2019 0.8  0.1 - 1.0 mg/dL Final    Alkaline Phosphatase 11/04/2019 55  55 - 135 U/L Final    AST 11/04/2019 24  10 - 40 U/L Final    ALT 11/04/2019 17  10 - 44 U/L Final    Anion Gap 11/04/2019 11  8 - 16 mmol/L Final    eGFR if  11/04/2019 52.2* >60 mL/min/1.73 m^2 Final    eGFR if non African American 11/04/2019 45.3* >60 mL/min/1.73 m^2 Final    Troponin I 11/04/2019 <0.030  0.020 - 0.040 ng/mL Final    BNP 11/04/2019 265* 0 - 99 pg/mL Final    TSH 11/04/2019 0.700  0.340 - 5.600 uIU/mL Final    PT 11/04/2019 13.7  10.6 - 14.8 sec Final    INR 11/04/2019 1.1   Final    aPTT  11/04/2019 34.6* 23.6 - 33.3 sec Final    Troponin I 11/04/2019 0.031  0.020 - 0.040 ng/mL Final    BSA 11/05/2019 1.82  m2 Final    TDI SEPTAL 11/05/2019 0.09  m/s Final    LV LATERAL E/E' RATIO 11/05/2019 12.50  m/s Final    LV SEPTAL E/E' RATIO 11/05/2019 11.11  m/s Final    AORTIC VALVE CUSP SEPERATION 11/05/2019 0.79  cm Final    TDI LATERAL 11/05/2019 0.08  m/s Final    PV PEAK VELOCITY 11/05/2019 56.22  cm/s Final    LVIDD 11/05/2019 5.21  3.5 - 6.0 cm Final    IVS 11/05/2019 1.55* 0.6 - 1.1 cm Final    PW 11/05/2019 1.23* 0.6 - 1.1 cm Final    Ao root annulus 11/05/2019 3.32  cm Final    LVIDS 11/05/2019 3.68  2.1 - 4.0 cm Final    FS 11/05/2019 29  28 - 44 % Final    LV mass 11/05/2019 307.33  g Final    LA size 11/05/2019 4.42  cm Final    RVDD 11/05/2019 309.00  cm Final    Left Ventricle Relative Wall Thick* 11/05/2019 0.47  cm Final    AV mean gradient 11/05/2019 11  mmHg Final    AV valve area 11/05/2019 0.88  cm2 Final    AV Velocity Ratio 11/05/2019 29.26   Final    AV index (prosthetic) 11/05/2019 0.28   Final    E/A ratio 11/05/2019 3.23   Final    Mean e' 11/05/2019 0.09  m/s Final    E wave decelartion time 11/05/2019 128.46  msec Final    IVRT 11/05/2019 66.30  msec Final    LVOT diameter 11/05/2019 2.00  cm Final    LVOT area 11/05/2019 3.1  cm2 Final    LVOT peak michael 11/05/2019 62.33  m/s Final    LVOT peak VTI 11/05/2019 16.66  cm Final    Ao peak michael 11/05/2019 2.13  m/s Final    Ao VTI 11/05/2019 59.47  cm Final    LVOT stroke volume 11/05/2019 52.31  cm3 Final    AV peak gradient 11/05/2019 18  mmHg Final    E/E' ratio 11/05/2019 11.76  m/s Final    MV Peak E Michael 11/05/2019 1.00  m/s Final    TR Max Michael 11/05/2019 2.49  m/s Final    MV Peak A Michael 11/05/2019 0.31  m/s Final    LV Systolic Volume 11/05/2019 32.74  mL Final    LV Systolic Volume Index 11/05/2019 18.6  mL/m2 Final    LV Diastolic Volume 11/05/2019 89.72  mL Final    LV Diastolic Volume  Index 11/05/2019 50.98  mL/m2 Final    LV Mass Index 11/05/2019 175  g/m2 Final    Triscuspid Valve Regurgitation Pea* 11/05/2019 25  mmHg Final    Hemoglobin A1C 11/04/2019 7.0* 4.5 - 6.2 % Final    Estimated Avg Glucose 11/04/2019 154* 68 - 131 mg/dL Final    Troponin I 11/04/2019 0.032  0.020 - 0.040 ng/mL Final    Sodium 11/05/2019 138  136 - 145 mmol/L Final    Potassium 11/05/2019 4.2  3.5 - 5.1 mmol/L Final    Chloride 11/05/2019 103  95 - 110 mmol/L Final    CO2 11/05/2019 27  23 - 29 mmol/L Final    Glucose 11/05/2019 147* 70 - 110 mg/dL Final    BUN, Bld 11/05/2019 27* 8 - 23 mg/dL Final    Creatinine 11/05/2019 1.1  0.5 - 1.4 mg/dL Final    Calcium 11/05/2019 8.9  8.7 - 10.5 mg/dL Final    Anion Gap 11/05/2019 8  8 - 16 mmol/L Final    eGFR if  11/05/2019 52.2* >60 mL/min/1.73 m^2 Final    eGFR if non African American 11/05/2019 45.3* >60 mL/min/1.73 m^2 Final    Magnesium 11/05/2019 1.9  1.6 - 2.6 mg/dL Final    WBC 11/05/2019 5.72  3.90 - 12.70 K/uL Final    RBC 11/05/2019 3.75* 4.00 - 5.40 M/uL Final    Hemoglobin 11/05/2019 9.0* 12.0 - 16.0 g/dL Final    Hematocrit 11/05/2019 29.4* 37.0 - 48.5 % Final    Mean Corpuscular Volume 11/05/2019 78* 82 - 98 fL Final    Mean Corpuscular Hemoglobin 11/05/2019 24.0* 27.0 - 31.0 pg Final    Mean Corpuscular Hemoglobin Conc 11/05/2019 30.6* 32.0 - 36.0 g/dL Final    RDW 11/05/2019 17.2* 11.5 - 14.5 % Final    Platelets 11/05/2019 239  150 - 350 K/uL Final    MPV 11/05/2019 10.8  9.2 - 12.9 fL Final   Orders Only on 09/24/2019   Component Date Value Ref Range Status    Cholesterol 09/24/2019 157  <200 mg/dL Final    HDL 09/24/2019 45* >50 mg/dL Final    Triglycerides 09/24/2019 147  <150 mg/dL Final    LDL Cholesterol 09/24/2019 88  mg/dL (calc) Final    Hdl/Cholesterol Ratio 09/24/2019 3.5  <5.0 (calc) Final    Non HDL Chol. (LDL+VLDL) 09/24/2019 112  <130 mg/dL (calc) Final    Glucose 09/24/2019 119* 65 - 99  mg/dL Final    BUN, Bld 09/24/2019 23  7 - 25 mg/dL Final    Creatinine 09/24/2019 0.91* 0.60 - 0.88 mg/dL Final    eGFR if non African American 09/24/2019 57* > OR = 60 mL/min/1.73m2 Final    eGFR if  09/24/2019 66  > OR = 60 mL/min/1.73m2 Final    BUN/Creatinine Ratio 09/24/2019 25* 6 - 22 (calc) Final    Sodium 09/24/2019 140  135 - 146 mmol/L Final    Potassium 09/24/2019 3.9  3.5 - 5.3 mmol/L Final    Chloride 09/24/2019 104  98 - 110 mmol/L Final    CO2 09/24/2019 27  20 - 32 mmol/L Final    Calcium 09/24/2019 9.7  8.6 - 10.4 mg/dL Final    Total Protein 09/24/2019 7.4  6.1 - 8.1 g/dL Final    Albumin 09/24/2019 4.4  3.6 - 5.1 g/dL Final    Globulin, Total 09/24/2019 3.0  1.9 - 3.7 g/dL (calc) Final    Albumin/Globulin Ratio 09/24/2019 1.5  1.0 - 2.5 (calc) Final    Total Bilirubin 09/24/2019 0.5  0.2 - 1.2 mg/dL Final    Alkaline Phosphatase 09/24/2019 55  33 - 130 U/L Final    AST 09/24/2019 14  10 - 35 U/L Final    ALT 09/24/2019 9  6 - 29 U/L Final    WBC 09/24/2019 6.1  3.8 - 10.8 Thousand/uL Final    RBC 09/24/2019 4.38  3.80 - 5.10 Million/uL Final    Hemoglobin 09/24/2019 11.3* 11.7 - 15.5 g/dL Final    Hematocrit 09/24/2019 35.8  35.0 - 45.0 % Final    Mean Corpuscular Volume 09/24/2019 81.7  80.0 - 100.0 fL Final    Mean Corpuscular Hemoglobin 09/24/2019 25.8* 27.0 - 33.0 pg Final    Mean Corpuscular Hemoglobin Conc 09/24/2019 31.6* 32.0 - 36.0 g/dL Final    RDW 09/24/2019 15.7* 11.0 - 15.0 % Final    Platelets 09/24/2019 202  140 - 400 Thousand/uL Final    MPV 09/24/2019 12.6* 7.5 - 12.5 fL Final    Neutrophils Absolute 09/24/2019 3,770  1,500 - 7,800 cells/uL Final    Lymph # 09/24/2019 1,537  850 - 3,900 cells/uL Final    Mono # 09/24/2019 531  200 - 950 cells/uL Final    Eos # 09/24/2019 214  15 - 500 cells/uL Final    Baso # 09/24/2019 49  0 - 200 cells/uL Final    Neutrophils Relative 09/24/2019 61.8  % Final    Lymph% 09/24/2019 25.2  % Final     Mono% 09/24/2019 8.7  % Final    Eosinophil% 09/24/2019 3.5  % Final    Basophil% 09/24/2019 0.8  % Final    Hemoglobin A1C 09/24/2019 6.9* <5.7 % of total Hgb Final       Past Medical History:   Diagnosis Date    Anticoagulant long-term use     Cancer     Cataract     CHF (congestive heart failure)     Diabetes mellitus, type 2     Heart murmur     Hypertension     Iron deficiency anemia, unspecified     Anemia, iron def.    Occasional tremors      Past Surgical History:   Procedure Laterality Date    ADENOIDECTOMY      APPENDECTOMY      COLONOSCOPY N/A 12/17/2019    Procedure: COLONOSCOPY;  Surgeon: Raleigh Quarles III, MD;  Location: [facility] ENDO;  Service: Endoscopy;  Laterality: N/A;    COLONOSCOPY N/A 12/18/2019    Procedure: COLONOSCOPY;  Surgeon: Raleigh Quarles III, MD;  Location: [facility] ENDO;  Service: Endoscopy;  Laterality: N/A;    Gall bladder revmoval      HYSTERECTOMY      INSERTION OF PACEMAKER Right 1/2/2020    Procedure: INSERTION, PACEMAKER;  Surgeon: Fly Barragan MD;  Location: [facility] CATH/EP LAB;  Service: Cardiology;  Laterality: Right;    left mastectomy Left 1945    MASTECTOMY Left     NH CABG, ARTERY-VEIN, FOUR  2014    Coronary Artery Bypass, 4    SMALL BOWEL ENTEROSCOPY N/A 12/17/2019    Procedure: ENTEROSCOPY;  Surgeon: Raleigh Quarles III, MD;  Location: [facility] ENDO;  Service: Endoscopy;  Laterality: N/A;    TONSILLECTOMY       Family History   Problem Relation Age of Onset    No Known Problems Mother     Heart disease Father        Marital Status:   Alcohol History:  reports that she does not drink alcohol.  Tobacco History:  reports that she has never smoked. She has never used smokeless tobacco.  Drug History:  reports that she does not use drugs.    Review of patient's allergies indicates:   Allergen Reactions    Penicillin g benzathine Other (See Comments)       Current Outpatient Medications:     allopurinol (ZYLOPRIM) 100 MG tablet,  Take 1 tablet (100 mg total) by mouth once daily., Disp: 90 tablet, Rfl: 1    amiodarone (PACERONE) 200 MG Tab, Take 1 tablet (200 mg total) by mouth once daily., Disp: 30 tablet, Rfl: 11    amLODIPine (NORVASC) 10 MG tablet, Take 0.5 tablets (5 mg total) by mouth once daily., Disp: 90 tablet, Rfl: 1    apixaban (ELIQUIS) 2.5 mg Tab, Take 1 tablet (2.5 mg total) by mouth 2 (two) times daily., Disp: 180 tablet, Rfl: 1    aspirin 81 MG Chew, Take 1 tablet (81 mg total) by mouth once daily., Disp: 30 tablet, Rfl: 3    busPIRone (BUSPAR) 5 MG Tab, Take 10 mg by mouth 2 (two) times daily., Disp: , Rfl:     cholecalciferol, vitamin D3, (VITAMIN D3) 1,000 unit capsule, Take 1 capsule (1,000 Units total) by mouth once daily., Disp: 100 capsule, Rfl: 1    CRESTOR 20 mg tablet, Take 1 tablet (20 mg total) by mouth once daily. (Patient taking differently: Take 20 mg by mouth every evening. ), Disp: 90 tablet, Rfl: 1    ferrous sulfate 325 (65 FE) MG EC tablet, Take 325 mg by mouth 2 (two) times daily., Disp: , Rfl:     fish oil-omega-3 fatty acids 300-1,000 mg capsule, Take 1 capsule by mouth once daily., Disp: , Rfl:     fluticasone propionate (FLONASE) 50 mcg/actuation nasal spray, 1 spray (50 mcg total) by Each Nostril route 2 (two) times daily. (Patient taking differently: 1 spray by Each Nostril route 2 (two) times daily as needed. ), Disp: 3 Bottle, Rfl: 3    hydroCHLOROthiazide (HYDRODIURIL) 12.5 MG Tab, Take 1 tablet (12.5 mg total) by mouth once daily., Disp: 90 tablet, Rfl: 1    loratadine (CLARITIN) 10 mg tablet, Take 1 tablet (10 mg total) by mouth once daily., Disp: 90 tablet, Rfl: 1    LORazepam (ATIVAN) 1 MG tablet, Take 1 tablet (1 mg total) by mouth 2 (two) times daily., Disp: 90 tablet, Rfl: 1    losartan (COZAAR) 100 MG tablet, Take 1 tablet (100 mg total) by mouth once daily., Disp: 90 tablet, Rfl: 1    magnesium oxide (MAG-OX) 400 mg (241.3 mg magnesium) tablet, Take 1 tablet (400 mg total)  "by mouth once daily., Disp: 90 tablet, Rfl: 1    metoprolol tartrate (LOPRESSOR) 25 MG tablet, Take 0.5 tablets (12.5 mg total) by mouth 2 (two) times daily., Disp: 30 tablet, Rfl: 0    omeprazole (PRILOSEC) 20 MG capsule, Take 2 capsules (40 mg total) by mouth once daily., Disp: 180 capsule, Rfl: 1    ondansetron (ZOFRAN-ODT) 8 MG TbDL, Take 1 tablet (8 mg total) by mouth every 6 (six) hours as needed., Disp: 20 tablet, Rfl: 1    vit C,E-Kl-phnbf-lutein-zeaxan (VISION FORMULA-2) 107-977-47-1 mg-unit-mg-mg Cap, Take 1 tablet by mouth 2 (two) times daily., Disp: 180 capsule, Rfl: 1    Review of Systems   Constitutional: Positive for fatigue (chronic, mild). Negative for appetite change, chills, fever and unexpected weight change.   HENT: Negative for congestion.    Respiratory: Negative for cough, chest tightness and shortness of breath.    Cardiovascular: Negative for chest pain and palpitations.   Gastrointestinal: Negative for abdominal distention and abdominal pain.   Endocrine: Negative for cold intolerance and heat intolerance.   Genitourinary: Negative for difficulty urinating and dysuria.   Musculoskeletal: Negative for arthralgias and back pain.   Neurological: Negative for dizziness, weakness and headaches.          Objective:      Vitals:    01/09/20 1159   BP: 120/62   Pulse: 68   Weight: 72.8 kg (160 lb 6.4 oz)   Height: 5' 1" (1.549 m)     Physical Exam   Constitutional: She is oriented to person, place, and time. She appears well-developed and well-nourished. No distress.   Very pleasant elderly female   HENT:   Head: Normocephalic and atraumatic.   Eyes: Pupils are equal, round, and reactive to light. Conjunctivae and EOM are normal.   Neck: Normal range of motion. Neck supple. No thyromegaly present.   Cardiovascular: Normal rate, regular rhythm, normal heart sounds and intact distal pulses.   Paced rhythm at 60bpm   Pulmonary/Chest: Effort normal and breath sounds normal.   Abdominal: Soft. " Bowel sounds are normal. She exhibits no distension. There is no tenderness.   Musculoskeletal: Normal range of motion.   Neurological: She is alert and oriented to person, place, and time. No cranial nerve deficit.   Skin: Skin is warm and dry. No erythema.   Psychiatric: She has a normal mood and affect.         Assessment:       1. Essential hypertension    2. Other specified anxiety disorders    3. Hospital discharge follow-up    4. Symptomatic bradycardia    5. Paroxysmal atrial fibrillation         Plan:       Essential hypertension  Comments:  BP is at goal. continue as is.    Other specified anxiety disorders  Comments:  She appears to be doing very well from an anxiety standpoint. refills today as needed  Orders:  -     LORazepam (ATIVAN) 1 MG tablet; Take 1 tablet (1 mg total) by mouth 2 (two) times daily.  Dispense: 90 tablet; Refill: 1    Hospital discharge follow-up  Comments:  I reviewed the hospital records in entirety. Meds have been reconciled. She seems to be back to her baseline.     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 Rhythm paced at 60bpm. No further sxs that were a factor in her recent admission    Paroxysmal atrial fibrillation  Comments:  has followup with cards next Friday.    Other orders  -     magnesium oxide (MAG-OX) 400 mg (241.3 mg magnesium) tablet; Take 1 tablet (400 mg total) by mouth once daily.  Dispense: 90 tablet; Refill: 1  -     vit C,P-Mh-hrxur-lutein-zeaxan (VISION FORMULA-2) [id number] mg-unit-mg-mg Cap; Take 1 tablet by mouth 2 (two) times daily.  Dispense: 180 capsule; Refill: 1      Follow up if symptoms worsen or fail to improve, for as scheduled with Dr. Rhodes.        1/9/2020 Casper Pratt PA-C

## 2020-01-09 NOTE — PATIENT INSTRUCTIONS
Discharge Instructions for Atrial Fibrillation  You have been diagnosed with an abnormal heart rhythm called atrial fibrillation. With this condition, your hearts 2 upper chambers quiver rather than squeeze the blood out in a normal pattern. This leads to an irregular and sometimes rapid heartbeat. Some people will develop associated symptoms such as a flip-flopping heartbeat, chest pain, lightheadedness, or shortness of breath. Other people may have no symptoms at all. Atrial fibrillation is serious because it affects the hearts ability to fill with blood as it should. Blood clots may form. This increases the risk for stroke. Untreated atrial fibrillation can also lead to heart failure. Atrial fibrillation can be controlled. With treatment, most people with atrial fibrillation lead normal lives.  Treatment options  Recommended treatment for atrial fibrillation depends on your age, symptoms, how long you have had atrial fibrillation, and other factors. You will have a complete evaluation to find out if you have any abnormalities that caused your heart to go into atrial fibrillation. This might be blocked heart arteries or a thyroid problem. Your doctor will assess your particular case and discuss choices with you.  Treatment choices may include:  · Treating an underlying disorder that puts you at risk for atrial fibrillation. For example, correcting an abnormal thyroid or electrolyte problem, or treating a blocked heart artery.  · Restoring a normal heart rhythm with an electrical shock (cardioversion) or with an antiarrhythmic medicine (chemical cardioversion)  · Using medicine to control your heart rate in atrial fibrillation.  · Preventing the risk for blood clot and stroke using blood-thinning medicines. Your doctor will tell you what he or she recommends. Choices may include aspirin, clopidogrel, warfarin, dabigatran, rivaroxaban, apixaban, and edoxaban.  · Doing catheter ablation or a surgical maze  procedure. These use different methods to destroy certain areas of heart tissue. This interrupts the electrical signals causing atrial fibrillation. One of these procedures may be a choice when medicines do not work, or as an alternative to long-term medicine.  · Other treatment choices may be recommended for you by your doctor.  Managing risk factors for stroke and preventing heart failure are important parts of any treatment plan for atrial fibrillation.  Home care  · Take your medicines exactly as directed. Dont skip doses.  · Work with your doctor to find the right medicines and doses for you.  · Learn to take your own pulse. Keep a record of your results. Ask your doctor which pulse rates mean that you need medical attention. Slowing your pulse is often the goal of treatment. Ask your doctor if its OK for you to use an automatic machine to check your pulse at home. Sometimes these machines dont count the pulse correctly when you have atrial fibrillation.  · Limit your intake of coffee, tea, cola, and other beverages with caffeine. Talk with your doctor about whether you should eliminate caffeine.  · Avoid over-the-counter medicines that have caffeine in them.  · Let your doctor know what medicines you take, including prescription and over-the-counter medicines, as well as any supplements. They interfere with some medicines given for atrial fibrillation.  · Ask your doctor about whether you can drink alcohol. Some people need to avoid alcohol to better treat atrial fibrillation. If you are taking blood-thinner medicines, alcohol may interfere with them by increasing their effect.  · Never take stimulants such as amphetamines or cocaine. These drugs can speed up your heart rate and trigger atrial fibrillation.  Follow-up care  Follow up with your doctor, or as advised.     When should I call my healthcare provider  Call your healthcare provider right away if you have any of the  following:  · Weakness  · Dizziness  · Fainting  · Fatigue  · Shortness of breath  · Chest pain with increased activity  · A change in the usual regularity of your heartbeat, or an unusually fast heartbeat   Date Last Reviewed: 4/23/2016 © 2000-2017 [employer]. [address] [place] [id number]. All rights reserved. This information is not intended as a substitute for professional medical care. Always follow your healthcare professional's instructions.        Understanding Bradycardia    Your heart has an electrical system that sends signals to control the heartbeat. Any abnormal change in the speed or pattern of the heartbeat is called an arrhythmia. An arrhythmia that causes the heart to beat slower than normal is called bradycardia. There are many types of bradycardia. In healthy children and adults, bradycardia is often normal, particularly during sleep. Sometimes bradycardia is caused by failure of the hearts natural timer or failure of the electrical pathways within the heart. Depending on the type you have and how severe it is, you may need treatment.  What causes bradycardia?  Many things can cause bradycardia, including:   · Natural aging  process  · Coronary artery disease  · Heart attack  · Heart muscle disease  · Problems with the SA node. This is the hearts natural pacemaker that starts each heartbeat.  · Problems with the electrical pathways in the heart  · Problems with the structure of the heart that you are born with  · Infection  · Use of certain medicines  · Electrolyte imbalance  · Underactive thyroid   · Sleep apnea  · Increased pressure in the brain or stroke   Well-conditioned athletes often have a naturally slow heart rate.  What are the symptoms of bradycardia?  Bradycardia can cause a slow or irregular heartbeat. It can also make it harder for the heart to pump blood to the body. This may cause symptoms such as:  · Tiredness  · Weakness  · Loss of ability to  exercise  · Shortness of breath  · Dizziness or fainting  · Chest pain  · Heart failure  Some people with bradycardia have no symptoms at all.  How is bradycardia treated?  Treatment for bradycardia depends on the cause. It also depends on the type you have and how severe your symptoms are. If you need treatment, your options may include:  · Treatment of the underlying cause. For instance, if a medicine is causing bradycardia, stopping the medicine, under your doctors guidance, may correct the problem. Or if a condition such as an underactive thyroid is the cause, treating the thyroid may keep bradycardia from coming back.  · Medicines. Medicines may be used to treat conditions that cause bradycardia.  Some medicines can also be used in the short-term to increase the heart rate. These are often not long-term solutions.   · Temporary pacing. Pacing is used to help regulate your heartbeat.When the heart beats too slowly, the device sends signals to keep the heart beating at the right pace. A temporary pacemaker may be connected to the heart using wires guided through a blood vessel in your neck or leg to the heart. This is also sometimes done using special pads placed on the chest. This may be used in an emergency, as a bridge to permanent pacing, when pacing is only needed short-term, or to further evaluate your condition.  · Pacemaker. This is a device that is placed permanently under the skin in your chest and connected to your heart. When the heart beats too slowly, the device sends signals to keep the heart beating at the right pace.  What are the complications of bradycardia?  These can include:  · Development of other types of arrhythmias  · Heart failure. This problem occurs when the heart weakens so much that it no longer pumps blood well.  · Sudden cardiac arrest. This is when the heart suddenly stops beating.  When should I call my healthcare provider?  Call your healthcare provider right away if you have  any of these:  · Symptoms that dont get better with treatment, or get worse  · New symptoms   Date Last Reviewed: 5/1/2016  © 5320-2946 [employer]. [address] [place] [address], [place] [address]. All rights reserved. This information is not intended as a substitute for professional medical care. Always follow your healthcare professional's instructions.

## 2020-02-27 ENCOUNTER — TELEPHONE (OUTPATIENT)
Dept: FAMILY MEDICINE | Facility: CLINIC | Age: 85
End: 2020-02-27

## 2020-02-27 NOTE — TELEPHONE ENCOUNTER
----- Message from Eliana Espino sent at 2/19/2020  9:49 AM CST -----  Contact: Rosanna Bello  Pt says she would like for the manager to give her a call back when you get a chance please. She didn't want to specify why .   Pt# [phone]

## 2020-03-27 ENCOUNTER — TELEPHONE (OUTPATIENT)
Dept: FAMILY MEDICINE | Facility: CLINIC | Age: 85
End: 2020-03-27

## 2020-03-27 RX ORDER — MUPIROCIN 20 MG/G
OINTMENT TOPICAL 3 TIMES DAILY
Qty: 30 G | Refills: 2 | Status: SHIPPED | OUTPATIENT
Start: 2020-03-27 | End: 2020-04-10

## 2020-03-27 NOTE — TELEPHONE ENCOUNTER
Spoke to patient over the phone.  Scraped her arm on her countertop.  No major infection.  No fever or chills.  She requests a refill of mupirocin.

## 2020-03-27 NOTE — TELEPHONE ENCOUNTER
----- Message from Janelle Mirza sent at 3/27/2020 11:00 AM CDT -----  Pt has a infection in her arm. She used her sister antiobic and is needing some called in   mutirocin 2% cream  [facility]   Pt [phone]

## 2020-05-29 ENCOUNTER — TELEPHONE (OUTPATIENT)
Dept: FAMILY MEDICINE | Facility: CLINIC | Age: 85
End: 2020-05-29

## 2020-05-29 NOTE — TELEPHONE ENCOUNTER
"----- Message from Gladis Strong MA sent at 5/29/2020 10:04 AM CDT -----  Pt called in to find out when she needs to reschedule her appt with Gilberto.  Her original appt was cancelled due to COVID.  She states she is doing "fabulous" but was just curious about when she should be seen again.  Please advise pt.      Pt- [phone]  "

## 2020-06-01 ENCOUNTER — TELEPHONE (OUTPATIENT)
Dept: FAMILY MEDICINE | Facility: CLINIC | Age: 85
End: 2020-06-01

## 2020-06-01 NOTE — TELEPHONE ENCOUNTER
----- Message from Gladis Strong MA sent at 6/1/2020  2:39 PM CDT -----  Pt is returning [place]'s call from 5/29/2020.  Please contact pt @ [phone]

## 2020-06-01 NOTE — TELEPHONE ENCOUNTER
Patient appointment was cancelled during pandemic, says she is doing fine and wants to know when you would like to see her back in clinic. Please advise. Thanks

## 2020-06-02 ENCOUNTER — TELEPHONE (OUTPATIENT)
Dept: FAMILY MEDICINE | Facility: CLINIC | Age: 85
End: 2020-06-02

## 2020-06-02 NOTE — TELEPHONE ENCOUNTER
----- Message from Eliana Espino sent at 6/2/2020  9:59 AM CDT -----  Contact: Rosanna Bello  Pt is returning nurse Siria call.   Pt# [phone]

## 2020-06-02 NOTE — TELEPHONE ENCOUNTER
Spoke to pt in regards to previous message. Gilberto would like her to come into office in 8 weeks for her check up. Patient states she will call back in the beginning of August to schedule.

## 2020-06-15 RX ORDER — AMIODARONE HYDROCHLORIDE 200 MG/1
200 TABLET ORAL DAILY
Qty: 90 TABLET | Refills: 1 | Status: SHIPPED | OUTPATIENT
Start: 2020-06-15 | End: 2020-06-17 | Stop reason: SDUPTHER

## 2020-06-15 NOTE — TELEPHONE ENCOUNTER
----- Message from Janelle Mirza sent at 6/15/2020  2:17 PM CDT -----  Regarding: REFILLS  AMIODARONE 200 MG 90 DAYS   PHARM [facility]   [phone]

## 2020-06-17 RX ORDER — AMIODARONE HYDROCHLORIDE 200 MG/1
200 TABLET ORAL DAILY
Qty: 90 TABLET | Refills: 1 | Status: SHIPPED | OUTPATIENT
Start: 2020-06-17 | End: 2020-08-11 | Stop reason: SDUPTHER

## 2020-06-29 DIAGNOSIS — I10 ESSENTIAL HYPERTENSION: ICD-10-CM

## 2020-06-29 NOTE — TELEPHONE ENCOUNTER
----- Message from Gladis Strong MA sent at 6/29/2020  8:16 AM CDT -----  VM @ 7:34a - Pt is requesting a refill:    Cozaar    Pharmacy - [facility] [place]    Pt [phone]

## 2020-06-29 NOTE — TELEPHONE ENCOUNTER
----- Message from Janelle Mirza sent at 6/29/2020  8:47 AM CDT -----  Regarding: refills  Losartan pt [place] [facility] and she is needing it to be sent to the local pharm    [facility]   Pt [phone]

## 2020-06-30 RX ORDER — LOSARTAN POTASSIUM 100 MG/1
100 TABLET ORAL DAILY
Qty: 90 TABLET | Refills: 1 | Status: SHIPPED | OUTPATIENT
Start: 2020-06-30 | End: 2020-07-09 | Stop reason: SDUPTHER

## 2020-07-09 DIAGNOSIS — I10 ESSENTIAL HYPERTENSION: ICD-10-CM

## 2020-07-09 RX ORDER — LOSARTAN POTASSIUM 100 MG/1
100 TABLET ORAL DAILY
Qty: 90 TABLET | Refills: 1 | Status: SHIPPED | OUTPATIENT
Start: 2020-07-09 | End: 2020-08-11 | Stop reason: SDUPTHER

## 2020-07-09 NOTE — TELEPHONE ENCOUNTER
----- Message from Eliana Espino sent at 7/9/2020 11:53 AM CDT -----  Regarding: Refill  Contact: Rosanna Raymundo  Needs refill on Losartan 100mg   [facility] [address]  Pt# [phone]

## 2020-07-15 ENCOUNTER — TELEPHONE (OUTPATIENT)
Dept: FAMILY MEDICINE | Facility: CLINIC | Age: 85
End: 2020-07-15

## 2020-07-15 NOTE — TELEPHONE ENCOUNTER
----- Message from Gris Mendoza sent at 7/15/2020 11:51 AM CDT -----  Vm- patient said she wants to directly to victor manuel . @10:14

## 2020-07-25 LAB
ALBUMIN SERPL-MCNC: 4.3 G/DL (ref 3.6–5.1)
ALBUMIN/GLOB SERPL: 1.4 (CALC) (ref 1–2.5)
ALP SERPL-CCNC: 68 U/L (ref 37–153)
ALT SERPL-CCNC: 29 U/L (ref 6–29)
AST SERPL-CCNC: 27 U/L (ref 10–35)
BASOPHILS # BLD AUTO: 38 CELLS/UL (ref 0–200)
BASOPHILS NFR BLD AUTO: 0.7 %
BILIRUB SERPL-MCNC: 0.6 MG/DL (ref 0.2–1.2)
BUN SERPL-MCNC: 25 MG/DL (ref 7–25)
BUN/CREAT SERPL: 25 (CALC) (ref 6–22)
CALCIUM SERPL-MCNC: 10 MG/DL (ref 8.6–10.4)
CHLORIDE SERPL-SCNC: 101 MMOL/L (ref 98–110)
CHOLEST SERPL-MCNC: 165 MG/DL
CHOLEST/HDLC SERPL: 4.2 (CALC)
CO2 SERPL-SCNC: 29 MMOL/L (ref 20–32)
COPY(IES) SENT TO:: NORMAL
CREAT SERPL-MCNC: 1.02 MG/DL (ref 0.6–0.88)
EOSINOPHIL # BLD AUTO: 189 CELLS/UL (ref 15–500)
EOSINOPHIL NFR BLD AUTO: 3.5 %
ERYTHROCYTE [DISTWIDTH] IN BLOOD BY AUTOMATED COUNT: 13.6 % (ref 11–15)
GFRSERPLBLD MDRD-ARVRAT: 49 ML/MIN/1.73M2
GLOBULIN SER CALC-MCNC: 3.1 G/DL (CALC) (ref 1.9–3.7)
GLUCOSE SERPL-MCNC: 126 MG/DL (ref 65–99)
HBA1C MFR BLD: 6.4 % OF TOTAL HGB
HCT VFR BLD AUTO: 43.7 % (ref 35–45)
HDLC SERPL-MCNC: 39 MG/DL
HGB BLD-MCNC: 14.3 G/DL (ref 11.7–15.5)
LDLC SERPL CALC-MCNC: 96 MG/DL (CALC)
LYMPHOCYTES # BLD AUTO: 1026 CELLS/UL (ref 850–3900)
LYMPHOCYTES NFR BLD AUTO: 19 %
MCH RBC QN AUTO: 30.4 PG (ref 27–33)
MCHC RBC AUTO-ENTMCNC: 32.7 G/DL (ref 32–36)
MCV RBC AUTO: 92.8 FL (ref 80–100)
MONOCYTES # BLD AUTO: 594 CELLS/UL (ref 200–950)
MONOCYTES NFR BLD AUTO: 11 %
NEUTROPHILS # BLD AUTO: 3553 CELLS/UL (ref 1500–7800)
NEUTROPHILS NFR BLD AUTO: 65.8 %
NONHDLC SERPL-MCNC: 126 MG/DL (CALC)
PLATELET # BLD AUTO: 101 THOUSAND/UL (ref 140–400)
PMV BLD REES-ECKER: 12.9 FL (ref 7.5–12.5)
POTASSIUM SERPL-SCNC: 4 MMOL/L (ref 3.5–5.3)
PROT SERPL-MCNC: 7.4 G/DL (ref 6.1–8.1)
RBC # BLD AUTO: 4.71 MILLION/UL (ref 3.8–5.1)
SODIUM SERPL-SCNC: 140 MMOL/L (ref 135–146)
TRIGL SERPL-MCNC: 205 MG/DL
TSH SERPL-ACNC: 3.79 MIU/L (ref 0.4–4.5)
WBC # BLD AUTO: 5.4 THOUSAND/UL (ref 3.8–10.8)

## 2020-08-04 ENCOUNTER — TELEPHONE (OUTPATIENT)
Dept: FAMILY MEDICINE | Facility: CLINIC | Age: 85
End: 2020-08-04

## 2020-08-04 NOTE — TELEPHONE ENCOUNTER
Spoke to pt regarding her appt on 8/11. Offered virtual visit pt stated she wants to come into the office. c-19 reviewed

## 2020-08-11 ENCOUNTER — OFFICE VISIT (OUTPATIENT)
Dept: FAMILY MEDICINE | Facility: CLINIC | Age: 85
End: 2020-08-11
Payer: MEDICARE

## 2020-08-11 VITALS
HEIGHT: 61 IN | TEMPERATURE: 98 F | BODY MASS INDEX: 33.61 KG/M2 | SYSTOLIC BLOOD PRESSURE: 120 MMHG | WEIGHT: 178 LBS | HEART RATE: 60 BPM | DIASTOLIC BLOOD PRESSURE: 60 MMHG

## 2020-08-11 DIAGNOSIS — J30.1 NON-SEASONAL ALLERGIC RHINITIS DUE TO POLLEN: ICD-10-CM

## 2020-08-11 DIAGNOSIS — K21.9 GASTROESOPHAGEAL REFLUX DISEASE WITHOUT ESOPHAGITIS: ICD-10-CM

## 2020-08-11 DIAGNOSIS — E78.2 MIXED HYPERLIPIDEMIA: ICD-10-CM

## 2020-08-11 DIAGNOSIS — I48.0 PAROXYSMAL ATRIAL FIBRILLATION: ICD-10-CM

## 2020-08-11 DIAGNOSIS — F41.8 OTHER SPECIFIED ANXIETY DISORDERS: ICD-10-CM

## 2020-08-11 DIAGNOSIS — I10 ESSENTIAL HYPERTENSION: ICD-10-CM

## 2020-08-11 DIAGNOSIS — M1A.09X0 IDIOPATHIC CHRONIC GOUT OF MULTIPLE SITES WITHOUT TOPHUS: ICD-10-CM

## 2020-08-11 DIAGNOSIS — K52.9 GASTROENTERITIS: ICD-10-CM

## 2020-08-11 PROCEDURE — 99214 PR OFFICE/OUTPT VISIT, EST, LEVL IV, 30-39 MIN: ICD-10-PCS | Mod: S$GLB,,, | Performed by: PHYSICIAN ASSISTANT

## 2020-08-11 PROCEDURE — 99214 OFFICE O/P EST MOD 30 MIN: CPT | Mod: S$GLB,,, | Performed by: PHYSICIAN ASSISTANT

## 2020-08-11 RX ORDER — ROSUVASTATIN CALCIUM 20 MG/1
20 TABLET, FILM COATED ORAL DAILY
Qty: 90 TABLET | Refills: 1 | Status: SHIPPED | OUTPATIENT
Start: 2020-08-11 | End: 2021-02-03 | Stop reason: SDUPTHER

## 2020-08-11 RX ORDER — AMLODIPINE BESYLATE 10 MG/1
5 TABLET ORAL DAILY
Qty: 90 TABLET | Refills: 1 | Status: SHIPPED | OUTPATIENT
Start: 2020-08-11 | End: 2021-02-03 | Stop reason: SDUPTHER

## 2020-08-11 RX ORDER — AMIODARONE HYDROCHLORIDE 200 MG/1
200 TABLET ORAL DAILY
Qty: 90 TABLET | Refills: 1 | Status: SHIPPED | OUTPATIENT
Start: 2020-08-11 | End: 2021-02-03 | Stop reason: SDUPTHER

## 2020-08-11 RX ORDER — BUSPIRONE HYDROCHLORIDE 5 MG/1
10 TABLET ORAL 2 TIMES DAILY
Qty: 180 TABLET | Refills: 1 | Status: SHIPPED | OUTPATIENT
Start: 2020-08-11 | End: 2021-02-03 | Stop reason: SDUPTHER

## 2020-08-11 RX ORDER — LOSARTAN POTASSIUM 100 MG/1
100 TABLET ORAL DAILY
Qty: 90 TABLET | Refills: 1 | Status: SHIPPED | OUTPATIENT
Start: 2020-08-11 | End: 2021-02-03 | Stop reason: SDUPTHER

## 2020-08-11 RX ORDER — LANOLIN ALCOHOL/MO/W.PET/CERES
400 CREAM (GRAM) TOPICAL DAILY
COMMUNITY
End: 2020-08-11 | Stop reason: SDUPTHER

## 2020-08-11 RX ORDER — LORAZEPAM 1 MG/1
1 TABLET ORAL 2 TIMES DAILY
Qty: 90 TABLET | Refills: 1 | Status: SHIPPED | OUTPATIENT
Start: 2020-08-11 | End: 2021-02-03 | Stop reason: SDUPTHER

## 2020-08-11 RX ORDER — PROPRANOLOL HYDROCHLORIDE 80 MG/1
1 TABLET ORAL 2 TIMES DAILY
COMMUNITY
Start: 2020-05-29 | End: 2020-08-11 | Stop reason: SDUPTHER

## 2020-08-11 RX ORDER — ONDANSETRON 8 MG/1
8 TABLET, ORALLY DISINTEGRATING ORAL EVERY 6 HOURS PRN
Qty: 20 TABLET | Refills: 1 | Status: SHIPPED | OUTPATIENT
Start: 2020-08-11 | End: 2021-02-03 | Stop reason: SDUPTHER

## 2020-08-11 RX ORDER — PROPRANOLOL HYDROCHLORIDE 80 MG/1
80 TABLET ORAL 2 TIMES DAILY
Qty: 180 TABLET | Refills: 1 | Status: SHIPPED | OUTPATIENT
Start: 2020-08-11 | End: 2021-02-03 | Stop reason: SDUPTHER

## 2020-08-11 RX ORDER — ALLOPURINOL 100 MG/1
100 TABLET ORAL DAILY
Qty: 90 TABLET | Refills: 1 | Status: SHIPPED | OUTPATIENT
Start: 2020-08-11 | End: 2021-02-03 | Stop reason: SDUPTHER

## 2020-08-11 RX ORDER — LANOLIN ALCOHOL/MO/W.PET/CERES
400 CREAM (GRAM) TOPICAL DAILY
Qty: 90 TABLET | Refills: 1 | Status: SHIPPED | OUTPATIENT
Start: 2020-08-11 | End: 2021-02-03 | Stop reason: SDUPTHER

## 2020-08-11 RX ORDER — LORATADINE 10 MG/1
10 TABLET ORAL DAILY
Qty: 90 TABLET | Refills: 1 | Status: SHIPPED | OUTPATIENT
Start: 2020-08-11 | End: 2021-02-03 | Stop reason: SDUPTHER

## 2020-08-11 RX ORDER — AMIODARONE HYDROCHLORIDE 200 MG/1
200 TABLET ORAL DAILY
Qty: 90 TABLET | Refills: 1 | Status: CANCELLED | OUTPATIENT
Start: 2020-08-11 | End: 2021-02-07

## 2020-08-11 RX ORDER — FLUTICASONE PROPIONATE 50 MCG
1 SPRAY, SUSPENSION (ML) NASAL 2 TIMES DAILY
Qty: 16 G | Refills: 3 | Status: SHIPPED | OUTPATIENT
Start: 2020-08-11 | End: 2021-02-03 | Stop reason: SDUPTHER

## 2020-08-11 RX ORDER — HYDROCHLOROTHIAZIDE 12.5 MG/1
12.5 TABLET ORAL DAILY
Qty: 90 TABLET | Refills: 1 | Status: SHIPPED | OUTPATIENT
Start: 2020-08-11 | End: 2021-02-03 | Stop reason: SDUPTHER

## 2020-08-11 RX ORDER — OMEPRAZOLE 20 MG/1
40 CAPSULE, DELAYED RELEASE ORAL DAILY
Qty: 180 CAPSULE | Refills: 1 | Status: SHIPPED | OUTPATIENT
Start: 2020-08-11 | End: 2021-02-03 | Stop reason: SDUPTHER

## 2020-08-11 NOTE — PROGRESS NOTES
SUBJECTIVE:    Patient ID: Rosanna Raymundo is a 87 y.o. female.    Chief Complaint: Diabetes (brought bottles// SW)    This is a very pleasant 87-year-old female who presents today for 6 month checkup.  She is a diet-controlled diabetic.  A1c is well managed at 6.4%.  This was checked 2 weeks ago.  She does still follow with her cardiologist, Dr. Evans.  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  Since that time she has not had any trouble.  She maintains on amiodarone and lower dose Eliquis.  Aside from complaints regarding mask wearing she has no new concerns today.      No visits with results within 6 Month(s) from this visit.   Latest known visit with results is:   Admission on 12/31/2019, Discharged on 01/03/2020   Component Date Value Ref Range Status    WBC 12/31/2019 8.42  3.90 - 12.70 K/uL Final    RBC 12/31/2019 4.29  4.00 - 5.40 M/uL Final    Hemoglobin 12/31/2019 9.8* 12.0 - 16.0 g/dL Final    Hematocrit 12/31/2019 33.3* 37.0 - 48.5 % Final    Mean Corpuscular Volume 12/31/2019 78* 82 - 98 fL Final    Mean Corpuscular Hemoglobin 12/31/2019 22.8* 27.0 - 31.0 pg Final    Mean Corpuscular Hemoglobin Conc 12/31/2019 29.4* 32.0 - 36.0 g/dL Final    RDW 12/31/2019 25.7* 11.5 - 14.5 % Final    Platelets 12/31/2019 279  150 - 350 K/uL Final    MPV 12/31/2019 10.9  9.2 - 12.9 fL Final    Immature Granulocytes 12/31/2019 0.5  0.0 - 0.5 % Final    Gran # (ANC) 12/31/2019 6.4  1.8 - 7.7 K/uL Final    Immature Grans (Abs) 12/31/2019 0.04  0.00 - 0.04 K/uL Final    Lymph # 12/31/2019 1.2  1.0 - 4.8 K/uL Final    Mono # 12/31/2019 0.6  0.3 - 1.0 K/uL Final    Eos # 12/31/2019 0.1  0.0 - 0.5 K/uL Final    Baso # 12/31/2019 0.06  0.00 - 0.20 K/uL Final    nRBC 12/31/2019 0  0 /100 WBC Final    Gran% 12/31/2019 75.8* 38.0 - 73.0 % Final    Lymph% 12/31/2019 14.5* 18.0 - 48.0 % Final    Mono% 12/31/2019 7.0  4.0 - 15.0 % Final    Eosinophil%  12/31/2019 1.5  0.0 - 8.0 % Final    Basophil% 12/31/2019 0.7  0.0 - 1.9 % Final    Differential Method 12/31/2019 Automated   Final    Sodium 12/31/2019 140  136 - 145 mmol/L Final    Potassium 12/31/2019 3.5  3.5 - 5.1 mmol/L Final    Chloride 12/31/2019 106  95 - 110 mmol/L Final    CO2 12/31/2019 25  23 - 29 mmol/L Final    Glucose 12/31/2019 128* 70 - 110 mg/dL Final    BUN, Bld 12/31/2019 23  8 - 23 mg/dL Final    Creatinine 12/31/2019 1.0  0.5 - 1.4 mg/dL Final    Calcium 12/31/2019 8.6* 8.7 - 10.5 mg/dL Final    Total Protein 12/31/2019 6.8  6.0 - 8.4 g/dL Final    Albumin 12/31/2019 3.8  3.5 - 5.2 g/dL Final    Total Bilirubin 12/31/2019 0.8  0.1 - 1.0 mg/dL Final    Alkaline Phosphatase 12/31/2019 55  55 - 135 U/L Final    AST 12/31/2019 19  10 - 40 U/L Final    ALT 12/31/2019 18  10 - 44 U/L Final    Anion Gap 12/31/2019 9  8 - 16 mmol/L Final    eGFR if  12/31/2019 58.5* >60 mL/min/1.73 m^2 Final    eGFR if non African American 12/31/2019 50.8* >60 mL/min/1.73 m^2 Final    Troponin I 12/31/2019 <0.030  <=0.040 ng/mL Final    BNP 12/31/2019 750* 0 - 99 pg/mL Final    Troponin I 12/31/2019 <0.030  <=0.040 ng/mL Final    POC Glucose 12/31/2019 126* 70 - 110 mg/dL Final    POC BUN 12/31/2019 21  6 - 30 mg/dL Final    POC Creatinine 12/31/2019 1.0  0.5 - 1.4 mg/dL Final    POC Sodium 12/31/2019 140  136 - 145 mmol/L Final    POC Potassium 12/31/2019 3.6  3.5 - 5.1 mmol/L Final    POC Chloride 12/31/2019 106  95 - 110 mmol/L Final    POC TCO2 (MEASURED) 12/31/2019 25  23 - 29 mmol/L Final    POC Anion Gap 12/31/2019 14  8 - 16 mmol/L Final    POC Ionized Calcium 12/31/2019 1.17  1.06 - 1.42 mmol/L Final    POC Hematocrit 12/31/2019 32* 36 - 54 %PCV Final    Sample 12/31/2019 VENOUS   Final    TSH 12/31/2019 2.020  0.340 - 5.600 uIU/mL Final    Free T4 12/31/2019 0.95  0.71 - 1.51 ng/dL Final    POC Glucose 12/31/2019 154* 70 - 110 Final    POC Glucose  12/31/2019 148* 70 - 110 Final    Sodium 01/01/2020 140  136 - 145 mmol/L Final    Potassium 01/01/2020 3.7  3.5 - 5.1 mmol/L Final    Chloride 01/01/2020 104  95 - 110 mmol/L Final    CO2 01/01/2020 26  23 - 29 mmol/L Final    Glucose 01/01/2020 117* 70 - 110 mg/dL Final    BUN, Bld 01/01/2020 30* 8 - 23 mg/dL Final    Creatinine 01/01/2020 1.1  0.5 - 1.4 mg/dL Final    Calcium 01/01/2020 9.4  8.7 - 10.5 mg/dL Final    Anion Gap 01/01/2020 10  8 - 16 mmol/L Final    eGFR if African American 01/01/2020 52.2* >60 mL/min/1.73 m^2 Final    eGFR if non African American 01/01/2020 45.3* >60 mL/min/1.73 m^2 Final    Magnesium 01/01/2020 1.9  1.6 - 2.6 mg/dL Final    Phosphorus 01/01/2020 4.1  2.7 - 4.5 mg/dL Final    WBC 01/01/2020 5.40  3.90 - 12.70 K/uL Final    RBC 01/01/2020 4.32  4.00 - 5.40 M/uL Final    Hemoglobin 01/01/2020 10.2* 12.0 - 16.0 g/dL Final    Hematocrit 01/01/2020 33.4* 37.0 - 48.5 % Final    Mean Corpuscular Volume 01/01/2020 77* 82 - 98 fL Final    Mean Corpuscular Hemoglobin 01/01/2020 23.6* 27.0 - 31.0 pg Final    Mean Corpuscular Hemoglobin Conc 01/01/2020 30.5* 32.0 - 36.0 g/dL Final    RDW 01/01/2020 25.9* 11.5 - 14.5 % Final    Platelets 01/01/2020 237  150 - 350 K/uL Final    MPV 01/01/2020 10.6  9.2 - 12.9 fL Final    POC Glucose 01/01/2020 116* 70 - 110 Final    aPTT 01/01/2020 32.7  23.6 - 33.3 sec Final    PT 01/01/2020 13.3  10.6 - 14.8 sec Final    INR 01/01/2020 1.1   Final    MRSA SCREEN BY PCR 01/01/2020 Negative  Negative Final    POC Glucose 01/01/2020 129* 70 - 110 Final    POC Glucose 01/01/2020 135* 70 - 110 Final    POC Glucose 01/01/2020 147* 70 - 110 Final    Sodium 01/02/2020 142  136 - 145 mmol/L Final    Potassium 01/02/2020 3.6  3.5 - 5.1 mmol/L Final    Chloride 01/02/2020 106  95 - 110 mmol/L Final    CO2 01/02/2020 24  23 - 29 mmol/L Final    Glucose 01/02/2020 114* 70 - 110 mg/dL Final    BUN, Bld 01/02/2020 25* 8 - 23 mg/dL Final     Creatinine 01/02/2020 1.0  0.5 - 1.4 mg/dL Final    Calcium 01/02/2020 9.4  8.7 - 10.5 mg/dL Final    Anion Gap 01/02/2020 12  8 - 16 mmol/L Final    eGFR if African American 01/02/2020 58.5* >60 mL/min/1.73 m^2 Final    eGFR if non African American 01/02/2020 50.8* >60 mL/min/1.73 m^2 Final    Magnesium 01/02/2020 1.5* 1.6 - 2.6 mg/dL Final    Phosphorus 01/02/2020 4.3  2.7 - 4.5 mg/dL Final    WBC 01/02/2020 6.66  3.90 - 12.70 K/uL Final    RBC 01/02/2020 4.50  4.00 - 5.40 M/uL Final    Hemoglobin 01/02/2020 10.5* 12.0 - 16.0 g/dL Final    Hematocrit 01/02/2020 34.4* 37.0 - 48.5 % Final    Mean Corpuscular Volume 01/02/2020 76* 82 - 98 fL Final    Mean Corpuscular Hemoglobin 01/02/2020 23.3* 27.0 - 31.0 pg Final    Mean Corpuscular Hemoglobin Conc 01/02/2020 30.5* 32.0 - 36.0 g/dL Final    RDW 01/02/2020 26.1* 11.5 - 14.5 % Final    Platelets 01/02/2020 237  150 - 350 K/uL Final    MPV 01/02/2020 10.3  9.2 - 12.9 fL Final    POC Glucose 01/02/2020 128* 70 - 110 Final    POC Glucose 01/02/2020 127* 70 - 110 Final    Sodium 01/03/2020 138  136 - 145 mmol/L Final    Potassium 01/03/2020 3.7  3.5 - 5.1 mmol/L Final    Chloride 01/03/2020 104  95 - 110 mmol/L Final    CO2 01/03/2020 23  23 - 29 mmol/L Final    Glucose 01/03/2020 113* 70 - 110 mg/dL Final    BUN, Bld 01/03/2020 21  8 - 23 mg/dL Final    Creatinine 01/03/2020 0.8  0.5 - 1.4 mg/dL Final    Calcium 01/03/2020 8.7  8.7 - 10.5 mg/dL Final    Anion Gap 01/03/2020 11  8 - 16 mmol/L Final    eGFR if African American 01/03/2020 >60.0  >60 mL/min/1.73 m^2 Final    eGFR if non African American 01/03/2020 >60.0  >60 mL/min/1.73 m^2 Final    Magnesium 01/03/2020 1.6  1.6 - 2.6 mg/dL Final    Phosphorus 01/03/2020 3.6  2.7 - 4.5 mg/dL Final    WBC 01/03/2020 8.39  3.90 - 12.70 K/uL Final    RBC 01/03/2020 4.56  4.00 - 5.40 M/uL Final    Hemoglobin 01/03/2020 10.6* 12.0 - 16.0 g/dL Final    Hematocrit 01/03/2020 35.2* 37.0 - 48.5  % Final    Mean Corpuscular Volume 01/03/2020 77* 82 - 98 fL Final    Mean Corpuscular Hemoglobin 01/03/2020 23.2* 27.0 - 31.0 pg Final    Mean Corpuscular Hemoglobin Conc 01/03/2020 30.1* 32.0 - 36.0 g/dL Final    RDW 01/03/2020 26.6* 11.5 - 14.5 % Final    Platelets 01/03/2020 183  150 - 350 K/uL Final    MPV 01/03/2020 11.6  9.2 - 12.9 fL Final    POC Glucose 01/03/2020 120* 70 - 110 Final    Vancomycin-Trough 01/03/2020 7.0* 10.0 - 22.0 ug/mL Final    POC Glucose 01/03/2020 130* 70 - 110 Final       Past Medical History:   Diagnosis Date    Anticoagulant long-term use     Cancer     Cataract     CHF (congestive heart failure)     Diabetes mellitus, type 2     Heart murmur     Hypertension     Iron deficiency anemia, unspecified     Anemia, iron def.    Occasional tremors      Past Surgical History:   Procedure Laterality Date    ADENOIDECTOMY      APPENDECTOMY      COLONOSCOPY N/A 12/17/2019    Procedure: COLONOSCOPY;  Surgeon: Raleigh Quarles III, MD;  Location: [facility] ENDO;  Service: Endoscopy;  Laterality: N/A;    COLONOSCOPY N/A 12/18/2019    Procedure: COLONOSCOPY;  Surgeon: Raleigh Quarles III, MD;  Location: [facility] ENDO;  Service: Endoscopy;  Laterality: N/A;    Gall bladder revmoval      HYSTERECTOMY      INSERTION OF PACEMAKER Right 1/2/2020    Procedure: INSERTION, PACEMAKER;  Surgeon: Fly Barragan MD;  Location: [facility] CATH/EP LAB;  Service: Cardiology;  Laterality: Right;    left mastectomy Left 1945    MASTECTOMY Left     NE CABG, ARTERY-VEIN, FOUR  2014    Coronary Artery Bypass, 4    SMALL BOWEL ENTEROSCOPY N/A 12/17/2019    Procedure: ENTEROSCOPY;  Surgeon: Raleigh Quarles III, MD;  Location: [facility] ENDO;  Service: Endoscopy;  Laterality: N/A;    TONSILLECTOMY       Family History   Problem Relation Age of Onset    No Known Problems Mother     Heart disease Father        Marital Status:   Alcohol History:  reports no history of alcohol  use.  Tobacco History:  reports that she has never smoked. She has never used smokeless tobacco.  Drug History:  reports no history of drug use.    Review of patient's allergies indicates:   Allergen Reactions    Penicillin g benzathine Other (See Comments)       Current Outpatient Medications:     allopurinoL (ZYLOPRIM) 100 MG tablet, Take 1 tablet (100 mg total) by mouth once daily., Disp: 90 tablet, Rfl: 1    amiodarone (PACERONE) 200 MG Tab, Take 1 tablet (200 mg total) by mouth once daily., Disp: 90 tablet, Rfl: 1    amLODIPine (NORVASC) 10 MG tablet, Take 0.5 tablets (5 mg total) by mouth once daily., Disp: 90 tablet, Rfl: 1    apixaban (ELIQUIS) 2.5 mg Tab, Take 1 tablet (2.5 mg total) by mouth 2 (two) times daily., Disp: 180 tablet, Rfl: 1    aspirin 81 MG Chew, Take 1 tablet (81 mg total) by mouth once daily., Disp: 30 tablet, Rfl: 3    busPIRone (BUSPAR) 5 MG Tab, Take 2 tablets (10 mg total) by mouth 2 (two) times daily., Disp: 180 tablet, Rfl: 1    cholecalciferol, vitamin D3, (VITAMIN D3) 1,000 unit capsule, Take 1 capsule (1,000 Units total) by mouth once daily., Disp: 100 capsule, Rfl: 1    CRESTOR 20 mg tablet, Take 1 tablet (20 mg total) by mouth once daily., Disp: 90 tablet, Rfl: 1    ferrous sulfate 325 (65 FE) MG EC tablet, Take 325 mg by mouth 2 (two) times daily., Disp: , Rfl:     fish oil-omega-3 fatty acids 300-1,000 mg capsule, Take 1 capsule by mouth once daily., Disp: , Rfl:     fluticasone propionate (FLONASE) 50 mcg/actuation nasal spray, 1 spray (50 mcg total) by Each Nostril route 2 (two) times daily. (Patient taking differently: 1 spray by Each Nostril route 2 (two) times daily as needed. ), Disp: 3 Bottle, Rfl: 3    hydroCHLOROthiazide (HYDRODIURIL) 12.5 MG Tab, Take 1 tablet (12.5 mg total) by mouth once daily., Disp: 90 tablet, Rfl: 1    loratadine (CLARITIN) 10 mg tablet, Take 1 tablet (10 mg total) by mouth once daily., Disp: 90 tablet, Rfl: 1    LORazepam (ATIVAN)  "1 MG tablet, Take 1 tablet (1 mg total) by mouth 2 (two) times daily., Disp: 90 tablet, Rfl: 1    losartan (COZAAR) 100 MG tablet, Take 1 tablet (100 mg total) by mouth once daily., Disp: 90 tablet, Rfl: 1    metoprolol tartrate (LOPRESSOR) 25 MG tablet, Take 0.5 tablets (12.5 mg total) by mouth 2 (two) times daily., Disp: 30 tablet, Rfl: 0    omeprazole (PRILOSEC) 20 MG capsule, Take 2 capsules (40 mg total) by mouth once daily., Disp: 180 capsule, Rfl: 1    ondansetron (ZOFRAN-ODT) 8 MG TbDL, Take 1 tablet (8 mg total) by mouth every 6 (six) hours as needed., Disp: 20 tablet, Rfl: 1    propranoloL (INDERAL) 80 MG tablet, Take 1 tablet by mouth 2 (two) times daily., Disp: , Rfl:     Review of Systems   Constitutional: Negative for appetite change, chills, fatigue, fever and unexpected weight change.   HENT: Negative for congestion.    Respiratory: Negative for cough, chest tightness and shortness of breath.    Cardiovascular: Negative for chest pain and palpitations.   Gastrointestinal: Negative for abdominal distention and abdominal pain.   Endocrine: Negative for cold intolerance and heat intolerance.   Genitourinary: Negative for difficulty urinating and dysuria.   Musculoskeletal: Negative for arthralgias and back pain.   Neurological: Negative for dizziness, weakness and headaches.          Objective:      Vitals:    08/11/20 0737   BP: 120/60   Pulse: 60   Temp: 98.3 °F (36.8 °C)   Weight: 80.7 kg (178 lb)   Height: 5' 1" (1.549 m)     Physical Exam  Constitutional:       General: She is not in acute distress.     Appearance: She is well-developed.   HENT:      Head: Normocephalic and atraumatic.   Eyes:      Conjunctiva/sclera: Conjunctivae normal.      Pupils: Pupils are equal, round, and reactive to light.   Neck:      Musculoskeletal: Normal range of motion and neck supple.      Thyroid: No thyromegaly.   Cardiovascular:      Rate and Rhythm: Normal rate and regular rhythm.      Heart sounds: Normal " heart sounds.   Pulmonary:      Effort: Pulmonary effort is normal.      Breath sounds: Normal breath sounds.   Abdominal:      General: Bowel sounds are normal. There is no distension.      Palpations: Abdomen is soft.      Tenderness: There is no abdominal tenderness.   Musculoskeletal: Normal range of motion.   Skin:     General: Skin is warm and dry.      Findings: No erythema.   Neurological:      Mental Status: She is alert and oriented to person, place, and time.      Cranial Nerves: No cranial nerve deficit.           Assessment:       1. Essential hypertension    2. Idiopathic chronic gout of multiple sites without tophus    3. Paroxysmal atrial fibrillation    4. Non-seasonal allergic rhinitis due to pollen    5. Other specified anxiety disorders    6. Mixed hyperlipidemia    7. Gastroenteritis    8. Paroxysmal atrial fibrillation         Plan:       Essential hypertension  Comments:  Very well managed.  Continue as is keep follow-up with cardiology  Orders:  -     amLODIPine (NORVASC) 10 MG tablet; Take 0.5 tablets (5 mg total) by mouth once daily.  Dispense: 90 tablet; Refill: 1  -     hydroCHLOROthiazide (HYDRODIURIL) 12.5 MG Tab; Take 1 tablet (12.5 mg total) by mouth once daily.  Dispense: 90 tablet; Refill: 1  -     losartan (COZAAR) 100 MG tablet; Take 1 tablet (100 mg total) by mouth once daily.  Dispense: 90 tablet; Refill: 1    Idiopathic chronic gout of multiple sites without tophus  -     allopurinoL (ZYLOPRIM) 100 MG tablet; Take 1 tablet (100 mg total) by mouth once daily.  Dispense: 90 tablet; Refill: 1    Paroxysmal atrial fibrillation  Comments:  Rate controlled, on amiodarone as well.  Maintain with low-dose Eliquis  Orders:  -     amiodarone (PACERONE) 200 MG Tab; Take 1 tablet (200 mg total) by mouth once daily.  Dispense: 90 tablet; Refill: 1  -     apixaban (ELIQUIS) 2.5 mg Tab; Take 1 tablet (2.5 mg total) by mouth 2 (two) times daily.  Dispense: 180 tablet; Refill: 1    Non-seasonal  allergic rhinitis due to pollen  -     loratadine (CLARITIN) 10 mg tablet; Take 1 tablet (10 mg total) by mouth once daily.  Dispense: 90 tablet; Refill: 1    Other specified anxiety disorders  Comments:  She appears to be doing very well from an anxiety standpoint. refills today as needed  Orders:  -     LORazepam (ATIVAN) 1 MG tablet; Take 1 tablet (1 mg total) by mouth 2 (two) times daily.  Dispense: 90 tablet; Refill: 1    Mixed hyperlipidemia  Comments:  stable, continue statin therapy as is.  Orders:  -     CRESTOR 20 mg tablet; Take 1 tablet (20 mg total) by mouth once daily.  Dispense: 90 tablet; Refill: 1    Gastroenteritis  -     ondansetron (ZOFRAN-ODT) 8 MG TbDL; Take 1 tablet (8 mg total) by mouth every 6 (six) hours as needed.  Dispense: 20 tablet; Refill: 1    Paroxysmal atrial fibrillation  -     amiodarone (PACERONE) 200 MG Tab; Take 1 tablet (200 mg total) by mouth once daily.  Dispense: 90 tablet; Refill: 1  -     apixaban (ELIQUIS) 2.5 mg Tab; Take 1 tablet (2.5 mg total) by mouth 2 (two) times daily.  Dispense: 180 tablet; Refill: 1    Other orders  -     busPIRone (BUSPAR) 5 MG Tab; Take 2 tablets (10 mg total) by mouth 2 (two) times daily.  Dispense: 180 tablet; Refill: 1      Follow up in about 6 months (around 2/11/2021) for Annual Physical, BP Check-Up.        8/11/2020 Casper Pratt PA-C

## 2020-08-11 NOTE — TELEPHONE ENCOUNTER
----- Message from Rosaura Dionicio sent at 8/11/2020  9:34 AM CDT -----  Pt calling said she needs refills on Prilosec 20 mg, Flonase, Losartan 100 mg, Propranolol 80 mg, Eye vitmains, Magnisum she needs printed and a call [facility] can  [facility]  Cb # [phone]

## 2020-08-11 NOTE — PATIENT INSTRUCTIONS
Understanding Atrial Fibrillation    An arrhythmia is any problem with the speed or pattern of the heartbeat. Atrial fibrillation (AFib) is a common type of arrhythmia. It causes fast, chaotic electrical signals in the atria. This leads to poor functioning of the heart. It also affects how much blood your heart can pump out to the body.  Afib may occur once in a while and go away on its own. Or it may continue for longer periods and need treatment.  AFib can lead to serious problems, such as stroke. Your healthcare provider will need to monitor and manage it.  What happens during atrial fibrillation?   The heart has an electrical system that sends signals to control the heartbeat. As signals move through the heart, they tell the hearts upper chambers (atria) and lower chambers (ventricles) when to squeeze (contract) and relax. This lets blood move through the heart and out to the body and lungs.  With AFib, the atria receive abnormal signals. This causes them to contract in a fast and irregular way, and out of sync with the ventricles. When this happens, the atria also have a harder time moving blood into the ventricles. Blood may then pool in the atria, which increases the risk for blood clots and stroke. The ventricles also may contract too quickly and irregularly. As a result, they may not pump blood to the body and lungs as well as they should. This can weaken the heart muscle over time and cause heart failure.  What causes atrial fibrillation?  AFib is more common in older adults. It has many possible causes including:  · Coronary artery disease  · Heart valve disease  · Heart attack  · Heart surgery  · High blood pressure  · Thyroid disease  · Diabetes  · Lung disease  · Sleep apnea  · Heavy alcohol use  In some cases of AFib, doctors do not know the cause.  What are the symptoms of atrial fibrillation?  AFib may or may not cause symptoms. If symptoms do occur, they may include:  · A fast, pounding,  irregular heartbeat  · Shortness of breath  · Tiredness  · Dizziness or fainting  · Chest pain  How is atrial fibrillation treated?  Treatments for AFib can include any of the options below.  · Medicines. You may be prescribed:  ¨ Heart rate medicines to help slow down the heartbeat  ¨ Heart rhythm medicines to help the heart beat more regularly  ¨ Anti-clotting medicines to help reduce the risk for blood clots and stroke  · Electrical cardioversion. Your healthcare provider uses special pads or paddles to send one or more brief electrical shocks to the heart. This can help reset the heartbeat to normal.  · Ablation. Long, thin tubes called catheters are threaded through a blood vessel to the heart. There, the catheters send out hot or cold energy to the areas causing the abnormal signals. This energy destroys the problem tissue or cells. This improves the chances that your heart will stay in normal rhythm without using medicines. If your heart rate and rhythm cant be controlled, you may need ablation and a pacemaker. These will help control the heart rate and regularity of the heartbeat.  · Surgery. During surgery, your healthcare provider may use different methods to create scar tissue in the areas of the heart causing the abnormal signals. The scar tissue disrupts the abnormal signals and may stop AFib from occurring.  What are the complications of atrial fibrillation?  These can include:  · Blood clots  · Stroke  · Heart failure. This problem occurs when the heart muscle weakens so much that it can no longer pump blood well.  When should I call my healthcare provider?  Call your healthcare provider right away if you have any of these:  · Symptoms that dont get better with treatment, or get worse  · New symptoms   Date Last Reviewed: 5/1/2016  © 6544-7968 "[employer]. [address] [place] [address], [place] [address]. All rights reserved. This information is not intended as a substitute for professional  medical care. Always follow your healthcare professional's instructions.

## 2020-08-11 NOTE — TELEPHONE ENCOUNTER
Pt called in needing these refilled as well and wants them printed out please. Please send back to me when done.

## 2020-08-24 ENCOUNTER — OFFICE VISIT (OUTPATIENT)
Dept: FAMILY MEDICINE | Facility: CLINIC | Age: 85
End: 2020-08-24
Payer: MEDICARE

## 2020-08-24 ENCOUNTER — TELEPHONE (OUTPATIENT)
Dept: FAMILY MEDICINE | Facility: CLINIC | Age: 85
End: 2020-08-24

## 2020-08-24 VITALS
DIASTOLIC BLOOD PRESSURE: 84 MMHG | BODY MASS INDEX: 32.66 KG/M2 | WEIGHT: 173 LBS | SYSTOLIC BLOOD PRESSURE: 118 MMHG | HEART RATE: 64 BPM | HEIGHT: 61 IN | TEMPERATURE: 99 F

## 2020-08-24 DIAGNOSIS — E78.2 MIXED HYPERLIPIDEMIA: ICD-10-CM

## 2020-08-24 DIAGNOSIS — R39.9 UTI SYMPTOMS: ICD-10-CM

## 2020-08-24 DIAGNOSIS — I10 ESSENTIAL HYPERTENSION: ICD-10-CM

## 2020-08-24 DIAGNOSIS — Z23 NEED FOR 23-POLYVALENT PNEUMOCOCCAL POLYSACCHARIDE VACCINE: Primary | ICD-10-CM

## 2020-08-24 DIAGNOSIS — K52.9 GASTROENTERITIS: ICD-10-CM

## 2020-08-24 DIAGNOSIS — J30.1 NON-SEASONAL ALLERGIC RHINITIS DUE TO POLLEN: ICD-10-CM

## 2020-08-24 DIAGNOSIS — I48.0 PAROXYSMAL ATRIAL FIBRILLATION: ICD-10-CM

## 2020-08-24 DIAGNOSIS — M1A.09X0 IDIOPATHIC CHRONIC GOUT OF MULTIPLE SITES WITHOUT TOPHUS: ICD-10-CM

## 2020-08-24 DIAGNOSIS — F41.8 OTHER SPECIFIED ANXIETY DISORDERS: ICD-10-CM

## 2020-08-24 LAB
BILIRUB UR QL STRIP: NEGATIVE
GLUCOSE UR QL STRIP: NEGATIVE
KETONES UR QL STRIP: NEGATIVE
LEUKOCYTE ESTERASE UR QL STRIP: NEGATIVE
PH, POC UA: 6
POC BLOOD, URINE: NEGATIVE
POC NITRATES, URINE: NEGATIVE
PROT UR QL STRIP: NEGATIVE
SP GR UR STRIP: 1.01 (ref 1–1.03)
UROBILINOGEN UR STRIP-ACNC: NORMAL (ref 0.1–1.1)

## 2020-08-24 PROCEDURE — 99213 PR OFFICE/OUTPT VISIT, EST, LEVL III, 20-29 MIN: ICD-10-PCS | Mod: 25,S$GLB,, | Performed by: PHYSICIAN ASSISTANT

## 2020-08-24 PROCEDURE — 99213 OFFICE O/P EST LOW 20 MIN: CPT | Mod: 25,S$GLB,, | Performed by: PHYSICIAN ASSISTANT

## 2020-08-24 PROCEDURE — 81003 URINALYSIS AUTO W/O SCOPE: CPT | Mod: QW,S$GLB,, | Performed by: PHYSICIAN ASSISTANT

## 2020-08-24 PROCEDURE — 81003 POCT URINALYSIS, DIPSTICK, AUTOMATED, W/O SCOPE: ICD-10-PCS | Mod: QW,S$GLB,, | Performed by: PHYSICIAN ASSISTANT

## 2020-08-24 RX ORDER — ALLOPURINOL 100 MG/1
100 TABLET ORAL DAILY
Qty: 90 TABLET | Refills: 1 | Status: CANCELLED | OUTPATIENT
Start: 2020-08-24

## 2020-08-24 RX ORDER — LOSARTAN POTASSIUM 100 MG/1
100 TABLET ORAL DAILY
Qty: 90 TABLET | Refills: 1 | Status: CANCELLED | OUTPATIENT
Start: 2020-08-24

## 2020-08-24 RX ORDER — LORAZEPAM 1 MG/1
1 TABLET ORAL 2 TIMES DAILY
Qty: 90 TABLET | Refills: 1 | Status: CANCELLED | OUTPATIENT
Start: 2020-08-24

## 2020-08-24 RX ORDER — FLUTICASONE PROPIONATE 50 MCG
1 SPRAY, SUSPENSION (ML) NASAL 2 TIMES DAILY
Qty: 16 G | Refills: 3 | Status: CANCELLED | OUTPATIENT
Start: 2020-08-24

## 2020-08-24 RX ORDER — HYDROCHLOROTHIAZIDE 12.5 MG/1
12.5 TABLET ORAL DAILY
Qty: 90 TABLET | Refills: 1 | Status: CANCELLED | OUTPATIENT
Start: 2020-08-24

## 2020-08-24 RX ORDER — BUSPIRONE HYDROCHLORIDE 5 MG/1
10 TABLET ORAL 2 TIMES DAILY
Qty: 180 TABLET | Refills: 1 | Status: CANCELLED | OUTPATIENT
Start: 2020-08-24

## 2020-08-24 RX ORDER — PROPRANOLOL HYDROCHLORIDE 80 MG/1
80 TABLET ORAL 2 TIMES DAILY
Qty: 180 TABLET | Refills: 1 | Status: CANCELLED | OUTPATIENT
Start: 2020-08-24

## 2020-08-24 RX ORDER — ROSUVASTATIN CALCIUM 20 MG/1
20 TABLET, FILM COATED ORAL DAILY
Qty: 90 TABLET | Refills: 1 | Status: CANCELLED | OUTPATIENT
Start: 2020-08-24

## 2020-08-24 RX ORDER — AMLODIPINE BESYLATE 10 MG/1
5 TABLET ORAL DAILY
Qty: 90 TABLET | Refills: 1 | Status: CANCELLED | OUTPATIENT
Start: 2020-08-24

## 2020-08-24 RX ORDER — ONDANSETRON 8 MG/1
8 TABLET, ORALLY DISINTEGRATING ORAL EVERY 6 HOURS PRN
Qty: 20 TABLET | Refills: 1 | Status: CANCELLED | OUTPATIENT
Start: 2020-08-24

## 2020-08-24 RX ORDER — AMIODARONE HYDROCHLORIDE 200 MG/1
200 TABLET ORAL DAILY
Qty: 90 TABLET | Refills: 1 | Status: CANCELLED | OUTPATIENT
Start: 2020-08-24 | End: 2021-02-20

## 2020-08-24 NOTE — PATIENT INSTRUCTIONS
Anatomy of the Female Urinary Tract  Your urinary tract helps get rid of urine (your bodys liquid waste). The kidneys collect chemicals and water your body doesnt need. This is turned into urine. Urine travels out of the kidneys through the ureters to the bladder. The bladder holds urine until youre ready to release it. The urethra carries urine from the bladder out of the body. The main sphincter muscle circles the mid-urethra.      Front view of female urinary tract.     Date Last Reviewed: 1/1/2017 © 2000-2017 [employer] [address] [place] [id number]. All rights reserved. This information is not intended as a substitute for professional medical care. Always follow your healthcare professional's instructions.

## 2020-08-24 NOTE — PROGRESS NOTES
SUBJECTIVE:    Patient ID: Rosanna Raymundo is a 87 y.o. female.    Chief Complaint: Urinary Tract Infection (frequent urination, pressure since yesterday, brought bottles, PNA 23 wants// SW)    Pleasant 87-year-old presenting for possible UTI. She states her symptoms started yesterday. She reports pain in her lower abdomen that radiates to her flanks when her bladder fills which is relieved with emptying. She also reports urinary frequency going to the bathroom every 30 minutes and urgency. She denies any dysuria, hematuria or fevers.       No visits with results within 6 Month(s) from this visit.   Latest known visit with results is:   Admission on 12/31/2019, Discharged on 01/03/2020   Component Date Value Ref Range Status    WBC 12/31/2019 8.42  3.90 - 12.70 K/uL Final    RBC 12/31/2019 4.29  4.00 - 5.40 M/uL Final    Hemoglobin 12/31/2019 9.8* 12.0 - 16.0 g/dL Final    Hematocrit 12/31/2019 33.3* 37.0 - 48.5 % Final    Mean Corpuscular Volume 12/31/2019 78* 82 - 98 fL Final    Mean Corpuscular Hemoglobin 12/31/2019 22.8* 27.0 - 31.0 pg Final    Mean Corpuscular Hemoglobin Conc 12/31/2019 29.4* 32.0 - 36.0 g/dL Final    RDW 12/31/2019 25.7* 11.5 - 14.5 % Final    Platelets 12/31/2019 279  150 - 350 K/uL Final    MPV 12/31/2019 10.9  9.2 - 12.9 fL Final    Immature Granulocytes 12/31/2019 0.5  0.0 - 0.5 % Final    Gran # (ANC) 12/31/2019 6.4  1.8 - 7.7 K/uL Final    Immature Grans (Abs) 12/31/2019 0.04  0.00 - 0.04 K/uL Final    Lymph # 12/31/2019 1.2  1.0 - 4.8 K/uL Final    Mono # 12/31/2019 0.6  0.3 - 1.0 K/uL Final    Eos # 12/31/2019 0.1  0.0 - 0.5 K/uL Final    Baso # 12/31/2019 0.06  0.00 - 0.20 K/uL Final    nRBC 12/31/2019 0  0 /100 WBC Final    Gran% 12/31/2019 75.8* 38.0 - 73.0 % Final    Lymph% 12/31/2019 14.5* 18.0 - 48.0 % Final    Mono% 12/31/2019 7.0  4.0 - 15.0 % Final    Eosinophil% 12/31/2019 1.5  0.0 - 8.0 % Final    Basophil% 12/31/2019 0.7  0.0 - 1.9 % Final     Differential Method 12/31/2019 Automated   Final    Sodium 12/31/2019 140  136 - 145 mmol/L Final    Potassium 12/31/2019 3.5  3.5 - 5.1 mmol/L Final    Chloride 12/31/2019 106  95 - 110 mmol/L Final    CO2 12/31/2019 25  23 - 29 mmol/L Final    Glucose 12/31/2019 128* 70 - 110 mg/dL Final    BUN, Bld 12/31/2019 23  8 - 23 mg/dL Final    Creatinine 12/31/2019 1.0  0.5 - 1.4 mg/dL Final    Calcium 12/31/2019 8.6* 8.7 - 10.5 mg/dL Final    Total Protein 12/31/2019 6.8  6.0 - 8.4 g/dL Final    Albumin 12/31/2019 3.8  3.5 - 5.2 g/dL Final    Total Bilirubin 12/31/2019 0.8  0.1 - 1.0 mg/dL Final    Alkaline Phosphatase 12/31/2019 55  55 - 135 U/L Final    AST 12/31/2019 19  10 - 40 U/L Final    ALT 12/31/2019 18  10 - 44 U/L Final    Anion Gap 12/31/2019 9  8 - 16 mmol/L Final    eGFR if  12/31/2019 58.5* >60 mL/min/1.73 m^2 Final    eGFR if non African American 12/31/2019 50.8* >60 mL/min/1.73 m^2 Final    Troponin I 12/31/2019 <0.030  <=0.040 ng/mL Final    BNP 12/31/2019 750* 0 - 99 pg/mL Final    Troponin I 12/31/2019 <0.030  <=0.040 ng/mL Final    POC Glucose 12/31/2019 126* 70 - 110 mg/dL Final    POC BUN 12/31/2019 21  6 - 30 mg/dL Final    POC Creatinine 12/31/2019 1.0  0.5 - 1.4 mg/dL Final    POC Sodium 12/31/2019 140  136 - 145 mmol/L Final    POC Potassium 12/31/2019 3.6  3.5 - 5.1 mmol/L Final    POC Chloride 12/31/2019 106  95 - 110 mmol/L Final    POC TCO2 (MEASURED) 12/31/2019 25  23 - 29 mmol/L Final    POC Anion Gap 12/31/2019 14  8 - 16 mmol/L Final    POC Ionized Calcium 12/31/2019 1.17  1.06 - 1.42 mmol/L Final    POC Hematocrit 12/31/2019 32* 36 - 54 %PCV Final    Sample 12/31/2019 VENOUS   Final    TSH 12/31/2019 2.020  0.340 - 5.600 uIU/mL Final    Free T4 12/31/2019 0.95  0.71 - 1.51 ng/dL Final    POC Glucose 12/31/2019 154* 70 - 110 Final    POC Glucose 12/31/2019 148* 70 - 110 Final    Sodium 01/01/2020 140  136 - 145 mmol/L Final     Potassium 01/01/2020 3.7  3.5 - 5.1 mmol/L Final    Chloride 01/01/2020 104  95 - 110 mmol/L Final    CO2 01/01/2020 26  23 - 29 mmol/L Final    Glucose 01/01/2020 117* 70 - 110 mg/dL Final    BUN, Bld 01/01/2020 30* 8 - 23 mg/dL Final    Creatinine 01/01/2020 1.1  0.5 - 1.4 mg/dL Final    Calcium 01/01/2020 9.4  8.7 - 10.5 mg/dL Final    Anion Gap 01/01/2020 10  8 - 16 mmol/L Final    eGFR if African American 01/01/2020 52.2* >60 mL/min/1.73 m^2 Final    eGFR if non African American 01/01/2020 45.3* >60 mL/min/1.73 m^2 Final    Magnesium 01/01/2020 1.9  1.6 - 2.6 mg/dL Final    Phosphorus 01/01/2020 4.1  2.7 - 4.5 mg/dL Final    WBC 01/01/2020 5.40  3.90 - 12.70 K/uL Final    RBC 01/01/2020 4.32  4.00 - 5.40 M/uL Final    Hemoglobin 01/01/2020 10.2* 12.0 - 16.0 g/dL Final    Hematocrit 01/01/2020 33.4* 37.0 - 48.5 % Final    Mean Corpuscular Volume 01/01/2020 77* 82 - 98 fL Final    Mean Corpuscular Hemoglobin 01/01/2020 23.6* 27.0 - 31.0 pg Final    Mean Corpuscular Hemoglobin Conc 01/01/2020 30.5* 32.0 - 36.0 g/dL Final    RDW 01/01/2020 25.9* 11.5 - 14.5 % Final    Platelets 01/01/2020 237  150 - 350 K/uL Final    MPV 01/01/2020 10.6  9.2 - 12.9 fL Final    POC Glucose 01/01/2020 116* 70 - 110 Final    aPTT 01/01/2020 32.7  23.6 - 33.3 sec Final    PT 01/01/2020 13.3  10.6 - 14.8 sec Final    INR 01/01/2020 1.1   Final    MRSA SCREEN BY PCR 01/01/2020 Negative  Negative Final    POC Glucose 01/01/2020 129* 70 - 110 Final    POC Glucose 01/01/2020 135* 70 - 110 Final    POC Glucose 01/01/2020 147* 70 - 110 Final    Sodium 01/02/2020 142  136 - 145 mmol/L Final    Potassium 01/02/2020 3.6  3.5 - 5.1 mmol/L Final    Chloride 01/02/2020 106  95 - 110 mmol/L Final    CO2 01/02/2020 24  23 - 29 mmol/L Final    Glucose 01/02/2020 114* 70 - 110 mg/dL Final    BUN, Bld 01/02/2020 25* 8 - 23 mg/dL Final    Creatinine 01/02/2020 1.0  0.5 - 1.4 mg/dL Final    Calcium 01/02/2020 9.4  8.7  - 10.5 mg/dL Final    Anion Gap 01/02/2020 12  8 - 16 mmol/L Final    eGFR if African American 01/02/2020 58.5* >60 mL/min/1.73 m^2 Final    eGFR if non African American 01/02/2020 50.8* >60 mL/min/1.73 m^2 Final    Magnesium 01/02/2020 1.5* 1.6 - 2.6 mg/dL Final    Phosphorus 01/02/2020 4.3  2.7 - 4.5 mg/dL Final    WBC 01/02/2020 6.66  3.90 - 12.70 K/uL Final    RBC 01/02/2020 4.50  4.00 - 5.40 M/uL Final    Hemoglobin 01/02/2020 10.5* 12.0 - 16.0 g/dL Final    Hematocrit 01/02/2020 34.4* 37.0 - 48.5 % Final    Mean Corpuscular Volume 01/02/2020 76* 82 - 98 fL Final    Mean Corpuscular Hemoglobin 01/02/2020 23.3* 27.0 - 31.0 pg Final    Mean Corpuscular Hemoglobin Conc 01/02/2020 30.5* 32.0 - 36.0 g/dL Final    RDW 01/02/2020 26.1* 11.5 - 14.5 % Final    Platelets 01/02/2020 237  150 - 350 K/uL Final    MPV 01/02/2020 10.3  9.2 - 12.9 fL Final    POC Glucose 01/02/2020 128* 70 - 110 Final    POC Glucose 01/02/2020 127* 70 - 110 Final    Sodium 01/03/2020 138  136 - 145 mmol/L Final    Potassium 01/03/2020 3.7  3.5 - 5.1 mmol/L Final    Chloride 01/03/2020 104  95 - 110 mmol/L Final    CO2 01/03/2020 23  23 - 29 mmol/L Final    Glucose 01/03/2020 113* 70 - 110 mg/dL Final    BUN, Bld 01/03/2020 21  8 - 23 mg/dL Final    Creatinine 01/03/2020 0.8  0.5 - 1.4 mg/dL Final    Calcium 01/03/2020 8.7  8.7 - 10.5 mg/dL Final    Anion Gap 01/03/2020 11  8 - 16 mmol/L Final    eGFR if African American 01/03/2020 >60.0  >60 mL/min/1.73 m^2 Final    eGFR if non African American 01/03/2020 >60.0  >60 mL/min/1.73 m^2 Final    Magnesium 01/03/2020 1.6  1.6 - 2.6 mg/dL Final    Phosphorus 01/03/2020 3.6  2.7 - 4.5 mg/dL Final    WBC 01/03/2020 8.39  3.90 - 12.70 K/uL Final    RBC 01/03/2020 4.56  4.00 - 5.40 M/uL Final    Hemoglobin 01/03/2020 10.6* 12.0 - 16.0 g/dL Final    Hematocrit 01/03/2020 35.2* 37.0 - 48.5 % Final    Mean Corpuscular Volume 01/03/2020 77* 82 - 98 fL Final    Mean  Corpuscular Hemoglobin 01/03/2020 23.2* 27.0 - 31.0 pg Final    Mean Corpuscular Hemoglobin Conc 01/03/2020 30.1* 32.0 - 36.0 g/dL Final    RDW 01/03/2020 26.6* 11.5 - 14.5 % Final    Platelets 01/03/2020 183  150 - 350 K/uL Final    MPV 01/03/2020 11.6  9.2 - 12.9 fL Final    POC Glucose 01/03/2020 120* 70 - 110 Final    Vancomycin-Trough 01/03/2020 7.0* 10.0 - 22.0 ug/mL Final    POC Glucose 01/03/2020 130* 70 - 110 Final       Past Medical History:   Diagnosis Date    Anticoagulant long-term use     Cancer     Cataract     CHF (congestive heart failure)     Diabetes mellitus, type 2     Heart murmur     Hypertension     Iron deficiency anemia, unspecified     Anemia, iron def.    Occasional tremors      Past Surgical History:   Procedure Laterality Date    ADENOIDECTOMY      APPENDECTOMY      COLONOSCOPY N/A 12/17/2019    Procedure: COLONOSCOPY;  Surgeon: Raleigh Quarles III, MD;  Location: [facility] ENDO;  Service: Endoscopy;  Laterality: N/A;    COLONOSCOPY N/A 12/18/2019    Procedure: COLONOSCOPY;  Surgeon: Raleigh Quarles III, MD;  Location: [facility] ENDO;  Service: Endoscopy;  Laterality: N/A;    Gall bladder revmoval      HYSTERECTOMY      INSERTION OF PACEMAKER Right 1/2/2020    Procedure: INSERTION, PACEMAKER;  Surgeon: Fly Barragan MD;  Location: [facility] CATH/EP LAB;  Service: Cardiology;  Laterality: Right;    left mastectomy Left 1945    MASTECTOMY Left     VT CABG, ARTERY-VEIN, FOUR  2014    Coronary Artery Bypass, 4    SMALL BOWEL ENTEROSCOPY N/A 12/17/2019    Procedure: ENTEROSCOPY;  Surgeon: Raleigh Quarles III, MD;  Location: [facility] ENDO;  Service: Endoscopy;  Laterality: N/A;    TONSILLECTOMY       Family History   Problem Relation Age of Onset    No Known Problems Mother     Heart disease Father        Marital Status:   Alcohol History:  reports no history of alcohol use.  Tobacco History:  reports that she has never smoked. She has never used smokeless  tobacco.  Drug History:  reports no history of drug use.    Review of patient's allergies indicates:   Allergen Reactions    Penicillin g benzathine Other (See Comments)       Current Outpatient Medications:     allopurinoL (ZYLOPRIM) 100 MG tablet, Take 1 tablet (100 mg total) by mouth once daily., Disp: 90 tablet, Rfl: 1    amiodarone (PACERONE) 200 MG Tab, Take 1 tablet (200 mg total) by mouth once daily., Disp: 90 tablet, Rfl: 1    amLODIPine (NORVASC) 10 MG tablet, Take 0.5 tablets (5 mg total) by mouth once daily., Disp: 90 tablet, Rfl: 1    apixaban (ELIQUIS) 2.5 mg Tab, Take 1 tablet (2.5 mg total) by mouth 2 (two) times daily., Disp: 180 tablet, Rfl: 1    aspirin 81 MG Chew, Take 1 tablet (81 mg total) by mouth once daily., Disp: 30 tablet, Rfl: 3    busPIRone (BUSPAR) 5 MG Tab, Take 2 tablets (10 mg total) by mouth 2 (two) times daily., Disp: 180 tablet, Rfl: 1    cholecalciferol, vitamin D3, (VITAMIN D3) 1,000 unit capsule, Take 1 capsule (1,000 Units total) by mouth once daily., Disp: 100 capsule, Rfl: 1    CRESTOR 20 mg tablet, Take 1 tablet (20 mg total) by mouth once daily., Disp: 90 tablet, Rfl: 1    ferrous sulfate 325 (65 FE) MG EC tablet, Take 325 mg by mouth 2 (two) times daily., Disp: , Rfl:     fish oil-omega-3 fatty acids 300-1,000 mg capsule, Take 1 capsule by mouth once daily., Disp: , Rfl:     fluticasone propionate (FLONASE) 50 mcg/actuation nasal spray, 1 spray (50 mcg total) by Each Nostril route 2 (two) times daily., Disp: 16 g, Rfl: 3    hydroCHLOROthiazide (HYDRODIURIL) 12.5 MG Tab, Take 1 tablet (12.5 mg total) by mouth once daily., Disp: 90 tablet, Rfl: 1    loratadine (CLARITIN) 10 mg tablet, Take 1 tablet (10 mg total) by mouth once daily., Disp: 90 tablet, Rfl: 1    LORazepam (ATIVAN) 1 MG tablet, Take 1 tablet (1 mg total) by mouth 2 (two) times daily., Disp: 90 tablet, Rfl: 1    losartan (COZAAR) 100 MG tablet, Take 1 tablet (100 mg total) by mouth once daily.,  "Disp: 90 tablet, Rfl: 1    magnesium oxide (MAG-OX) 400 mg (241.3 mg magnesium) tablet, Take 1 tablet (400 mg total) by mouth once daily., Disp: 90 tablet, Rfl: 1    omeprazole (PRILOSEC) 20 MG capsule, Take 2 capsules (40 mg total) by mouth once daily., Disp: 180 capsule, Rfl: 1    ondansetron (ZOFRAN-ODT) 8 MG TbDL, Take 1 tablet (8 mg total) by mouth every 6 (six) hours as needed., Disp: 20 tablet, Rfl: 1    propranoloL (INDERAL) 80 MG tablet, Take 1 tablet (80 mg total) by mouth 2 (two) times daily., Disp: 180 tablet, Rfl: 1    vit C,C-Op-ymlgf-lutein-zeaxan (VISION FORMULA-2) 352-360-75-1 mg-unit-mg-mg Cap, Take 1 tablet by mouth 2 (two) times daily., Disp: 180 capsule, Rfl: 1    Review of Systems   Constitutional: Negative for appetite change, chills, fatigue, fever and unexpected weight change.   HENT: Negative for congestion.    Respiratory: Negative for cough, chest tightness and shortness of breath.    Cardiovascular: Negative for chest pain and palpitations.   Gastrointestinal: Negative for abdominal distention and abdominal pain.   Endocrine: Negative for cold intolerance and heat intolerance.   Genitourinary: Positive for flank pain, frequency and urgency. Negative for decreased urine volume, difficulty urinating and dysuria.   Musculoskeletal: Negative for arthralgias and back pain.   Neurological: Negative for dizziness, weakness and headaches.          Objective:      Vitals:    08/24/20 0932   BP: 118/84   Pulse: 64   Temp: 98.7 °F (37.1 °C)   Weight: 78.5 kg (173 lb)   Height: 5' 1" (1.549 m)     Physical Exam  Constitutional:       General: She is not in acute distress.     Appearance: She is well-developed.   HENT:      Head: Normocephalic and atraumatic.   Eyes:      Conjunctiva/sclera: Conjunctivae normal.      Pupils: Pupils are equal, round, and reactive to light.   Neck:      Musculoskeletal: Normal range of motion and neck supple.      Thyroid: No thyromegaly.   Cardiovascular:      " Rate and Rhythm: Normal rate and regular rhythm.      Heart sounds: Normal heart sounds.   Pulmonary:      Effort: Pulmonary effort is normal.      Breath sounds: Normal breath sounds.   Abdominal:      General: Bowel sounds are normal. There is no distension.      Palpations: Abdomen is soft.      Tenderness: There is no abdominal tenderness. There is no right CVA tenderness or left CVA tenderness.   Musculoskeletal: Normal range of motion.   Skin:     General: Skin is warm and dry.      Findings: No erythema.   Neurological:      Mental Status: She is alert and oriented to person, place, and time.      Cranial Nerves: No cranial nerve deficit.           Assessment:       1. Need for 23-polyvalent pneumococcal polysaccharide vaccine    2. Essential hypertension    3. Idiopathic chronic gout of multiple sites without tophus    4. Mixed hyperlipidemia    5. Paroxysmal atrial fibrillation    6. Non-seasonal allergic rhinitis due to pollen    7. Gastroenteritis    8. Other specified anxiety disorders    9. UTI symptoms         Plan:       Need for 23-polyvalent pneumococcal polysaccharide vaccine  -     Cancel: Pneumococcal Polysaccharide Vaccine (23 Valent) (SQ/IM)    Essential hypertension  Comments:  Very well managed.  Continue as is keep follow-up with cardiology    Idiopathic chronic gout of multiple sites without tophus    Mixed hyperlipidemia  Comments:  stable, continue statin therapy as is.    Paroxysmal atrial fibrillation  Comments:  Rate controlled, on amiodarone as well.  Maintain with low-dose Eliquis    Non-seasonal allergic rhinitis due to pollen    Gastroenteritis    Other specified anxiety disorders  Comments:  She appears to be doing very well from an anxiety standpoint. refills today as needed    UTI symptoms  Comments:  She reports lower abdominal pain, urinary frequency and urgency. Negative urinalysis. Unimpressive urine. will send for culture to confirm. push fluids for now.  Orders:  -      POCT Urinalysis, Dipstick, Automated, W/O Scope  -     Urine Culture, Routine      Follow up if symptoms worsen or fail to improve, for as scheduled.        8/24/2020 Casper Pratt PA-C

## 2020-08-24 NOTE — TELEPHONE ENCOUNTER
----- Message from Cata Cramer sent at 8/24/2020  7:17 AM CDT -----  Vm- pt has a bladder infection, when her bladder fills up it hurts, when she empties it, it doesn't  [phone]

## 2020-08-27 ENCOUNTER — TELEPHONE (OUTPATIENT)
Dept: FAMILY MEDICINE | Facility: CLINIC | Age: 85
End: 2020-08-27

## 2020-08-27 DIAGNOSIS — N39.0 UTI (URINARY TRACT INFECTION): Primary | ICD-10-CM

## 2020-08-27 LAB — BACTERIA UR CULT: ABNORMAL

## 2020-08-27 RX ORDER — CIPROFLOXACIN 500 MG/1
500 TABLET ORAL 2 TIMES DAILY
Qty: 10 TABLET | Refills: 0 | Status: SHIPPED | OUTPATIENT
Start: 2020-08-27 | End: 2020-09-01

## 2020-08-27 RX ORDER — CEFUROXIME AXETIL 250 MG/1
250 TABLET ORAL 2 TIMES DAILY
Qty: 14 TABLET | Refills: 0 | Status: CANCELLED | OUTPATIENT
Start: 2020-08-27 | End: 2020-09-03

## 2020-08-27 RX ORDER — SULFAMETHOXAZOLE AND TRIMETHOPRIM 800; 160 MG/1; MG/1
1 TABLET ORAL 2 TIMES DAILY
Qty: 10 TABLET | Refills: 0 | Status: SHIPPED | OUTPATIENT
Start: 2020-08-27 | End: 2020-09-01

## 2020-08-27 NOTE — TELEPHONE ENCOUNTER
----- Message from Casper Pratt PA-C sent at 8/27/2020  1:32 PM CDT -----  Please call patient and let her know that she does indeed have a significant UTI.  He given that she was having symptoms when she was seen the other day I would like to start Cipro 500 p.o. b.i.d. for 5 days.  We will await the official susceptibility report.  If she agrees please load this medication for me to send

## 2020-08-28 ENCOUNTER — TELEPHONE (OUTPATIENT)
Dept: FAMILY MEDICINE | Facility: CLINIC | Age: 85
End: 2020-08-28

## 2020-08-28 NOTE — TELEPHONE ENCOUNTER
----- Message from Fozia Quarles sent at 8/28/2020  8:24 AM CDT -----   8:38 Returning a call pt's # [phone]

## 2020-08-28 NOTE — TELEPHONE ENCOUNTER
Called pt. Advised we switched antibiotics & it was called in. Patient said she is going to pick it up.

## 2020-09-17 ENCOUNTER — PES CALL (OUTPATIENT)
Dept: ADMINISTRATIVE | Facility: CLINIC | Age: 85
End: 2020-09-17

## 2020-10-19 DIAGNOSIS — I48.0 PAROXYSMAL ATRIAL FIBRILLATION: Primary | ICD-10-CM

## 2020-11-06 ENCOUNTER — HOSPITAL ENCOUNTER (OUTPATIENT)
Dept: CARDIOLOGY | Facility: CLINIC | Age: 85
Discharge: HOME | End: 2020-11-06
Attending: INTERNAL MEDICINE
Payer: MEDICARE

## 2020-11-06 DIAGNOSIS — I48.0 PAROXYSMAL ATRIAL FIBRILLATION: ICD-10-CM

## 2020-11-06 PROCEDURE — 93294 REM INTERROG EVL PM/LDLS PM: CPT | Mod: S$GLB,,, | Performed by: INTERNAL MEDICINE

## 2020-11-06 PROCEDURE — 93296 CARDIAC DEVICE CHECK - REMOTE: ICD-10-PCS | Mod: S$GLB,,, | Performed by: INTERNAL MEDICINE

## 2020-11-06 PROCEDURE — 93294 CARDIAC DEVICE CHECK - REMOTE: ICD-10-PCS | Mod: S$GLB,,, | Performed by: INTERNAL MEDICINE

## 2020-11-06 PROCEDURE — 93296 REM INTERROG EVL PM/IDS: CPT | Mod: S$GLB,,, | Performed by: INTERNAL MEDICINE

## 2020-11-12 DIAGNOSIS — I48.0 PAROXYSMAL ATRIAL FIBRILLATION: Primary | ICD-10-CM

## 2020-11-18 DIAGNOSIS — I48.0 PAROXYSMAL ATRIAL FIBRILLATION: Primary | ICD-10-CM

## 2020-11-30 ENCOUNTER — TELEPHONE (OUTPATIENT)
Dept: FAMILY MEDICINE | Facility: CLINIC | Age: 85
End: 2020-11-30

## 2020-11-30 NOTE — TELEPHONE ENCOUNTER
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

## 2020-12-01 LAB
ALBUMIN SERPL-MCNC: 4 G/DL (ref 3.6–5.1)
ALBUMIN/GLOB SERPL: 1.2 (CALC) (ref 1–2.5)
ALP SERPL-CCNC: 62 U/L (ref 37–153)
ALT SERPL-CCNC: 24 U/L (ref 6–29)
AST SERPL-CCNC: 21 U/L (ref 10–35)
BILIRUB SERPL-MCNC: 0.5 MG/DL (ref 0.2–1.2)
BUN SERPL-MCNC: 24 MG/DL (ref 7–25)
BUN/CREAT SERPL: 25 (CALC) (ref 6–22)
CALCIUM SERPL-MCNC: 10.1 MG/DL (ref 8.6–10.4)
CHLORIDE SERPL-SCNC: 103 MMOL/L (ref 98–110)
CHOLEST SERPL-MCNC: 152 MG/DL
CHOLEST/HDLC SERPL: 4 (CALC)
CO2 SERPL-SCNC: 30 MMOL/L (ref 20–32)
CREAT SERPL-MCNC: 0.97 MG/DL (ref 0.6–0.88)
ERYTHROCYTE [DISTWIDTH] IN BLOOD BY AUTOMATED COUNT: 14 % (ref 11–15)
GFRSERPLBLD MDRD-ARVRAT: 52 ML/MIN/1.73M2
GLOBULIN SER CALC-MCNC: 3.3 G/DL (CALC) (ref 1.9–3.7)
GLUCOSE SERPL-MCNC: 139 MG/DL (ref 65–99)
HCT VFR BLD AUTO: 41.2 % (ref 35–45)
HDLC SERPL-MCNC: 38 MG/DL
HGB BLD-MCNC: 13.5 G/DL (ref 11.7–15.5)
LDLC SERPL CALC-MCNC: 85 MG/DL (CALC)
MAGNESIUM SERPL-MCNC: 1.5 MG/DL (ref 1.5–2.5)
MCH RBC QN AUTO: 30 PG (ref 27–33)
MCHC RBC AUTO-ENTMCNC: 32.8 G/DL (ref 32–36)
MCV RBC AUTO: 91.6 FL (ref 80–100)
NONHDLC SERPL-MCNC: 114 MG/DL (CALC)
PLATELET # BLD AUTO: 107 THOUSAND/UL (ref 140–400)
PMV BLD REES-ECKER: 12.2 FL (ref 7.5–12.5)
POTASSIUM SERPL-SCNC: 4.5 MMOL/L (ref 3.5–5.3)
PROT SERPL-MCNC: 7.3 G/DL (ref 6.1–8.1)
RBC # BLD AUTO: 4.5 MILLION/UL (ref 3.8–5.1)
SODIUM SERPL-SCNC: 142 MMOL/L (ref 135–146)
TRIGL SERPL-MCNC: 202 MG/DL
TSH SERPL-ACNC: 3.58 MIU/L (ref 0.4–4.5)
WBC # BLD AUTO: 5.2 THOUSAND/UL (ref 3.8–10.8)

## 2020-12-02 ENCOUNTER — TELEPHONE (OUTPATIENT)
Dept: FAMILY MEDICINE | Facility: CLINIC | Age: 85
End: 2020-12-02

## 2020-12-02 NOTE — TELEPHONE ENCOUNTER
Spoke with pt and let her know that we do not know the release date of the covid vaccine yet or if we will be giving it.

## 2020-12-02 NOTE — TELEPHONE ENCOUNTER
"----- Message from Rosaura Greenberg sent at 12/2/2020  2:31 PM CST -----  Pt is calling wants to know when can she get the Covid vaccine shot.  She stated the "TV said its been approved" Cb # [phone]    "

## 2021-02-03 DIAGNOSIS — F41.8 OTHER SPECIFIED ANXIETY DISORDERS: ICD-10-CM

## 2021-02-03 DIAGNOSIS — K21.9 GASTROESOPHAGEAL REFLUX DISEASE WITHOUT ESOPHAGITIS: ICD-10-CM

## 2021-02-03 DIAGNOSIS — E78.2 MIXED HYPERLIPIDEMIA: ICD-10-CM

## 2021-02-03 DIAGNOSIS — M1A.09X0 IDIOPATHIC CHRONIC GOUT OF MULTIPLE SITES WITHOUT TOPHUS: ICD-10-CM

## 2021-02-03 DIAGNOSIS — K52.9 GASTROENTERITIS: ICD-10-CM

## 2021-02-03 DIAGNOSIS — I10 ESSENTIAL HYPERTENSION: ICD-10-CM

## 2021-02-03 DIAGNOSIS — I48.0 PAROXYSMAL ATRIAL FIBRILLATION: ICD-10-CM

## 2021-02-03 DIAGNOSIS — J30.1 NON-SEASONAL ALLERGIC RHINITIS DUE TO POLLEN: ICD-10-CM

## 2021-02-03 DIAGNOSIS — M85.80 OSTEOPENIA, UNSPECIFIED LOCATION: ICD-10-CM

## 2021-02-03 RX ORDER — MUPIROCIN 20 MG/G
OINTMENT TOPICAL
COMMUNITY
Start: 2020-10-31 | End: 2021-02-03 | Stop reason: SDUPTHER

## 2021-02-04 RX ORDER — FLUTICASONE PROPIONATE 50 MCG
1 SPRAY, SUSPENSION (ML) NASAL 2 TIMES DAILY
Qty: 16 G | Refills: 3 | Status: SHIPPED | OUTPATIENT
Start: 2021-02-04 | End: 2021-02-11 | Stop reason: SDUPTHER

## 2021-02-04 RX ORDER — ROSUVASTATIN CALCIUM 20 MG/1
20 TABLET, FILM COATED ORAL DAILY
Qty: 90 TABLET | Refills: 1 | Status: SHIPPED | OUTPATIENT
Start: 2021-02-04 | End: 2021-02-11 | Stop reason: SDUPTHER

## 2021-02-04 RX ORDER — HYDROCHLOROTHIAZIDE 12.5 MG/1
12.5 TABLET ORAL DAILY
Qty: 90 TABLET | Refills: 1 | Status: SHIPPED | OUTPATIENT
Start: 2021-02-04 | End: 2021-02-11 | Stop reason: SDUPTHER

## 2021-02-04 RX ORDER — AMIODARONE HYDROCHLORIDE 200 MG/1
200 TABLET ORAL DAILY
Qty: 90 TABLET | Refills: 1 | Status: SHIPPED | OUTPATIENT
Start: 2021-02-04 | End: 2021-02-11 | Stop reason: SDUPTHER

## 2021-02-04 RX ORDER — AMLODIPINE BESYLATE 10 MG/1
5 TABLET ORAL DAILY
Qty: 90 TABLET | Refills: 1 | Status: SHIPPED | OUTPATIENT
Start: 2021-02-04 | End: 2021-02-11 | Stop reason: SDUPTHER

## 2021-02-04 RX ORDER — OMEPRAZOLE 20 MG/1
40 CAPSULE, DELAYED RELEASE ORAL DAILY
Qty: 180 CAPSULE | Refills: 1 | Status: SHIPPED | OUTPATIENT
Start: 2021-02-04 | End: 2021-08-18 | Stop reason: SDUPTHER

## 2021-02-04 RX ORDER — BUSPIRONE HYDROCHLORIDE 5 MG/1
10 TABLET ORAL 2 TIMES DAILY
Qty: 180 TABLET | Refills: 1 | Status: SHIPPED | OUTPATIENT
Start: 2021-02-04 | End: 2021-02-11 | Stop reason: SDUPTHER

## 2021-02-04 RX ORDER — LOSARTAN POTASSIUM 100 MG/1
100 TABLET ORAL DAILY
Qty: 90 TABLET | Refills: 1 | Status: SHIPPED | OUTPATIENT
Start: 2021-02-04 | End: 2021-02-11 | Stop reason: SDUPTHER

## 2021-02-04 RX ORDER — ALLOPURINOL 100 MG/1
100 TABLET ORAL DAILY
Qty: 90 TABLET | Refills: 1 | Status: SHIPPED | OUTPATIENT
Start: 2021-02-04 | End: 2021-02-11 | Stop reason: SDUPTHER

## 2021-02-04 RX ORDER — MUPIROCIN 20 MG/G
OINTMENT TOPICAL DAILY
Qty: 30 G | Refills: 1 | Status: SHIPPED | OUTPATIENT
Start: 2021-02-04 | End: 2023-01-31 | Stop reason: SDUPTHER

## 2021-02-04 RX ORDER — ONDANSETRON 8 MG/1
8 TABLET, ORALLY DISINTEGRATING ORAL EVERY 6 HOURS PRN
Qty: 20 TABLET | Refills: 1 | Status: SHIPPED | OUTPATIENT
Start: 2021-02-04 | End: 2021-02-11 | Stop reason: SDUPTHER

## 2021-02-04 RX ORDER — FERROUS SULFATE 325(65) MG
325 TABLET, DELAYED RELEASE (ENTERIC COATED) ORAL 2 TIMES DAILY
Qty: 180 TABLET | Refills: 1 | Status: SHIPPED | OUTPATIENT
Start: 2021-02-04 | End: 2021-12-06 | Stop reason: SDUPTHER

## 2021-02-04 RX ORDER — VIT C/E/ZN/COPPR/LUTEIN/ZEAXAN 250MG-90MG
1000 CAPSULE ORAL DAILY
Qty: 100 CAPSULE | Refills: 1 | Status: SHIPPED | OUTPATIENT
Start: 2021-02-04

## 2021-02-04 RX ORDER — LANOLIN ALCOHOL/MO/W.PET/CERES
400 CREAM (GRAM) TOPICAL DAILY
Qty: 90 TABLET | Refills: 1 | Status: SHIPPED | OUTPATIENT
Start: 2021-02-04 | End: 2021-08-18 | Stop reason: SDUPTHER

## 2021-02-04 RX ORDER — AMOXICILLIN 500 MG
1 CAPSULE ORAL DAILY
Qty: 90 CAPSULE | Refills: 1 | Status: SHIPPED | OUTPATIENT
Start: 2021-02-04 | End: 2023-07-26

## 2021-02-04 RX ORDER — LORAZEPAM 1 MG/1
1 TABLET ORAL 2 TIMES DAILY
Qty: 90 TABLET | Refills: 1 | Status: SHIPPED | OUTPATIENT
Start: 2021-02-04 | End: 2022-06-06 | Stop reason: SDUPTHER

## 2021-02-04 RX ORDER — PROPRANOLOL HYDROCHLORIDE 80 MG/1
80 TABLET ORAL 2 TIMES DAILY
Qty: 180 TABLET | Refills: 1 | Status: SHIPPED | OUTPATIENT
Start: 2021-02-04 | End: 2021-02-11 | Stop reason: SDUPTHER

## 2021-02-04 RX ORDER — LORATADINE 10 MG/1
10 TABLET ORAL DAILY
Qty: 90 TABLET | Refills: 1 | Status: SHIPPED | OUTPATIENT
Start: 2021-02-04 | End: 2021-08-19 | Stop reason: SDUPTHER

## 2021-02-04 RX ORDER — NAPROXEN SODIUM 220 MG/1
81 TABLET, FILM COATED ORAL DAILY
Qty: 90 TABLET | Refills: 1 | Status: SHIPPED | OUTPATIENT
Start: 2021-02-04 | End: 2022-02-01

## 2021-02-05 ENCOUNTER — HOSPITAL ENCOUNTER (OUTPATIENT)
Dept: CARDIOLOGY | Facility: CLINIC | Age: 86
Discharge: HOME | End: 2021-02-05
Attending: INTERNAL MEDICINE
Payer: MEDICARE

## 2021-02-05 DIAGNOSIS — I48.0 PAROXYSMAL ATRIAL FIBRILLATION: ICD-10-CM

## 2021-02-05 PROCEDURE — 93294 REM INTERROG EVL PM/LDLS PM: CPT | Mod: S$GLB,,, | Performed by: INTERNAL MEDICINE

## 2021-02-05 PROCEDURE — 93296 CARDIAC DEVICE CHECK - REMOTE: ICD-10-PCS | Mod: S$GLB,,, | Performed by: INTERNAL MEDICINE

## 2021-02-05 PROCEDURE — 93296 REM INTERROG EVL PM/IDS: CPT | Mod: S$GLB,,, | Performed by: INTERNAL MEDICINE

## 2021-02-05 PROCEDURE — 93294 CARDIAC DEVICE CHECK - REMOTE: ICD-10-PCS | Mod: S$GLB,,, | Performed by: INTERNAL MEDICINE

## 2021-02-11 ENCOUNTER — OFFICE VISIT (OUTPATIENT)
Dept: FAMILY MEDICINE | Facility: CLINIC | Age: 86
End: 2021-02-11
Payer: MEDICARE

## 2021-02-11 VITALS
DIASTOLIC BLOOD PRESSURE: 60 MMHG | SYSTOLIC BLOOD PRESSURE: 130 MMHG | HEIGHT: 61 IN | BODY MASS INDEX: 33.04 KG/M2 | WEIGHT: 175 LBS | HEART RATE: 60 BPM

## 2021-02-11 DIAGNOSIS — E11.9 TYPE 2 DIABETES MELLITUS WITHOUT COMPLICATION, WITHOUT LONG-TERM CURRENT USE OF INSULIN: Primary | ICD-10-CM

## 2021-02-11 DIAGNOSIS — J30.1 NON-SEASONAL ALLERGIC RHINITIS DUE TO POLLEN: ICD-10-CM

## 2021-02-11 DIAGNOSIS — K52.9 GASTROENTERITIS: ICD-10-CM

## 2021-02-11 DIAGNOSIS — E78.2 MIXED HYPERLIPIDEMIA: ICD-10-CM

## 2021-02-11 DIAGNOSIS — M1A.09X0 IDIOPATHIC CHRONIC GOUT OF MULTIPLE SITES WITHOUT TOPHUS: ICD-10-CM

## 2021-02-11 DIAGNOSIS — I48.0 PAROXYSMAL ATRIAL FIBRILLATION: ICD-10-CM

## 2021-02-11 DIAGNOSIS — I10 ESSENTIAL HYPERTENSION: ICD-10-CM

## 2021-02-11 DIAGNOSIS — F41.9 ANXIETY: ICD-10-CM

## 2021-02-11 LAB — HBA1C MFR BLD: 6.5 %

## 2021-02-11 PROCEDURE — 83036 HEMOGLOBIN GLYCOSYLATED A1C: CPT | Mod: QW,,, | Performed by: PHYSICIAN ASSISTANT

## 2021-02-11 PROCEDURE — 99214 PR OFFICE/OUTPT VISIT, EST, LEVL IV, 30-39 MIN: ICD-10-PCS | Mod: S$GLB,,, | Performed by: PHYSICIAN ASSISTANT

## 2021-02-11 PROCEDURE — 99214 OFFICE O/P EST MOD 30 MIN: CPT | Mod: S$GLB,,, | Performed by: PHYSICIAN ASSISTANT

## 2021-02-11 PROCEDURE — 83036 POCT HEMOGLOBIN A1C: ICD-10-PCS | Mod: QW,,, | Performed by: PHYSICIAN ASSISTANT

## 2021-02-11 RX ORDER — ALLOPURINOL 100 MG/1
100 TABLET ORAL DAILY
Qty: 90 TABLET | Refills: 1 | Status: SHIPPED | OUTPATIENT
Start: 2021-02-11 | End: 2021-08-18 | Stop reason: SDUPTHER

## 2021-02-11 RX ORDER — ONDANSETRON 8 MG/1
8 TABLET, ORALLY DISINTEGRATING ORAL EVERY 6 HOURS PRN
Qty: 20 TABLET | Refills: 1 | Status: SHIPPED | OUTPATIENT
Start: 2021-02-11 | End: 2021-12-06 | Stop reason: SDUPTHER

## 2021-02-11 RX ORDER — PROPRANOLOL HYDROCHLORIDE 80 MG/1
80 TABLET ORAL 2 TIMES DAILY
Qty: 180 TABLET | Refills: 1 | Status: SHIPPED | OUTPATIENT
Start: 2021-02-11 | End: 2021-08-18

## 2021-02-11 RX ORDER — AMIODARONE HYDROCHLORIDE 200 MG/1
200 TABLET ORAL DAILY
Qty: 90 TABLET | Refills: 1 | Status: SHIPPED | OUTPATIENT
Start: 2021-02-11 | End: 2021-08-18 | Stop reason: SDUPTHER

## 2021-02-11 RX ORDER — LOSARTAN POTASSIUM 100 MG/1
100 TABLET ORAL DAILY
Qty: 90 TABLET | Refills: 1 | Status: SHIPPED | OUTPATIENT
Start: 2021-02-11 | End: 2021-08-18

## 2021-02-11 RX ORDER — HYDROCHLOROTHIAZIDE 12.5 MG/1
12.5 TABLET ORAL DAILY
Qty: 90 TABLET | Refills: 1 | Status: SHIPPED | OUTPATIENT
Start: 2021-02-11 | End: 2021-08-18 | Stop reason: SDUPTHER

## 2021-02-11 RX ORDER — BUSPIRONE HYDROCHLORIDE 5 MG/1
10 TABLET ORAL 2 TIMES DAILY
Qty: 180 TABLET | Refills: 1 | Status: SHIPPED | OUTPATIENT
Start: 2021-02-11 | End: 2021-08-18 | Stop reason: SDUPTHER

## 2021-02-11 RX ORDER — FLUTICASONE PROPIONATE 50 MCG
1 SPRAY, SUSPENSION (ML) NASAL 2 TIMES DAILY
Qty: 16 G | Refills: 3 | Status: SHIPPED | OUTPATIENT
Start: 2021-02-11 | End: 2021-08-18 | Stop reason: SDUPTHER

## 2021-02-11 RX ORDER — ROSUVASTATIN CALCIUM 20 MG/1
20 TABLET, FILM COATED ORAL DAILY
Qty: 90 TABLET | Refills: 1 | Status: SHIPPED | OUTPATIENT
Start: 2021-02-11 | End: 2021-08-18 | Stop reason: SDUPTHER

## 2021-02-11 RX ORDER — AMLODIPINE BESYLATE 10 MG/1
5 TABLET ORAL DAILY
Qty: 90 TABLET | Refills: 1 | Status: SHIPPED | OUTPATIENT
Start: 2021-02-11 | End: 2021-12-06 | Stop reason: SDUPTHER

## 2021-02-18 ENCOUNTER — HOSPITAL ENCOUNTER (OUTPATIENT)
Dept: CARDIOLOGY | Facility: CLINIC | Age: 86
Discharge: HOME | End: 2021-02-18
Attending: INTERNAL MEDICINE
Payer: MEDICARE

## 2021-02-18 DIAGNOSIS — I48.0 PAROXYSMAL ATRIAL FIBRILLATION: ICD-10-CM

## 2021-02-18 PROCEDURE — 93280 PM DEVICE PROGR EVAL DUAL: CPT | Mod: S$GLB,,, | Performed by: INTERNAL MEDICINE

## 2021-02-18 PROCEDURE — 93280 CARDIAC DEVICE CHECK - IN CLINIC & HOSPITAL: ICD-10-PCS | Mod: S$GLB,,, | Performed by: INTERNAL MEDICINE

## 2021-02-24 DIAGNOSIS — E11.9 TYPE 2 DIABETES MELLITUS WITHOUT COMPLICATION, WITHOUT LONG-TERM CURRENT USE OF INSULIN: ICD-10-CM

## 2021-02-24 DIAGNOSIS — E11.9 TYPE 2 DIABETES MELLITUS WITHOUT COMPLICATION, WITHOUT LONG-TERM CURRENT USE OF INSULIN: Primary | ICD-10-CM

## 2021-02-24 RX ORDER — INSULIN PUMP SYRINGE, 3 ML
EACH MISCELLANEOUS
Qty: 1 EACH | Refills: 0 | Status: SHIPPED | OUTPATIENT
Start: 2021-02-24 | End: 2021-02-25 | Stop reason: SDUPTHER

## 2021-02-24 RX ORDER — LANCETS
EACH MISCELLANEOUS
Qty: 200 EACH | Refills: 1 | Status: SHIPPED | OUTPATIENT
Start: 2021-02-24 | End: 2021-02-25 | Stop reason: SDUPTHER

## 2021-02-25 LAB
BATTERY VOLTAGE (V): 2.99 V
IMPEDANCE RA LEAD (NATIVE): 490 OHMS
IMPEDANCE RA LEAD: 560 OHMS
P/R-WAVE RA LEAD (NATIVE): >12 MV
P/R-WAVE RA LEAD: 1.5 MV
THRESHOLD MS RA LEAD (NATIVE): 0.4 MS
THRESHOLD MS RA LEAD: 0.4 MS
THRESHOLD V RA LEAD (NATIVE): 1 V
THRESHOLD V RA LEAD: 0.75 V

## 2021-02-25 RX ORDER — LANCETS
EACH MISCELLANEOUS
Qty: 200 EACH | Refills: 1 | Status: SHIPPED | OUTPATIENT
Start: 2021-02-25 | End: 2021-08-18

## 2021-02-25 RX ORDER — INSULIN PUMP SYRINGE, 3 ML
EACH MISCELLANEOUS
Qty: 1 EACH | Refills: 0 | Status: SHIPPED | OUTPATIENT
Start: 2021-02-25 | End: 2023-12-07

## 2021-03-10 ENCOUNTER — TELEPHONE (OUTPATIENT)
Dept: CARDIOLOGY | Facility: CLINIC | Age: 86
End: 2021-03-10

## 2021-03-10 DIAGNOSIS — I48.0 PAROXYSMAL ATRIAL FIBRILLATION: Primary | ICD-10-CM

## 2021-03-25 LAB
BATTERY VOLTAGE (V): 2.99 V
IMPEDANCE RA LEAD (NATIVE): 510 OHMS
IMPEDANCE RA LEAD: 510 OHMS
P/R-WAVE RA LEAD (NATIVE): 12 MV
P/R-WAVE RA LEAD: 1.4 MV
THRESHOLD MS RA LEAD: 0.4 MS
THRESHOLD V RA LEAD: 0.75 V

## 2021-04-26 ENCOUNTER — TELEPHONE (OUTPATIENT)
Dept: FAMILY MEDICINE | Facility: CLINIC | Age: 86
End: 2021-04-26

## 2021-04-26 DIAGNOSIS — R25.1 OCCASIONAL TREMORS: Primary | ICD-10-CM

## 2021-05-07 ENCOUNTER — HOSPITAL ENCOUNTER (OUTPATIENT)
Dept: CARDIOLOGY | Facility: CLINIC | Age: 86
Discharge: HOME | End: 2021-05-07
Attending: INTERNAL MEDICINE
Payer: MEDICARE

## 2021-05-07 DIAGNOSIS — I48.0 PAROXYSMAL ATRIAL FIBRILLATION: Primary | ICD-10-CM

## 2021-05-07 DIAGNOSIS — I48.0 PAROXYSMAL ATRIAL FIBRILLATION: ICD-10-CM

## 2021-05-07 PROCEDURE — 93294 REM INTERROG EVL PM/LDLS PM: CPT | Mod: S$GLB,,, | Performed by: INTERNAL MEDICINE

## 2021-05-07 PROCEDURE — 93296 REM INTERROG EVL PM/IDS: CPT | Mod: S$GLB,,, | Performed by: INTERNAL MEDICINE

## 2021-05-07 PROCEDURE — 93294 CARDIAC DEVICE CHECK - REMOTE: ICD-10-PCS | Mod: S$GLB,,, | Performed by: INTERNAL MEDICINE

## 2021-05-07 PROCEDURE — 93296 CARDIAC DEVICE CHECK - REMOTE: ICD-10-PCS | Mod: S$GLB,,, | Performed by: INTERNAL MEDICINE

## 2021-05-16 LAB
BATTERY VOLTAGE (V): 3.01 V
IMPEDANCE RA LEAD (NATIVE): 510 OHMS
IMPEDANCE RA LEAD: 540 OHMS
P/R-WAVE RA LEAD (NATIVE): >12 MV
P/R-WAVE RA LEAD: 2.5 MV
THRESHOLD MS RA LEAD: 0.4 MS
THRESHOLD V RA LEAD: 0.75 V

## 2021-05-21 DIAGNOSIS — I48.0 PAROXYSMAL ATRIAL FIBRILLATION: Primary | ICD-10-CM

## 2021-06-04 LAB
BATTERY VOLTAGE (V): 2.99 V
IMPEDANCE RA LEAD (NATIVE): 490 OHMS
IMPEDANCE RA LEAD: 480 OHMS
P/R-WAVE RA LEAD (NATIVE): >12 MV
P/R-WAVE RA LEAD: 1.7 MV
THRESHOLD MS RA LEAD (NATIVE): NORMAL MS
THRESHOLD MS RA LEAD: 0.4 MS
THRESHOLD V RA LEAD (NATIVE): NORMAL V
THRESHOLD V RA LEAD: 0.75 V

## 2021-06-08 ENCOUNTER — OFFICE VISIT (OUTPATIENT)
Dept: CARDIOLOGY | Facility: CLINIC | Age: 86
End: 2021-06-08
Payer: MEDICARE

## 2021-06-08 VITALS
RESPIRATION RATE: 16 BRPM | HEART RATE: 70 BPM | HEIGHT: 61 IN | WEIGHT: 170 LBS | SYSTOLIC BLOOD PRESSURE: 120 MMHG | BODY MASS INDEX: 32.1 KG/M2 | DIASTOLIC BLOOD PRESSURE: 70 MMHG | OXYGEN SATURATION: 98 %

## 2021-06-08 DIAGNOSIS — G25.0 ESSENTIAL TREMOR: ICD-10-CM

## 2021-06-08 DIAGNOSIS — I25.10 CAD IN NATIVE ARTERY: ICD-10-CM

## 2021-06-08 DIAGNOSIS — R00.1 SYMPTOMATIC BRADYCARDIA: ICD-10-CM

## 2021-06-08 DIAGNOSIS — Z86.2 HISTORY OF ANEMIA: ICD-10-CM

## 2021-06-08 DIAGNOSIS — E78.2 MIXED HYPERLIPIDEMIA: ICD-10-CM

## 2021-06-08 DIAGNOSIS — K21.9 GASTROESOPHAGEAL REFLUX DISEASE WITHOUT ESOPHAGITIS: ICD-10-CM

## 2021-06-08 DIAGNOSIS — I48.0 PAROXYSMAL ATRIAL FIBRILLATION: Primary | Chronic | ICD-10-CM

## 2021-06-08 DIAGNOSIS — I10 ESSENTIAL HYPERTENSION: ICD-10-CM

## 2021-06-08 DIAGNOSIS — Z95.0 CARDIAC PACEMAKER IN SITU: ICD-10-CM

## 2021-06-08 PROCEDURE — 99214 OFFICE O/P EST MOD 30 MIN: CPT | Mod: S$GLB,,, | Performed by: INTERNAL MEDICINE

## 2021-06-08 PROCEDURE — 99214 PR OFFICE/OUTPT VISIT, EST, LEVL IV, 30-39 MIN: ICD-10-PCS | Mod: S$GLB,,, | Performed by: INTERNAL MEDICINE

## 2021-06-11 ENCOUNTER — TELEPHONE (OUTPATIENT)
Dept: FAMILY MEDICINE | Facility: CLINIC | Age: 86
End: 2021-06-11

## 2021-08-03 ENCOUNTER — TELEPHONE (OUTPATIENT)
Dept: FAMILY MEDICINE | Facility: CLINIC | Age: 86
End: 2021-08-03

## 2021-08-03 DIAGNOSIS — Z79.899 ENCOUNTER FOR LONG-TERM (CURRENT) USE OF OTHER MEDICATIONS: Primary | ICD-10-CM

## 2021-08-03 DIAGNOSIS — D50.0 ANEMIA DUE TO BLOOD LOSS: ICD-10-CM

## 2021-08-03 DIAGNOSIS — E11.9 TYPE 2 DIABETES MELLITUS WITHOUT COMPLICATION, WITHOUT LONG-TERM CURRENT USE OF INSULIN: ICD-10-CM

## 2021-08-03 DIAGNOSIS — I10 ESSENTIAL HYPERTENSION: ICD-10-CM

## 2021-08-06 ENCOUNTER — HOSPITAL ENCOUNTER (OUTPATIENT)
Dept: CARDIOLOGY | Facility: CLINIC | Age: 86
Discharge: HOME | End: 2021-08-06
Attending: INTERNAL MEDICINE
Payer: MEDICARE

## 2021-08-06 DIAGNOSIS — I48.0 PAROXYSMAL ATRIAL FIBRILLATION: ICD-10-CM

## 2021-08-06 PROCEDURE — 93294 REM INTERROG EVL PM/LDLS PM: CPT | Mod: S$GLB,,, | Performed by: INTERNAL MEDICINE

## 2021-08-06 PROCEDURE — 93294 CARDIAC DEVICE CHECK - REMOTE: ICD-10-PCS | Mod: S$GLB,,, | Performed by: INTERNAL MEDICINE

## 2021-08-06 PROCEDURE — 93296 CARDIAC DEVICE CHECK - REMOTE: ICD-10-PCS | Mod: S$GLB,,, | Performed by: INTERNAL MEDICINE

## 2021-08-06 PROCEDURE — 93296 REM INTERROG EVL PM/IDS: CPT | Mod: S$GLB,,, | Performed by: INTERNAL MEDICINE

## 2021-08-18 ENCOUNTER — TELEPHONE (OUTPATIENT)
Dept: FAMILY MEDICINE | Facility: CLINIC | Age: 86
End: 2021-08-18

## 2021-08-18 ENCOUNTER — OFFICE VISIT (OUTPATIENT)
Dept: FAMILY MEDICINE | Facility: CLINIC | Age: 86
End: 2021-08-18
Payer: MEDICARE

## 2021-08-18 VITALS
HEIGHT: 61 IN | HEART RATE: 80 BPM | DIASTOLIC BLOOD PRESSURE: 70 MMHG | WEIGHT: 170 LBS | BODY MASS INDEX: 32.1 KG/M2 | SYSTOLIC BLOOD PRESSURE: 130 MMHG

## 2021-08-18 DIAGNOSIS — K21.9 GASTROESOPHAGEAL REFLUX DISEASE WITHOUT ESOPHAGITIS: ICD-10-CM

## 2021-08-18 DIAGNOSIS — M1A.09X0 IDIOPATHIC CHRONIC GOUT OF MULTIPLE SITES WITHOUT TOPHUS: ICD-10-CM

## 2021-08-18 DIAGNOSIS — J30.1 NON-SEASONAL ALLERGIC RHINITIS DUE TO POLLEN: ICD-10-CM

## 2021-08-18 DIAGNOSIS — F41.8 OTHER SPECIFIED ANXIETY DISORDERS: ICD-10-CM

## 2021-08-18 DIAGNOSIS — Z79.01 ANTICOAGULANT LONG-TERM USE: Chronic | ICD-10-CM

## 2021-08-18 DIAGNOSIS — E78.2 MIXED HYPERLIPIDEMIA: ICD-10-CM

## 2021-08-18 DIAGNOSIS — E11.9 TYPE 2 DIABETES MELLITUS WITHOUT COMPLICATION, WITHOUT LONG-TERM CURRENT USE OF INSULIN: Primary | ICD-10-CM

## 2021-08-18 DIAGNOSIS — I50.32 CHRONIC DIASTOLIC CONGESTIVE HEART FAILURE: Chronic | ICD-10-CM

## 2021-08-18 DIAGNOSIS — I25.10 CORONARY ARTERY DISEASE, ANGINA PRESENCE UNSPECIFIED, UNSPECIFIED VESSEL OR LESION TYPE, UNSPECIFIED WHETHER NATIVE OR TRANSPLANTED HEART: Chronic | ICD-10-CM

## 2021-08-18 DIAGNOSIS — G25.0 ESSENTIAL TREMOR: ICD-10-CM

## 2021-08-18 DIAGNOSIS — F41.9 ANXIETY: ICD-10-CM

## 2021-08-18 DIAGNOSIS — Z95.0 CARDIAC PACEMAKER: ICD-10-CM

## 2021-08-18 DIAGNOSIS — I10 ESSENTIAL HYPERTENSION: ICD-10-CM

## 2021-08-18 DIAGNOSIS — Z85.3 HISTORY OF LOBULAR CARCINOMA OF BREAST: Chronic | ICD-10-CM

## 2021-08-18 DIAGNOSIS — R00.1 SYMPTOMATIC BRADYCARDIA: ICD-10-CM

## 2021-08-18 DIAGNOSIS — I48.0 PAROXYSMAL ATRIAL FIBRILLATION: ICD-10-CM

## 2021-08-18 DIAGNOSIS — I35.0 SEVERE AORTIC STENOSIS: Chronic | ICD-10-CM

## 2021-08-18 LAB — HBA1C MFR BLD: 6.3 %

## 2021-08-18 PROCEDURE — 83036 POCT HEMOGLOBIN A1C: ICD-10-PCS | Mod: QW,,, | Performed by: FAMILY MEDICINE

## 2021-08-18 PROCEDURE — 99214 PR OFFICE/OUTPT VISIT, EST, LEVL IV, 30-39 MIN: ICD-10-PCS | Mod: S$GLB,,, | Performed by: FAMILY MEDICINE

## 2021-08-18 PROCEDURE — 99214 OFFICE O/P EST MOD 30 MIN: CPT | Mod: S$GLB,,, | Performed by: FAMILY MEDICINE

## 2021-08-18 PROCEDURE — 83036 HEMOGLOBIN GLYCOSYLATED A1C: CPT | Mod: QW,,, | Performed by: FAMILY MEDICINE

## 2021-08-18 RX ORDER — ALLOPURINOL 100 MG/1
100 TABLET ORAL DAILY
Qty: 90 TABLET | Refills: 1 | Status: SHIPPED | OUTPATIENT
Start: 2021-08-18 | End: 2021-12-06 | Stop reason: SDUPTHER

## 2021-08-18 RX ORDER — AMIODARONE HYDROCHLORIDE 200 MG/1
200 TABLET ORAL DAILY
Qty: 90 TABLET | Refills: 1 | Status: SHIPPED | OUTPATIENT
Start: 2021-08-18 | End: 2021-12-06 | Stop reason: SDUPTHER

## 2021-08-18 RX ORDER — BUSPIRONE HYDROCHLORIDE 5 MG/1
10 TABLET ORAL 2 TIMES DAILY
Qty: 180 TABLET | Refills: 1 | Status: SHIPPED | OUTPATIENT
Start: 2021-08-18 | End: 2021-12-06 | Stop reason: SDUPTHER

## 2021-08-18 RX ORDER — ROSUVASTATIN CALCIUM 20 MG/1
20 TABLET, FILM COATED ORAL DAILY
Qty: 90 TABLET | Refills: 1 | Status: SHIPPED | OUTPATIENT
Start: 2021-08-18 | End: 2021-12-06 | Stop reason: SDUPTHER

## 2021-08-18 RX ORDER — LANCETS 28 GAUGE
EACH MISCELLANEOUS
COMMUNITY
Start: 2021-03-02

## 2021-08-18 RX ORDER — OMEPRAZOLE 20 MG/1
40 CAPSULE, DELAYED RELEASE ORAL DAILY
Qty: 180 CAPSULE | Refills: 1 | Status: SHIPPED | OUTPATIENT
Start: 2021-08-18 | End: 2021-08-24 | Stop reason: SDUPTHER

## 2021-08-18 RX ORDER — LOSARTAN POTASSIUM 100 MG/1
100 TABLET ORAL DAILY
Qty: 90 TABLET | Refills: 1 | Status: CANCELLED | OUTPATIENT
Start: 2021-08-18

## 2021-08-18 RX ORDER — LOSARTAN POTASSIUM 50 MG/1
50 TABLET ORAL 2 TIMES DAILY
Qty: 180 TABLET | Refills: 1 | Status: SHIPPED | OUTPATIENT
Start: 2021-08-18 | End: 2021-12-06 | Stop reason: SDUPTHER

## 2021-08-18 RX ORDER — AMLODIPINE BESYLATE 5 MG/1
5 TABLET ORAL 2 TIMES DAILY
Qty: 180 TABLET | Refills: 1 | Status: SHIPPED | OUTPATIENT
Start: 2021-08-18 | End: 2022-01-20 | Stop reason: SDUPTHER

## 2021-08-18 RX ORDER — OMEPRAZOLE 20 MG/1
40 CAPSULE, DELAYED RELEASE ORAL DAILY
Qty: 180 CAPSULE | Refills: 1 | Status: SHIPPED | OUTPATIENT
Start: 2021-08-18 | End: 2021-08-18 | Stop reason: SDUPTHER

## 2021-08-18 RX ORDER — AMLODIPINE BESYLATE 10 MG/1
5 TABLET ORAL DAILY
Qty: 90 TABLET | Refills: 1 | Status: CANCELLED | OUTPATIENT
Start: 2021-08-18

## 2021-08-18 RX ORDER — PROPRANOLOL HYDROCHLORIDE 40 MG/1
40 TABLET ORAL 2 TIMES DAILY
Qty: 180 TABLET | Refills: 1 | Status: SHIPPED | OUTPATIENT
Start: 2021-08-18 | End: 2021-12-06 | Stop reason: SDUPTHER

## 2021-08-18 RX ORDER — HYDROCHLOROTHIAZIDE 12.5 MG/1
12.5 TABLET ORAL DAILY
Qty: 90 TABLET | Refills: 1 | Status: SHIPPED | OUTPATIENT
Start: 2021-08-18 | End: 2021-12-06 | Stop reason: SDUPTHER

## 2021-08-18 RX ORDER — LANOLIN ALCOHOL/MO/W.PET/CERES
400 CREAM (GRAM) TOPICAL DAILY
Qty: 90 TABLET | Refills: 1 | Status: SHIPPED | OUTPATIENT
Start: 2021-08-18 | End: 2021-12-06 | Stop reason: SDUPTHER

## 2021-08-18 RX ORDER — PROPRANOLOL HYDROCHLORIDE 80 MG/1
80 TABLET ORAL 2 TIMES DAILY
Qty: 180 TABLET | Refills: 1 | Status: CANCELLED | OUTPATIENT
Start: 2021-08-18

## 2021-08-18 RX ORDER — FLUTICASONE PROPIONATE 50 MCG
1 SPRAY, SUSPENSION (ML) NASAL 2 TIMES DAILY
Qty: 16 G | Refills: 3 | Status: SHIPPED | OUTPATIENT
Start: 2021-08-18 | End: 2023-07-20

## 2021-08-19 DIAGNOSIS — J30.1 NON-SEASONAL ALLERGIC RHINITIS DUE TO POLLEN: ICD-10-CM

## 2021-08-19 RX ORDER — LORATADINE 10 MG/1
10 TABLET ORAL DAILY
Qty: 90 TABLET | Refills: 1 | Status: SHIPPED | OUTPATIENT
Start: 2021-08-19 | End: 2021-12-06 | Stop reason: SDUPTHER

## 2021-08-20 ENCOUNTER — TELEPHONE (OUTPATIENT)
Dept: FAMILY MEDICINE | Facility: CLINIC | Age: 86
End: 2021-08-20

## 2021-08-20 LAB
ALBUMIN SERPL-MCNC: 4.3 G/DL (ref 3.6–5.1)
ALBUMIN/CREAT UR: 56 MCG/MG CREAT
ALBUMIN/GLOB SERPL: 1.3 (CALC) (ref 1–2.5)
ALP SERPL-CCNC: 60 U/L (ref 37–153)
ALT SERPL-CCNC: 24 U/L (ref 6–29)
APPEARANCE UR: CLEAR
AST SERPL-CCNC: 24 U/L (ref 10–35)
BACTERIA #/AREA URNS HPF: ABNORMAL /HPF
BACTERIA UR CULT: ABNORMAL
BASOPHILS # BLD AUTO: 59 CELLS/UL (ref 0–200)
BASOPHILS NFR BLD AUTO: 1 %
BILIRUB SERPL-MCNC: 0.6 MG/DL (ref 0.2–1.2)
BILIRUB UR QL STRIP: NEGATIVE
BUN SERPL-MCNC: 27 MG/DL (ref 7–25)
BUN/CREAT SERPL: 28 (CALC) (ref 6–22)
CALCIUM SERPL-MCNC: 9.7 MG/DL (ref 8.6–10.4)
CHLORIDE SERPL-SCNC: 98 MMOL/L (ref 98–110)
CHOLEST SERPL-MCNC: 161 MG/DL
CHOLEST/HDLC SERPL: 3.7 (CALC)
CO2 SERPL-SCNC: 30 MMOL/L (ref 20–32)
COLOR UR: YELLOW
CREAT SERPL-MCNC: 0.95 MG/DL (ref 0.6–0.88)
CREAT UR-MCNC: 41 MG/DL (ref 20–275)
EOSINOPHIL # BLD AUTO: 189 CELLS/UL (ref 15–500)
EOSINOPHIL NFR BLD AUTO: 3.2 %
ERYTHROCYTE [DISTWIDTH] IN BLOOD BY AUTOMATED COUNT: 13.8 % (ref 11–15)
GLOBULIN SER CALC-MCNC: 3.2 G/DL (CALC) (ref 1.9–3.7)
GLUCOSE SERPL-MCNC: 147 MG/DL (ref 65–99)
GLUCOSE UR QL STRIP: NEGATIVE
HBA1C MFR BLD: 6.4 % OF TOTAL HGB
HCT VFR BLD AUTO: 43.2 % (ref 35–45)
HDLC SERPL-MCNC: 44 MG/DL
HGB BLD-MCNC: 14.2 G/DL (ref 11.7–15.5)
HGB UR QL STRIP: NEGATIVE
HYALINE CASTS #/AREA URNS LPF: ABNORMAL /LPF
KETONES UR QL STRIP: NEGATIVE
LDLC SERPL CALC-MCNC: 87 MG/DL (CALC)
LEUKOCYTE ESTERASE UR QL STRIP: ABNORMAL
LYMPHOCYTES # BLD AUTO: 1298 CELLS/UL (ref 850–3900)
LYMPHOCYTES NFR BLD AUTO: 22 %
MCH RBC QN AUTO: 30.6 PG (ref 27–33)
MCHC RBC AUTO-ENTMCNC: 32.9 G/DL (ref 32–36)
MCV RBC AUTO: 93.1 FL (ref 80–100)
MICROALBUMIN UR-MCNC: 2.3 MG/DL
MONOCYTES # BLD AUTO: 667 CELLS/UL (ref 200–950)
MONOCYTES NFR BLD AUTO: 11.3 %
NEUTROPHILS # BLD AUTO: 3688 CELLS/UL (ref 1500–7800)
NEUTROPHILS NFR BLD AUTO: 62.5 %
NITRITE UR QL STRIP: NEGATIVE
NONHDLC SERPL-MCNC: 117 MG/DL (CALC)
PH UR STRIP: 7 [PH] (ref 5–8)
PLATELET # BLD AUTO: 144 THOUSAND/UL (ref 140–400)
PMV BLD REES-ECKER: 12.1 FL (ref 7.5–12.5)
POTASSIUM SERPL-SCNC: 3.8 MMOL/L (ref 3.5–5.3)
PROT SERPL-MCNC: 7.5 G/DL (ref 6.1–8.1)
PROT UR QL STRIP: NEGATIVE
RBC # BLD AUTO: 4.64 MILLION/UL (ref 3.8–5.1)
RBC #/AREA URNS HPF: ABNORMAL /HPF
SODIUM SERPL-SCNC: 141 MMOL/L (ref 135–146)
SP GR UR STRIP: 1.01 (ref 1–1.03)
SQUAMOUS #/AREA URNS HPF: ABNORMAL /HPF
TRIGL SERPL-MCNC: 198 MG/DL
TSH SERPL-ACNC: 1.74 MIU/L (ref 0.4–4.5)
WBC # BLD AUTO: 5.9 THOUSAND/UL (ref 3.8–10.8)
WBC #/AREA URNS HPF: ABNORMAL /HPF

## 2021-08-24 DIAGNOSIS — J30.1 NON-SEASONAL ALLERGIC RHINITIS DUE TO POLLEN: ICD-10-CM

## 2021-08-24 DIAGNOSIS — K21.9 GASTROESOPHAGEAL REFLUX DISEASE WITHOUT ESOPHAGITIS: ICD-10-CM

## 2021-08-24 RX ORDER — OMEPRAZOLE 20 MG/1
40 CAPSULE, DELAYED RELEASE ORAL DAILY
Qty: 180 CAPSULE | Refills: 1 | Status: SHIPPED | OUTPATIENT
Start: 2021-08-24 | End: 2021-12-06 | Stop reason: SDUPTHER

## 2021-09-02 LAB
BATTERY VOLTAGE (V): 2.99 V
IMPEDANCE RA LEAD (NATIVE): 460 OHMS
IMPEDANCE RA LEAD: 550 OHMS
P/R-WAVE RA LEAD (NATIVE): >12 MV
THRESHOLD MS RA LEAD: 0.4 MS
THRESHOLD V RA LEAD: 0.88 V

## 2021-09-23 RX ORDER — NITROFURANTOIN (MACROCRYSTALS) 100 MG/1
100 CAPSULE ORAL EVERY 12 HOURS
Qty: 14 CAPSULE | Refills: 0 | Status: SHIPPED | OUTPATIENT
Start: 2021-09-23 | End: 2021-09-30

## 2021-10-12 ENCOUNTER — CLINICAL SUPPORT (OUTPATIENT)
Dept: FAMILY MEDICINE | Facility: CLINIC | Age: 86
End: 2021-10-12
Payer: MEDICARE

## 2021-10-12 VITALS — TEMPERATURE: 98 F

## 2021-10-12 DIAGNOSIS — Z23 NEED FOR INFLUENZA VACCINATION: Primary | ICD-10-CM

## 2021-10-12 PROCEDURE — 90662 IIV NO PRSV INCREASED AG IM: CPT | Mod: S$GLB,,, | Performed by: PHYSICIAN ASSISTANT

## 2021-10-12 PROCEDURE — G0008 FLU VACCINE - QUADRIVALENT - HIGH DOSE (65+) PRESERVATIVE FREE IM: ICD-10-PCS | Mod: S$GLB,,, | Performed by: PHYSICIAN ASSISTANT

## 2021-10-12 PROCEDURE — 90662 FLU VACCINE - QUADRIVALENT - HIGH DOSE (65+) PRESERVATIVE FREE IM: ICD-10-PCS | Mod: S$GLB,,, | Performed by: PHYSICIAN ASSISTANT

## 2021-10-12 PROCEDURE — G0008 ADMIN INFLUENZA VIRUS VAC: HCPCS | Mod: S$GLB,,, | Performed by: PHYSICIAN ASSISTANT

## 2021-11-05 ENCOUNTER — HOSPITAL ENCOUNTER (OUTPATIENT)
Dept: CARDIOLOGY | Facility: CLINIC | Age: 86
Discharge: HOME | End: 2021-11-05
Attending: INTERNAL MEDICINE
Payer: MEDICARE

## 2021-11-05 DIAGNOSIS — I48.0 PAROXYSMAL ATRIAL FIBRILLATION: ICD-10-CM

## 2021-11-05 PROCEDURE — 93296 REM INTERROG EVL PM/IDS: CPT | Mod: S$GLB,,, | Performed by: INTERNAL MEDICINE

## 2021-11-05 PROCEDURE — 93294 CARDIAC DEVICE CHECK - REMOTE: ICD-10-PCS | Mod: S$GLB,,, | Performed by: INTERNAL MEDICINE

## 2021-11-05 PROCEDURE — 93294 REM INTERROG EVL PM/LDLS PM: CPT | Mod: S$GLB,,, | Performed by: INTERNAL MEDICINE

## 2021-11-05 PROCEDURE — 93296 CARDIAC DEVICE CHECK - REMOTE: ICD-10-PCS | Mod: S$GLB,,, | Performed by: INTERNAL MEDICINE

## 2021-12-06 ENCOUNTER — OFFICE VISIT (OUTPATIENT)
Dept: FAMILY MEDICINE | Facility: CLINIC | Age: 86
End: 2021-12-06
Payer: MEDICARE

## 2021-12-06 VITALS
DIASTOLIC BLOOD PRESSURE: 84 MMHG | SYSTOLIC BLOOD PRESSURE: 132 MMHG | HEART RATE: 76 BPM | WEIGHT: 171 LBS | HEIGHT: 61 IN | BODY MASS INDEX: 32.28 KG/M2

## 2021-12-06 DIAGNOSIS — F41.9 ANXIETY: ICD-10-CM

## 2021-12-06 DIAGNOSIS — J30.1 NON-SEASONAL ALLERGIC RHINITIS DUE TO POLLEN: ICD-10-CM

## 2021-12-06 DIAGNOSIS — K21.9 GASTROESOPHAGEAL REFLUX DISEASE WITHOUT ESOPHAGITIS: ICD-10-CM

## 2021-12-06 DIAGNOSIS — I10 ESSENTIAL HYPERTENSION: ICD-10-CM

## 2021-12-06 DIAGNOSIS — I48.0 PAROXYSMAL ATRIAL FIBRILLATION: ICD-10-CM

## 2021-12-06 DIAGNOSIS — E11.9 TYPE 2 DIABETES MELLITUS WITHOUT COMPLICATION, WITHOUT LONG-TERM CURRENT USE OF INSULIN: Primary | ICD-10-CM

## 2021-12-06 DIAGNOSIS — E78.2 MIXED HYPERLIPIDEMIA: ICD-10-CM

## 2021-12-06 DIAGNOSIS — K52.9 GASTROENTERITIS: ICD-10-CM

## 2021-12-06 DIAGNOSIS — M1A.09X0 IDIOPATHIC CHRONIC GOUT OF MULTIPLE SITES WITHOUT TOPHUS: ICD-10-CM

## 2021-12-06 LAB — HBA1C MFR BLD: 6.6 %

## 2021-12-06 PROCEDURE — 99214 OFFICE O/P EST MOD 30 MIN: CPT | Mod: S$GLB,,, | Performed by: PHYSICIAN ASSISTANT

## 2021-12-06 PROCEDURE — 83036 HEMOGLOBIN GLYCOSYLATED A1C: CPT | Mod: QW,,, | Performed by: PHYSICIAN ASSISTANT

## 2021-12-06 PROCEDURE — 99214 PR OFFICE/OUTPT VISIT, EST, LEVL IV, 30-39 MIN: ICD-10-PCS | Mod: S$GLB,,, | Performed by: PHYSICIAN ASSISTANT

## 2021-12-06 PROCEDURE — 83036 POCT HEMOGLOBIN A1C: ICD-10-PCS | Mod: QW,,, | Performed by: PHYSICIAN ASSISTANT

## 2021-12-06 RX ORDER — ALLOPURINOL 100 MG/1
100 TABLET ORAL DAILY
Qty: 90 TABLET | Refills: 1 | Status: SHIPPED | OUTPATIENT
Start: 2021-12-06 | End: 2023-01-31 | Stop reason: SDUPTHER

## 2021-12-06 RX ORDER — HYDROCHLOROTHIAZIDE 12.5 MG/1
12.5 TABLET ORAL DAILY
Qty: 90 TABLET | Refills: 1 | Status: SHIPPED | OUTPATIENT
Start: 2021-12-06 | End: 2022-06-06 | Stop reason: SDUPTHER

## 2021-12-06 RX ORDER — ONDANSETRON 8 MG/1
8 TABLET, ORALLY DISINTEGRATING ORAL EVERY 6 HOURS PRN
Qty: 20 TABLET | Refills: 1 | Status: SHIPPED | OUTPATIENT
Start: 2021-12-06 | End: 2023-03-23

## 2021-12-06 RX ORDER — FERROUS SULFATE 325(65) MG
325 TABLET, DELAYED RELEASE (ENTERIC COATED) ORAL 2 TIMES DAILY
Qty: 180 TABLET | Refills: 1 | Status: SHIPPED | OUTPATIENT
Start: 2021-12-06 | End: 2022-06-06 | Stop reason: SDUPTHER

## 2021-12-06 RX ORDER — PROPRANOLOL HYDROCHLORIDE 40 MG/1
40 TABLET ORAL 2 TIMES DAILY
Qty: 180 TABLET | Refills: 1 | Status: SHIPPED | OUTPATIENT
Start: 2021-12-06 | End: 2022-06-06 | Stop reason: SDUPTHER

## 2021-12-06 RX ORDER — AMLODIPINE BESYLATE 5 MG/1
5 TABLET ORAL 2 TIMES DAILY
Qty: 180 TABLET | Refills: 1 | Status: CANCELLED | OUTPATIENT
Start: 2021-12-06 | End: 2022-12-06

## 2021-12-06 RX ORDER — LANOLIN ALCOHOL/MO/W.PET/CERES
400 CREAM (GRAM) TOPICAL DAILY
Qty: 90 TABLET | Refills: 1 | Status: SHIPPED | OUTPATIENT
Start: 2021-12-06 | End: 2022-06-06 | Stop reason: SDUPTHER

## 2021-12-06 RX ORDER — OMEPRAZOLE 20 MG/1
40 CAPSULE, DELAYED RELEASE ORAL DAILY
Qty: 180 CAPSULE | Refills: 1 | Status: SHIPPED | OUTPATIENT
Start: 2021-12-06 | End: 2022-06-06 | Stop reason: SDUPTHER

## 2021-12-06 RX ORDER — AMLODIPINE BESYLATE 10 MG/1
5 TABLET ORAL DAILY
Qty: 90 TABLET | Refills: 1 | Status: SHIPPED | OUTPATIENT
Start: 2021-12-06 | End: 2022-01-17 | Stop reason: SDUPTHER

## 2021-12-06 RX ORDER — LORATADINE 10 MG/1
10 TABLET ORAL DAILY
Qty: 90 TABLET | Refills: 1 | Status: SHIPPED | OUTPATIENT
Start: 2021-12-06 | End: 2023-01-31 | Stop reason: SDUPTHER

## 2021-12-06 RX ORDER — BUSPIRONE HYDROCHLORIDE 5 MG/1
10 TABLET ORAL 2 TIMES DAILY
Qty: 180 TABLET | Refills: 1 | Status: SHIPPED | OUTPATIENT
Start: 2021-12-06 | End: 2022-06-06 | Stop reason: SDUPTHER

## 2021-12-06 RX ORDER — AMIODARONE HYDROCHLORIDE 200 MG/1
200 TABLET ORAL DAILY
Qty: 90 TABLET | Refills: 1 | Status: SHIPPED | OUTPATIENT
Start: 2021-12-06 | End: 2022-02-01

## 2021-12-06 RX ORDER — LOSARTAN POTASSIUM 50 MG/1
50 TABLET ORAL 2 TIMES DAILY
Qty: 180 TABLET | Refills: 1 | Status: SHIPPED | OUTPATIENT
Start: 2021-12-06 | End: 2022-06-06 | Stop reason: SDUPTHER

## 2021-12-06 RX ORDER — ROSUVASTATIN CALCIUM 20 MG/1
20 TABLET, FILM COATED ORAL DAILY
Qty: 90 TABLET | Refills: 1 | Status: SHIPPED | OUTPATIENT
Start: 2021-12-06 | End: 2022-06-06 | Stop reason: SDUPTHER

## 2021-12-08 DIAGNOSIS — J30.1 NON-SEASONAL ALLERGIC RHINITIS DUE TO POLLEN: ICD-10-CM

## 2021-12-13 ENCOUNTER — TELEPHONE (OUTPATIENT)
Dept: FAMILY MEDICINE | Facility: CLINIC | Age: 86
End: 2021-12-13
Payer: MEDICARE

## 2021-12-15 ENCOUNTER — OFFICE VISIT (OUTPATIENT)
Dept: CARDIOLOGY | Facility: CLINIC | Age: 86
End: 2021-12-15
Payer: MEDICARE

## 2021-12-15 VITALS
HEIGHT: 61 IN | RESPIRATION RATE: 16 BRPM | SYSTOLIC BLOOD PRESSURE: 132 MMHG | HEART RATE: 97 BPM | WEIGHT: 171 LBS | OXYGEN SATURATION: 98 % | BODY MASS INDEX: 32.28 KG/M2 | DIASTOLIC BLOOD PRESSURE: 72 MMHG

## 2021-12-15 DIAGNOSIS — R00.1 SYMPTOMATIC BRADYCARDIA: ICD-10-CM

## 2021-12-15 DIAGNOSIS — I25.10 CORONARY ARTERY DISEASE, UNSPECIFIED VESSEL OR LESION TYPE, UNSPECIFIED WHETHER ANGINA PRESENT, UNSPECIFIED WHETHER NATIVE OR TRANSPLANTED HEART: Chronic | ICD-10-CM

## 2021-12-15 DIAGNOSIS — R94.31 NONSPECIFIC ABNORMAL ELECTROCARDIOGRAM (ECG) (EKG): ICD-10-CM

## 2021-12-15 DIAGNOSIS — I35.0 SEVERE AORTIC STENOSIS: Chronic | ICD-10-CM

## 2021-12-15 DIAGNOSIS — Z95.0 CARDIAC PACEMAKER: ICD-10-CM

## 2021-12-15 DIAGNOSIS — I50.32 CHRONIC DIASTOLIC CONGESTIVE HEART FAILURE: Primary | Chronic | ICD-10-CM

## 2021-12-15 DIAGNOSIS — E78.2 MIXED HYPERLIPIDEMIA: Chronic | ICD-10-CM

## 2021-12-15 DIAGNOSIS — K21.9 GASTROESOPHAGEAL REFLUX DISEASE WITHOUT ESOPHAGITIS: ICD-10-CM

## 2021-12-15 DIAGNOSIS — I10 ESSENTIAL HYPERTENSION: Chronic | ICD-10-CM

## 2021-12-15 PROCEDURE — 93000 ELECTROCARDIOGRAM COMPLETE: CPT | Mod: S$GLB,,, | Performed by: INTERNAL MEDICINE

## 2021-12-15 PROCEDURE — 99214 PR OFFICE/OUTPT VISIT, EST, LEVL IV, 30-39 MIN: ICD-10-PCS | Mod: S$GLB,,, | Performed by: INTERNAL MEDICINE

## 2021-12-15 PROCEDURE — 99214 OFFICE O/P EST MOD 30 MIN: CPT | Mod: S$GLB,,, | Performed by: INTERNAL MEDICINE

## 2021-12-15 PROCEDURE — 93000 EKG 12-LEAD: ICD-10-PCS | Mod: S$GLB,,, | Performed by: INTERNAL MEDICINE

## 2021-12-20 LAB
LEFT EYE DM RETINOPATHY: POSITIVE
RIGHT EYE DM RETINOPATHY: POSITIVE

## 2021-12-30 LAB
BATTERY VOLTAGE (V): 3.01 V
IMPEDANCE RA LEAD (NATIVE): 460 OHMS
IMPEDANCE RA LEAD: 560 OHMS
P/R-WAVE RA LEAD (NATIVE): >12 MV
THRESHOLD MS RA LEAD: 0.4 MS
THRESHOLD V RA LEAD: 0.88 V

## 2022-01-17 DIAGNOSIS — I10 ESSENTIAL HYPERTENSION: ICD-10-CM

## 2022-01-17 RX ORDER — AMLODIPINE BESYLATE 10 MG/1
5 TABLET ORAL DAILY
Qty: 90 TABLET | Refills: 1 | Status: SHIPPED | OUTPATIENT
Start: 2022-01-17 | End: 2022-02-01

## 2022-01-17 NOTE — TELEPHONE ENCOUNTER
----- Message from Karlie Penn sent at 1/17/2022  8:55 AM CST -----  Refill for Amlodipine 5 mg. Pt needs a hard copy. Pt #[phone]

## 2022-01-20 DIAGNOSIS — I10 ESSENTIAL HYPERTENSION: ICD-10-CM

## 2022-01-20 RX ORDER — AMLODIPINE BESYLATE 5 MG/1
5 TABLET ORAL 2 TIMES DAILY
Qty: 180 TABLET | Refills: 1 | Status: SHIPPED | OUTPATIENT
Start: 2022-01-20 | End: 2022-02-01

## 2022-02-01 ENCOUNTER — OFFICE VISIT (OUTPATIENT)
Dept: CARDIOLOGY | Facility: CLINIC | Age: 87
End: 2022-02-01
Payer: MEDICARE

## 2022-02-01 VITALS
WEIGHT: 173 LBS | BODY MASS INDEX: 32.66 KG/M2 | SYSTOLIC BLOOD PRESSURE: 132 MMHG | DIASTOLIC BLOOD PRESSURE: 76 MMHG | HEART RATE: 78 BPM | HEIGHT: 61 IN | OXYGEN SATURATION: 99 %

## 2022-02-01 DIAGNOSIS — I10 ESSENTIAL HYPERTENSION: Chronic | ICD-10-CM

## 2022-02-01 DIAGNOSIS — I10 ESSENTIAL HYPERTENSION: ICD-10-CM

## 2022-02-01 DIAGNOSIS — I48.0 PAROXYSMAL ATRIAL FIBRILLATION: Chronic | ICD-10-CM

## 2022-02-01 DIAGNOSIS — E11.9 TYPE 2 DIABETES MELLITUS WITHOUT COMPLICATION, WITHOUT LONG-TERM CURRENT USE OF INSULIN: Chronic | ICD-10-CM

## 2022-02-01 DIAGNOSIS — Z95.0 CARDIAC PACEMAKER: ICD-10-CM

## 2022-02-01 DIAGNOSIS — I25.10 CORONARY ARTERY DISEASE, UNSPECIFIED VESSEL OR LESION TYPE, UNSPECIFIED WHETHER ANGINA PRESENT, UNSPECIFIED WHETHER NATIVE OR TRANSPLANTED HEART: Chronic | ICD-10-CM

## 2022-02-01 DIAGNOSIS — I50.32 CHRONIC DIASTOLIC CONGESTIVE HEART FAILURE: Chronic | ICD-10-CM

## 2022-02-01 DIAGNOSIS — E78.2 MIXED HYPERLIPIDEMIA: Chronic | ICD-10-CM

## 2022-02-01 DIAGNOSIS — Z95.1 HX OF CABG: ICD-10-CM

## 2022-02-01 DIAGNOSIS — I48.0 PAROXYSMAL ATRIAL FIBRILLATION: ICD-10-CM

## 2022-02-01 DIAGNOSIS — I35.0 SEVERE AORTIC STENOSIS: Primary | Chronic | ICD-10-CM

## 2022-02-01 PROCEDURE — 99214 PR OFFICE/OUTPT VISIT, EST, LEVL IV, 30-39 MIN: ICD-10-PCS | Mod: S$GLB,,, | Performed by: NURSE PRACTITIONER

## 2022-02-01 PROCEDURE — 99214 OFFICE O/P EST MOD 30 MIN: CPT | Mod: S$GLB,,, | Performed by: NURSE PRACTITIONER

## 2022-02-01 RX ORDER — AMLODIPINE BESYLATE 5 MG/1
5 TABLET ORAL NIGHTLY
Qty: 90 TABLET | Refills: 3 | Status: SHIPPED | OUTPATIENT
Start: 2022-02-01 | End: 2023-01-31 | Stop reason: SDUPTHER

## 2022-02-01 RX ORDER — AMIODARONE HYDROCHLORIDE 200 MG/1
100 TABLET ORAL DAILY
Qty: 45 TABLET | Refills: 1 | Status: SHIPPED | OUTPATIENT
Start: 2022-02-01 | End: 2023-01-31 | Stop reason: SDUPTHER

## 2022-02-01 NOTE — PROGRESS NOTES
Subjective:    Patient ID:  Rosanna Raymundo is a 89 y.o. female   Chief Complaint   Patient presents with    Results     echo       HPI:  Ms. Raymundo presents today for follow up appointment. She states her breathing has been stable. She denies any chest pain. She does have some dizziness after taking her medications. Denies any falls.     Review of patient's allergies indicates:   Allergen Reactions    Penicillin g benzathine Other (See Comments)       Past Medical History:   Diagnosis Date    Anticoagulant long-term use     Cancer     Cataract     CHF (congestive heart failure)     Diabetes mellitus, type 2     Heart murmur     Hypertension     Iron deficiency anemia, unspecified     Anemia, iron def.    Occasional tremors      Past Surgical History:   Procedure Laterality Date    ADENOIDECTOMY      APPENDECTOMY      COLONOSCOPY N/A 12/17/2019    Procedure: COLONOSCOPY;  Surgeon: Raleigh Quarles III, MD;  Location: [facility] ENDO;  Service: Endoscopy;  Laterality: N/A;    COLONOSCOPY N/A 12/18/2019    Procedure: COLONOSCOPY;  Surgeon: Raleigh Quarles III, MD;  Location: [facility] ENDO;  Service: Endoscopy;  Laterality: N/A;    Gall bladder revmoval      HYSTERECTOMY      INSERTION OF PACEMAKER Right 1/2/2020    Procedure: INSERTION, PACEMAKER;  Surgeon: Fly Barragan MD;  Location: [facility] CATH/EP LAB;  Service: Cardiology;  Laterality: Right;    left mastectomy Left 1945    MASTECTOMY Left     [place] CABG, ARTERY-VEIN, FOUR  2014    Coronary Artery Bypass, 4    SMALL BOWEL ENTEROSCOPY N/A 12/17/2019    Procedure: ENTEROSCOPY;  Surgeon: Raleigh Quarles III, MD;  Location: [facility] ENDO;  Service: Endoscopy;  Laterality: N/A;    TONSILLECTOMY       Social History     Tobacco Use    Smoking status: Never Smoker    Smokeless tobacco: Never Used   Substance Use Topics    Alcohol use: Never    Drug use: Never     Family History   Problem Relation Age of Onset    No Known Problems Mother      Heart disease Father         Review of Systems:   Constitution: Negative for diaphoresis and fever.   HEENT: Negative for nosebleeds.    Cardiovascular: Negative for chest pain       No dyspnea on exertion       No leg swelling        No palpitations  Respiratory: Negative for shortness of breath and wheezing.    Hematologic/Lymphatic: Negative for bleeding problem. Does not bruise/bleed easily.   Skin: Negative for color change and rash.   Musculoskeletal: Negative for falls and myalgias.   Gastrointestinal: Negative for hematemesis and hematochezia.   Genitourinary: Negative for hematuria.   Neurological: + for dizziness and light-headedness.   Psychiatric/Behavioral: Negative for altered mental status and memory loss.          Objective:        Vitals:    02/01/22 1418   BP: 132/76   Pulse: 78       Lab Results   Component Value Date    WBC 5.9 08/18/2021    HGB 14.2 08/18/2021    HCT 43.2 08/18/2021     08/18/2021    CHOL 161 08/18/2021    TRIG 198 (H) 08/18/2021    HDL 44 (L) 08/18/2021    ALT 24 08/18/2021    AST 24 08/18/2021     08/18/2021    K 3.8 08/18/2021    CL 98 08/18/2021    CREATININE 0.95 (H) 08/18/2021    BUN 27 (H) 08/18/2021    CO2 30 08/18/2021    TSH 1.74 08/18/2021    INR 1.1 01/01/2020    HGBA1C 6.6 12/06/2021    MICROALBUR 2.3 08/18/2021        ECHOCARDIOGRAM RESULTS  Results for orders placed during the hospital encounter of 01/07/22    Echo    Interpretation Summary  · The left ventricle is normal in size with mild concentric hypertrophy and low normal systolic function.  · The estimated ejection fraction is 50%.  · Normal left ventricular diastolic function.  · Normal right ventricular size with low normal right ventricular systolic function.  · Mild left atrial enlargement.  · There is severe aortic valve stenosis.  · Aortic valve area is 0.81 cm2; peak velocity is 2.66 m/s; mean gradient is 18 mmHg.  · Mild tricuspid regurgitation.  · Mild mitral  regurgitation.        CURRENT/PREVIOUS VISIT EKG  Results for orders placed or performed in visit on 12/15/21   IN OFFICE EKG 12-LEAD (to [place])    Collection Time: 12/15/21  3:29 PM    Narrative    Test Reason : R94.31,    Vent. Rate : 060 BPM     Atrial Rate : 357 BPM     P-R Int : 312 ms          QRS Dur : 188 ms      QT Int : 534 ms       P-R-T Axes : 000 101 108 degrees     QTc Int : 534 ms    Atrial-paced rhythm with prolonged AV conduction  Right bundle branch block  Septal infarct (cited on or before 15-DEC-2019)  Abnormal ECG  When compared with ECG of 02-JAN-2020 12:37,  Previous ECG has undetermined rhythm, needs review  Right bundle branch block is now Present  Confirmed by Ashwin Herrera MD ([id number]) on 1/16/2022 3:17:16 PM    Referred By:             Confirmed By:Ashwin Herrera MD     No valid procedures specified.   No results found for this or any previous visit.      Physical Exam:  CONSTITUTIONAL: No fever, no chills  HEENT: Normocephalic, atraumatic,pupils reactive to light                 NECK:  No JVD no carotid bruit  CVS: S1S2+, RRR  LUNGS: Clear  ABDOMEN: Soft, NT, BS+  EXTREMITIES: No cyanosis, edema  : No vargas catheter  NEURO: AAO X 3  PSY: Normal affect      Medication List with Changes/Refills   Current Medications    ALLOPURINOL (ZYLOPRIM) 100 MG TABLET    Take 1 tablet (100 mg total) by mouth once daily.    APIXABAN (ELIQUIS) 2.5 MG TAB    Take 1 tablet (2.5 mg total) by mouth 2 (two) times daily.    BLOOD SUGAR DIAGNOSTIC STRP    To check BG 3 times daily, to use with insurance preferred meter    BLOOD-GLUCOSE METER KIT    To check BG 3 times daily, to use with insurance preferred meter    BUSPIRONE (BUSPAR) 5 MG TAB    Take 2 tablets (10 mg total) by mouth 2 (two) times daily.    CHOLECALCIFEROL, VITAMIN D3, (VITAMIN D3) 25 MCG (1,000 UNIT) CAPSULE    Take 1 capsule (1,000 Units total) by mouth once daily.    CRESTOR 20 MG TABLET    Take 1 tablet (20 mg total) by mouth once daily.     FERROUS SULFATE 325 (65 FE) MG EC TABLET    Take 1 tablet (325 mg total) by mouth 2 (two) times daily.    FLUTICASONE PROPIONATE (FLONASE) 50 MCG/ACTUATION NASAL SPRAY    1 spray (50 mcg total) by Each Nostril route 2 (two) times daily.    FREESTYLE LANCETS 28 GAUGE LANCETS        HYDROCHLOROTHIAZIDE (HYDRODIURIL) 12.5 MG TAB    Take 1 tablet (12.5 mg total) by mouth once daily.    LORATADINE (CLARITIN) 10 MG TABLET    Take 1 tablet (10 mg total) by mouth once daily.    LORAZEPAM (ATIVAN) 1 MG TABLET    Take 1 tablet (1 mg total) by mouth 2 (two) times daily.    LOSARTAN (COZAAR) 50 MG TABLET    Take 1 tablet (50 mg total) by mouth 2 (two) times a day.    MAGNESIUM OXIDE (MAG-OX) 400 MG (241.3 MG MAGNESIUM) TABLET    Take 1 tablet (400 mg total) by mouth once daily.    MUPIROCIN (BACTROBAN) 2 % OINTMENT    Apply topically once daily.    OMEGA-3 FATTY ACIDS/FISH OIL (FISH OIL-OMEGA-3 FATTY ACIDS) 300-1,000 MG CAPSULE    Take 1 capsule by mouth once daily.    OMEPRAZOLE (PRILOSEC) 20 MG CAPSULE    Take 2 capsules (40 mg total) by mouth once daily.    ONDANSETRON (ZOFRAN-ODT) 8 MG TBDL    Take 1 tablet (8 mg total) by mouth every 6 (six) hours as needed.    PROPRANOLOL (INDERAL) 40 MG TABLET    Take 1 tablet (40 mg total) by mouth 2 (two) times daily.    VIT C,A-RE-CIMZE-LUTEIN-ZEAXAN (VISION FORMULA-2) 250-90-40-1 MG CAP    Take 1 tablet by mouth 2 (two) times daily.   Changed and/or Refilled Medications    Modified Medication Previous Medication    AMIODARONE (PACERONE) 200 MG TAB amiodarone (PACERONE) 200 MG Tab       Take 0.5 tablets (100 mg total) by mouth once daily.    Take 1 tablet (200 mg total) by mouth once daily.    AMLODIPINE (NORVASC) 5 MG TABLET amLODIPine (NORVASC) 5 MG tablet       Take 1 tablet (5 mg total) by mouth every evening.    Take 1 tablet (5 mg total) by mouth 2 (two) times daily.   Discontinued Medications    AMLODIPINE (NORVASC) 10 MG TABLET    Take 0.5 tablets (5 mg total) by mouth once  daily.    ASPIRIN 81 MG CHEW    Take 1 tablet (81 mg total) by mouth once daily.             Assessment:       1. Severe aortic stenosis    2. Paroxysmal atrial fibrillation    3. Cardiac pacemaker    4. Coronary artery disease, unspecified vessel or lesion type, unspecified whether angina present, unspecified whether native or transplanted heart    5. Hx of CABG    6. Chronic diastolic congestive heart failure    7. Mixed hyperlipidemia    8. Essential hypertension    9. Type 2 diabetes mellitus without complication, without long-term current use of insulin    10. Essential hypertension    11. Paroxysmal atrial fibrillation         Plan:     1. Echocardiogram results reviewed with patient. EF is around 50%. She is noted to have severe AS with HEENA of 0.81 cm2, peak velocity of 2.66, and mean gradient of 18 mmHg.   2. Discussed options with patient including evaluation for TAVR. She prefers to wait for now, but she will consider evaluation if the AS worsens.   Will repeat a 2d echo in 6 months.   3. She is having issues with dizziness.   She has only been taking her amlodipine 5 mg po QHS instead of BID due to dizziness.   4. Decrease amiodarone to 100 mg po daily.   5. Patient is taking aspirin and ELiquis. She states she bruises easily. Will stop aspirin for now.   6. Will see her back for follow up in about 6 months. May call or return sooner if problems arise.     Problem List Items Addressed This Visit        Unprioritized    Paroxysmal atrial fibrillation (Chronic)    Relevant Medications    amiodarone (PACERONE) 200 MG Tab    Coronary artery disease (Chronic)    Type 2 diabetes mellitus without complication, without long-term current use of insulin (Chronic)    Mixed hyperlipidemia (Chronic)    Essential hypertension (Chronic)    Relevant Medications    amLODIPine (NORVASC) 5 MG tablet    Chronic diastolic congestive heart failure (Chronic)    Severe aortic stenosis - Primary (Chronic)    Relevant Orders     Echo    Cardiac pacemaker      Other Visit Diagnoses     Hx of CABG              Follow up in about 6 months (around 8/1/2022).

## 2022-03-16 ENCOUNTER — TELEPHONE (OUTPATIENT)
Dept: FAMILY MEDICINE | Facility: CLINIC | Age: 87
End: 2022-03-16
Payer: MEDICARE

## 2022-03-16 ENCOUNTER — HOSPITAL ENCOUNTER (OUTPATIENT)
Dept: RADIOLOGY | Facility: HOSPITAL | Age: 87
Discharge: HOME | End: 2022-03-16
Attending: NURSE PRACTITIONER
Payer: MEDICARE

## 2022-03-16 ENCOUNTER — OFFICE VISIT (OUTPATIENT)
Dept: FAMILY MEDICINE | Facility: CLINIC | Age: 87
End: 2022-03-16
Payer: MEDICARE

## 2022-03-16 VITALS
HEIGHT: 61 IN | DIASTOLIC BLOOD PRESSURE: 72 MMHG | SYSTOLIC BLOOD PRESSURE: 138 MMHG | BODY MASS INDEX: 31.72 KG/M2 | HEART RATE: 68 BPM | OXYGEN SATURATION: 97 % | WEIGHT: 168 LBS

## 2022-03-16 DIAGNOSIS — S93.602A SPRAIN OF LEFT FOOT, INITIAL ENCOUNTER: ICD-10-CM

## 2022-03-16 DIAGNOSIS — M79.672 LEFT FOOT PAIN: Primary | ICD-10-CM

## 2022-03-16 DIAGNOSIS — M79.672 LEFT FOOT PAIN: ICD-10-CM

## 2022-03-16 PROCEDURE — 99213 OFFICE O/P EST LOW 20 MIN: CPT | Mod: S$GLB,,, | Performed by: NURSE PRACTITIONER

## 2022-03-16 PROCEDURE — 73630 X-RAY EXAM OF FOOT: CPT | Mod: TC,PO,LT

## 2022-03-16 PROCEDURE — 99213 PR OFFICE/OUTPT VISIT, EST, LEVL III, 20-29 MIN: ICD-10-PCS | Mod: S$GLB,,, | Performed by: NURSE PRACTITIONER

## 2022-03-16 NOTE — TELEPHONE ENCOUNTER
----- Message from Faith Beebe NP sent at 3/16/2022  2:28 PM CDT -----  No fractures seen on x-ray. Continue rest, compression, ice, and tylenol as needed.

## 2022-03-16 NOTE — PROGRESS NOTES
SUBJECTIVE:    Patient ID: Rosanna Raymundo is a 89 y.o. female.    Chief Complaint: Ankle Injury (No bottles//Yesterday pt twisted her left ankle on her gravel road. Pt states that her ankle started hurting her last night.She can not put any weight on it//SRIRAM)    Pt presents s/p injury to left ankle. Was walking in her gravel driveway yesterday. Stepped wrong and twisted her ankle. No pain initially but after a few hours began to swell and painful. Painful to bear weight. Using a walker today that she does not normally use. Reports she is able to wiggle her toes and move ankle without issue. Took Tylenol. Used a compression sock and a walking boot as states it makes it easier for her to walk on the limb.         Hospital Outpatient Visit on 02/04/2022   Component Date Value Ref Range Status    Battery Voltage (V) 02/04/2022 2.99  V In process    P/R-wave RA Lead (native) 02/04/2022 >12.0  mV In process    Impedance RA Lead 02/04/2022 560  Ohms In process    Impedance RA Lead (native) 02/04/2022 490  Ohms In process    Threshold V RA Lead 02/04/2022 0.875  V In process    Theshold ms RA Lead 02/04/2022 0.4  ms In process   Hospital Outpatient Visit on 01/07/2022   Component Date Value Ref Range Status    AORTIC VALVE CUSP SEPERATION 01/07/2022 1.20  cm Final    Ao VTI 01/07/2022 84.20  cm Final    Ao peak michael 01/07/2022 2.66  m/s Final    AV mean gradient 01/07/2022 18  mmHg Final    IVRT 01/07/2022 155.00  ms Final    IVS 01/07/2022 0.96  0.6 - 1.1 cm Final    LVIDd 01/07/2022 4.93  3.5 - 6.0 cm Final    LVIDs 01/07/2022 3.67  2.1 - 4.0 cm Final    LVOT diameter 01/07/2022 2.00  cm Final    LVOT peak VTI 01/07/2022 21.80  cm Final    Posterior Wall 01/07/2022 1.07  0.6 - 1.1 cm Final    LA size 01/07/2022 4.20  cm Final    E wave deceleration time 01/07/2022 190.00  ms Final    MV stenosis pressure 1/2 time 01/07/2022 111.00  ms Final    MV Peak A Michael 01/07/2022 0.71  m/s Final    LVOT  peak michael 01/07/2022 0.57  m/s Final    RVDD 01/07/2022 2.09  cm Final    TR Max Michael 01/07/2022 2.36  m/s Final    Triscuspid Valve Regurgitation Pea* 01/07/2022 22  mmHg Final    BSA 01/07/2022 1.83  m2 Final    TDI SEPTAL 01/07/2022 0.05  m/s Final    LV LATERAL E/E' RATIO 01/07/2022 10.00  m/s Final    LV SEPTAL E/E' RATIO 01/07/2022 12.00  m/s Final    TDI LATERAL 01/07/2022 0.06  m/s Final    FS 01/07/2022 26  28 - 44 % Final    LV mass 01/07/2022 181.42  g Final    Left Ventricle Relative Wall Thick* 01/07/2022 0.43  cm Final    AV valve area 01/07/2022 0.81  cm2 Final    AV Velocity Ratio 01/07/2022 0.21   Final    AV index (prosthetic) 01/07/2022 0.26   Final    MV valve area p 1/2 method 01/07/2022 1.98  cm2 Final    E/A ratio 01/07/2022 0.85   Final    Mean e' 01/07/2022 0.06  m/s Final    LVOT area 01/07/2022 3.1  cm2 Final    LVOT stroke volume 01/07/2022 68.45  cm3 Final    AV peak gradient 01/07/2022 28  mmHg Final    E/E' ratio 01/07/2022 10.91  m/s Final    MV Peak E Michael 01/07/2022 0.60  m/s Final    LV Mass Index 01/07/2022 102  g/m2 Final    EF 01/07/2022 50  % Final   Orders Only on 12/20/2021   Component Date Value Ref Range Status    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   Final    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 Positive   Final   Office Visit on 12/06/2021   Component Date Value Ref Range Status    Hemoglobin A1C 12/06/2021 6.6  % Final   Hospital Outpatient Visit on 11/05/2021   Component Date Value Ref Range Status    Battery Voltage (V) 11/05/2021 3.01  V Final    P/R-wave RA Lead (native) 11/05/2021 >12.0  mV Final    Impedance RA Lead 11/05/2021 560  Ohms Final    Impedance RA Lead (native) 11/05/2021 460  Ohms Final    Threshold V RA Lead 11/05/2021 0.875  V Final    Theshold ms RA Lead 11/05/2021 0.4  ms Final       Past Medical History:   Diagnosis Date    Anticoagulant long-term use     Cancer     Cataract     CHF (congestive heart  failure)     Diabetes mellitus, type 2     Heart murmur     Hypertension     Iron deficiency anemia, unspecified     Anemia, iron def.    Occasional tremors      Past Surgical History:   Procedure Laterality Date    ADENOIDECTOMY      APPENDECTOMY      COLONOSCOPY N/A 12/17/2019    Procedure: COLONOSCOPY;  Surgeon: Raleigh Quarles III, MD;  Location: [facility] ENDO;  Service: Endoscopy;  Laterality: N/A;    COLONOSCOPY N/A 12/18/2019    Procedure: COLONOSCOPY;  Surgeon: Raleigh Quarles III, MD;  Location: [facility] ENDO;  Service: Endoscopy;  Laterality: N/A;    Gall bladder revmoval      HYSTERECTOMY      INSERTION OF PACEMAKER Right 1/2/2020    Procedure: INSERTION, PACEMAKER;  Surgeon: Fly Barragan MD;  Location: [facility] CATH/EP LAB;  Service: Cardiology;  Laterality: Right;    left mastectomy Left 1945    MASTECTOMY Left     [place] CABG, ARTERY-VEIN, FOUR  2014    Coronary Artery Bypass, 4    SMALL BOWEL ENTEROSCOPY N/A 12/17/2019    Procedure: ENTEROSCOPY;  Surgeon: Raleigh Quarles III, MD;  Location: [facility] ENDO;  Service: Endoscopy;  Laterality: N/A;    TONSILLECTOMY       Family History   Problem Relation Age of Onset    No Known Problems Mother     Heart disease Father        Marital Status:   Alcohol History:  reports no history of alcohol use.  Tobacco History:  reports that she has never smoked. She has never used smokeless tobacco.  Drug History:  reports no history of drug use.    Review of patient's allergies indicates:   Allergen Reactions    Penicillin g benzathine Other (See Comments)       Current Outpatient Medications:     allopurinoL (ZYLOPRIM) 100 MG tablet, Take 1 tablet (100 mg total) by mouth once daily., Disp: 90 tablet, Rfl: 1    amiodarone (PACERONE) 200 MG Tab, Take 0.5 tablets (100 mg total) by mouth once daily., Disp: 45 tablet, Rfl: 1    amLODIPine (NORVASC) 5 MG tablet, Take 1 tablet (5 mg total) by mouth every evening., Disp: 90 tablet, Rfl: 3     apixaban (ELIQUIS) 2.5 mg Tab, Take 1 tablet (2.5 mg total) by mouth 2 (two) times daily., Disp: 180 tablet, Rfl: 1    blood sugar diagnostic Strp, To check BG 3 times daily, to use with insurance preferred meter, Disp: 200 strip, Rfl: 1    blood-glucose meter kit, To check BG 3 times daily, to use with insurance preferred meter, Disp: 1 each, Rfl: 0    busPIRone (BUSPAR) 5 MG Tab, Take 2 tablets (10 mg total) by mouth 2 (two) times daily., Disp: 180 tablet, Rfl: 1    cholecalciferol, vitamin D3, (VITAMIN D3) 25 mcg (1,000 unit) capsule, Take 1 capsule (1,000 Units total) by mouth once daily., Disp: 100 capsule, Rfl: 1    CRESTOR 20 mg tablet, Take 1 tablet (20 mg total) by mouth once daily., Disp: 90 tablet, Rfl: 1    ferrous sulfate 325 (65 FE) MG EC tablet, Take 1 tablet (325 mg total) by mouth 2 (two) times daily., Disp: 180 tablet, Rfl: 1    fluticasone propionate (FLONASE) 50 mcg/actuation nasal spray, 1 spray (50 mcg total) by Each Nostril route 2 (two) times daily., Disp: 16 g, Rfl: 3    FREESTYLE LANCETS 28 gauge lancets, , Disp: , Rfl:     hydroCHLOROthiazide (HYDRODIURIL) 12.5 MG Tab, Take 1 tablet (12.5 mg total) by mouth once daily., Disp: 90 tablet, Rfl: 1    loratadine (CLARITIN) 10 mg tablet, Take 1 tablet (10 mg total) by mouth once daily., Disp: 90 tablet, Rfl: 1    LORazepam (ATIVAN) 1 MG tablet, Take 1 tablet (1 mg total) by mouth 2 (two) times daily., Disp: 90 tablet, Rfl: 1    losartan (COZAAR) 50 MG tablet, Take 1 tablet (50 mg total) by mouth 2 (two) times a day., Disp: 180 tablet, Rfl: 1    magnesium oxide (MAG-OX) 400 mg (241.3 mg magnesium) tablet, Take 1 tablet (400 mg total) by mouth once daily., Disp: 90 tablet, Rfl: 1    mupirocin (BACTROBAN) 2 % ointment, Apply topically once daily., Disp: 30 g, Rfl: 1    omega-3 fatty acids/fish oil (FISH OIL-OMEGA-3 FATTY ACIDS) 300-1,000 mg capsule, Take 1 capsule by mouth once daily., Disp: 90 capsule, Rfl: 1    omeprazole  "(PRILOSEC) 20 MG capsule, Take 2 capsules (40 mg total) by mouth once daily., Disp: 180 capsule, Rfl: 1    ondansetron (ZOFRAN-ODT) 8 MG TbDL, Take 1 tablet (8 mg total) by mouth every 6 (six) hours as needed., Disp: 20 tablet, Rfl: 1    propranoloL (INDERAL) 40 MG tablet, Take 1 tablet (40 mg total) by mouth 2 (two) times daily., Disp: 180 tablet, Rfl: 1    vit C,U-Za-sdsfg-lutein-zeaxan (VISION FORMULA-2) 250-90-40-1 mg Cap, Take 1 tablet by mouth 2 (two) times daily., Disp: 180 capsule, Rfl: 1    Review of Systems   Constitutional: Negative for activity change.   HENT: Negative.    Respiratory: Negative.    Cardiovascular: Negative.    Musculoskeletal: Positive for arthralgias (left ankle/foot pain) and gait problem.   Psychiatric/Behavioral: Negative.           Objective:      Vitals:    03/16/22 1155   BP: 138/72   Pulse: 68   SpO2: 97%   Weight: 76.2 kg (168 lb)   Height: 5' 1" (1.549 m)     Body mass index is 31.74 kg/m².  Physical Exam  Constitutional:       Appearance: Normal appearance.   HENT:      Head: Normocephalic and atraumatic.   Cardiovascular:      Rate and Rhythm: Normal rate.   Pulmonary:      Effort: No respiratory distress.   Musculoskeletal:        Feet:    Skin:     General: Skin is warm.   Neurological:      Mental Status: She is alert and oriented to person, place, and time.   Psychiatric:         Mood and Affect: Mood normal.           Assessment:       1. Left foot pain    2. Sprain of left foot, initial encounter         Plan:       Left foot pain  -     X-Ray Foot Complete 3 view Left; Future; Expected date: 03/16/2022    Sprain of left foot, initial encounter    Suspect mild sprain of left foot but will get xray to rule out any fractures. Recommend compression brace on the foot when walking. Okay for Tylenol. Elevate, rest, ice the foot.    Follow up if symptoms worsen or fail to improve.              "

## 2022-04-18 ENCOUNTER — TELEPHONE (OUTPATIENT)
Dept: FAMILY MEDICINE | Facility: CLINIC | Age: 87
End: 2022-04-18

## 2022-04-18 NOTE — TELEPHONE ENCOUNTER
----- Message from Tg Menon sent at 4/18/2022  3:22 PM CDT -----  Patient called and stated that she has  sent her a   button in case she falls or something it can send help and they advised her if she want to keep it contact her doctor to approve it or she will have to send it back please give her a call at [phone]

## 2022-04-19 ENCOUNTER — PES CALL (OUTPATIENT)
Dept: ADMINISTRATIVE | Facility: CLINIC | Age: 87
End: 2022-04-19
Payer: MEDICARE

## 2022-04-19 NOTE — TELEPHONE ENCOUNTER
I certainly have no problems with this. I think its a great resource. Not sure what exactly they need from me?

## 2022-06-06 ENCOUNTER — OFFICE VISIT (OUTPATIENT)
Dept: FAMILY MEDICINE | Facility: CLINIC | Age: 87
End: 2022-06-06
Payer: MEDICARE

## 2022-06-06 VITALS
BODY MASS INDEX: 31.72 KG/M2 | DIASTOLIC BLOOD PRESSURE: 78 MMHG | HEIGHT: 61 IN | SYSTOLIC BLOOD PRESSURE: 126 MMHG | HEART RATE: 90 BPM | WEIGHT: 168 LBS

## 2022-06-06 DIAGNOSIS — I10 ESSENTIAL HYPERTENSION: ICD-10-CM

## 2022-06-06 DIAGNOSIS — I48.0 PAROXYSMAL ATRIAL FIBRILLATION: ICD-10-CM

## 2022-06-06 DIAGNOSIS — E78.2 MIXED HYPERLIPIDEMIA: ICD-10-CM

## 2022-06-06 DIAGNOSIS — E11.9 TYPE 2 DIABETES MELLITUS WITHOUT COMPLICATION, WITHOUT LONG-TERM CURRENT USE OF INSULIN: Primary | ICD-10-CM

## 2022-06-06 DIAGNOSIS — F41.9 ANXIETY: ICD-10-CM

## 2022-06-06 DIAGNOSIS — K21.9 GASTROESOPHAGEAL REFLUX DISEASE WITHOUT ESOPHAGITIS: ICD-10-CM

## 2022-06-06 DIAGNOSIS — J30.89 ALLERGIC RHINITIS DUE TO OTHER ALLERGIC TRIGGER, UNSPECIFIED SEASONALITY: ICD-10-CM

## 2022-06-06 LAB — HBA1C MFR BLD: 6.4 %

## 2022-06-06 PROCEDURE — 99214 OFFICE O/P EST MOD 30 MIN: CPT | Mod: S$GLB,,, | Performed by: PHYSICIAN ASSISTANT

## 2022-06-06 PROCEDURE — 83036 HEMOGLOBIN GLYCOSYLATED A1C: CPT | Mod: QW,,, | Performed by: PHYSICIAN ASSISTANT

## 2022-06-06 PROCEDURE — 83036 POCT HEMOGLOBIN A1C: ICD-10-PCS | Mod: QW,,, | Performed by: PHYSICIAN ASSISTANT

## 2022-06-06 PROCEDURE — 99214 PR OFFICE/OUTPT VISIT, EST, LEVL IV, 30-39 MIN: ICD-10-PCS | Mod: S$GLB,,, | Performed by: PHYSICIAN ASSISTANT

## 2022-06-06 RX ORDER — FERROUS SULFATE 325(65) MG
325 TABLET, DELAYED RELEASE (ENTERIC COATED) ORAL 2 TIMES DAILY
Qty: 180 TABLET | Refills: 2 | Status: SHIPPED | OUTPATIENT
Start: 2022-06-06

## 2022-06-06 RX ORDER — OMEPRAZOLE 20 MG/1
40 CAPSULE, DELAYED RELEASE ORAL DAILY
Qty: 180 CAPSULE | Refills: 1 | Status: SHIPPED | OUTPATIENT
Start: 2022-06-06 | End: 2023-01-31 | Stop reason: SDUPTHER

## 2022-06-06 RX ORDER — BUSPIRONE HYDROCHLORIDE 5 MG/1
10 TABLET ORAL 2 TIMES DAILY
Qty: 180 TABLET | Refills: 1 | Status: SHIPPED | OUTPATIENT
Start: 2022-06-06 | End: 2023-01-31 | Stop reason: SDUPTHER

## 2022-06-06 RX ORDER — LOSARTAN POTASSIUM 50 MG/1
50 TABLET ORAL 2 TIMES DAILY
Qty: 180 TABLET | Refills: 1 | Status: SHIPPED | OUTPATIENT
Start: 2022-06-06 | End: 2023-01-31 | Stop reason: SDUPTHER

## 2022-06-06 RX ORDER — HYDROCHLOROTHIAZIDE 12.5 MG/1
12.5 TABLET ORAL DAILY
Qty: 90 TABLET | Refills: 1 | Status: SHIPPED | OUTPATIENT
Start: 2022-06-06 | End: 2023-01-31 | Stop reason: SDUPTHER

## 2022-06-06 RX ORDER — LORAZEPAM 1 MG/1
1 TABLET ORAL 2 TIMES DAILY
Qty: 90 TABLET | Refills: 1 | Status: SHIPPED | OUTPATIENT
Start: 2022-06-06 | End: 2022-12-05

## 2022-06-06 RX ORDER — LANOLIN ALCOHOL/MO/W.PET/CERES
400 CREAM (GRAM) TOPICAL DAILY
Qty: 90 TABLET | Refills: 2 | Status: SHIPPED | OUTPATIENT
Start: 2022-06-06 | End: 2023-01-31 | Stop reason: SDUPTHER

## 2022-06-06 RX ORDER — ROSUVASTATIN CALCIUM 20 MG/1
20 TABLET, FILM COATED ORAL DAILY
Qty: 90 TABLET | Refills: 1 | Status: SHIPPED | OUTPATIENT
Start: 2022-06-06 | End: 2023-01-31 | Stop reason: SDUPTHER

## 2022-06-06 RX ORDER — PROPRANOLOL HYDROCHLORIDE 40 MG/1
40 TABLET ORAL 2 TIMES DAILY
Qty: 180 TABLET | Refills: 1 | Status: SHIPPED | OUTPATIENT
Start: 2022-06-06 | End: 2023-01-31 | Stop reason: SDUPTHER

## 2022-06-06 NOTE — TELEPHONE ENCOUNTER
----- Message from Tg Menon sent at 6/6/2022 10:46 AM CDT -----  Patient called and stated that she need to have a prescription written for  her two vitamins  she would like to come to the office and pick them up if any questions please give her a call at [phone]

## 2022-06-06 NOTE — PROGRESS NOTES
"  SUBJECTIVE:    Patient ID: Rosanna Raymundo is a 89 y.o. female.    Chief Complaint: Diabetes (A1C ordered // brought bottles // sinus problem //abc)    This is an 89-year-old female who presents today for regular diabetic checkup.  A1c today 6.4%.  She is diet controlled.  She also carries history of hypertension, AFib, CHF and essential tremor.  She does follow with Cardiology, Dr. Herrera.  Today she reports doing well overall but having some sinus congestion that started a couple days prior. No fever/chills or major systemic sxs. She has not taken anything otc thus far. She just feels that some of her cardiac meds are "pulling her down a bit" She will be having echo and then appt with cardiology in a few weeks.      Office Visit on 06/06/2022   Component Date Value Ref Range Status    Hemoglobin A1C 06/06/2022 6.4  % Final   Hospital Outpatient Visit on 05/06/2022   Component Date Value Ref Range Status    Battery Voltage (V) 05/06/2022 2.99  V Final    P/R-wave RA Lead (native) 05/06/2022 >12.0  mV Final    Impedance RA Lead 05/06/2022 560  Ohms Final    Impedance RA Lead (native) 05/06/2022 460  Ohms Final    Threshold V RA Lead 05/06/2022 0.75  V Final    Theshold ms RA Lead 05/06/2022 0.4  ms Final   Hospital Outpatient Visit on 02/04/2022   Component Date Value Ref Range Status    Battery Voltage (V) 02/04/2022 2.99  V Final    P/R-wave RA Lead (native) 02/04/2022 >12.0  mV Final    Impedance RA Lead 02/04/2022 560  Ohms Final    Impedance RA Lead (native) 02/04/2022 490  Ohms Final    Threshold V RA Lead 02/04/2022 0.875  V Final    Theshold ms RA Lead 02/04/2022 0.4  ms Final   Hospital Outpatient Visit on 01/07/2022   Component Date Value Ref Range Status    AORTIC VALVE CUSP SEPERATION 01/07/2022 1.20  cm Final    Ao VTI 01/07/2022 84.20  cm Final    Ao peak radha 01/07/2022 2.66  m/s Final    AV mean gradient 01/07/2022 18  mmHg Final    IVRT 01/07/2022 155.00  ms Final    IVS " 01/07/2022 0.96  0.6 - 1.1 cm Final    LVIDd 01/07/2022 4.93  3.5 - 6.0 cm Final    LVIDs 01/07/2022 3.67  2.1 - 4.0 cm Final    LVOT diameter 01/07/2022 2.00  cm Final    LVOT peak VTI 01/07/2022 21.80  cm Final    Posterior Wall 01/07/2022 1.07  0.6 - 1.1 cm Final    LA size 01/07/2022 4.20  cm Final    E wave deceleration time 01/07/2022 190.00  ms Final    MV stenosis pressure 1/2 time 01/07/2022 111.00  ms Final    MV Peak A Michael 01/07/2022 0.71  m/s Final    LVOT peak michael 01/07/2022 0.57  m/s Final    RVDD 01/07/2022 2.09  cm Final    TR Max Michael 01/07/2022 2.36  m/s Final    Triscuspid Valve Regurgitation Pea* 01/07/2022 22  mmHg Final    BSA 01/07/2022 1.83  m2 Final    TDI SEPTAL 01/07/2022 0.05  m/s Final    LV LATERAL E/E' RATIO 01/07/2022 10.00  m/s Final    LV SEPTAL E/E' RATIO 01/07/2022 12.00  m/s Final    TDI LATERAL 01/07/2022 0.06  m/s Final    FS 01/07/2022 26  28 - 44 % Final    LV mass 01/07/2022 181.42  g Final    Left Ventricle Relative Wall Thick* 01/07/2022 0.43  cm Final    AV valve area 01/07/2022 0.81  cm2 Final    AV Velocity Ratio 01/07/2022 0.21   Final    AV index (prosthetic) 01/07/2022 0.26   Final    MV valve area p 1/2 method 01/07/2022 1.98  cm2 Final    E/A ratio 01/07/2022 0.85   Final    Mean e' 01/07/2022 0.06  m/s Final    LVOT area 01/07/2022 3.1  cm2 Final    LVOT stroke volume 01/07/2022 68.45  cm3 Final    AV peak gradient 01/07/2022 28  mmHg Final    E/E' ratio 01/07/2022 10.91  m/s Final    MV Peak E Michael 01/07/2022 0.60  m/s Final    LV Mass Index 01/07/2022 102  g/m2 Final    EF 01/07/2022 50  % Final   Orders Only on 12/20/2021   Component Date Value Ref Range Status    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    Occasional tremors      Past Surgical History:   Procedure Laterality Date    ADENOIDECTOMY      APPENDECTOMY      COLONOSCOPY N/A 12/17/2019    Procedure: COLONOSCOPY;  Surgeon: Raleigh Quarles III, MD;  Location: [facility] ENDO;  Service: Endoscopy;  Laterality: N/A;    COLONOSCOPY N/A 12/18/2019    Procedure: COLONOSCOPY;  Surgeon: Raleigh Quarles III, MD;  Location: [facility] ENDO;  Service: Endoscopy;  Laterality: N/A;    Gall bladder revmoval      HYSTERECTOMY      INSERTION OF PACEMAKER Right 1/2/2020    Procedure: INSERTION, PACEMAKER;  Surgeon: Fly Barragan MD;  Location: [facility] CATH/EP LAB;  Service: Cardiology;  Laterality: Right;    left mastectomy Left 1945    MASTECTOMY Left     RI CABG, ARTERY-VEIN, FOUR  2014    Coronary Artery Bypass, 4    SMALL BOWEL ENTEROSCOPY N/A 12/17/2019    Procedure: ENTEROSCOPY;  Surgeon: Raleigh Quarles III, MD;  Location: [facility] ENDO;  Service: Endoscopy;  Laterality: N/A;    TONSILLECTOMY       Family History   Problem Relation Age of Onset    No Known Problems Mother     Heart disease Father        Marital Status:   Alcohol History:  reports no history of alcohol use.  Tobacco History:  reports that she has never smoked. She has never used smokeless tobacco.  Drug History:  reports no history of drug use.    Review of patient's allergies indicates:   Allergen Reactions    Penicillin g benzathine Other (See Comments)       Current Outpatient Medications:     allopurinoL (ZYLOPRIM) 100 MG tablet, Take 1 tablet (100 mg total) by mouth once daily., Disp: 90 tablet, Rfl: 1    amiodarone (PACERONE) 200 MG Tab, Take 0.5 tablets (100 mg total) by mouth once daily., Disp: 45 tablet, Rfl: 1    amLODIPine (NORVASC) 5 MG tablet, Take 1 tablet (5 mg total) by mouth every evening., Disp: 90 tablet, Rfl: 3    blood sugar diagnostic  Strp, To check BG 3 times daily, to use with insurance preferred meter, Disp: 200 strip, Rfl: 1    cholecalciferol, vitamin D3, (VITAMIN D3) 25 mcg (1,000 unit) capsule, Take 1 capsule (1,000 Units total) by mouth once daily., Disp: 100 capsule, Rfl: 1    ferrous sulfate 325 (65 FE) MG EC tablet, Take 1 tablet (325 mg total) by mouth 2 (two) times daily., Disp: 180 tablet, Rfl: 1    fluticasone propionate (FLONASE) 50 mcg/actuation nasal spray, 1 spray (50 mcg total) by Each Nostril route 2 (two) times daily., Disp: 16 g, Rfl: 3    FREESTYLE LANCETS 28 gauge lancets, , Disp: , Rfl:     loratadine (CLARITIN) 10 mg tablet, Take 1 tablet (10 mg total) by mouth once daily., Disp: 90 tablet, Rfl: 1    magnesium oxide (MAG-OX) 400 mg (241.3 mg magnesium) tablet, Take 1 tablet (400 mg total) by mouth once daily., Disp: 90 tablet, Rfl: 1    mupirocin (BACTROBAN) 2 % ointment, Apply topically once daily., Disp: 30 g, Rfl: 1    omega-3 fatty acids/fish oil (FISH OIL-OMEGA-3 FATTY ACIDS) 300-1,000 mg capsule, Take 1 capsule by mouth once daily., Disp: 90 capsule, Rfl: 1    ondansetron (ZOFRAN-ODT) 8 MG TbDL, Take 1 tablet (8 mg total) by mouth every 6 (six) hours as needed., Disp: 20 tablet, Rfl: 1    vit C,O-Nm-epswt-lutein-zeaxan (VISION FORMULA-2) 250-90-40-1 mg Cap, Take 1 tablet by mouth 2 (two) times daily., Disp: 180 capsule, Rfl: 1    apixaban (ELIQUIS) 2.5 mg Tab, Take 1 tablet (2.5 mg total) by mouth 2 (two) times daily., Disp: 180 tablet, Rfl: 1    blood-glucose meter kit, To check BG 3 times daily, to use with insurance preferred meter, Disp: 1 each, Rfl: 0    busPIRone (BUSPAR) 5 MG Tab, Take 2 tablets (10 mg total) by mouth 2 (two) times daily., Disp: 180 tablet, Rfl: 1    CRESTOR 20 mg tablet, Take 1 tablet (20 mg total) by mouth once daily., Disp: 90 tablet, Rfl: 1    hydroCHLOROthiazide (HYDRODIURIL) 12.5 MG Tab, Take 1 tablet (12.5 mg total) by mouth once daily., Disp: 90 tablet, Rfl: 1     "LORazepam (ATIVAN) 1 MG tablet, Take 1 tablet (1 mg total) by mouth 2 (two) times daily., Disp: 90 tablet, Rfl: 1    losartan (COZAAR) 50 MG tablet, Take 1 tablet (50 mg total) by mouth 2 (two) times a day., Disp: 180 tablet, Rfl: 1    omeprazole (PRILOSEC) 20 MG capsule, Take 2 capsules (40 mg total) by mouth once daily., Disp: 180 capsule, Rfl: 1    propranoloL (INDERAL) 40 MG tablet, Take 1 tablet (40 mg total) by mouth 2 (two) times daily., Disp: 180 tablet, Rfl: 1    Review of Systems   Constitutional: Negative for appetite change, chills, fatigue, fever and unexpected weight change.   HENT: Negative for congestion.    Respiratory: Negative for cough, chest tightness and shortness of breath.    Cardiovascular: Negative for chest pain and palpitations.   Gastrointestinal: Negative for abdominal distention and abdominal pain.   Endocrine: Negative for cold intolerance and heat intolerance.   Genitourinary: Negative for difficulty urinating and dysuria.   Musculoskeletal: Negative for arthralgias and back pain.   Neurological: Positive for tremors (chronic). Negative for dizziness, weakness and headaches.          Objective:      Vitals:    06/06/22 0722   BP: 126/78   Pulse: 90   Weight: 76.2 kg (168 lb)   Height: 5' 1" (1.549 m)     Physical Exam  Constitutional:       General: She is not in acute distress.     Appearance: She is well-developed. She is not ill-appearing.   HENT:      Head: Normocephalic and atraumatic.      Nose: No congestion.   Eyes:      General: No scleral icterus.     Conjunctiva/sclera: Conjunctivae normal.      Pupils: Pupils are equal, round, and reactive to light.   Neck:      Thyroid: No thyromegaly.      Vascular: No carotid bruit.   Cardiovascular:      Rate and Rhythm: Normal rate and regular rhythm.      Pulses: Normal pulses.           Dorsalis pedis pulses are 2+ on the right side and 2+ on the left side.        Posterior tibial pulses are 2+ on the right side and 2+ on the " left side.      Heart sounds: Murmur (Harsh, systolic Aortic murmur appreciated) heard.     No friction rub. No gallop.   Pulmonary:      Effort: Pulmonary effort is normal. No respiratory distress.      Breath sounds: Normal breath sounds. No stridor. No wheezing, rhonchi or rales.   Abdominal:      General: Bowel sounds are normal. There is no distension.      Palpations: Abdomen is soft.      Tenderness: There is no abdominal tenderness.   Musculoskeletal:         General: No swelling or tenderness. Normal range of motion.      Cervical back: Normal range of motion and neck supple.      Right lower leg: No edema.      Left lower leg: No edema.   Feet:      Right foot:      Protective Sensation: 6 sites tested. 6 sites sensed.      Skin integrity: Dry skin present. No ulcer, blister or fissure.      Toenail Condition: Right toenails are abnormally thick.      Left foot:      Protective Sensation: 6 sites tested. 6 sites sensed.      Skin integrity: Dry skin present. No ulcer, blister or fissure.      Toenail Condition: Left toenails are abnormally thick.   Skin:     General: Skin is warm and dry.      Capillary Refill: Capillary refill takes less than 2 seconds.      Coloration: Skin is not jaundiced.      Findings: No erythema.   Neurological:      Mental Status: She is alert and oriented to person, place, and time.      Cranial Nerves: No cranial nerve deficit.   Psychiatric:         Mood and Affect: Mood normal.         Behavior: Behavior normal.           Assessment:       1. Type 2 diabetes mellitus without complication, without long-term current use of insulin    2. Allergic rhinitis due to other allergic trigger, unspecified seasonality    3. Paroxysmal atrial fibrillation    4. Essential hypertension    5. Anxiety    6. Paroxysmal atrial fibrillation    7. Gastroesophageal reflux disease without esophagitis    8. Mixed hyperlipidemia         Plan:       Type 2 diabetes mellitus without complication,  without long-term current use of insulin  Comments:  A1c 6.4% and just diet controlled. continue as is.  Orders:  -     Hemoglobin A1C, POCT    Allergic rhinitis due to other allergic trigger, unspecified seasonality  Comments:  Suspect that sxs of 2 days duration likely to be allergy related rather than bacterial. may continue with antihistamine and flonase prn.    Paroxysmal atrial fibrillation  Comments:  rate controlled. on eliquis. follows with cardiology.  Orders:  -     propranoloL (INDERAL) 40 MG tablet; Take 1 tablet (40 mg total) by mouth 2 (two) times daily.  Dispense: 180 tablet; Refill: 1  -     apixaban (ELIQUIS) 2.5 mg Tab; Take 1 tablet (2.5 mg total) by mouth 2 (two) times daily.  Dispense: 180 tablet; Refill: 1    Essential hypertension  Comments:  BP is at goal. no changes.  Orders:  -     propranoloL (INDERAL) 40 MG tablet; Take 1 tablet (40 mg total) by mouth 2 (two) times daily.  Dispense: 180 tablet; Refill: 1  -     losartan (COZAAR) 50 MG tablet; Take 1 tablet (50 mg total) by mouth 2 (two) times a day.  Dispense: 180 tablet; Refill: 1  -     hydroCHLOROthiazide (HYDRODIURIL) 12.5 MG Tab; Take 1 tablet (12.5 mg total) by mouth once daily.  Dispense: 90 tablet; Refill: 1    Anxiety  Comments:  Seems to be well managed. discussed lorazepam strictly prn. consider tapering buspar as well but will maintain for now.  Orders:  -     LORazepam (ATIVAN) 1 MG tablet; Take 1 tablet (1 mg total) by mouth 2 (two) times daily.  Dispense: 90 tablet; Refill: 1  -     busPIRone (BUSPAR) 5 MG Tab; Take 2 tablets (10 mg total) by mouth 2 (two) times daily.  Dispense: 180 tablet; Refill: 1    Paroxysmal atrial fibrillation  Comments:  Rate controlled, on Amiodarone.  Maintain with low-dose Eliquis.  Scheduled for a pacemaker interrogation the upcoming months.  Orders:  -     propranoloL (INDERAL) 40 MG tablet; Take 1 tablet (40 mg total) by mouth 2 (two) times daily.  Dispense: 180 tablet; Refill: 1  -      apixaban (ELIQUIS) 2.5 mg Tab; Take 1 tablet (2.5 mg total) by mouth 2 (two) times daily.  Dispense: 180 tablet; Refill: 1    Gastroesophageal reflux disease without esophagitis  -     omeprazole (PRILOSEC) 20 MG capsule; Take 2 capsules (40 mg total) by mouth once daily.  Dispense: 180 capsule; Refill: 1    Mixed hyperlipidemia  Comments:  Currently stable, continue Crestor therapy as is.  Refill sent today.  Orders:  -     CRESTOR 20 mg tablet; Take 1 tablet (20 mg total) by mouth once daily.  Dispense: 90 tablet; Refill: 1      Follow up in about 6 months (around 12/6/2022) for Annual Physical.        6/6/2022 Casper Pratt PA-C

## 2022-06-14 RX ORDER — VIT A/VIT C/VIT E/ZINC/COPPER 4296-226
1 CAPSULE ORAL 2 TIMES DAILY
Qty: 180 CAPSULE | Refills: 3 | Status: SHIPPED | OUTPATIENT
Start: 2022-06-14 | End: 2022-06-15 | Stop reason: SDUPTHER

## 2022-06-14 NOTE — TELEPHONE ENCOUNTER
----- Message from Tg Menon sent at 6/14/2022  7:49 AM CDT -----   06/14/22 @7:46 AM :patient called and stated that the wrong rx was called into the pharmacy please give her a call at [phone]

## 2022-06-15 RX ORDER — VIT A/VIT C/VIT E/ZINC/COPPER 4296-226
1 CAPSULE ORAL 2 TIMES DAILY
Qty: 180 CAPSULE | Refills: 3 | Status: SHIPPED | OUTPATIENT
Start: 2022-06-15 | End: 2023-02-01 | Stop reason: SDUPTHER

## 2022-06-15 NOTE — TELEPHONE ENCOUNTER
Can you print and sign for victor manuel?    Attempted to call pt to see if she needs other medications printed. Pt did not answer.

## 2022-06-15 NOTE — TELEPHONE ENCOUNTER
----- Message from Rosaura Dionicio sent at 6/15/2022  8:40 AM CDT -----  Pt called said she has a vm but it is so fast and mumbled she cannot make anything out she just recognized our number she has been waiting on a printed script for her eye vitamins and we sent it to [facility] she needs that cancelled since she picks up her scripts and fills at [facility] in person. Please call the pt when it is ready.  # [phone]

## 2022-07-25 ENCOUNTER — TELEPHONE (OUTPATIENT)
Dept: CARDIOLOGY | Facility: CLINIC | Age: 87
End: 2022-07-25
Payer: MEDICARE

## 2022-07-25 NOTE — TELEPHONE ENCOUNTER
----- Message from Yovana Chaney sent at 7/25/2022 10:21 AM CDT -----  Contact: pt  Type: Needs Medical Advice         Who Called: pt  Best Call Back Number:[phone]   Additional Information: Requesting a call back regarding pt was to come in for a echo  this morning and  forgot . she is asking if she can still come in to have it done  Please Advise- Thank you       
verbalization

## 2022-07-26 ENCOUNTER — HOSPITAL ENCOUNTER (OUTPATIENT)
Dept: CARDIOLOGY | Facility: HOSPITAL | Age: 87
Discharge: HOME | End: 2022-07-26
Attending: NURSE PRACTITIONER
Payer: MEDICARE

## 2022-07-26 VITALS — HEIGHT: 61 IN | BODY MASS INDEX: 31.72 KG/M2 | WEIGHT: 168 LBS

## 2022-07-26 DIAGNOSIS — I35.0 SEVERE AORTIC STENOSIS: ICD-10-CM

## 2022-07-26 PROCEDURE — 93306 ECHO (CUPID ONLY): ICD-10-PCS | Mod: 26,,, | Performed by: INTERNAL MEDICINE

## 2022-07-26 PROCEDURE — 93306 TTE W/DOPPLER COMPLETE: CPT

## 2022-07-26 PROCEDURE — 93306 TTE W/DOPPLER COMPLETE: CPT | Mod: 26,,, | Performed by: INTERNAL MEDICINE

## 2022-08-18 LAB
AORTIC VALVE CUSP SEPERATION: 0.8 CM
AV INDEX (PROSTH): 0.29
AV MEAN GRADIENT: 25 MMHG
AV PEAK GRADIENT: 25 MMHG
AV VALVE AREA: 0.92 CM2
AV VELOCITY RATIO: 0.2
BSA FOR ECHO PROCEDURE: 1.81 M2
CV ECHO LV RWT: 0.62 CM
DOP CALC AO PEAK VEL: 2.51 M/S
DOP CALC AO VTI: 71.7 CM
DOP CALC LVOT AREA: 3.1 CM2
DOP CALC LVOT DIAMETER: 2 CM
DOP CALC LVOT PEAK VEL: 0.51 M/S
DOP CALC LVOT STROKE VOLUME: 65.94 CM3
DOP CALCLVOT PEAK VEL VTI: 21 CM
E WAVE DECELERATION TIME: 296 MS
ECHO LV POSTERIOR WALL: 1.61 CM (ref 0.6–1.1)
EJECTION FRACTION: 50 %
FRACTIONAL SHORTENING: 26 % (ref 28–44)
INTERVENTRICULAR SEPTUM: 1.39 CM (ref 0.6–1.1)
IVRT: 106 MS
LEFT ATRIUM SIZE: 4.8 CM
LEFT INTERNAL DIMENSION IN SYSTOLE: 3.84 CM (ref 2.1–4)
LEFT VENTRICLE MASS INDEX: 194 G/M2
LEFT VENTRICULAR INTERNAL DIMENSION IN DIASTOLE: 5.18 CM (ref 3.5–6)
LEFT VENTRICULAR MASS: 340.35 G
MV PEAK A VEL: 0.74 M/S
MV STENOSIS PRESSURE HALF TIME: 52 MS
MV VALVE AREA P 1/2 METHOD: 4.23 CM2
PISA TR MAX VEL: 2.12 M/S
RIGHT VENTRICULAR END-DIASTOLIC DIMENSION: 2.56 CM
TDI LATERAL: 0.08 M/S
TDI SEPTAL: 0.05 M/S
TDI: 0.07 M/S
TR MAX PG: 18 MMHG

## 2022-09-07 ENCOUNTER — OFFICE VISIT (OUTPATIENT)
Dept: CARDIOLOGY | Facility: CLINIC | Age: 87
End: 2022-09-07
Payer: MEDICARE

## 2022-09-07 VITALS
DIASTOLIC BLOOD PRESSURE: 78 MMHG | BODY MASS INDEX: 29.7 KG/M2 | SYSTOLIC BLOOD PRESSURE: 122 MMHG | WEIGHT: 157.31 LBS | HEIGHT: 61 IN | OXYGEN SATURATION: 92 % | HEART RATE: 80 BPM

## 2022-09-07 DIAGNOSIS — R42 DIZZINESS: ICD-10-CM

## 2022-09-07 DIAGNOSIS — I35.0 SEVERE AORTIC STENOSIS: Primary | Chronic | ICD-10-CM

## 2022-09-07 DIAGNOSIS — I25.10 CORONARY ARTERY DISEASE, UNSPECIFIED VESSEL OR LESION TYPE, UNSPECIFIED WHETHER ANGINA PRESENT, UNSPECIFIED WHETHER NATIVE OR TRANSPLANTED HEART: Chronic | ICD-10-CM

## 2022-09-07 DIAGNOSIS — I50.32 CHRONIC DIASTOLIC CONGESTIVE HEART FAILURE: Chronic | ICD-10-CM

## 2022-09-07 DIAGNOSIS — R00.1 SYMPTOMATIC BRADYCARDIA: ICD-10-CM

## 2022-09-07 DIAGNOSIS — Z95.0 CARDIAC PACEMAKER: ICD-10-CM

## 2022-09-07 DIAGNOSIS — I48.0 PAROXYSMAL ATRIAL FIBRILLATION: Chronic | ICD-10-CM

## 2022-09-07 DIAGNOSIS — I10 ESSENTIAL HYPERTENSION: Chronic | ICD-10-CM

## 2022-09-07 DIAGNOSIS — E78.2 MIXED HYPERLIPIDEMIA: Chronic | ICD-10-CM

## 2022-09-07 PROCEDURE — 99214 PR OFFICE/OUTPT VISIT, EST, LEVL IV, 30-39 MIN: ICD-10-PCS | Mod: S$GLB,,, | Performed by: NURSE PRACTITIONER

## 2022-09-07 PROCEDURE — 99214 OFFICE O/P EST MOD 30 MIN: CPT | Mod: S$GLB,,, | Performed by: NURSE PRACTITIONER

## 2022-09-07 NOTE — ASSESSMENT & PLAN NOTE
"BP stable, but she is feeling very dizzy and "drunk" in the mornings for several hours. Will change her losartan and amlodipine to once per day at night.   Advised her to monitor BP 2 times per day and notify me if her BP is greater 150/80.   "

## 2022-09-07 NOTE — ASSESSMENT & PLAN NOTE
Stop amiodarone.   Decrease losartan and amlodipine to once daily at night and monitor BP closely.   Recommend fall precautions.

## 2022-09-07 NOTE — PROGRESS NOTES
Subjective:    Patient ID:  Rosanna Raymundo is a 89 y.o. female   Chief Complaint   Patient presents with    Congestive Heart Failure    Aortic Stenosis    Atrial Fibrillation    Fatigue     Says her medications are doing it to her       HPI:  Patient presents today for follow-up appointment.  She states that she has been having issues with feeling dizzy and fatigued in the mornings after taking her medications.  She states that the symptoms last for few hours and then subsided.  Her breathing is stable, and she denies any chest pain.    Review of patient's allergies indicates:   Allergen Reactions    Penicillin g benzathine Other (See Comments)       Past Medical History:   Diagnosis Date    Anticoagulant long-term use     Cancer     Cataract     CHF (congestive heart failure)     Diabetes mellitus, type 2     Heart murmur     Hypertension     Iron deficiency anemia, unspecified     Anemia, iron def.    Occasional tremors      Past Surgical History:   Procedure Laterality Date    ADENOIDECTOMY      APPENDECTOMY      COLONOSCOPY N/A 12/17/2019    Procedure: COLONOSCOPY;  Surgeon: Raleigh Quarles III, MD;  Location: [facility] ENDO;  Service: Endoscopy;  Laterality: N/A;    COLONOSCOPY N/A 12/18/2019    Procedure: COLONOSCOPY;  Surgeon: Raleigh Quarles III, MD;  Location: [facility] ENDO;  Service: Endoscopy;  Laterality: N/A;    Gall bladder revmoval      HYSTERECTOMY      INSERTION OF PACEMAKER Right 1/2/2020    Procedure: INSERTION, PACEMAKER;  Surgeon: Fly Barragan MD;  Location: [facility] CATH/EP LAB;  Service: Cardiology;  Laterality: Right;    left mastectomy Left 1945    MASTECTOMY Left     CA CABG, ARTERY-VEIN, FOUR  2014    Coronary Artery Bypass, 4    SMALL BOWEL ENTEROSCOPY N/A 12/17/2019    Procedure: ENTEROSCOPY;  Surgeon: Raleigh Quarles III, MD;  Location: [facility] ENDO;  Service: Endoscopy;  Laterality: N/A;    TONSILLECTOMY       Social History     Tobacco Use    Smoking status: Never     Smokeless tobacco: Never   Substance Use Topics    Alcohol use: Never    Drug use: Never     Family History   Problem Relation Age of Onset    No Known Problems Mother     Heart disease Father         Review of Systems:   Constitution: Negative for diaphoresis and fever.   HEENT: Negative for nosebleeds.    Cardiovascular: Negative for chest pain       No dyspnea on exertion       No leg swelling        No palpitations  Respiratory: Negative for shortness of breath and wheezing.    Hematologic/Lymphatic: Negative for bleeding problem. Does not bruise/bleed easily.   Skin: Negative for color change and rash.   Musculoskeletal: Negative for falls and myalgias.   Gastrointestinal: Negative for hematemesis and hematochezia.   Genitourinary: Negative for hematuria.   Neurological: + for dizziness and light-headedness. +tremors   Psychiatric/Behavioral: Negative for altered mental status and memory loss.          Objective:        Vitals:    09/07/22 1430   BP: 122/78   Pulse: 80       Lab Results   Component Value Date    WBC 5.9 08/18/2021    HGB 14.2 08/18/2021    HCT 43.2 08/18/2021     08/18/2021    CHOL 161 08/18/2021    TRIG 198 (H) 08/18/2021    HDL 44 (L) 08/18/2021    ALT 24 08/18/2021    AST 24 08/18/2021     08/18/2021    K 3.8 08/18/2021    CL 98 08/18/2021    CREATININE 0.95 (H) 08/18/2021    BUN 27 (H) 08/18/2021    CO2 30 08/18/2021    TSH 1.74 08/18/2021    INR 1.1 01/01/2020    HGBA1C 6.4 06/06/2022    MICROALBUR 2.3 08/18/2021        ECHOCARDIOGRAM RESULTS  Results for orders placed during the hospital encounter of 07/26/22    Echo    Interpretation Summary  · The left ventricle is normal in size with mild concentric hypertrophy and low normal systolic function.  · The estimated ejection fraction is 50%.  · Normal left ventricular diastolic function.  · Normal right ventricular size with normal right ventricular systolic function.  · Moderate left atrial enlargement.  · There is severe  aortic valve stenosis.  · Aortic valve area is 0.92 cm2; peak velocity is 2.51 m/s; mean gradient is 25 mmHg.  · Moderate tricuspid regurgitation.  · Mild-to-moderate mitral regurgitation.        CURRENT/PREVIOUS VISIT EKG  Results for orders placed or performed in visit on 12/15/21   IN OFFICE EKG 12-LEAD (to Isabella)    Collection Time: 12/15/21  3:29 PM    Narrative    Test Reason : R94.31,    Vent. Rate : 060 BPM     Atrial Rate : 357 BPM     P-R Int : 312 ms          QRS Dur : 188 ms      QT Int : 534 ms       P-R-T Axes : 000 101 108 degrees     QTc Int : 534 ms    Atrial-paced rhythm with prolonged AV conduction  Right bundle branch block  Septal infarct (cited on or before 15-DEC-2019)  Abnormal ECG  When compared with ECG of 02-JAN-2020 12:37,  Previous ECG has undetermined rhythm, needs review  Right bundle branch block is now Present  Confirmed by Ashwin Herrera MD ([id number]) on 1/16/2022 3:17:16 PM    Referred By:             Confirmed By:Ashwin Herrera MD     No valid procedures specified.   No results found for this or any previous visit.      Physical Exam:  CONSTITUTIONAL: No fever, no chills  HEENT: Normocephalic, atraumatic,pupils reactive to light                 NECK:  No JVD no carotid bruit  CVS: S1S2+, RRR, +murmur  LUNGS: Clear  ABDOMEN: Soft, NT, BS+  EXTREMITIES: No cyanosis, edema  : No vargas catheter  NEURO: AAO X 3  PSY: Normal affect      Medication List with Changes/Refills   Current Medications    ALLOPURINOL (ZYLOPRIM) 100 MG TABLET    Take 1 tablet (100 mg total) by mouth once daily.    AMIODARONE (PACERONE) 200 MG TAB    Take 0.5 tablets (100 mg total) by mouth once daily.    AMLODIPINE (NORVASC) 5 MG TABLET    Take 1 tablet (5 mg total) by mouth every evening.    APIXABAN (ELIQUIS) 2.5 MG TAB    Take 1 tablet (2.5 mg total) by mouth 2 (two) times daily.    BLOOD SUGAR DIAGNOSTIC STRP    To check BG 3 times daily, to use with insurance preferred meter    BLOOD-GLUCOSE METER KIT    To  check BG 3 times daily, to use with insurance preferred meter    BUSPIRONE (BUSPAR) 5 MG TAB    Take 2 tablets (10 mg total) by mouth 2 (two) times daily.    CHOLECALCIFEROL, VITAMIN D3, (VITAMIN D3) 25 MCG (1,000 UNIT) CAPSULE    Take 1 capsule (1,000 Units total) by mouth once daily.    CRESTOR 20 MG TABLET    Take 1 tablet (20 mg total) by mouth once daily.    FERROUS SULFATE 325 (65 FE) MG EC TABLET    Take 1 tablet (325 mg total) by mouth 2 (two) times daily.    FLUTICASONE PROPIONATE (FLONASE) 50 MCG/ACTUATION NASAL SPRAY    1 spray (50 mcg total) by Each Nostril route 2 (two) times daily.    FREESTYLE LANCETS 28 GAUGE LANCETS        HYDROCHLOROTHIAZIDE (HYDRODIURIL) 12.5 MG TAB    Take 1 tablet (12.5 mg total) by mouth once daily.    LORATADINE (CLARITIN) 10 MG TABLET    Take 1 tablet (10 mg total) by mouth once daily.    LORAZEPAM (ATIVAN) 1 MG TABLET    Take 1 tablet (1 mg total) by mouth 2 (two) times daily.    LOSARTAN (COZAAR) 50 MG TABLET    Take 1 tablet (50 mg total) by mouth 2 (two) times a day.    MAGNESIUM OXIDE (MAG-OX) 400 MG (241.3 MG MAGNESIUM) TABLET    Take 1 tablet (400 mg total) by mouth once daily.    MUPIROCIN (BACTROBAN) 2 % OINTMENT    Apply topically once daily.    OMEGA-3 FATTY ACIDS/FISH OIL (FISH OIL-OMEGA-3 FATTY ACIDS) 300-1,000 MG CAPSULE    Take 1 capsule by mouth once daily.    OMEPRAZOLE (PRILOSEC) 20 MG CAPSULE    Take 2 capsules (40 mg total) by mouth once daily.    ONDANSETRON (ZOFRAN-ODT) 8 MG TBDL    Take 1 tablet (8 mg total) by mouth every 6 (six) hours as needed.    PROPRANOLOL (INDERAL) 40 MG TABLET    Take 1 tablet (40 mg total) by mouth 2 (two) times daily.    VIT C,N-VP-NGFBU-LUTEIN-ZEAXAN (VISION FORMULA-2) 250-90-40-1 MG CAP    Take 1 tablet by mouth 2 (two) times daily.    VITAMINS  A,C,E-ZINC-COPPER (PRESERVISION AREDS) 14,320-226-200 UNIT-MG-UNIT CAP    Take 1 capsule by mouth 2 (two) times daily.             Assessment:       1. Severe aortic stenosis    2.  "Dizziness    3. Chronic diastolic congestive heart failure    4. Coronary artery disease, unspecified vessel or lesion type, unspecified whether angina present, unspecified whether native or transplanted heart    5. Paroxysmal atrial fibrillation    6. Essential hypertension    7. Mixed hyperlipidemia    8. Symptomatic bradycardia    9. Cardiac pacemaker         Plan:     Problem List Items Addressed This Visit          Unprioritized    Paroxysmal atrial fibrillation (Chronic)    Current Assessment & Plan     Continue Eliquis 2.5 mg po BID. No bleeding issues or falls.            Coronary artery disease (Chronic)    Current Assessment & Plan     Recommend low fat low cholesterol diet.          Mixed hyperlipidemia (Chronic)    Essential hypertension (Chronic)    Current Assessment & Plan     BP stable, but she is feeling very dizzy and "drunk" in the mornings for several hours. Will change her losartan and amlodipine to once per day at night.   Advised her to monitor BP 2 times per day and notify me if her BP is greater 150/80.          Chronic diastolic congestive heart failure (Chronic)    Current Assessment & Plan     Appears euvolemic on exam.            Severe aortic stenosis - Primary (Chronic)    Current Assessment & Plan     Stable on recent echocardiogram.   Repeat echo in 1 year.          Symptomatic bradycardia    Cardiac pacemaker    Current Assessment & Plan     Pacemaker with adequate battery life. No defects noted.          Dizziness    Current Assessment & Plan     Stop amiodarone.   Decrease losartan and amlodipine to once daily at night and monitor BP closely.   Recommend fall precautions.               Follow up in about 3 months (around 12/7/2022).      "

## 2022-11-07 DIAGNOSIS — Z95.0 CARDIAC PACEMAKER: Primary | ICD-10-CM

## 2022-11-18 ENCOUNTER — TELEPHONE (OUTPATIENT)
Dept: FAMILY MEDICINE | Facility: CLINIC | Age: 87
End: 2022-11-18

## 2022-11-18 DIAGNOSIS — E11.9 TYPE 2 DIABETES MELLITUS WITHOUT COMPLICATION, WITHOUT LONG-TERM CURRENT USE OF INSULIN: Primary | ICD-10-CM

## 2022-11-18 DIAGNOSIS — I10 ESSENTIAL HYPERTENSION: ICD-10-CM

## 2022-11-18 DIAGNOSIS — Z00.00 ROUTINE GENERAL MEDICAL EXAMINATION AT A HEALTH CARE FACILITY: ICD-10-CM

## 2022-11-18 DIAGNOSIS — Z79.899 ENCOUNTER FOR LONG-TERM (CURRENT) USE OF OTHER MEDICATIONS: ICD-10-CM

## 2022-11-18 DIAGNOSIS — E78.2 MIXED HYPERLIPIDEMIA: ICD-10-CM

## 2022-11-18 NOTE — TELEPHONE ENCOUNTER
Left message on voice mail in regards to pre-visit labs. Verbalized on voice mail that we placed order for the pt to have done for up coming office visit on 12/05/2022 and  that the order is through [employer]. No appointment is needed, just make sure that you are fasting for the blood work.

## 2022-11-24 LAB
ALBUMIN SERPL-MCNC: 3.9 G/DL (ref 3.6–5.1)
ALBUMIN/CREAT UR: 115 MCG/MG CREAT
ALBUMIN/GLOB SERPL: 1.3 (CALC) (ref 1–2.5)
ALP SERPL-CCNC: 74 U/L (ref 37–153)
ALT SERPL-CCNC: 16 U/L (ref 6–29)
APPEARANCE UR: ABNORMAL
AST SERPL-CCNC: 22 U/L (ref 10–35)
BACTERIA #/AREA URNS HPF: ABNORMAL /HPF
BACTERIA UR CULT: ABNORMAL
BACTERIA UR CULT: ABNORMAL
BASOPHILS # BLD AUTO: 49 CELLS/UL (ref 0–200)
BASOPHILS NFR BLD AUTO: 0.8 %
BILIRUB SERPL-MCNC: 0.5 MG/DL (ref 0.2–1.2)
BILIRUB UR QL STRIP: NEGATIVE
BUN SERPL-MCNC: 23 MG/DL (ref 7–25)
BUN/CREAT SERPL: 21 (CALC) (ref 6–22)
CALCIUM SERPL-MCNC: 9.5 MG/DL (ref 8.6–10.4)
CHLORIDE SERPL-SCNC: 104 MMOL/L (ref 98–110)
CHOLEST SERPL-MCNC: 161 MG/DL
CHOLEST/HDLC SERPL: 3.8 (CALC)
CO2 SERPL-SCNC: 28 MMOL/L (ref 20–32)
COLOR UR: YELLOW
CREAT SERPL-MCNC: 1.08 MG/DL (ref 0.6–0.95)
CREAT UR-MCNC: 74 MG/DL (ref 20–275)
EGFR: 49 ML/MIN/1.73M2
EOSINOPHIL # BLD AUTO: 171 CELLS/UL (ref 15–500)
EOSINOPHIL NFR BLD AUTO: 2.8 %
ERYTHROCYTE [DISTWIDTH] IN BLOOD BY AUTOMATED COUNT: 15.4 % (ref 11–15)
GLOBULIN SER CALC-MCNC: 3.1 G/DL (CALC) (ref 1.9–3.7)
GLUCOSE SERPL-MCNC: 131 MG/DL (ref 65–99)
GLUCOSE UR QL STRIP: NEGATIVE
HBA1C MFR BLD: 6.6 % OF TOTAL HGB
HCT VFR BLD AUTO: 38.2 % (ref 35–45)
HDLC SERPL-MCNC: 42 MG/DL
HGB BLD-MCNC: 12 G/DL (ref 11.7–15.5)
HGB UR QL STRIP: ABNORMAL
HYALINE CASTS #/AREA URNS LPF: ABNORMAL /LPF
KETONES UR QL STRIP: NEGATIVE
LDLC SERPL CALC-MCNC: 91 MG/DL (CALC)
LEUKOCYTE ESTERASE UR QL STRIP: ABNORMAL
LYMPHOCYTES # BLD AUTO: 1440 CELLS/UL (ref 850–3900)
LYMPHOCYTES NFR BLD AUTO: 23.6 %
MCH RBC QN AUTO: 26 PG (ref 27–33)
MCHC RBC AUTO-ENTMCNC: 31.4 G/DL (ref 32–36)
MCV RBC AUTO: 82.9 FL (ref 80–100)
MICROALBUMIN UR-MCNC: 8.5 MG/DL
MONOCYTES # BLD AUTO: 567 CELLS/UL (ref 200–950)
MONOCYTES NFR BLD AUTO: 9.3 %
NEUTROPHILS # BLD AUTO: 3874 CELLS/UL (ref 1500–7800)
NEUTROPHILS NFR BLD AUTO: 63.5 %
NITRITE UR QL STRIP: NEGATIVE
NONHDLC SERPL-MCNC: 119 MG/DL (CALC)
PH UR STRIP: 7.5 [PH] (ref 5–8)
PLATELET # BLD AUTO: 174 THOUSAND/UL (ref 140–400)
PMV BLD REES-ECKER: 12.2 FL (ref 7.5–12.5)
POTASSIUM SERPL-SCNC: 4.1 MMOL/L (ref 3.5–5.3)
PROT SERPL-MCNC: 7 G/DL (ref 6.1–8.1)
PROT UR QL STRIP: ABNORMAL
RBC # BLD AUTO: 4.61 MILLION/UL (ref 3.8–5.1)
RBC #/AREA URNS HPF: ABNORMAL /HPF
SERVICE CMNT-IMP: ABNORMAL
SODIUM SERPL-SCNC: 139 MMOL/L (ref 135–146)
SP GR UR STRIP: 1.01 (ref 1–1.03)
SQUAMOUS #/AREA URNS HPF: ABNORMAL /HPF
TRIGL SERPL-MCNC: 190 MG/DL
TSH SERPL-ACNC: 2.51 MIU/L (ref 0.4–4.5)
WBC # BLD AUTO: 6.1 THOUSAND/UL (ref 3.8–10.8)
WBC #/AREA URNS HPF: ABNORMAL /HPF

## 2022-11-26 NOTE — PROGRESS NOTES
Call patient.  Her urine test looks like it is growing some E coli.  If she has symptoms we can call in Bactrim DS 1 p.o. b.i.d. x7 days

## 2022-11-28 ENCOUNTER — TELEPHONE (OUTPATIENT)
Dept: FAMILY MEDICINE | Facility: CLINIC | Age: 87
End: 2022-11-28

## 2022-11-28 DIAGNOSIS — B96.20 E COLI BACTEREMIA: Primary | ICD-10-CM

## 2022-11-28 DIAGNOSIS — R78.81 E COLI BACTEREMIA: Primary | ICD-10-CM

## 2022-11-28 DIAGNOSIS — N39.0 URINARY TRACT INFECTION WITHOUT HEMATURIA, SITE UNSPECIFIED: ICD-10-CM

## 2022-11-28 DIAGNOSIS — R82.90 URINE FINDINGS ABNORMAL: ICD-10-CM

## 2022-11-28 RX ORDER — SULFAMETHOXAZOLE AND TRIMETHOPRIM 800; 160 MG/1; MG/1
1 TABLET ORAL 2 TIMES DAILY
Qty: 14 TABLET | Refills: 0 | Status: SHIPPED | OUTPATIENT
Start: 2022-11-28 | End: 2022-12-05

## 2022-11-28 NOTE — TELEPHONE ENCOUNTER
Spoke with patient is not having any symptoms except for leaking and wears a pad. Will like to have medication called into pharmacy. Please confirm dx codes.

## 2022-11-28 NOTE — TELEPHONE ENCOUNTER
----- Message from Aamir Rhodes MD sent at 11/26/2022  1:29 PM CST -----  Call patient.  Her urine test looks like it is growing some E coli.  If she has symptoms we can call in Bactrim DS 1 p.o. b.i.d. x7 days

## 2022-12-05 ENCOUNTER — OFFICE VISIT (OUTPATIENT)
Dept: FAMILY MEDICINE | Facility: CLINIC | Age: 87
End: 2022-12-05
Payer: MEDICARE

## 2022-12-05 VITALS
DIASTOLIC BLOOD PRESSURE: 80 MMHG | HEART RATE: 76 BPM | HEIGHT: 61 IN | WEIGHT: 159 LBS | SYSTOLIC BLOOD PRESSURE: 124 MMHG | BODY MASS INDEX: 30.02 KG/M2

## 2022-12-05 DIAGNOSIS — R42 DIZZINESS: ICD-10-CM

## 2022-12-05 DIAGNOSIS — Z23 FLU VACCINE NEED: ICD-10-CM

## 2022-12-05 DIAGNOSIS — E11.9 TYPE 2 DIABETES MELLITUS WITHOUT COMPLICATION, WITHOUT LONG-TERM CURRENT USE OF INSULIN: Chronic | ICD-10-CM

## 2022-12-05 DIAGNOSIS — G25.0 ESSENTIAL TREMOR: Primary | ICD-10-CM

## 2022-12-05 DIAGNOSIS — D69.2 SENILE PURPURA: ICD-10-CM

## 2022-12-05 PROCEDURE — 99214 PR OFFICE/OUTPT VISIT, EST, LEVL IV, 30-39 MIN: ICD-10-PCS | Mod: S$GLB,,, | Performed by: PHYSICIAN ASSISTANT

## 2022-12-05 PROCEDURE — 99214 OFFICE O/P EST MOD 30 MIN: CPT | Mod: S$GLB,,, | Performed by: PHYSICIAN ASSISTANT

## 2022-12-05 PROCEDURE — G0008 ADMIN INFLUENZA VIRUS VAC: HCPCS | Mod: S$GLB,,, | Performed by: PHYSICIAN ASSISTANT

## 2022-12-05 PROCEDURE — G0008 FLU VACCINE - QUADRIVALENT - HIGH DOSE (65+) PRESERVATIVE FREE IM: ICD-10-PCS | Mod: S$GLB,,, | Performed by: PHYSICIAN ASSISTANT

## 2022-12-05 PROCEDURE — 90662 FLU VACCINE - QUADRIVALENT - HIGH DOSE (65+) PRESERVATIVE FREE IM: ICD-10-PCS | Mod: S$GLB,,, | Performed by: PHYSICIAN ASSISTANT

## 2022-12-05 PROCEDURE — 90662 IIV NO PRSV INCREASED AG IM: CPT | Mod: S$GLB,,, | Performed by: PHYSICIAN ASSISTANT

## 2022-12-05 NOTE — PROGRESS NOTES
"  SUBJECTIVE:    Patient ID: Rosanna Raymundo is a 89 y.o. female.    Chief Complaint: Follow-up (6 mo f/u//brought med bottles//flu vac ordered//tc)    This is an 89-year-old female who presents today for regular diabetic checkup.  A1c today 6.6%.  She is diet controlled.   She also carries history of hypertension, AFib, CHF and essential tremor.  She does follow with Cardiology, Dr. Herrera. Pt reports that she finds it challenging to schedule with Dr. Desai's office and is experiencing frequent schedule changes.    Pt c/o essential tremors really affecting her quality of life. Difficult to  anything. Also experiencing dizziness. Describes herself as feeling "drunk" and not sure if it's the medications. When she walks more than a block she starts to feel spasms in her leg and stops and rests. Discussed the possibility of using a walker or rollator to increase her mobility.    Today she reports doing well overall and just got over a cold that has now cleared up. C/o senile purpura on LE and UE.      Telephone on 11/18/2022   Component Date Value Ref Range Status    Glucose 11/21/2022 131 (H)  65 - 99 mg/dL Final    BUN 11/21/2022 23  7 - 25 mg/dL Final    Creatinine 11/21/2022 1.08 (H)  0.60 - 0.95 mg/dL Final    eGFR 11/21/2022 49 (L)  > OR = 60 mL/min/1.73m2 Final    BUN/Creatinine Ratio 11/21/2022 21  6 - 22 (calc) Final    Sodium 11/21/2022 139  135 - 146 mmol/L Final    Potassium 11/21/2022 4.1  3.5 - 5.3 mmol/L Final    Chloride 11/21/2022 104  98 - 110 mmol/L Final    CO2 11/21/2022 28  20 - 32 mmol/L Final    Calcium 11/21/2022 9.5  8.6 - 10.4 mg/dL Final    Total Protein 11/21/2022 7.0  6.1 - 8.1 g/dL Final    Albumin 11/21/2022 3.9  3.6 - 5.1 g/dL Final    Globulin, Total 11/21/2022 3.1  1.9 - 3.7 g/dL (calc) Final    Albumin/Globulin Ratio 11/21/2022 1.3  1.0 - 2.5 (calc) Final    Total Bilirubin 11/21/2022 0.5  0.2 - 1.2 mg/dL Final    Alkaline Phosphatase 11/21/2022 74  37 - 153 U/L Final "    AST 11/21/2022 22  10 - 35 U/L Final    ALT 11/21/2022 16  6 - 29 U/L Final    WBC 11/21/2022 6.1  3.8 - 10.8 Thousand/uL Final    RBC 11/21/2022 4.61  3.80 - 5.10 Million/uL Final    Hemoglobin 11/21/2022 12.0  11.7 - 15.5 g/dL Final    Hematocrit 11/21/2022 38.2  35.0 - 45.0 % Final    MCV 11/21/2022 82.9  80.0 - 100.0 fL Final    MCH 11/21/2022 26.0 (L)  27.0 - 33.0 pg Final    MCHC 11/21/2022 31.4 (L)  32.0 - 36.0 g/dL Final    RDW 11/21/2022 15.4 (H)  11.0 - 15.0 % Final    Platelets 11/21/2022 174  140 - 400 Thousand/uL Final    MPV 11/21/2022 12.2  7.5 - 12.5 fL Final    Neutrophils, Abs 11/21/2022 3,874  1,500 - 7,800 cells/uL Final    Lymph # 11/21/2022 1,440  850 - 3,900 cells/uL Final    Mono # 11/21/2022 567  200 - 950 cells/uL Final    Eos # 11/21/2022 171  15 - 500 cells/uL Final    Baso # 11/21/2022 49  0 - 200 cells/uL Final    Neutrophils Relative 11/21/2022 63.5  % Final    Lymph % 11/21/2022 23.6  % Final    Mono % 11/21/2022 9.3  % Final    Eosinophil % 11/21/2022 2.8  % Final    Basophil % 11/21/2022 0.8  % Final    Cholesterol 11/21/2022 161  <200 mg/dL Final    HDL 11/21/2022 42 (L)  > OR = 50 mg/dL Final    Triglycerides 11/21/2022 190 (H)  <150 mg/dL Final    LDL Cholesterol 11/21/2022 91  mg/dL (calc) Final    HDL/Cholesterol Ratio 11/21/2022 3.8  <5.0 (calc) Final    Non HDL Chol. (LDL+VLDL) 11/21/2022 119  <130 mg/dL (calc) Final    Creatinine, Urine 11/21/2022 74  20 - 275 mg/dL Final    Microalb, Ur 11/21/2022 8.5  See Note: mg/dL Final    Microalb/Creat Ratio 11/21/2022 115 (H)  <30 mcg/mg creat Final    TSH w/reflex to FT4 11/21/2022 2.51  0.40 - 4.50 mIU/L Final    Color, UA 11/21/2022 YELLOW  YELLOW Final    Appearance, UA 11/21/2022 CLOUDY (A)  CLEAR Final    Specific Gravity, UA 11/21/2022 1.014  1.001 - 1.035 Final    pH, UA 11/21/2022 7.5  5.0 - 8.0 Final    Glucose, UA 11/21/2022 NEGATIVE  NEGATIVE Final    Bilirubin, UA 11/21/2022 NEGATIVE  NEGATIVE Final    Ketones, UA  11/21/2022 NEGATIVE  NEGATIVE Final    Occult Blood UA 11/21/2022 TRACE (A)  NEGATIVE Final    Protein, UA 11/21/2022 1+ (A)  NEGATIVE Final    Nitrite, UA 11/21/2022 NEGATIVE  NEGATIVE Final    Leukocytes, UA 11/21/2022 3+ (A)  NEGATIVE Final    WBC Casts, UA 11/21/2022 > OR = 60 (A)  < OR = 5 /HPF Final    RBC Casts, UA 11/21/2022 0-2  < OR = 2 /HPF Final    Squam Epithel, UA 11/21/2022 NONE SEEN  < OR = 5 /HPF Final    Bacteria, UA 11/21/2022 FEW (A)  NONE SEEN /HPF Final    Hyaline Casts, UA 11/21/2022 0-5 (A)  NONE SEEN /LPF Final    Service Cmt: 11/21/2022    Final    Reflexive Urine Culture 11/21/2022    Final    Urine Culture, Routine 11/21/2022  (A)   Final    Hemoglobin A1C 11/21/2022 6.6 (H)  <5.7 % of total Hgb Final   Hospital Outpatient Visit on 11/04/2022   Component Date Value Ref Range Status    Battery Voltage (V) 11/04/2022 2.99  V In process    P/R-wave RA Lead 11/04/2022 1.0  mV In process    P/R-wave RA Lead (native) 11/04/2022 >12  mV In process    Impedance RA Lead 11/04/2022 580  Ohms In process    Impedance RA Lead (native) 11/04/2022 450  Ohms In process    Threshold V RA Lead 11/04/2022 0.75  V In process    Theshold ms RA Lead 11/04/2022 0.4  ms In process   Hospital Outpatient Visit on 07/26/2022   Component Date Value Ref Range Status    AORTIC VALVE CUSP SEPERATION 07/26/2022 0.80  cm Final    Ao VTI 07/26/2022 71.70  cm Final    Ao peak radha 07/26/2022 2.51  m/s Final    AV mean gradient 07/26/2022 25  mmHg Final    IVRT 07/26/2022 106.00  ms Final    IVS 07/26/2022 1.39 (A)  0.6 - 1.1 cm Final    LVIDd 07/26/2022 5.18  3.5 - 6.0 cm Final    LVIDs 07/26/2022 3.84  2.1 - 4.0 cm Final    LVOT diameter 07/26/2022 2.00  cm Final    LVOT peak VTI 07/26/2022 21.00  cm Final    Posterior Wall 07/26/2022 1.61 (A)  0.6 - 1.1 cm Final    LA size 07/26/2022 4.80  cm Final    E wave deceleration time 07/26/2022 296.00  ms Final    MV stenosis pressure 1/2 time 07/26/2022 52.00  ms Final    MV  Peak A Michael 07/26/2022 0.74  m/s Final    LVOT peak michael 07/26/2022 0.51  m/s Final    RVDD 07/26/2022 2.56  cm Final    TR Max Michael 07/26/2022 2.12  m/s Final    Triscuspid Valve Regurgitation Pea* 07/26/2022 18  mmHg Final    BSA 07/26/2022 1.81  m2 Final    TDI SEPTAL 07/26/2022 0.05  m/s Final    TDI LATERAL 07/26/2022 0.08  m/s Final    FS 07/26/2022 26  28 - 44 % Final    LV mass 07/26/2022 340.35  g Final    Left Ventricle Relative Wall Thick* 07/26/2022 0.62  cm Final    AV valve area 07/26/2022 0.92  cm2 Final    AV Velocity Ratio 07/26/2022 0.20   Final    AV index (prosthetic) 07/26/2022 0.29   Final    MV valve area p 1/2 method 07/26/2022 4.23  cm2 Final    Mean e' 07/26/2022 0.07  m/s Final    LVOT area 07/26/2022 3.1  cm2 Final    LVOT stroke volume 07/26/2022 65.94  cm3 Final    AV peak gradient 07/26/2022 25  mmHg Final    LV Mass Index 07/26/2022 194  g/m2 Final    EF 07/26/2022 50  % Final   Office Visit on 06/06/2022   Component Date Value Ref Range Status    Hemoglobin A1C 06/06/2022 6.4  % Final       Past Medical History:   Diagnosis Date    Anticoagulant long-term use     Cancer     Cataract     CHF (congestive heart failure)     Diabetes mellitus, type 2     Heart murmur     Hypertension     Iron deficiency anemia, unspecified     Anemia, iron def.    Occasional tremors      Past Surgical History:   Procedure Laterality Date    ADENOIDECTOMY      APPENDECTOMY      COLONOSCOPY N/A 12/17/2019    Procedure: COLONOSCOPY;  Surgeon: Raleigh Quarles III, MD;  Location: [facility] ENDO;  Service: Endoscopy;  Laterality: N/A;    COLONOSCOPY N/A 12/18/2019    Procedure: COLONOSCOPY;  Surgeon: Ralegih Quarles III, MD;  Location: [facility];  Service: Endoscopy;  Laterality: N/A;    Gall bladder revmoval      HYSTERECTOMY      INSERTION OF PACEMAKER Right 1/2/2020    Procedure: INSERTION, PACEMAKER;  Surgeon: Fly Barragan MD;  Location: [facility] CATH/EP LAB;  Service: Cardiology;  Laterality: Right;     left mastectomy Left 1945    MASTECTOMY Left     VT CABG, ARTERY-VEIN, FOUR  2014    Coronary Artery Bypass, 4    SMALL BOWEL ENTEROSCOPY N/A 12/17/2019    Procedure: ENTEROSCOPY;  Surgeon: Raleigh Quarles III, MD;  Location: [facility];  Service: Endoscopy;  Laterality: N/A;    TONSILLECTOMY       Family History   Problem Relation Age of Onset    No Known Problems Mother     Heart disease Father        Marital Status:   Alcohol History:  reports no history of alcohol use.  Tobacco History:  reports that she has never smoked. She has never used smokeless tobacco.  Drug History:  reports no history of drug use.    Review of patient's allergies indicates:   Allergen Reactions    Penicillin g benzathine Other (See Comments)       Current Outpatient Medications:     allopurinoL (ZYLOPRIM) 100 MG tablet, Take 1 tablet (100 mg total) by mouth once daily., Disp: 90 tablet, Rfl: 1    amiodarone (PACERONE) 200 MG Tab, Take 0.5 tablets (100 mg total) by mouth once daily., Disp: 45 tablet, Rfl: 1    amLODIPine (NORVASC) 5 MG tablet, Take 1 tablet (5 mg total) by mouth every evening., Disp: 90 tablet, Rfl: 3    apixaban (ELIQUIS) 2.5 mg Tab, Take 1 tablet (2.5 mg total) by mouth 2 (two) times daily., Disp: 180 tablet, Rfl: 1    busPIRone (BUSPAR) 5 MG Tab, Take 2 tablets (10 mg total) by mouth 2 (two) times daily., Disp: 180 tablet, Rfl: 1    hydroCHLOROthiazide (HYDRODIURIL) 12.5 MG Tab, Take 1 tablet (12.5 mg total) by mouth once daily., Disp: 90 tablet, Rfl: 1    losartan (COZAAR) 50 MG tablet, Take 1 tablet (50 mg total) by mouth 2 (two) times a day., Disp: 180 tablet, Rfl: 1    magnesium oxide (MAG-OX) 400 mg (241.3 mg magnesium) tablet, Take 1 tablet (400 mg total) by mouth once daily., Disp: 90 tablet, Rfl: 2    omeprazole (PRILOSEC) 20 MG capsule, Take 2 capsules (40 mg total) by mouth once daily., Disp: 180 capsule, Rfl: 1    propranoloL (INDERAL) 40 MG tablet, Take 1 tablet (40 mg total) by mouth 2 (two)  times daily., Disp: 180 tablet, Rfl: 1    vitamins  A,C,E-zinc-copper (PRESERVISION AREDS) 14,320-226-200 unit-mg-unit Cap, Take 1 capsule by mouth 2 (two) times daily., Disp: 180 capsule, Rfl: 3    blood sugar diagnostic Strp, To check BG 3 times daily, to use with insurance preferred meter, Disp: 200 strip, Rfl: 1    blood-glucose meter kit, To check BG 3 times daily, to use with insurance preferred meter, Disp: 1 each, Rfl: 0    cholecalciferol, vitamin D3, (VITAMIN D3) 25 mcg (1,000 unit) capsule, Take 1 capsule (1,000 Units total) by mouth once daily., Disp: 100 capsule, Rfl: 1    CRESTOR 20 mg tablet, Take 1 tablet (20 mg total) by mouth once daily., Disp: 90 tablet, Rfl: 1    ferrous sulfate 325 (65 FE) MG EC tablet, Take 1 tablet (325 mg total) by mouth 2 (two) times daily., Disp: 180 tablet, Rfl: 2    fluticasone propionate (FLONASE) 50 mcg/actuation nasal spray, 1 spray (50 mcg total) by Each Nostril route 2 (two) times daily., Disp: 16 g, Rfl: 3    FREESTYLE LANCETS 28 gauge lancets, , Disp: , Rfl:     loratadine (CLARITIN) 10 mg tablet, Take 1 tablet (10 mg total) by mouth once daily., Disp: 90 tablet, Rfl: 1    mupirocin (BACTROBAN) 2 % ointment, Apply topically once daily., Disp: 30 g, Rfl: 1    omega-3 fatty acids/fish oil (FISH OIL-OMEGA-3 FATTY ACIDS) 300-1,000 mg capsule, Take 1 capsule by mouth once daily., Disp: 90 capsule, Rfl: 1    ondansetron (ZOFRAN-ODT) 8 MG TbDL, Take 1 tablet (8 mg total) by mouth every 6 (six) hours as needed., Disp: 20 tablet, Rfl: 1    vit C,O-Gg-rrrux-lutein-zeaxan (VISION FORMULA-2) 250-90-40-1 mg Cap, Take 1 tablet by mouth 2 (two) times daily., Disp: 180 capsule, Rfl: 1    Review of Systems   Constitutional:  Positive for unexpected weight change. Negative for appetite change, chills, fatigue and fever.        Has lost 10 lbs recently. Says she is eating less due to tremors. Still eats lots of fruits.   HENT:  Negative for congestion.    Respiratory:  Negative for  "cough, chest tightness and shortness of breath.    Cardiovascular:  Negative for chest pain and palpitations.   Gastrointestinal:  Negative for abdominal distention, abdominal pain, constipation, diarrhea, nausea and vomiting.   Endocrine: Negative for cold intolerance and heat intolerance.   Genitourinary:  Negative for difficulty urinating and dysuria.   Musculoskeletal:  Negative for arthralgias and back pain.   Skin:  Positive for color change. Negative for rash.        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   Neurological:  Positive for dizziness, tremors (chronic) and light-headedness. Negative for syncope, weakness and headaches.        Objective:      Vitals:    12/05/22 0729   BP: 124/80   Pulse: 76   Weight: 72.1 kg (159 lb)   Height: 5' 1" (1.549 m)     Physical Exam  Constitutional:       General: She is not in acute distress.     Appearance: She is well-developed. She is not ill-appearing.   HENT:      Head: Normocephalic and atraumatic.      Nose: No congestion.   Eyes:      General: No scleral icterus.     Conjunctiva/sclera: Conjunctivae normal.      Pupils: Pupils are equal, round, and reactive to light.   Neck:      Thyroid: No thyromegaly.      Vascular: No carotid bruit.   Cardiovascular:      Rate and Rhythm: Normal rate and regular rhythm.      Pulses: Normal pulses.           Dorsalis pedis pulses are 2+ on the right side and 2+ on the left side.        Posterior tibial pulses are 2+ on the right side and 2+ on the left side.      Heart sounds: Murmur (Harsh, systolic Aortic murmur appreciated) heard.     No friction rub. No gallop.   Pulmonary:      Effort: Pulmonary effort is normal. No respiratory distress.      Breath sounds: Normal breath sounds. No stridor. No wheezing, rhonchi or rales.   Abdominal:      General: Bowel sounds are normal. There is no distension.      Palpations: Abdomen is soft.      Tenderness: There is no abdominal tenderness.   Musculoskeletal:         General: No swelling or " tenderness. Normal range of motion.      Cervical back: Normal range of motion and neck supple.      Right lower leg: No edema.      Left lower leg: No edema.   Feet:      Right foot:      Protective Sensation: 6 sites tested.  6 sites sensed.      Skin integrity: Dry skin present. No ulcer, blister or fissure.      Toenail Condition: Right toenails are abnormally thick.      Left foot:      Protective Sensation: 6 sites tested.  6 sites sensed.      Skin integrity: Dry skin present. No ulcer, blister or fissure.      Toenail Condition: Left toenails are abnormally thick.   Skin:     General: Skin is warm and dry.      Capillary Refill: Capillary refill takes less than 2 seconds.      Coloration: Skin is not jaundiced.      Findings: No erythema.      Comments: Senile purpura on her LE and UE.   Neurological:      Mental Status: She is alert and oriented to person, place, and time.      Cranial Nerves: No cranial nerve deficit.   Psychiatric:         Mood and Affect: Mood normal.         Behavior: Behavior normal.         Assessment:       1. Essential tremor    2. Flu vaccine need    3. Senile purpura    4. Type 2 diabetes mellitus without complication, without long-term current use of insulin    5. Dizziness         Plan:       Essential tremor  Comments:  Pt currently taking propronal for essential tremors.  Orders:  -     WALKER FOR HOME USE    Flu vaccine need  Comments:  Flu vaccine adminstered during today's visit.  Orders:  -     Influenza - Quadrivalent - High Dose (65+) (PF) (IM)    Senile purpura  Comments:  Pt education provided re: this benign condition.    Type 2 diabetes mellitus without complication, without long-term current use of insulin  Comments:  Pt continues to manage her diabetes through diet. Well controlled with A1c of 6.6.    Dizziness  Comments:  Pt encouraged to consider a walker or rollator to help increase her sense of security while walking and her mobility.  Orders:  -     WALKER FOR  HOME USE    No follow-ups on file.        12/5/2022 Casper Pratt PA-C

## 2022-12-07 ENCOUNTER — OFFICE VISIT (OUTPATIENT)
Dept: CARDIOLOGY | Facility: CLINIC | Age: 87
End: 2022-12-07
Payer: MEDICARE

## 2022-12-07 VITALS
HEIGHT: 61 IN | DIASTOLIC BLOOD PRESSURE: 80 MMHG | BODY MASS INDEX: 29.45 KG/M2 | WEIGHT: 156 LBS | HEART RATE: 72 BPM | SYSTOLIC BLOOD PRESSURE: 122 MMHG

## 2022-12-07 DIAGNOSIS — E11.9 TYPE 2 DIABETES MELLITUS WITHOUT COMPLICATION, WITHOUT LONG-TERM CURRENT USE OF INSULIN: ICD-10-CM

## 2022-12-07 DIAGNOSIS — I50.32 CHRONIC DIASTOLIC CONGESTIVE HEART FAILURE: Chronic | ICD-10-CM

## 2022-12-07 DIAGNOSIS — G25.0 ESSENTIAL TREMOR: ICD-10-CM

## 2022-12-07 DIAGNOSIS — I35.0 SEVERE AORTIC STENOSIS: ICD-10-CM

## 2022-12-07 DIAGNOSIS — I10 ESSENTIAL HYPERTENSION: Chronic | ICD-10-CM

## 2022-12-07 DIAGNOSIS — E78.2 MIXED HYPERLIPIDEMIA: Chronic | ICD-10-CM

## 2022-12-07 DIAGNOSIS — Z95.0 CARDIAC PACEMAKER: Primary | ICD-10-CM

## 2022-12-07 DIAGNOSIS — I25.10 CORONARY ARTERY DISEASE INVOLVING NATIVE CORONARY ARTERY OF NATIVE HEART WITHOUT ANGINA PECTORIS: Chronic | ICD-10-CM

## 2022-12-07 DIAGNOSIS — Z79.01 ANTICOAGULANT LONG-TERM USE: Chronic | ICD-10-CM

## 2022-12-07 DIAGNOSIS — I48.0 PAROXYSMAL ATRIAL FIBRILLATION: ICD-10-CM

## 2022-12-07 DIAGNOSIS — R00.1 SYMPTOMATIC BRADYCARDIA: ICD-10-CM

## 2022-12-07 DIAGNOSIS — Z95.1 HX OF CABG: ICD-10-CM

## 2022-12-07 PROCEDURE — 99214 OFFICE O/P EST MOD 30 MIN: CPT | Mod: S$PBB,,, | Performed by: NURSE PRACTITIONER

## 2022-12-07 PROCEDURE — 99999 PR PBB SHADOW E&M-EST. PATIENT-LVL IV: CPT | Mod: PBBFAC,,, | Performed by: NURSE PRACTITIONER

## 2022-12-07 PROCEDURE — 99999 PR PBB SHADOW E&M-EST. PATIENT-LVL IV: ICD-10-PCS | Mod: PBBFAC,,, | Performed by: NURSE PRACTITIONER

## 2022-12-07 PROCEDURE — 99214 OFFICE O/P EST MOD 30 MIN: CPT | Mod: PBBFAC,PN | Performed by: NURSE PRACTITIONER

## 2022-12-07 PROCEDURE — 99214 PR OFFICE/OUTPT VISIT, EST, LEVL IV, 30-39 MIN: ICD-10-PCS | Mod: S$PBB,,, | Performed by: NURSE PRACTITIONER

## 2022-12-07 NOTE — ASSESSMENT & PLAN NOTE
Echo    Interpretation Summary  · The left ventricle is normal in size with mild concentric hypertrophy and low normal systolic function.  · The estimated ejection fraction is 50%.  · Normal left ventricular diastolic function.  · Normal right ventricular size with normal right ventricular systolic function.  · Moderate left atrial enlargement.  · There is severe aortic valve stenosis.  · Aortic valve area is 0.92 cm2; peak velocity is 2.51 m/s; mean gradient is 25 mmHg.  · Moderate tricuspid regurgitation.  · Mild-to-moderate mitral regurgitation.    Euvolemic on exam

## 2022-12-07 NOTE — ASSESSMENT & PLAN NOTE
Pace maker in situ     1. Normal device function.   2. Three AMS episodes occurred. Mode Switch <1%.   3. One high ventricular rate detected on 10/30/22 at 5:41 am lasting 00:02.

## 2022-12-07 NOTE — PROGRESS NOTES
Subjective:    Patient ID:  Rosanna Raymundo is a 89 y.o. female patient here for evaluation Atrial Fibrillation, Hyperlipidemia, Hypertension, severe aortic stenosis, and Coronary Artery Disease      History of Present Illness:       Ms. Bello is here today for her follow up visit. She denies CP or SOB. NO syncopal episodes. She denies recent fever or chills. NO blood in the urine or stool. No lightheaded or dizziness.       Review of patient's allergies indicates:   Allergen Reactions    Penicillin g benzathine Other (See Comments)       Past Medical History:   Diagnosis Date    Anticoagulant long-term use     Cancer     Cataract     CHF (congestive heart failure)     Diabetes mellitus, type 2     Heart murmur     Hypertension     Iron deficiency anemia, unspecified     Anemia, iron def.    Occasional tremors      Past Surgical History:   Procedure Laterality Date    ADENOIDECTOMY      APPENDECTOMY      COLONOSCOPY N/A 12/17/2019    Procedure: COLONOSCOPY;  Surgeon: Raleigh Quarles III, MD;  Location: [facility] ENDO;  Service: Endoscopy;  Laterality: N/A;    COLONOSCOPY N/A 12/18/2019    Procedure: COLONOSCOPY;  Surgeon: Raleigh Quarles III, MD;  Location: [facility] ENDO;  Service: Endoscopy;  Laterality: N/A;    Gall bladder revmoval      HYSTERECTOMY      INSERTION OF PACEMAKER Right 1/2/2020    Procedure: INSERTION, PACEMAKER;  Surgeon: Fly Barragan MD;  Location: [facility] CATH/EP LAB;  Service: Cardiology;  Laterality: Right;    left mastectomy Left 1945    MASTECTOMY Left     NH CABG, ARTERY-VEIN, FOUR  2014    Coronary Artery Bypass, 4    SMALL BOWEL ENTEROSCOPY N/A 12/17/2019    Procedure: ENTEROSCOPY;  Surgeon: Raleigh Quarles III, MD;  Location: [facility] ENDO;  Service: Endoscopy;  Laterality: N/A;    TONSILLECTOMY       Social History     Tobacco Use    Smoking status: Never    Smokeless tobacco: Never   Substance Use Topics    Alcohol use: Never    Drug use: Never        Review of Systems:     As noted in HPI in addition                   Objective        Vitals:    12/07/22 1253   BP: 122/80   Pulse: 72       LIPIDS - LAST 2   Lab Results   Component Value Date    CHOL 161 11/21/2022    CHOL 161 08/18/2021    HDL 42 (L) 11/21/2022    HDL 44 (L) 08/18/2021    LDLCALC 91 11/21/2022    LDLCALC 87 08/18/2021    TRIG 190 (H) 11/21/2022    TRIG 198 (H) 08/18/2021    CHOLHDL 3.8 11/21/2022    CHOLHDL 3.7 08/18/2021       CBC - LAST 2  Lab Results   Component Value Date    WBC 6.1 11/21/2022    WBC 5.9 08/18/2021    RBC 4.61 11/21/2022    RBC 4.64 08/18/2021    HGB 12.0 11/21/2022    HGB 14.2 08/18/2021    HCT 38.2 11/21/2022    HCT 43.2 08/18/2021    MCV 82.9 11/21/2022    MCV 93.1 08/18/2021    MCH 26.0 (L) 11/21/2022    MCH 30.6 08/18/2021    MCHC 31.4 (L) 11/21/2022    MCHC 32.9 08/18/2021    RDW 15.4 (H) 11/21/2022    RDW 13.8 08/18/2021     11/21/2022     08/18/2021    MPV 12.2 11/21/2022    MPV 12.1 08/18/2021    GRAN 6.4 12/31/2019    GRAN 75.8 (H) 12/31/2019    LYMPH 1,440 11/21/2022    LYMPH 23.6 11/21/2022    MONO 567 11/21/2022    MONO 9.3 11/21/2022    BASO 49 11/21/2022    BASO 59 08/18/2021    NRBC 0 12/31/2019    NRBC 0 12/15/2019       CHEMISTRY & LIVER FUNCTION - LAST 2  Lab Results   Component Value Date     11/21/2022     08/18/2021    K 4.1 11/21/2022    K 3.8 08/18/2021     11/21/2022    CL 98 08/18/2021    CO2 28 11/21/2022    CO2 30 08/18/2021    ANIONGAP 11 01/03/2020    ANIONGAP 12 01/02/2020    BUN 23 11/21/2022    BUN 27 (H) 08/18/2021    CREATININE 1.08 (H) 11/21/2022    CREATININE 0.95 (H) 08/18/2021     (H) 11/21/2022     (H) 08/18/2021    CALCIUM 9.5 11/21/2022    CALCIUM 9.7 08/18/2021    MG 1.5 11/30/2020    MG 1.6 01/03/2020    ALBUMIN 3.9 11/21/2022    ALBUMIN 4.3 08/18/2021    PROT 7.0 11/21/2022    PROT 7.5 08/18/2021    ALKPHOS 68 07/24/2020    ALKPHOS 55 12/31/2019    ALT 16 11/21/2022    ALT 24 08/18/2021    AST 22  11/21/2022    AST 24 08/18/2021    BILITOT 0.5 11/21/2022    BILITOT 0.6 08/18/2021        CARDIAC PROFILE - LAST 2  Lab Results   Component Value Date     (H) 12/31/2019     (H) 11/04/2019    CPK 64 12/14/2019    CPKMB 1.6 12/14/2019    TROPONINI <0.030 12/31/2019    TROPONINI <0.030 12/31/2019        COAGULATION - LAST 2  Lab Results   Component Value Date    LABPT 13.3 01/01/2020    LABPT 16.5 (H) 12/14/2019    INR 1.1 01/01/2020    INR 1.4 12/14/2019    APTT 32.7 01/01/2020    APTT 25.9 12/14/2019       ENDOCRINE & PSA - LAST 2  Lab Results   Component Value Date    HGBA1C 6.6 (H) 11/21/2022    HGBA1C 6.4 06/06/2022    MICROALBUR 8.5 11/21/2022    MICROALBUR 2.3 08/18/2021    TSH 1.74 08/18/2021    TSH 3.58 11/30/2020        ECHOCARDIOGRAM RESULTS  Results for orders placed during the hospital encounter of 07/26/22    Echo    Interpretation Summary  · The left ventricle is normal in size with mild concentric hypertrophy and low normal systolic function.  · The estimated ejection fraction is 50%.  · Normal left ventricular diastolic function.  · Normal right ventricular size with normal right ventricular systolic function.  · Moderate left atrial enlargement.  · There is severe aortic valve stenosis.  · Aortic valve area is 0.92 cm2; peak velocity is 2.51 m/s; mean gradient is 25 mmHg.  · Moderate tricuspid regurgitation.  · Mild-to-moderate mitral regurgitation.      CURRENT/PREVIOUS VISIT EKG  Results for orders placed or performed in visit on 12/15/21   IN OFFICE EKG 12-LEAD (to [facility])    Collection Time: 12/15/21  3:29 PM    Narrative    Test Reason : R94.31,    Vent. Rate : 060 BPM     Atrial Rate : 357 BPM     P-R Int : 312 ms          QRS Dur : 188 ms      QT Int : 534 ms       P-R-T Axes : 000 101 108 degrees     QTc Int : 534 ms    Atrial-paced rhythm with prolonged AV conduction  Right bundle branch block  Septal infarct (cited on or before 15-DEC-2019)  Abnormal ECG  When compared with ECG  of 02-JAN-2020 12:37,  Previous ECG has undetermined rhythm, needs review  Right bundle branch block is now Present  Confirmed by Ashwin Herrera MD ([id number]) on 1/16/2022 3:17:16 PM    Referred By:             Confirmed By:Ashwin Herrera MD         PHYSICAL EXAM  CONSTITUTIONAL: Well built, well nourished in no apparent distress  NECK: no carotid bruit, no JVD  LUNGS: CTA  CHEST WALL: no tenderness  HEART: regular rate and rhythm, S1, S2 normal, no murmur, click, rub or gallop   ABDOMEN: soft, non-tender; bowel sounds normal; no masses,  no organomegaly  EXTREMITIES: Extremities normal, no edema, no calf tenderness noted  NEURO: AAO X 3    I HAVE REVIEWED :    The vital signs, nurses notes, and all the pertinent radiology and labs.        Current Outpatient Medications   Medication Instructions    allopurinoL (ZYLOPRIM) 100 mg, Oral, Daily    amiodarone (PACERONE) 100 mg, Oral, Daily    amLODIPine (NORVASC) 5 mg, Oral, Nightly    apixaban (ELIQUIS) 2.5 mg, Oral, 2 times daily    blood sugar diagnostic Strp To check BG 3 times daily, to use with insurance preferred meter    blood-glucose meter kit To check BG 3 times daily, to use with insurance preferred meter    busPIRone (BUSPAR) 10 mg, Oral, 2 times daily    cholecalciferol (vitamin D3) (VITAMIN D3) 1,000 Units, Oral, Daily    CRESTOR 20 mg, Oral, Daily    ferrous sulfate 325 mg, Oral, 2 times daily    fluticasone propionate (FLONASE) 50 mcg, Each Nostril, 2 times daily    FREESTYLE LANCETS 28 gauge lancets No dose, route, or frequency recorded.    hydroCHLOROthiazide (HYDRODIURIL) 12.5 mg, Oral, Daily    loratadine (CLARITIN) 10 mg, Oral, Daily    losartan (COZAAR) 50 mg, Oral, 2 times daily    magnesium oxide (MAG-OX) 400 mg, Oral, Daily    mupirocin (BACTROBAN) 2 % ointment Topical (Top), Daily    omega-3 fatty acids/fish oil (FISH OIL-OMEGA-3 FATTY ACIDS) 300-1,000 mg capsule 1 capsule, Oral, Daily    omeprazole (PRILOSEC) 40 mg, Oral, Daily    ondansetron  (ZOFRAN-ODT) 8 mg, Oral, Every 6 hours PRN    propranoloL (INDERAL) 40 mg, Oral, 2 times daily    vit C,L-Pq-hhnbj-lutein-zeaxan (VISION FORMULA-2) 250-90-40-1 mg Cap 1 tablet, Oral, 2 times daily    vitamins  A,C,E-zinc-copper (PRESERVISION AREDS) 14,320-226-200 unit-mg-unit Cap 1 capsule, Oral, 2 times daily          Assessment & Plan     Hx of CABG  No anginal symptoms doing well on present regimen    Cardiac pacemaker  Pace maker in situ    1. Normal device function.  2. Three AMS episodes occurred. Mode Switch <1%.  3. One high ventricular rate detected on 10/30/22 at 5:41 am lasting 00:02.      Essential tremor  Chronic on propanolol     Type 2 diabetes mellitus without complication, without long-term current use of insulin  PER PCP    Symptomatic bradycardia  S/P PACEMAKER IN SITU    Anticoagulant long-term use  No blood in the urine or stool.       Severe aortic stenosis  Stable currently  Repeat ECHO in 4-6 months time.     Essential hypertension  BP stable  Continue the same    Mixed hyperlipidemia  Continue Crestor at present dosing  Recent LDL 91 Goal is 75  She will watch her diet    Paroxysmal atrial fibrillation  Continue Eliquis at present doses  No bleeding tendencies  No recent falls    Chronic diastolic congestive heart failure      Echo    Interpretation Summary  · The left ventricle is normal in size with mild concentric hypertrophy and low normal systolic function.  · The estimated ejection fraction is 50%.  · Normal left ventricular diastolic function.  · Normal right ventricular size with normal right ventricular systolic function.  · Moderate left atrial enlargement.  · There is severe aortic valve stenosis.  · Aortic valve area is 0.92 cm2; peak velocity is 2.51 m/s; mean gradient is 25 mmHg.  · Moderate tricuspid regurgitation.  · Mild-to-moderate mitral regurgitation.    Euvolemic on exam          No follow-ups on file.

## 2023-01-11 DIAGNOSIS — Z95.0 CARDIAC PACEMAKER: Primary | ICD-10-CM

## 2023-01-19 ENCOUNTER — TELEPHONE (OUTPATIENT)
Dept: CARDIOLOGY | Facility: CLINIC | Age: 88
End: 2023-01-19
Payer: MEDICARE

## 2023-01-19 DIAGNOSIS — Z95.0 CARDIAC PACEMAKER: Primary | ICD-10-CM

## 2023-01-31 DIAGNOSIS — I10 ESSENTIAL HYPERTENSION: ICD-10-CM

## 2023-01-31 DIAGNOSIS — I48.0 PAROXYSMAL ATRIAL FIBRILLATION: ICD-10-CM

## 2023-01-31 DIAGNOSIS — J30.1 NON-SEASONAL ALLERGIC RHINITIS DUE TO POLLEN: ICD-10-CM

## 2023-01-31 DIAGNOSIS — F41.9 ANXIETY: ICD-10-CM

## 2023-01-31 DIAGNOSIS — M1A.09X0 IDIOPATHIC CHRONIC GOUT OF MULTIPLE SITES WITHOUT TOPHUS: ICD-10-CM

## 2023-01-31 DIAGNOSIS — E78.2 MIXED HYPERLIPIDEMIA: ICD-10-CM

## 2023-01-31 DIAGNOSIS — K21.9 GASTROESOPHAGEAL REFLUX DISEASE WITHOUT ESOPHAGITIS: ICD-10-CM

## 2023-01-31 RX ORDER — PROPRANOLOL HYDROCHLORIDE 40 MG/1
40 TABLET ORAL 2 TIMES DAILY
Qty: 180 TABLET | Refills: 3 | Status: SHIPPED | OUTPATIENT
Start: 2023-01-31 | End: 2023-05-16 | Stop reason: SDUPTHER

## 2023-01-31 RX ORDER — LANOLIN ALCOHOL/MO/W.PET/CERES
400 CREAM (GRAM) TOPICAL DAILY
Qty: 90 TABLET | Refills: 3 | Status: SHIPPED | OUTPATIENT
Start: 2023-01-31 | End: 2023-07-20 | Stop reason: SDUPTHER

## 2023-01-31 RX ORDER — BUSPIRONE HYDROCHLORIDE 5 MG/1
10 TABLET ORAL 2 TIMES DAILY
Qty: 180 TABLET | Refills: 3 | Status: SHIPPED | OUTPATIENT
Start: 2023-01-31 | End: 2023-05-16 | Stop reason: SDUPTHER

## 2023-01-31 RX ORDER — AMLODIPINE BESYLATE 5 MG/1
5 TABLET ORAL NIGHTLY
Qty: 90 TABLET | Refills: 3 | OUTPATIENT
Start: 2023-01-31 | End: 2023-02-03 | Stop reason: SDUPTHER

## 2023-01-31 RX ORDER — MUPIROCIN 20 MG/G
OINTMENT TOPICAL DAILY
Qty: 30 G | Refills: 3 | Status: SHIPPED | OUTPATIENT
Start: 2023-01-31 | End: 2023-07-20 | Stop reason: SDUPTHER

## 2023-01-31 RX ORDER — HYDROCHLOROTHIAZIDE 12.5 MG/1
12.5 TABLET ORAL DAILY
Qty: 90 TABLET | Refills: 3 | Status: SHIPPED | OUTPATIENT
Start: 2023-01-31 | End: 2023-05-16 | Stop reason: SDUPTHER

## 2023-01-31 RX ORDER — ALLOPURINOL 100 MG/1
100 TABLET ORAL DAILY
Qty: 90 TABLET | Refills: 3 | Status: SHIPPED | OUTPATIENT
Start: 2023-01-31 | End: 2023-05-16 | Stop reason: SDUPTHER

## 2023-01-31 RX ORDER — OMEPRAZOLE 20 MG/1
40 CAPSULE, DELAYED RELEASE ORAL DAILY
Qty: 180 CAPSULE | Refills: 3 | Status: SHIPPED | OUTPATIENT
Start: 2023-01-31 | End: 2023-05-16 | Stop reason: SDUPTHER

## 2023-01-31 RX ORDER — ROSUVASTATIN CALCIUM 20 MG/1
20 TABLET, FILM COATED ORAL DAILY
Qty: 90 TABLET | Refills: 3 | Status: SHIPPED | OUTPATIENT
Start: 2023-01-31 | End: 2023-03-23 | Stop reason: SDUPTHER

## 2023-01-31 RX ORDER — LORATADINE 10 MG/1
10 TABLET ORAL DAILY
Qty: 90 TABLET | Refills: 3 | Status: SHIPPED | OUTPATIENT
Start: 2023-01-31 | End: 2023-05-16 | Stop reason: SDUPTHER

## 2023-01-31 RX ORDER — LOSARTAN POTASSIUM 50 MG/1
50 TABLET ORAL 2 TIMES DAILY
Qty: 180 TABLET | Refills: 3 | Status: SHIPPED | OUTPATIENT
Start: 2023-01-31 | End: 2023-05-16 | Stop reason: SDUPTHER

## 2023-01-31 RX ORDER — AMIODARONE HYDROCHLORIDE 200 MG/1
100 TABLET ORAL DAILY
Qty: 45 TABLET | Refills: 3 | OUTPATIENT
Start: 2023-01-31 | End: 2023-02-03 | Stop reason: SDUPTHER

## 2023-01-31 NOTE — TELEPHONE ENCOUNTER
----- Message from Tg Menon sent at 1/31/2023  8:05 AM CST -----  Patient called and stated that she need a refill of all her medicine she need a hand written for each please give her a call at [phone]

## 2023-01-31 NOTE — TELEPHONE ENCOUNTER
----- Message from Aramis Edwards sent at 1/31/2023  9:07 AM CST -----  Type:  RX Refill Request  Who Called: Pt   Refill or New Rx: refills   RX Name and Strength: amLODIPine (NORVASC) 5 MG tablet 90 tablet , amiodarone (PACERONE) 200 MG Tab 45 tablet   How is the patient currently taking it? (ex. 1XDay):    Is this a 30 day or 90 day RX:    Preferred Pharmacy with phone number:  [id number] - [address]   Phone:  [phone]  Fax:  [phone]  Local or Mail Order:  Local  Ordering Provider:  Erika Chin NP  Best Call Back Number:  [phone]  Additional Information: Pt requesting refills on RX amLODIPine (NORVASC) 5 MG tablet 90 tablet , amiodarone (PACERONE) 200 MG Tab 45 tablet.

## 2023-01-31 NOTE — TELEPHONE ENCOUNTER
Spoke to pt. She brought bottles at last visit. Needs printed rx to bring to [facility]. Can you sign for Gilberto?

## 2023-02-01 RX ORDER — VIT A/VIT C/VIT E/ZINC/COPPER 4296-226
1 CAPSULE ORAL 2 TIMES DAILY
Qty: 180 CAPSULE | Refills: 3 | Status: SHIPPED | OUTPATIENT
Start: 2023-02-01 | End: 2023-07-20 | Stop reason: SDUPTHER

## 2023-02-01 NOTE — TELEPHONE ENCOUNTER
----- Message from Tg Menon sent at 2/1/2023  2:56 PM CST -----  Patient called and stated that she need a refill of her preservision she need to come and  a prescription. She would like to speak to Gilberto she has a personal questions for him please give her a call at [phone]    
Spoke to pt and she stated she needs the Rx printed and wanted to ask Gilberto to print the Rx. Let pt know the Rx is being printed. Pt is coming to  Rx tomorrow   
No

## 2023-02-03 DIAGNOSIS — I10 ESSENTIAL HYPERTENSION: ICD-10-CM

## 2023-02-03 DIAGNOSIS — I48.0 PAROXYSMAL ATRIAL FIBRILLATION: ICD-10-CM

## 2023-02-03 RX ORDER — AMIODARONE HYDROCHLORIDE 200 MG/1
100 TABLET ORAL DAILY
Qty: 45 TABLET | Refills: 3 | Status: SHIPPED | OUTPATIENT
Start: 2023-02-03 | End: 2023-05-16 | Stop reason: SDUPTHER

## 2023-02-03 RX ORDER — AMLODIPINE BESYLATE 5 MG/1
5 TABLET ORAL NIGHTLY
Qty: 90 TABLET | Refills: 3 | Status: SHIPPED | OUTPATIENT
Start: 2023-02-03 | End: 2023-05-16 | Stop reason: SDUPTHER

## 2023-02-03 NOTE — TELEPHONE ENCOUNTER
Pt states she has been trying to get her medication filled by the cardiologist, but they will not respond to her calls. Pt needs rx printed for Amlodipine and amiodarone. Pt is requesting [facility] to fill these mediations. She would like meds printed please advise. Original rx had 3 refills. Please advise if you want pt to have 1 year supply.

## 2023-02-03 NOTE — TELEPHONE ENCOUNTER
----- Message from Boo Daily sent at 2/3/2023  7:58 AM CST -----  Pt came in and said she's tired of her cardiologist because she hasn't received her two medicine from them.  [phone]

## 2023-03-23 ENCOUNTER — OFFICE VISIT (OUTPATIENT)
Dept: FAMILY MEDICINE | Facility: CLINIC | Age: 88
End: 2023-03-23
Payer: MEDICARE

## 2023-03-23 VITALS
DIASTOLIC BLOOD PRESSURE: 78 MMHG | HEIGHT: 61 IN | SYSTOLIC BLOOD PRESSURE: 120 MMHG | WEIGHT: 156 LBS | BODY MASS INDEX: 29.45 KG/M2 | HEART RATE: 74 BPM

## 2023-03-23 DIAGNOSIS — Z23 NEED FOR VACCINATION: ICD-10-CM

## 2023-03-23 DIAGNOSIS — I10 ESSENTIAL HYPERTENSION: ICD-10-CM

## 2023-03-23 DIAGNOSIS — G25.0 ESSENTIAL TREMOR: ICD-10-CM

## 2023-03-23 DIAGNOSIS — E78.2 MIXED HYPERLIPIDEMIA: ICD-10-CM

## 2023-03-23 DIAGNOSIS — E11.9 TYPE 2 DIABETES MELLITUS WITHOUT COMPLICATION, WITHOUT LONG-TERM CURRENT USE OF INSULIN: Primary | ICD-10-CM

## 2023-03-23 LAB — HBA1C MFR BLD: 6.7 %

## 2023-03-23 PROCEDURE — 99214 PR OFFICE/OUTPT VISIT, EST, LEVL IV, 30-39 MIN: ICD-10-PCS | Mod: S$GLB,,, | Performed by: PHYSICIAN ASSISTANT

## 2023-03-23 PROCEDURE — 90677 PCV20 VACCINE IM: CPT | Mod: S$GLB,,, | Performed by: PHYSICIAN ASSISTANT

## 2023-03-23 PROCEDURE — 99214 OFFICE O/P EST MOD 30 MIN: CPT | Mod: S$GLB,,, | Performed by: PHYSICIAN ASSISTANT

## 2023-03-23 PROCEDURE — G0009 ADMIN PNEUMOCOCCAL VACCINE: HCPCS | Mod: S$GLB,,, | Performed by: PHYSICIAN ASSISTANT

## 2023-03-23 PROCEDURE — 83036 HEMOGLOBIN GLYCOSYLATED A1C: CPT | Mod: QW,,, | Performed by: PHYSICIAN ASSISTANT

## 2023-03-23 PROCEDURE — 90677 PNEUMOCOCCAL CONJUGATE VACCINE 20-VALENT: ICD-10-PCS | Mod: S$GLB,,, | Performed by: PHYSICIAN ASSISTANT

## 2023-03-23 PROCEDURE — 83036 POCT HEMOGLOBIN A1C: ICD-10-PCS | Mod: QW,,, | Performed by: PHYSICIAN ASSISTANT

## 2023-03-23 PROCEDURE — G0009 PNEUMOCOCCAL CONJUGATE VACCINE 20-VALENT: ICD-10-PCS | Mod: S$GLB,,, | Performed by: PHYSICIAN ASSISTANT

## 2023-03-23 RX ORDER — ROSUVASTATIN CALCIUM 20 MG/1
20 TABLET, COATED ORAL DAILY
COMMUNITY
End: 2023-05-16 | Stop reason: SDUPTHER

## 2023-03-23 NOTE — PROGRESS NOTES
SUBJECTIVE:    Patient ID: Rosanna Raymundo is a 89 y.o. female.    Chief Complaint: Follow-up (Follow up//brought med bottles//A1C ordered//PNA 20 ordered//tc)    This is a very pleasant 89-year-old female who presents today for regular follow-up.  Past medical history significant for CHF, hypertension, type 2 diabetes.  Her hemoglobin A1c was well managed at 6.6% 4 months ago. Today it is at 6.7%  Other labs were all stable.  She is up-to-date on screening measures.  She is agreeable to pneumonia 20 vaccine today. Today she reports that she has been doing well overall. No new concerns at this time.      Office Visit on 03/23/2023   Component Date Value Ref Range Status    Hemoglobin A1C, POC 03/23/2023 6.7  % Final   Hospital Outpatient Visit on 01/19/2023   Component Date Value Ref Range Status    Battery Voltage (V) 01/19/2023 2.99  V Final    P/R-wave RA Lead 01/19/2023 1.2  mV Final    P/R-wave RA Lead (native) 01/19/2023 >12  mV Final    Impedance RA Lead 01/19/2023 460  Ohms Final    Impedance RA Lead (native) 01/19/2023 460  Ohms Final    Threshold V RA Lead 01/19/2023 0.87  V Final    Theshold ms RA Lead 01/19/2023 0.4  ms Final    Threshold V RA Lead (native) 01/19/2023 1.25  V Final    Threshold ms RA Lead (native) 01/19/2023 0.4  ms Final   Telephone on 11/18/2022   Component Date Value Ref Range Status    Glucose 11/21/2022 131 (H)  65 - 99 mg/dL Final    BUN 11/21/2022 23  7 - 25 mg/dL Final    Creatinine 11/21/2022 1.08 (H)  0.60 - 0.95 mg/dL Final    eGFR 11/21/2022 49 (L)  > OR = 60 mL/min/1.73m2 Final    BUN/Creatinine Ratio 11/21/2022 21  6 - 22 (calc) Final    Sodium 11/21/2022 139  135 - 146 mmol/L Final    Potassium 11/21/2022 4.1  3.5 - 5.3 mmol/L Final    Chloride 11/21/2022 104  98 - 110 mmol/L Final    CO2 11/21/2022 28  20 - 32 mmol/L Final    Calcium 11/21/2022 9.5  8.6 - 10.4 mg/dL Final    Total Protein 11/21/2022 7.0  6.1 - 8.1 g/dL Final    Albumin 11/21/2022 3.9  3.6 - 5.1  g/dL Final    Globulin, Total 11/21/2022 3.1  1.9 - 3.7 g/dL (calc) Final    Albumin/Globulin Ratio 11/21/2022 1.3  1.0 - 2.5 (calc) Final    Total Bilirubin 11/21/2022 0.5  0.2 - 1.2 mg/dL Final    Alkaline Phosphatase 11/21/2022 74  37 - 153 U/L Final    AST 11/21/2022 22  10 - 35 U/L Final    ALT 11/21/2022 16  6 - 29 U/L Final    WBC 11/21/2022 6.1  3.8 - 10.8 Thousand/uL Final    RBC 11/21/2022 4.61  3.80 - 5.10 Million/uL Final    Hemoglobin 11/21/2022 12.0  11.7 - 15.5 g/dL Final    Hematocrit 11/21/2022 38.2  35.0 - 45.0 % Final    MCV 11/21/2022 82.9  80.0 - 100.0 fL Final    MCH 11/21/2022 26.0 (L)  27.0 - 33.0 pg Final    MCHC 11/21/2022 31.4 (L)  32.0 - 36.0 g/dL Final    RDW 11/21/2022 15.4 (H)  11.0 - 15.0 % Final    Platelets 11/21/2022 174  140 - 400 Thousand/uL Final    MPV 11/21/2022 12.2  7.5 - 12.5 fL Final    Neutrophils, Abs 11/21/2022 3,874  1,500 - 7,800 cells/uL Final    Lymph # 11/21/2022 1,440  850 - 3,900 cells/uL Final    Mono # 11/21/2022 567  200 - 950 cells/uL Final    Eos # 11/21/2022 171  15 - 500 cells/uL Final    Baso # 11/21/2022 49  0 - 200 cells/uL Final    Neutrophils Relative 11/21/2022 63.5  % Final    Lymph % 11/21/2022 23.6  % Final    Mono % 11/21/2022 9.3  % Final    Eosinophil % 11/21/2022 2.8  % Final    Basophil % 11/21/2022 0.8  % Final    Cholesterol 11/21/2022 161  <200 mg/dL Final    HDL 11/21/2022 42 (L)  > OR = 50 mg/dL Final    Triglycerides 11/21/2022 190 (H)  <150 mg/dL Final    LDL Cholesterol 11/21/2022 91  mg/dL (calc) Final    HDL/Cholesterol Ratio 11/21/2022 3.8  <5.0 (calc) Final    Non HDL Chol. (LDL+VLDL) 11/21/2022 119  <130 mg/dL (calc) Final    Creatinine, Urine 11/21/2022 74  20 - 275 mg/dL Final    Microalb, Ur 11/21/2022 8.5  See Note: mg/dL Final    Microalb/Creat Ratio 11/21/2022 115 (H)  <30 mcg/mg creat Final    TSH w/reflex to FT4 11/21/2022 2.51  0.40 - 4.50 mIU/L Final    Color, UA 11/21/2022 YELLOW  YELLOW Final    Appearance, UA  11/21/2022 CLOUDY (A)  CLEAR Final    Specific Gravity, UA 11/21/2022 1.014  1.001 - 1.035 Final    pH, UA 11/21/2022 7.5  5.0 - 8.0 Final    Glucose, UA 11/21/2022 NEGATIVE  NEGATIVE Final    Bilirubin, UA 11/21/2022 NEGATIVE  NEGATIVE Final    Ketones, UA 11/21/2022 NEGATIVE  NEGATIVE Final    Occult Blood UA 11/21/2022 TRACE (A)  NEGATIVE Final    Protein, UA 11/21/2022 1+ (A)  NEGATIVE Final    Nitrite, UA 11/21/2022 NEGATIVE  NEGATIVE Final    Leukocytes, UA 11/21/2022 3+ (A)  NEGATIVE Final    WBC Casts, UA 11/21/2022 > OR = 60 (A)  < OR = 5 /HPF Final    RBC Casts, UA 11/21/2022 0-2  < OR = 2 /HPF Final    Squam Epithel, UA 11/21/2022 NONE SEEN  < OR = 5 /HPF Final    Bacteria, UA 11/21/2022 FEW (A)  NONE SEEN /HPF Final    Hyaline Casts, UA 11/21/2022 0-5 (A)  NONE SEEN /LPF Final    Service Cmt: 11/21/2022    Final    Reflexive Urine Culture 11/21/2022    Final    Urine Culture, Routine 11/21/2022  (A)   Final    Hemoglobin A1C 11/21/2022 6.6 (H)  <5.7 % of total Hgb Final   Hospital Outpatient Visit on 11/04/2022   Component Date Value Ref Range Status    Battery Voltage (V) 11/04/2022 2.99  V Final    P/R-wave RA Lead 11/04/2022 1.0  mV Final    P/R-wave RA Lead (native) 11/04/2022 >12  mV Final    Impedance RA Lead 11/04/2022 580  Ohms Final    Impedance RA Lead (native) 11/04/2022 450  Ohms Final    Threshold V RA Lead 11/04/2022 0.75  V Final    Theshold ms RA Lead 11/04/2022 0.4  ms Final       Past Medical History:   Diagnosis Date    Anticoagulant long-term use     Cancer     Cataract     CHF (congestive heart failure)     Diabetes mellitus, type 2     Heart murmur     Hypertension     Iron deficiency anemia, unspecified     Anemia, iron def.    Occasional tremors      Past Surgical History:   Procedure Laterality Date    ADENOIDECTOMY      APPENDECTOMY      COLONOSCOPY N/A 12/17/2019    Procedure: COLONOSCOPY;  Surgeon: Raleigh Quarles III, MD;  Location: [facility];  Service: Endoscopy;   Laterality: N/A;    COLONOSCOPY N/A 12/18/2019    Procedure: COLONOSCOPY;  Surgeon: Raleigh Quarles III, MD;  Location: [facility] ENDO;  Service: Endoscopy;  Laterality: N/A;    Gall bladder revmoval      HYSTERECTOMY      INSERTION OF PACEMAKER Right 1/2/2020    Procedure: INSERTION, PACEMAKER;  Surgeon: Fly Barragan MD;  Location: [facility] CATH/EP LAB;  Service: Cardiology;  Laterality: Right;    left mastectomy Left 1945    MASTECTOMY Left     NV CABG, ARTERY-VEIN, FOUR  2014    Coronary Artery Bypass, 4    SMALL BOWEL ENTEROSCOPY N/A 12/17/2019    Procedure: ENTEROSCOPY;  Surgeon: Raleigh Quarles III, MD;  Location: [facility] ENDO;  Service: Endoscopy;  Laterality: N/A;    TONSILLECTOMY       Family History   Problem Relation Age of Onset    No Known Problems Mother     Heart disease Father        Marital Status:   Alcohol History:  reports no history of alcohol use.  Tobacco History:  reports that she has never smoked. She has never used smokeless tobacco.  Drug History:  reports no history of drug use.    Review of patient's allergies indicates:   Allergen Reactions    Penicillin g benzathine Other (See Comments)       Current Outpatient Medications:     allopurinoL (ZYLOPRIM) 100 MG tablet, Take 1 tablet (100 mg total) by mouth once daily., Disp: 90 tablet, Rfl: 3    amiodarone (PACERONE) 200 MG Tab, Take 0.5 tablets (100 mg total) by mouth once daily., Disp: 45 tablet, Rfl: 3    amLODIPine (NORVASC) 5 MG tablet, Take 1 tablet (5 mg total) by mouth every evening., Disp: 90 tablet, Rfl: 3    apixaban (ELIQUIS) 2.5 mg Tab, Take 1 tablet (2.5 mg total) by mouth 2 (two) times daily., Disp: 180 tablet, Rfl: 3    busPIRone (BUSPAR) 5 MG Tab, Take 2 tablets (10 mg total) by mouth 2 (two) times daily., Disp: 180 tablet, Rfl: 3    cholecalciferol, vitamin D3, (VITAMIN D3) 25 mcg (1,000 unit) capsule, Take 1 capsule (1,000 Units total) by mouth once daily., Disp: 100 capsule, Rfl: 1    ferrous sulfate 325 (65  FE) MG EC tablet, Take 1 tablet (325 mg total) by mouth 2 (two) times daily., Disp: 180 tablet, Rfl: 2    fluticasone propionate (FLONASE) 50 mcg/actuation nasal spray, 1 spray (50 mcg total) by Each Nostril route 2 (two) times daily., Disp: 16 g, Rfl: 3    hydroCHLOROthiazide (HYDRODIURIL) 12.5 MG Tab, Take 1 tablet (12.5 mg total) by mouth once daily., Disp: 90 tablet, Rfl: 3    loratadine (CLARITIN) 10 mg tablet, Take 1 tablet (10 mg total) by mouth once daily., Disp: 90 tablet, Rfl: 3    losartan (COZAAR) 50 MG tablet, Take 1 tablet (50 mg total) by mouth 2 (two) times a day., Disp: 180 tablet, Rfl: 3    magnesium oxide (MAG-OX) 400 mg (241.3 mg magnesium) tablet, Take 1 tablet (400 mg total) by mouth once daily., Disp: 90 tablet, Rfl: 3    omega-3 fatty acids/fish oil (FISH OIL-OMEGA-3 FATTY ACIDS) 300-1,000 mg capsule, Take 1 capsule by mouth once daily., Disp: 90 capsule, Rfl: 1    omeprazole (PRILOSEC) 20 MG capsule, Take 2 capsules (40 mg total) by mouth once daily., Disp: 180 capsule, Rfl: 3    propranoloL (INDERAL) 40 MG tablet, Take 1 tablet (40 mg total) by mouth 2 (two) times daily., Disp: 180 tablet, Rfl: 3    rosuvastatin (CRESTOR) 20 MG tablet, Take 20 mg by mouth once daily., Disp: , Rfl:     vit C,V-Oa-gcwbb-lutein-zeaxan (PRESERVISION AREDS 2) 250-90-40-1 mg Cap, Take 1 capsule by mouth once daily., Disp: 90 capsule, Rfl: 3    vit C,N-Yb-tpbsv-lutein-zeaxan (VISION FORMULA-2) 250-90-40-1 mg Cap, Take 1 tablet by mouth 2 (two) times daily., Disp: 180 capsule, Rfl: 1    blood sugar diagnostic Strp, To check BG 3 times daily, to use with insurance preferred meter, Disp: 200 strip, Rfl: 1    blood-glucose meter kit, To check BG 3 times daily, to use with insurance preferred meter, Disp: 1 each, Rfl: 0    FREESTYLE LANCETS 28 gauge lancets, , Disp: , Rfl:     mupirocin (BACTROBAN) 2 % ointment, Apply topically once daily., Disp: 30 g, Rfl: 3    vitamins A,C,E-zinc-copper (PRESERVISION AREDS)  "14,300-226-200 unit-mg-unit Cap, Take 1 capsule by mouth 2 (two) times daily., Disp: 180 capsule, Rfl: 3    Review of Systems   Constitutional:  Positive for unexpected weight change. Negative for appetite change, chills, fatigue and fever.        Has lost 10 lbs recently. Says she is eating less due to tremors. Still eats lots of fruits.   HENT:  Negative for congestion.    Respiratory:  Negative for cough, chest tightness and shortness of breath.    Cardiovascular:  Negative for chest pain and palpitations.   Gastrointestinal:  Negative for abdominal distention, abdominal pain, constipation, diarrhea, nausea and vomiting.   Endocrine: Negative for cold intolerance and heat intolerance.   Genitourinary:  Negative for difficulty urinating and dysuria.   Musculoskeletal:  Negative for arthralgias and back pain.   Skin:  Positive for color change. Negative for rash.        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   Neurological:  Positive for dizziness, tremors (chronic) and light-headedness. Negative for syncope, weakness and headaches.        Objective:      Vitals:    03/23/23 0733   BP: 120/78   Pulse: 74   Weight: 70.8 kg (156 lb)   Height: 5' 1" (1.549 m)     Physical Exam  Constitutional:       General: She is not in acute distress.     Appearance: She is well-developed. She is not ill-appearing.   HENT:      Head: Normocephalic and atraumatic.      Nose: No congestion.   Eyes:      General: No scleral icterus.     Conjunctiva/sclera: Conjunctivae normal.      Pupils: Pupils are equal, round, and reactive to light.   Neck:      Thyroid: No thyromegaly.      Vascular: No carotid bruit.   Cardiovascular:      Rate and Rhythm: Normal rate and regular rhythm.      Pulses: Normal pulses.           Dorsalis pedis pulses are 2+ on the right side and 2+ on the left side.        Posterior tibial pulses are 2+ on the right side and 2+ on the left side.      Heart sounds: Murmur (Harsh, systolic Aortic murmur appreciated) heard.     " No friction rub. No gallop.   Pulmonary:      Effort: Pulmonary effort is normal. No respiratory distress.      Breath sounds: Normal breath sounds. No stridor. No wheezing, rhonchi or rales.   Abdominal:      General: Bowel sounds are normal. There is no distension.      Palpations: Abdomen is soft.      Tenderness: There is no abdominal tenderness.   Musculoskeletal:         General: No swelling or tenderness. Normal range of motion.      Cervical back: Normal range of motion and neck supple.      Right lower leg: No edema.      Left lower leg: No edema.   Feet:      Right foot:      Protective Sensation: 6 sites tested.  6 sites sensed.      Skin integrity: Dry skin present. No ulcer, blister or fissure.      Toenail Condition: Right toenails are abnormally thick.      Left foot:      Protective Sensation: 6 sites tested.  6 sites sensed.      Skin integrity: Dry skin present. No ulcer, blister or fissure.      Toenail Condition: Left toenails are abnormally thick.   Skin:     General: Skin is warm and dry.      Capillary Refill: Capillary refill takes less than 2 seconds.      Coloration: Skin is not jaundiced.      Findings: No erythema.      Comments: Senile purpura on her LE and UE.   Neurological:      Mental Status: She is alert and oriented to person, place, and time.      Cranial Nerves: No cranial nerve deficit.   Psychiatric:         Mood and Affect: Mood normal.         Behavior: Behavior normal.         Assessment:       1. Type 2 diabetes mellitus without complication, without long-term current use of insulin    2. Need for vaccination    3. Essential tremor    4. Essential hypertension    5. Mixed hyperlipidemia           Plan:       Type 2 diabetes mellitus without complication, without long-term current use of insulin  Comments:  A1c remains at 6.7% and is decently controlled. we will maintain medication as prescribed  Orders:  -     Hemoglobin A1C, POCT    Need for vaccination  -     (In Office  Administered) Pneumococcal Conjugate Vaccine (20 Valent) (IM)    Essential tremor  Comments:  seems to be about the same. she is researching potential treatment for the tremor.    Essential hypertension  Comments:  BP is at goal. cotninue as is.    Mixed hyperlipidemia  Comments:  stable, continue as is.      Follow up in about 6 months (around 9/23/2023) for BP Check-Up.        3/23/2023 Casper Pratt PA-C

## 2023-03-29 ENCOUNTER — TELEPHONE (OUTPATIENT)
Dept: FAMILY MEDICINE | Facility: CLINIC | Age: 88
End: 2023-03-29

## 2023-03-29 NOTE — TELEPHONE ENCOUNTER
----- Message from Herrera Daily MA sent at 3/29/2023 11:43 AM CDT -----  Regarding: placard request  Pt calling to request a handicap placard. [phone]

## 2023-04-25 ENCOUNTER — TELEPHONE (OUTPATIENT)
Dept: CARDIOLOGY | Facility: CLINIC | Age: 88
End: 2023-04-25
Payer: MEDICARE

## 2023-04-25 DIAGNOSIS — Z95.0 CARDIAC PACEMAKER: Primary | ICD-10-CM

## 2023-04-27 ENCOUNTER — TELEPHONE (OUTPATIENT)
Dept: CARDIOLOGY | Facility: CLINIC | Age: 88
End: 2023-04-27
Payer: MEDICARE

## 2023-04-27 DIAGNOSIS — Z95.0 CARDIAC PACEMAKER: Primary | ICD-10-CM

## 2023-05-09 ENCOUNTER — TELEPHONE (OUTPATIENT)
Dept: FAMILY MEDICINE | Facility: CLINIC | Age: 88
End: 2023-05-09

## 2023-05-09 ENCOUNTER — OFFICE VISIT (OUTPATIENT)
Dept: FAMILY MEDICINE | Facility: CLINIC | Age: 88
End: 2023-05-09
Payer: MEDICARE

## 2023-05-09 VITALS
BODY MASS INDEX: 28.89 KG/M2 | TEMPERATURE: 99 F | HEIGHT: 61 IN | SYSTOLIC BLOOD PRESSURE: 128 MMHG | WEIGHT: 153 LBS | DIASTOLIC BLOOD PRESSURE: 86 MMHG | HEART RATE: 68 BPM

## 2023-05-09 DIAGNOSIS — R19.7 DIARRHEA, UNSPECIFIED TYPE: Primary | ICD-10-CM

## 2023-05-09 LAB
CTP QC/QA: YES
POC MOLECULAR INFLUENZA A AGN: NEGATIVE
POC MOLECULAR INFLUENZA B AGN: NEGATIVE

## 2023-05-09 PROCEDURE — 99213 PR OFFICE/OUTPT VISIT, EST, LEVL III, 20-29 MIN: ICD-10-PCS | Mod: S$GLB,,, | Performed by: PHYSICIAN ASSISTANT

## 2023-05-09 PROCEDURE — 99213 OFFICE O/P EST LOW 20 MIN: CPT | Mod: S$GLB,,, | Performed by: PHYSICIAN ASSISTANT

## 2023-05-09 PROCEDURE — 87502 INFLUENZA DNA AMP PROBE: CPT | Mod: QW,,, | Performed by: PHYSICIAN ASSISTANT

## 2023-05-09 PROCEDURE — 87502 POCT INFLUENZA A/B MOLECULAR: ICD-10-PCS | Mod: QW,,, | Performed by: PHYSICIAN ASSISTANT

## 2023-05-09 NOTE — PROGRESS NOTES
SUBJECTIVE:    Patient ID: Rosanna Raymundo is a 89 y.o. female.    Chief Complaint: Diarrhea (Weakness, stomach pains, since Friday, has been around niece who tested positive for Flu B yesterday @ , no bottles// SW)    This is a 89-year-old female who presents today for urgent evaluation of weakness, stomach pains.  Diarrhea off and on since Friday.  Niece tested positive for flu B yesterday and lives with her at home..Her test is negative today. Denies any fever/chills. Occasional nausea. She has just taken some tylenol for her pain. Denies any cough, congestion, URI sxs. Diarrhea has some form to it but mostly liquid. Sxs are improving today. Some gas persist.      Office Visit on 03/23/2023   Component Date Value Ref Range Status    Hemoglobin A1C, POC 03/23/2023 6.7  % Final   Hospital Outpatient Visit on 01/19/2023   Component Date Value Ref Range Status    Battery Voltage (V) 01/19/2023 2.99  V Final    P/R-wave RA Lead 01/19/2023 1.2  mV Final    P/R-wave RA Lead (native) 01/19/2023 >12  mV Final    Impedance RA Lead 01/19/2023 460  Ohms Final    Impedance RA Lead (native) 01/19/2023 460  Ohms Final    Threshold V RA Lead 01/19/2023 0.87  V Final    Theshold ms RA Lead 01/19/2023 0.4  ms Final    Threshold V RA Lead (native) 01/19/2023 1.25  V Final    Threshold ms RA Lead (native) 01/19/2023 0.4  ms Final   Telephone on 11/18/2022   Component Date Value Ref Range Status    Glucose 11/21/2022 131 (H)  65 - 99 mg/dL Final    BUN 11/21/2022 23  7 - 25 mg/dL Final    Creatinine 11/21/2022 1.08 (H)  0.60 - 0.95 mg/dL Final    eGFR 11/21/2022 49 (L)  > OR = 60 mL/min/1.73m2 Final    BUN/Creatinine Ratio 11/21/2022 21  6 - 22 (calc) Final    Sodium 11/21/2022 139  135 - 146 mmol/L Final    Potassium 11/21/2022 4.1  3.5 - 5.3 mmol/L Final    Chloride 11/21/2022 104  98 - 110 mmol/L Final    CO2 11/21/2022 28  20 - 32 mmol/L Final    Calcium 11/21/2022 9.5  8.6 - 10.4 mg/dL Final    Total Protein 11/21/2022  7.0  6.1 - 8.1 g/dL Final    Albumin 11/21/2022 3.9  3.6 - 5.1 g/dL Final    Globulin, Total 11/21/2022 3.1  1.9 - 3.7 g/dL (calc) Final    Albumin/Globulin Ratio 11/21/2022 1.3  1.0 - 2.5 (calc) Final    Total Bilirubin 11/21/2022 0.5  0.2 - 1.2 mg/dL Final    Alkaline Phosphatase 11/21/2022 74  37 - 153 U/L Final    AST 11/21/2022 22  10 - 35 U/L Final    ALT 11/21/2022 16  6 - 29 U/L Final    WBC 11/21/2022 6.1  3.8 - 10.8 Thousand/uL Final    RBC 11/21/2022 4.61  3.80 - 5.10 Million/uL Final    Hemoglobin 11/21/2022 12.0  11.7 - 15.5 g/dL Final    Hematocrit 11/21/2022 38.2  35.0 - 45.0 % Final    MCV 11/21/2022 82.9  80.0 - 100.0 fL Final    MCH 11/21/2022 26.0 (L)  27.0 - 33.0 pg Final    MCHC 11/21/2022 31.4 (L)  32.0 - 36.0 g/dL Final    RDW 11/21/2022 15.4 (H)  11.0 - 15.0 % Final    Platelets 11/21/2022 174  140 - 400 Thousand/uL Final    MPV 11/21/2022 12.2  7.5 - 12.5 fL Final    Neutrophils, Abs 11/21/2022 3,874  1,500 - 7,800 cells/uL Final    Lymph # 11/21/2022 1,440  850 - 3,900 cells/uL Final    Mono # 11/21/2022 567  200 - 950 cells/uL Final    Eos # 11/21/2022 171  15 - 500 cells/uL Final    Baso # 11/21/2022 49  0 - 200 cells/uL Final    Neutrophils Relative 11/21/2022 63.5  % Final    Lymph % 11/21/2022 23.6  % Final    Mono % 11/21/2022 9.3  % Final    Eosinophil % 11/21/2022 2.8  % Final    Basophil % 11/21/2022 0.8  % Final    Cholesterol 11/21/2022 161  <200 mg/dL Final    HDL 11/21/2022 42 (L)  > OR = 50 mg/dL Final    Triglycerides 11/21/2022 190 (H)  <150 mg/dL Final    LDL Cholesterol 11/21/2022 91  mg/dL (calc) Final    HDL/Cholesterol Ratio 11/21/2022 3.8  <5.0 (calc) Final    Non HDL Chol. (LDL+VLDL) 11/21/2022 119  <130 mg/dL (calc) Final    Creatinine, Urine 11/21/2022 74  20 - 275 mg/dL Final    Microalb, Ur 11/21/2022 8.5  See Note: mg/dL Final    Microalb/Creat Ratio 11/21/2022 115 (H)  <30 mcg/mg creat Final    TSH w/reflex to FT4 11/21/2022 2.51  0.40 - 4.50 mIU/L Final     Color, UA 11/21/2022 YELLOW  YELLOW Final    Appearance, UA 11/21/2022 CLOUDY (A)  CLEAR Final    Specific Gravity, UA 11/21/2022 1.014  1.001 - 1.035 Final    pH, UA 11/21/2022 7.5  5.0 - 8.0 Final    Glucose, UA 11/21/2022 NEGATIVE  NEGATIVE Final    Bilirubin, UA 11/21/2022 NEGATIVE  NEGATIVE Final    Ketones, UA 11/21/2022 NEGATIVE  NEGATIVE Final    Occult Blood UA 11/21/2022 TRACE (A)  NEGATIVE Final    Protein, UA 11/21/2022 1+ (A)  NEGATIVE Final    Nitrite, UA 11/21/2022 NEGATIVE  NEGATIVE Final    Leukocytes, UA 11/21/2022 3+ (A)  NEGATIVE Final    WBC Casts, UA 11/21/2022 > OR = 60 (A)  < OR = 5 /HPF Final    RBC Casts, UA 11/21/2022 0-2  < OR = 2 /HPF Final    Squam Epithel, UA 11/21/2022 NONE SEEN  < OR = 5 /HPF Final    Bacteria, UA 11/21/2022 FEW (A)  NONE SEEN /HPF Final    Hyaline Casts, UA 11/21/2022 0-5 (A)  NONE SEEN /LPF Final    Service Cmt: 11/21/2022    Final    Reflexive Urine Culture 11/21/2022    Final    Urine Culture, Routine 11/21/2022  (A)   Final    Hemoglobin A1C 11/21/2022 6.6 (H)  <5.7 % of total Hgb Final       Past Medical History:   Diagnosis Date    Anticoagulant long-term use     Cancer     Cataract     CHF (congestive heart failure)     Diabetes mellitus, type 2     Heart murmur     Hypertension     Iron deficiency anemia, unspecified     Anemia, iron def.    Occasional tremors      Past Surgical History:   Procedure Laterality Date    ADENOIDECTOMY      APPENDECTOMY      COLONOSCOPY N/A 12/17/2019    Procedure: COLONOSCOPY;  Surgeon: Raleigh Quarles III, MD;  Location: [facility];  Service: Endoscopy;  Laterality: N/A;    COLONOSCOPY N/A 12/18/2019    Procedure: COLONOSCOPY;  Surgeon: Raleigh Quarles III, MD;  Location: [facility];  Service: Endoscopy;  Laterality: N/A;    Gall bladder revmoval      HYSTERECTOMY      INSERTION OF PACEMAKER Right 1/2/2020    Procedure: INSERTION, PACEMAKER;  Surgeon: Fly Barragan MD;  Location: [facility] CATH/EP LAB;  Service:  Cardiology;  Laterality: Right;    left mastectomy Left 1945    MASTECTOMY Left     SD CABG, ARTERY-VEIN, FOUR  2014    Coronary Artery Bypass, 4    SMALL BOWEL ENTEROSCOPY N/A 12/17/2019    Procedure: ENTEROSCOPY;  Surgeon: Raleigh Quarles III, MD;  Location: [facility];  Service: Endoscopy;  Laterality: N/A;    TONSILLECTOMY       Family History   Problem Relation Age of Onset    No Known Problems Mother     Heart disease Father        Marital Status:   Alcohol History:  reports no history of alcohol use.  Tobacco History:  reports that she has never smoked. She has never used smokeless tobacco.  Drug History:  reports no history of drug use.    Review of patient's allergies indicates:   Allergen Reactions    Penicillin g benzathine Other (See Comments)       Current Outpatient Medications:     allopurinoL (ZYLOPRIM) 100 MG tablet, Take 1 tablet (100 mg total) by mouth once daily., Disp: 90 tablet, Rfl: 3    amiodarone (PACERONE) 200 MG Tab, Take 0.5 tablets (100 mg total) by mouth once daily., Disp: 45 tablet, Rfl: 3    amLODIPine (NORVASC) 5 MG tablet, Take 1 tablet (5 mg total) by mouth every evening., Disp: 90 tablet, Rfl: 3    apixaban (ELIQUIS) 2.5 mg Tab, Take 1 tablet (2.5 mg total) by mouth 2 (two) times daily., Disp: 180 tablet, Rfl: 3    blood sugar diagnostic Strp, To check BG 3 times daily, to use with insurance preferred meter, Disp: 200 strip, Rfl: 1    blood-glucose meter kit, To check BG 3 times daily, to use with insurance preferred meter, Disp: 1 each, Rfl: 0    busPIRone (BUSPAR) 5 MG Tab, Take 2 tablets (10 mg total) by mouth 2 (two) times daily., Disp: 180 tablet, Rfl: 3    cholecalciferol, vitamin D3, (VITAMIN D3) 25 mcg (1,000 unit) capsule, Take 1 capsule (1,000 Units total) by mouth once daily., Disp: 100 capsule, Rfl: 1    ferrous sulfate 325 (65 FE) MG EC tablet, Take 1 tablet (325 mg total) by mouth 2 (two) times daily., Disp: 180 tablet, Rfl: 2    fluticasone propionate  (FLONASE) 50 mcg/actuation nasal spray, 1 spray (50 mcg total) by Each Nostril route 2 (two) times daily., Disp: 16 g, Rfl: 3    FREESTYLE LANCETS 28 gauge lancets, , Disp: , Rfl:     hydroCHLOROthiazide (HYDRODIURIL) 12.5 MG Tab, Take 1 tablet (12.5 mg total) by mouth once daily., Disp: 90 tablet, Rfl: 3    loratadine (CLARITIN) 10 mg tablet, Take 1 tablet (10 mg total) by mouth once daily., Disp: 90 tablet, Rfl: 3    losartan (COZAAR) 50 MG tablet, Take 1 tablet (50 mg total) by mouth 2 (two) times a day., Disp: 180 tablet, Rfl: 3    magnesium oxide (MAG-OX) 400 mg (241.3 mg magnesium) tablet, Take 1 tablet (400 mg total) by mouth once daily., Disp: 90 tablet, Rfl: 3    mupirocin (BACTROBAN) 2 % ointment, Apply topically once daily., Disp: 30 g, Rfl: 3    omega-3 fatty acids/fish oil (FISH OIL-OMEGA-3 FATTY ACIDS) 300-1,000 mg capsule, Take 1 capsule by mouth once daily., Disp: 90 capsule, Rfl: 1    omeprazole (PRILOSEC) 20 MG capsule, Take 2 capsules (40 mg total) by mouth once daily., Disp: 180 capsule, Rfl: 3    propranoloL (INDERAL) 40 MG tablet, Take 1 tablet (40 mg total) by mouth 2 (two) times daily., Disp: 180 tablet, Rfl: 3    rosuvastatin (CRESTOR) 20 MG tablet, Take 20 mg by mouth once daily., Disp: , Rfl:     vit C,T-Hj-rbvlq-lutein-zeaxan (PRESERVISION AREDS 2) 250-90-40-1 mg Cap, Take 1 capsule by mouth once daily., Disp: 90 capsule, Rfl: 3    vit C,X-Jk-fiqgv-lutein-zeaxan (VISION FORMULA-2) 250-90-40-1 mg Cap, Take 1 tablet by mouth 2 (two) times daily., Disp: 180 capsule, Rfl: 1    vitamins A,C,E-zinc-copper (PRESERVISION AREDS) 14,320-226-200 unit-mg-unit Cap, Take 1 capsule by mouth 2 (two) times daily., Disp: 180 capsule, Rfl: 3    Review of Systems   HENT:  Negative for congestion, ear pain, hearing loss, sinus pressure, sinus pain and sore throat.    Respiratory:  Negative for cough and shortness of breath.    Gastrointestinal:  Positive for constipation, diarrhea and nausea (off and on).  "Negative for abdominal pain and blood in stool.        Objective:      Vitals:    05/09/23 1408   BP: 128/86   Pulse: 68   Temp: 98.5 °F (36.9 °C)   Weight: 69.4 kg (153 lb)   Height: 5' 1" (1.549 m)     Physical Exam  Constitutional:       General: She is not in acute distress.     Appearance: She is well-developed.   HENT:      Head: Normocephalic and atraumatic.   Eyes:      General:         Right eye: No discharge.         Left eye: No discharge.      Conjunctiva/sclera: Conjunctivae normal.      Pupils: Pupils are equal, round, and reactive to light.   Neck:      Thyroid: No thyromegaly.   Cardiovascular:      Rate and Rhythm: Normal rate and regular rhythm.      Heart sounds: Normal heart sounds.   Pulmonary:      Effort: Pulmonary effort is normal. No respiratory distress.      Breath sounds: Normal breath sounds. No wheezing.   Chest:      Chest wall: No tenderness.   Abdominal:      General: Bowel sounds are normal.      Palpations: Abdomen is soft.   Musculoskeletal:      Cervical back: Normal range of motion and neck supple.         Assessment:       1. Diarrhea, unspecified type         Plan:       Diarrhea, unspecified type  Comments:  improving, Viral process suspected. Push fluids and rest. Im going to ok tamiflu at the ppx dose to prevent from rhoda from her roommate.  Orders:  -     POCT Influenza A/B Molecular      Follow up if symptoms worsen or fail to improve.        5/9/2023 Casper Pratt PA-C      "

## 2023-05-09 NOTE — TELEPHONE ENCOUNTER
----- Message from Tg Menon sent at 5/9/2023 10:27 AM CDT -----  Patient called and stated that she think she has the flu she stated that she has a lot of gas and she had diarrhea yesterday and she feel very bad. She would like to know if he would like to see her , please give her a call back at [phone]

## 2023-05-15 ENCOUNTER — TELEPHONE (OUTPATIENT)
Dept: FAMILY MEDICINE | Facility: CLINIC | Age: 88
End: 2023-05-15

## 2023-05-15 NOTE — TELEPHONE ENCOUNTER
Spoke to pt and she stated [facility] needs to speak to our office before they can refill medications

## 2023-05-15 NOTE — TELEPHONE ENCOUNTER
----- Message from Tg Menon sent at 5/15/2023  8:09 AM CDT -----  Patient called and stated that she need to speak to the nurse about her medicine she stated that [facility] did not get anything. Please give her a call at [phone]

## 2023-05-16 DIAGNOSIS — J30.1 NON-SEASONAL ALLERGIC RHINITIS DUE TO POLLEN: ICD-10-CM

## 2023-05-16 DIAGNOSIS — K21.9 GASTROESOPHAGEAL REFLUX DISEASE WITHOUT ESOPHAGITIS: ICD-10-CM

## 2023-05-16 DIAGNOSIS — M1A.09X0 IDIOPATHIC CHRONIC GOUT OF MULTIPLE SITES WITHOUT TOPHUS: ICD-10-CM

## 2023-05-16 DIAGNOSIS — F41.9 ANXIETY: ICD-10-CM

## 2023-05-16 DIAGNOSIS — I10 ESSENTIAL HYPERTENSION: ICD-10-CM

## 2023-05-16 DIAGNOSIS — I48.0 PAROXYSMAL ATRIAL FIBRILLATION: ICD-10-CM

## 2023-05-16 RX ORDER — AMIODARONE HYDROCHLORIDE 200 MG/1
100 TABLET ORAL DAILY
Qty: 45 TABLET | Refills: 3 | Status: SHIPPED | OUTPATIENT
Start: 2023-05-16 | End: 2023-07-20 | Stop reason: SDUPTHER

## 2023-05-16 RX ORDER — PROPRANOLOL HYDROCHLORIDE 40 MG/1
40 TABLET ORAL 2 TIMES DAILY
Qty: 180 TABLET | Refills: 3 | Status: SHIPPED | OUTPATIENT
Start: 2023-05-16 | End: 2023-07-20 | Stop reason: SDUPTHER

## 2023-05-16 RX ORDER — BUSPIRONE HYDROCHLORIDE 5 MG/1
10 TABLET ORAL 2 TIMES DAILY
Qty: 180 TABLET | Refills: 3 | Status: SHIPPED | OUTPATIENT
Start: 2023-05-16

## 2023-05-16 RX ORDER — OMEPRAZOLE 20 MG/1
40 CAPSULE, DELAYED RELEASE ORAL DAILY
Qty: 180 CAPSULE | Refills: 1 | Status: SHIPPED | OUTPATIENT
Start: 2023-05-16 | End: 2023-07-20 | Stop reason: SDUPTHER

## 2023-05-16 RX ORDER — AMLODIPINE BESYLATE 5 MG/1
5 TABLET ORAL NIGHTLY
Qty: 90 TABLET | Refills: 3 | Status: SHIPPED | OUTPATIENT
Start: 2023-05-16 | End: 2023-07-20

## 2023-05-16 RX ORDER — ONDANSETRON 8 MG/1
8 TABLET, ORALLY DISINTEGRATING ORAL DAILY PRN
Qty: 90 TABLET | Refills: 0 | Status: SHIPPED | OUTPATIENT
Start: 2023-05-16 | End: 2023-07-20

## 2023-05-16 RX ORDER — ALLOPURINOL 100 MG/1
100 TABLET ORAL DAILY
Qty: 90 TABLET | Refills: 3 | Status: SHIPPED | OUTPATIENT
Start: 2023-05-16 | End: 2023-07-20 | Stop reason: SDUPTHER

## 2023-05-16 RX ORDER — HYDROCHLOROTHIAZIDE 12.5 MG/1
12.5 TABLET ORAL DAILY
Qty: 90 TABLET | Refills: 3 | Status: SHIPPED | OUTPATIENT
Start: 2023-05-16 | End: 2023-09-25

## 2023-05-16 RX ORDER — ROSUVASTATIN CALCIUM 20 MG/1
20 TABLET, COATED ORAL DAILY
Qty: 90 TABLET | Refills: 3 | Status: SHIPPED | OUTPATIENT
Start: 2023-05-16 | End: 2023-07-20 | Stop reason: SDUPTHER

## 2023-05-16 RX ORDER — LOSARTAN POTASSIUM 50 MG/1
50 TABLET ORAL 2 TIMES DAILY
Qty: 180 TABLET | Refills: 3 | Status: SHIPPED | OUTPATIENT
Start: 2023-05-16 | End: 2023-07-20 | Stop reason: SDUPTHER

## 2023-05-16 RX ORDER — LORATADINE 10 MG/1
10 TABLET ORAL DAILY
Qty: 90 TABLET | Refills: 3 | Status: SHIPPED | OUTPATIENT
Start: 2023-05-16

## 2023-05-16 NOTE — TELEPHONE ENCOUNTER
----- Message from Joann Dominguez sent at 5/16/2023  8:43 AM CDT -----  Pt is changing pharmacies brought in med list to be filled.  [facility]. Gave to Rachel

## 2023-06-19 LAB
LEFT EYE DM RETINOPATHY: POSITIVE
RIGHT EYE DM RETINOPATHY: POSITIVE

## 2023-06-20 ENCOUNTER — PATIENT OUTREACH (OUTPATIENT)
Dept: ADMINISTRATIVE | Facility: HOSPITAL | Age: 88
End: 2023-06-20
Payer: MEDICARE

## 2023-06-20 NOTE — PROGRESS NOTES
Population Health Chart Review & Patient Outreach Details:     Reason for Outreach Encounter:     [x]  Non-Compliant Report   []  Payor Report ([employer], PHN, BCBS, MSSP, MCIP, UHC, etc.)   []  Pre-Visit Chart Review     Updates Requested / Reviewed:     []  Care Everywhere    []     []  External Sources ([facility], [facility], DIS, etc.)   []  Care Team Updated    Patient Outreach Method:    []  Telephone Outreach Completed   [] Successful   [] Left Voicemail   [] Unable to Contact (wrong number, no voicemail)  []  Agent [employer] Portal Outreach Sent  []  Letter Outreach Mailed  []  Fax Sent for External Records  [x]  External Records Upload    Health Maintenance Topics Addressed and Outreach Outcomes / Actions Taken:        []      Breast Cancer Screening []  Mammo Scheduled      []  External Records Requested     []  Added Reminder to Complete to Upcoming Primary Care Appt Notes     []  Patient Declined     []  Patient Will Call Back to Schedule     []  Patient Will Schedule with External Provider / Order Routed if Applicable             []       Cervical Cancer Screening []  Pap Scheduled      []  External Records Requested     []  Added Reminder to Complete to Upcoming Primary Care Appt Notes     []  Patient Declined     []  Patient Will Call Back to Schedule     []  Patient Will Schedule with External Provider               []          Colorectal Cancer Screening []  Colonoscopy Case Request or Referral Placed     []  External Records Requested     []  Added Reminder to Complete to Upcoming Primary Care Appt Notes     []  Patient Declined     []  Patient Will Call Back to Schedule     []  Patient Will Schedule with External Provider     []  Fit Kit Mailed (add the SmartPhrase under additional notes)     []  Reminded Patient to Complete Home Test             [x]      Diabetic Eye Exam []  Eye Camera Scheduled or Optometry Referral Placed     []  External Records Requested     []  Added Reminder to Complete to  Upcoming Primary Care Appt Notes     []  Patient Declined     []  Patient Will Call Back to Schedule     []  Patient Will Schedule with External Provider             []      Blood Pressure Control []  Primary Care Follow Up Visit Scheduled     []  Remote Blood Pressure Reading Captured     []  Added Reminder to Complete to Upcoming Primary Care Appt Notes     []  Patient Declined     []  Patient Will Call Back / Patient Will Send Portal Message with Reading     []  Patient Will Call Back to Schedule Provider Visit             []       HbA1c & Other Labs []  Lab Appt Scheduled for Due Labs     []  Primary Care Follow Up Visit Scheduled      []  Reminded Patient to Complete Home Test     []  Added Reminder to Complete to Upcoming Primary Care Appt Notes     []  Patient Declined     []  Patient Will Call Back to Schedule     []  Patient Will Schedule with External Provider / Order Routed if Applicable           []    Schedule Primary Care Appt []  Primary Care Appt Scheduled     []  Patient Declined     []  Patient Will Call Back to Schedule     []  Pt Established with External Provider & Updated Care Team             []      Medication Adherence []  Primary Care Appointment Scheduled     []  Added Reminder to Upcoming Primary Care Appt Notes     []  Patient Reminded to  Prescription     []  Patient Declined, Provider Notified if Needed     []  Sent Provider Message to Review and/or Add Exclusion to Problem List             []      Osteoporosis Screening []  DXA Appointment Scheduled     []  External Records Requested     []  Added Reminder to Complete to Upcoming Primary Care Appt Notes     []  Patient Declined     []  Patient Will Call Back to Schedule     []  Patient Will Schedule with External Provider / Order Routed if Applicable     Additional Care Coordinator Notes:         Further Action Needed If Patient Returns Outreach:

## 2023-07-07 ENCOUNTER — TELEPHONE (OUTPATIENT)
Dept: FAMILY MEDICINE | Facility: CLINIC | Age: 88
End: 2023-07-07

## 2023-07-07 DIAGNOSIS — R39.9 UTI SYMPTOMS: Primary | ICD-10-CM

## 2023-07-07 LAB
BILIRUB UR QL STRIP: NEGATIVE
GLUCOSE UR QL STRIP: NEGATIVE
KETONES UR QL STRIP: NEGATIVE
LEUKOCYTE ESTERASE UR QL STRIP: NEGATIVE
PH, POC UA: 6
POC BLOOD, URINE: NEGATIVE
POC NITRATES, URINE: NEGATIVE
PROT UR QL STRIP: NEGATIVE
SP GR UR STRIP: 1.01 (ref 1–1.03)
UROBILINOGEN UR STRIP-ACNC: NEGATIVE (ref 0.1–1.1)

## 2023-07-07 PROCEDURE — 81003 POCT URINALYSIS, DIPSTICK, AUTOMATED, W/O SCOPE: ICD-10-PCS | Mod: QW,S$GLB,, | Performed by: PHYSICIAN ASSISTANT

## 2023-07-07 PROCEDURE — 81003 URINALYSIS AUTO W/O SCOPE: CPT | Mod: QW,S$GLB,, | Performed by: PHYSICIAN ASSISTANT

## 2023-07-07 RX ORDER — NITROFURANTOIN 25; 75 MG/1; MG/1
100 CAPSULE ORAL 2 TIMES DAILY
Qty: 14 CAPSULE | Refills: 0 | Status: SHIPPED | OUTPATIENT
Start: 2023-07-07 | End: 2023-07-20

## 2023-07-07 NOTE — TELEPHONE ENCOUNTER
----- Message from Yue Arellano MA sent at 7/7/2023  8:33 AM CDT -----  Regarding: FW: uti sx    ----- Message -----  From: Herrera Zhou MA  Sent: 7/7/2023   8:31 AM CDT  To: Aamir Rhodes Staff  Subject: uti sx                                           Pt niphong heaton calling calling is having severe back pain, nauseated, listless and they feel she may have a bad uti.

## 2023-07-07 NOTE — TELEPHONE ENCOUNTER
Spoke with pt daughter. She is requesting bringing a urine sample in so we can dip in the office. Advised ok to come in for urine sample.

## 2023-07-07 NOTE — TELEPHONE ENCOUNTER
Pt family member dropped urine sample off.     Per Dr. Rhodes Urine clear, will send off for a culture. Ok for Macrobid 100mg BID # 14    Pt notified. Pharm and allergies verified.

## 2023-07-07 NOTE — TELEPHONE ENCOUNTER
----- Message from Yue Arellano MA sent at 7/7/2023  8:04 AM CDT -----    ----- Message -----  From: Cata Cramer  Sent: 7/7/2023   7:54 AM CDT  To: Aamir Rhodes [facility]- 7:50-pt has a uti and would like something called in. Nauseated, low back pain, very uncomfortable   [phone]

## 2023-07-09 LAB — BACTERIA UR CULT: ABNORMAL

## 2023-07-10 ENCOUNTER — TELEPHONE (OUTPATIENT)
Dept: FAMILY MEDICINE | Facility: CLINIC | Age: 88
End: 2023-07-10

## 2023-07-10 NOTE — TELEPHONE ENCOUNTER
Spoke with pt pt states she was placed on the correct antibiotic. Pt states her symptoms are about 75 % better. States she is doing great. Pt advised to take full dose of meds. Pt voiced understanding.

## 2023-07-20 ENCOUNTER — OFFICE VISIT (OUTPATIENT)
Dept: FAMILY MEDICINE | Facility: CLINIC | Age: 88
End: 2023-07-20
Payer: MEDICARE

## 2023-07-20 ENCOUNTER — TELEPHONE (OUTPATIENT)
Dept: FAMILY MEDICINE | Facility: CLINIC | Age: 88
End: 2023-07-20

## 2023-07-20 VITALS
BODY MASS INDEX: 30.06 KG/M2 | WEIGHT: 159.19 LBS | HEART RATE: 77 BPM | OXYGEN SATURATION: 96 % | DIASTOLIC BLOOD PRESSURE: 72 MMHG | HEIGHT: 61 IN | SYSTOLIC BLOOD PRESSURE: 102 MMHG

## 2023-07-20 DIAGNOSIS — R42 VERTIGO: Primary | ICD-10-CM

## 2023-07-20 DIAGNOSIS — R53.1 WEAKNESS: ICD-10-CM

## 2023-07-20 DIAGNOSIS — E11.22 TYPE 2 DIABETES MELLITUS WITH STAGE 3A CHRONIC KIDNEY DISEASE, WITHOUT LONG-TERM CURRENT USE OF INSULIN: ICD-10-CM

## 2023-07-20 DIAGNOSIS — N18.31 TYPE 2 DIABETES MELLITUS WITH STAGE 3A CHRONIC KIDNEY DISEASE, WITHOUT LONG-TERM CURRENT USE OF INSULIN: ICD-10-CM

## 2023-07-20 DIAGNOSIS — I10 ESSENTIAL HYPERTENSION: ICD-10-CM

## 2023-07-20 DIAGNOSIS — K21.9 GASTROESOPHAGEAL REFLUX DISEASE WITHOUT ESOPHAGITIS: ICD-10-CM

## 2023-07-20 DIAGNOSIS — T14.8XXA ABRASION: ICD-10-CM

## 2023-07-20 DIAGNOSIS — G25.0 ESSENTIAL TREMOR: ICD-10-CM

## 2023-07-20 DIAGNOSIS — D69.2 SENILE PURPURA: ICD-10-CM

## 2023-07-20 DIAGNOSIS — M1A.09X0 IDIOPATHIC CHRONIC GOUT OF MULTIPLE SITES WITHOUT TOPHUS: ICD-10-CM

## 2023-07-20 DIAGNOSIS — E78.2 MIXED HYPERLIPIDEMIA: Chronic | ICD-10-CM

## 2023-07-20 DIAGNOSIS — I70.0 AORTIC ATHEROSCLEROSIS: ICD-10-CM

## 2023-07-20 DIAGNOSIS — I48.0 PAROXYSMAL ATRIAL FIBRILLATION: ICD-10-CM

## 2023-07-20 DIAGNOSIS — I50.32 CHRONIC DIASTOLIC CONGESTIVE HEART FAILURE: Chronic | ICD-10-CM

## 2023-07-20 PROCEDURE — 99214 OFFICE O/P EST MOD 30 MIN: CPT | Mod: S$GLB,,,

## 2023-07-20 PROCEDURE — 99214 PR OFFICE/OUTPT VISIT, EST, LEVL IV, 30-39 MIN: ICD-10-PCS | Mod: S$GLB,,,

## 2023-07-20 RX ORDER — AMIODARONE HYDROCHLORIDE 200 MG/1
100 TABLET ORAL DAILY
Qty: 45 TABLET | Refills: 3 | Status: SHIPPED | OUTPATIENT
Start: 2023-07-20 | End: 2024-07-14

## 2023-07-20 RX ORDER — OMEPRAZOLE 20 MG/1
40 CAPSULE, DELAYED RELEASE ORAL DAILY
Qty: 180 CAPSULE | Refills: 1 | Status: SHIPPED | OUTPATIENT
Start: 2023-07-20

## 2023-07-20 RX ORDER — DOCUSATE SODIUM 100 MG/1
100 CAPSULE, LIQUID FILLED ORAL
COMMUNITY

## 2023-07-20 RX ORDER — ALLOPURINOL 100 MG/1
100 TABLET ORAL DAILY
Qty: 90 TABLET | Refills: 3 | Status: SHIPPED | OUTPATIENT
Start: 2023-07-20 | End: 2023-08-08

## 2023-07-20 RX ORDER — ROSUVASTATIN CALCIUM 20 MG/1
20 TABLET, COATED ORAL DAILY
Qty: 90 TABLET | Refills: 3 | Status: ON HOLD | OUTPATIENT
Start: 2023-07-20 | End: 2023-07-29 | Stop reason: HOSPADM

## 2023-07-20 RX ORDER — LANOLIN ALCOHOL/MO/W.PET/CERES
400 CREAM (GRAM) TOPICAL DAILY
Qty: 90 TABLET | Refills: 3 | Status: SHIPPED | OUTPATIENT
Start: 2023-07-20

## 2023-07-20 RX ORDER — VIT A/VIT C/VIT E/ZINC/COPPER 4296-226
1 CAPSULE ORAL 2 TIMES DAILY
Qty: 180 CAPSULE | Refills: 3 | Status: SHIPPED | OUTPATIENT
Start: 2023-07-20 | End: 2023-09-25 | Stop reason: SDUPTHER

## 2023-07-20 RX ORDER — MUPIROCIN 20 MG/G
OINTMENT TOPICAL DAILY
Qty: 30 G | Refills: 3 | Status: SHIPPED | OUTPATIENT
Start: 2023-07-20

## 2023-07-20 RX ORDER — MECLIZINE HCL 12.5 MG 12.5 MG/1
12.5 TABLET ORAL 3 TIMES DAILY PRN
Qty: 90 TABLET | Refills: 0 | Status: SHIPPED | OUTPATIENT
Start: 2023-07-20 | End: 2023-09-25

## 2023-07-20 RX ORDER — LOSARTAN POTASSIUM 50 MG/1
50 TABLET ORAL 2 TIMES DAILY
Qty: 180 TABLET | Refills: 3 | Status: SHIPPED | OUTPATIENT
Start: 2023-07-20 | End: 2023-07-26

## 2023-07-20 RX ORDER — PROPRANOLOL HYDROCHLORIDE 40 MG/1
40 TABLET ORAL 2 TIMES DAILY
Qty: 180 TABLET | Refills: 3 | Status: SHIPPED | OUTPATIENT
Start: 2023-07-20 | End: 2024-07-19

## 2023-07-20 NOTE — TELEPHONE ENCOUNTER
----- Message from Karlie Penn sent at 7/20/2023  8:02 AM CDT -----  Contact: Martha  Pt needs to be seen asap. Pt is nauseated, weak, dizzy and low blood pressure. Martha @[phone]

## 2023-07-21 ENCOUNTER — TELEPHONE (OUTPATIENT)
Dept: FAMILY MEDICINE | Facility: CLINIC | Age: 88
End: 2023-07-21

## 2023-07-21 ENCOUNTER — PATIENT MESSAGE (OUTPATIENT)
Dept: FAMILY MEDICINE | Facility: CLINIC | Age: 88
End: 2023-07-21

## 2023-07-21 PROBLEM — I70.0 AORTIC ATHEROSCLEROSIS: Status: ACTIVE | Noted: 2023-07-21

## 2023-07-21 PROBLEM — R53.1 WEAKNESS: Status: ACTIVE | Noted: 2023-07-21

## 2023-07-21 PROBLEM — N18.31 CHRONIC KIDNEY DISEASE, STAGE 3A: Status: ACTIVE | Noted: 2023-07-21

## 2023-07-21 PROBLEM — D69.2 SENILE PURPURA: Status: ACTIVE | Noted: 2023-07-21

## 2023-07-21 PROBLEM — M1A.09X0 IDIOPATHIC CHRONIC GOUT OF MULTIPLE SITES WITHOUT TOPHUS: Status: ACTIVE | Noted: 2023-07-21

## 2023-07-21 PROBLEM — T14.8XXA ABRASION: Status: ACTIVE | Noted: 2023-07-21

## 2023-07-21 LAB — TSH SERPL-ACNC: 1.99 MIU/L (ref 0.4–4.5)

## 2023-07-21 NOTE — TELEPHONE ENCOUNTER
----- Message from Tg Menon sent at 7/21/2023 11:02 AM CDT -----  Patient  gustavo Jean called and stated that the patient was seen yesterday and she called [facility] home health and they have not received the referral from the office and she would like to know the status. Please give her a call [phone]

## 2023-07-21 NOTE — TELEPHONE ENCOUNTER
Please call them back and tell her to stop her night time dose of losartan indefinitely. Nausea is a new complaint to me, yesterday she was mostly concerned about dizziness. If she needs medication for nausea, I can send something out. If she begins to have vomiting, she'll need to go to the ER for IV hydration. If she does not already have a nurse visit in the next week or two to check her blood pressure, please schedule one     Patient will make a chart and write down BP, patient states she will call if needs nausea medication. NV  on 08/01/2023.

## 2023-07-21 NOTE — TELEPHONE ENCOUNTER
Lucretia Cardoso [facility] Staff; Malcom Segura  Phone Number: [phone]     Good afternoon,     We received faxed referral today for patient. I received a call from pt's niece Cinthia and she stated someone from our agency had reached out stating we could see patient Tuesday. I did not however call and state that date. By the time the referral was received we were full for the weekend with admits. I explained to her that I would reach out to you to discuss seeing patient Monday and if that was not ok referral would then have to go to another agency for a sooner admission date. Cinthia then stated to me Monday would not work and that we have to see her within 24 hours and I explained to her we did not have capacity to accommodate that so she stated to me she wanted to cancel the home health with us.     Thanks,   [facility]

## 2023-07-21 NOTE — TELEPHONE ENCOUNTER
Spoke with Cata Cardoso  -verbal order to consult [facility] so they can get patient admitted tomorrow.     Spoke with [facility] - they agree to admit.     Original referral faxed.      Spoke with Miranda (niece/caregiver) and  patient - feeling a little better this evening.  Nausea has resolved.   BP 98/56 this afternoon; 120/74 this evening.

## 2023-07-21 NOTE — PROGRESS NOTES
SUBJECTIVE:    Patient ID: Rosanna Raymundo is a 89 y.o. female.    Chief Complaint: Fatigue (Feeling weak x 2 weeks has nausea is weak, pale, cold, dizzy, BP was 105/50 2 days ago, / wakes up listless as day goes on she feels better/pacemaker placed in 2020/ BM formed, no vomiting//mp)    89-year-old established female patient presents to clinic today with complaint that she has been feeling weak for approximately the past 2 weeks and appears pale to her family members, feels cold, dizzy, and her blood pressure has been low at home.  Patient states that when she 1st wakes up she feels very listless but that is the day goes on she does feel better as she moves begins to move around.  Patient states that she feels like her head is spinning.  Patient does report that she is a pacemaker.  Patient has had no vomiting or diarrhea.  Patient has not been out in the heat and she states that she has been drinking water.      Fatigue  Associated symptoms include fatigue, vertigo and weakness. Pertinent negatives include no abdominal pain, chest pain, chills, congestion, coughing, fever, headaches, nausea, numbness, rash, sore throat or vomiting.     Hospital Outpatient Visit on 07/20/2023   Component Date Value Ref Range Status    Battery Voltage (V) 07/20/2023 2.99  V In process    P/R-wave RA Lead 07/20/2023 1.0  mV In process    P/R-wave RA Lead (native) 07/20/2023 >12.0  mV In process    Impedance RA Lead 07/20/2023 450  Ohms In process    Impedance RA Lead (native) 07/20/2023 430  Ohms In process    Threshold V RA Lead 07/20/2023 1.0  V In process    Theshold ms RA Lead 07/20/2023 0.4  ms In process   Office Visit on 07/20/2023   Component Date Value Ref Range Status    TSH w/reflex to FT4 07/20/2023 1.99  0.40 - 4.50 mIU/L Final   Refill on 07/07/2023   Component Date Value Ref Range Status    POC Blood, Urine 07/07/2023 Negative  Negative Final    POC Bilirubin, Urine 07/07/2023 Negative  Negative Final     POC Urobilinogen, Urine 07/07/2023 Negative  0.1 - 1.1 Final    POC Ketones, Urine 07/07/2023 Negative  Negative Final    POC Protein, Urine 07/07/2023 Negative  Negative Final    POC Nitrates, Urine 07/07/2023 Negative  Negative Final    POC Glucose, Urine 07/07/2023 Negative  Negative Final    pH, UA 07/07/2023 6.0   Final    POC Specific Gravity, Urine 07/07/2023 1.015  1.003 - 1.029 Final    POC Leukocytes, Urine 07/07/2023 Negative  Negative Final    Urine Culture, Routine 07/07/2023  (A)   Final   Patient Outreach on 06/20/2023   Component Date Value Ref Range Status    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   Final    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 Positive   Final   Office Visit on 05/09/2023   Component Date Value Ref Range Status    POC Molecular Influenza A Ag 05/09/2023 Negative  Negative, Not Reported Final    POC Molecular Influenza B Ag 05/09/2023 Negative  Negative, Not Reported Final     Acceptable 05/09/2023 Yes   Final   Office Visit on 03/23/2023   Component Date Value Ref Range Status    Hemoglobin A1C, POC 03/23/2023 6.7  % Final       Past Medical History:   Diagnosis Date    Anticoagulant long-term use     Cancer     Cataract     CHF (congestive heart failure)     Diabetes mellitus, type 2     Heart murmur     Hypertension     Iron deficiency anemia, unspecified     Anemia, iron def.    Occasional tremors      Social History     Socioeconomic History    Marital status:    Tobacco Use    Smoking status: Never    Smokeless tobacco: Never   Substance and Sexual Activity    Alcohol use: Never    Drug use: Never    Sexual activity: Not Currently     Past Surgical History:   Procedure Laterality Date    ADENOIDECTOMY      APPENDECTOMY      COLONOSCOPY N/A 12/17/2019    Procedure: COLONOSCOPY;  Surgeon: Raleigh Quarles III, MD;  Location: [facility];  Service: Endoscopy;  Laterality: N/A;    COLONOSCOPY N/A 12/18/2019    Procedure: COLONOSCOPY;  Surgeon: Raleigh WHITE  Willam NELSON MD;  Location: [facility] ENDO;  Service: Endoscopy;  Laterality: N/A;    Gall bladder revmoval      HYSTERECTOMY      INSERTION OF PACEMAKER Right 1/2/2020    Procedure: INSERTION, PACEMAKER;  Surgeon: Fly Barragan MD;  Location: [facility] CATH/EP LAB;  Service: Cardiology;  Laterality: Right;    left mastectomy Left 1945    MASTECTOMY Left     MI CABG, ARTERY-VEIN, FOUR  2014    Coronary Artery Bypass, 4    SMALL BOWEL ENTEROSCOPY N/A 12/17/2019    Procedure: ENTEROSCOPY;  Surgeon: Raleigh Quarles III, MD;  Location: [facility] ENDO;  Service: Endoscopy;  Laterality: N/A;    TONSILLECTOMY       Family History   Problem Relation Age of Onset    No Known Problems Mother     Heart disease Father        Review of patient's allergies indicates:   Allergen Reactions    Penicillin g benzathine Other (See Comments)       Current Outpatient Medications:     apixaban (ELIQUIS) 2.5 mg Tab, Take 1 tablet (2.5 mg total) by mouth 2 (two) times daily., Disp: 180 tablet, Rfl: 3    blood sugar diagnostic Strp, To check BG 3 times daily, to use with insurance preferred meter, Disp: 200 strip, Rfl: 1    blood-glucose meter kit, To check BG 3 times daily, to use with insurance preferred meter, Disp: 1 each, Rfl: 0    busPIRone (BUSPAR) 5 MG Tab, Take 2 tablets (10 mg total) by mouth 2 (two) times daily., Disp: 180 tablet, Rfl: 3    cholecalciferol, vitamin D3, (VITAMIN D3) 25 mcg (1,000 unit) capsule, Take 1 capsule (1,000 Units total) by mouth once daily., Disp: 100 capsule, Rfl: 1    docusate sodium (COLACE) 100 MG capsule, Take 100 mg by mouth as needed for Constipation., Disp: , Rfl:     ferrous sulfate 325 (65 FE) MG EC tablet, Take 1 tablet (325 mg total) by mouth 2 (two) times daily., Disp: 180 tablet, Rfl: 2    FREESTYLE LANCETS 28 gauge lancets, , Disp: , Rfl:     hydroCHLOROthiazide (HYDRODIURIL) 12.5 MG Tab, Take 1 tablet (12.5 mg total) by mouth once daily., Disp: 90 tablet, Rfl: 3    loratadine (CLARITIN) 10 mg  tablet, Take 1 tablet (10 mg total) by mouth once daily., Disp: 90 tablet, Rfl: 3    omega-3 fatty acids/fish oil (FISH OIL-OMEGA-3 FATTY ACIDS) 300-1,000 mg capsule, Take 1 capsule by mouth once daily., Disp: 90 capsule, Rfl: 1    vit C,V-Dx-tfbsk-lutein-zeaxan (PRESERVISION AREDS 2) 250-90-40-1 mg Cap, Take 1 capsule by mouth once daily., Disp: 90 capsule, Rfl: 3    vit C,C-Qn-qgtmd-lutein-zeaxan (VISION FORMULA-2) 250-90-40-1 mg Cap, Take 1 tablet by mouth 2 (two) times daily., Disp: 180 capsule, Rfl: 1    allopurinoL (ZYLOPRIM) 100 MG tablet, Take 1 tablet (100 mg total) by mouth once daily., Disp: 90 tablet, Rfl: 3    amiodarone (PACERONE) 200 MG Tab, Take 0.5 tablets (100 mg total) by mouth once daily., Disp: 45 tablet, Rfl: 3    losartan (COZAAR) 50 MG tablet, Take 1 tablet (50 mg total) by mouth 2 (two) times a day., Disp: 180 tablet, Rfl: 3    magnesium oxide (MAG-OX) 400 mg (241.3 mg magnesium) tablet, Take 1 tablet (400 mg total) by mouth once daily., Disp: 90 tablet, Rfl: 3    meclizine (ANTIVERT) 12.5 mg tablet, Take 1 tablet (12.5 mg total) by mouth 3 (three) times daily as needed for Dizziness., Disp: 90 tablet, Rfl: 0    mupirocin (BACTROBAN) 2 % ointment, Apply topically once daily., Disp: 30 g, Rfl: 3    omeprazole (PRILOSEC) 20 MG capsule, Take 2 capsules (40 mg total) by mouth once daily., Disp: 180 capsule, Rfl: 1    propranoloL (INDERAL) 40 MG tablet, Take 1 tablet (40 mg total) by mouth 2 (two) times daily., Disp: 180 tablet, Rfl: 3    rosuvastatin (CRESTOR) 20 MG tablet, Take 1 tablet (20 mg total) by mouth once daily., Disp: 90 tablet, Rfl: 3    vitamins A,C,E-zinc-copper (PRESERVISION AREDS) 4,296 mcg-226 mg-90 mg Cap, Take 1 capsule by mouth 2 (two) times daily., Disp: 180 capsule, Rfl: 3    Review of Systems   Constitutional:  Positive for fatigue. Negative for activity change, appetite change, chills, fever and unexpected weight change.   HENT:  Negative for nasal congestion, ear pain,  "rhinorrhea and sore throat.         No ear pain but ears do feel full   Eyes:  Negative for visual disturbance.   Respiratory:  Negative for apnea, cough, shortness of breath and wheezing.    Cardiovascular:  Negative for chest pain, palpitations and leg swelling.   Gastrointestinal:  Negative for abdominal pain, blood in stool, constipation, diarrhea, nausea and vomiting.   Genitourinary:  Negative for dysuria, frequency and urgency.   Integumentary:  Negative for rash.   Neurological:  Positive for dizziness, vertigo, weakness and light-headedness. Negative for tremors, syncope, facial asymmetry, speech difficulty, numbness and headaches.         Objective:      Vitals:    07/20/23 1100   BP: 102/72   Pulse: 77   SpO2: 96%   Weight: 72.2 kg (159 lb 3.2 oz)   Height: 5' 1" (1.549 m)     Physical Exam  Vitals and nursing note reviewed.   Constitutional:       General: She is not in acute distress.     Appearance: Normal appearance. She is well-developed and well-groomed. She is not ill-appearing, toxic-appearing or diaphoretic.   HENT:      Head: Normocephalic and atraumatic.      Right Ear: External ear normal. There is impacted cerumen.      Left Ear: External ear normal. There is impacted cerumen.      Nose: Nose normal. No congestion or rhinorrhea.      Mouth/Throat:      Lips: Pink.      Mouth: Mucous membranes are moist.      Pharynx: Oropharynx is clear. No oropharyngeal exudate or posterior oropharyngeal erythema.   Eyes:      General: No scleral icterus.        Right eye: No discharge.         Left eye: No discharge.      Conjunctiva/sclera: Conjunctivae normal.      Pupils: Pupils are equal, round, and reactive to light.   Neck:      Thyroid: No thyromegaly.      Vascular: No carotid bruit.   Cardiovascular:      Rate and Rhythm: Normal rate. Rhythm irregularly irregular. Occasional Extrasystoles are present.     Pulses: Normal pulses.           Radial pulses are 2+ on the right side and 2+ on the left " side.      Heart sounds: Murmur heard.   Pulmonary:      Effort: Pulmonary effort is normal.      Breath sounds: Normal breath sounds. No wheezing or rales.   Abdominal:      General: Bowel sounds are normal. There is no distension.      Palpations: Abdomen is soft.      Tenderness: There is no abdominal tenderness.   Musculoskeletal:         General: No tenderness or deformity. Normal range of motion.      Cervical back: Normal range of motion and neck supple.      Lumbar back: Normal. No spasms.      Right lower leg: No edema.      Left lower leg: No edema.      Comments: Bends 90 degrees at  waist   Lymphadenopathy:      Cervical: No cervical adenopathy.   Skin:     General: Skin is warm and dry.      Capillary Refill: Capillary refill takes less than 2 seconds.      Findings: No rash.   Neurological:      Mental Status: She is alert and oriented to person, place, and time.      Cranial Nerves: No cranial nerve deficit or facial asymmetry.      Sensory: Sensation is intact.      Motor: Weakness present.      Coordination: Romberg sign negative. Coordination normal.      Gait: Gait is intact.   Psychiatric:         Attention and Perception: Attention and perception normal.         Mood and Affect: Mood and affect normal.         Speech: Speech normal.         Behavior: Behavior normal. Behavior is cooperative.         Thought Content: Thought content normal.         Cognition and Memory: Cognition normal.         Judgment: Judgment normal.         Assessment:       1. Vertigo    2. Idiopathic chronic gout of multiple sites without tophus    3. Paroxysmal atrial fibrillation    4. Essential hypertension    5. Gastroesophageal reflux disease without esophagitis    6. Mixed hyperlipidemia    7. Abrasion    8. Weakness    9. Essential tremor    10. Chronic diastolic congestive heart failure    11. Senile purpura    12. Type 2 diabetes mellitus with stage 3a chronic kidney disease, without long-term current use of  "insulin    13. Aortic atherosclerosis         Plan:       Vertigo  Patient's primary concern today is the dizziness that she has been experiencing.  Patient does have bilateral cerumen impactions.  Both were able to be dislodged or removed.  Bilateral tympanic membranes within normal limits after removal.  Patient does state that she feels like her head feels a little clear once the pressure was taken off of her tympanic membranes.  She states that she does feel a little better.  We will prescribe meclizine 12.5 mg and instructed patient to take 1 tablet by mouth as needed for dizziness.  Instructed patient that if she does not feel dizzy she does not have to take the medication.  -     meclizine (ANTIVERT) 12.5 mg tablet; Take 1 tablet (12.5 mg total) by mouth 3 (three) times daily as needed for Dizziness.  Dispense: 90 tablet; Refill: 0    Essential hypertension  Patient's blood pressure has been running very low at home and in the office.  Family member states that this morning upon waking patient appeared very pale and was very listless in her bed, family member was very concerned, patient has mentioned a few times in the last 2 weeks that if she continues to feel like this she will go to the emergency room." Blood pressure was 105/50 this morning.  Advised patient that I feel like her blood pressure is no longer requiring as much medication to keep it under control.  We will do a trial of holding her amlodipine which she takes at bedtime to see if this improves her blood pressure and her feelings of weakness upon waking.  Patient denies feelings of lightheaded or dizziness with position changes.  Do not think patient is experiencing orthostasis.  I am not going to change her Inderal at this time because patient does need the beta-blocker for heart rate and rhythm and also has a intention tremor which does cause her enough deficit that she drops her food off of her utensils at times.  We will also continue " patient's losartan further benefit to her history of heart failure  -     propranoloL (INDERAL) 40 MG tablet; Take 1 tablet (40 mg total) by mouth 2 (two) times daily.  Dispense: 180 tablet; Refill: 3  -     magnesium oxide (MAG-OX) 400 mg (241.3 mg magnesium) tablet; Take 1 tablet (400 mg total) by mouth once daily.  Dispense: 90 tablet; Refill: 3  -     losartan (COZAAR) 50 MG tablet; Take 1 tablet (50 mg total) by mouth 2 (two) times a day.  Dispense: 180 tablet; Refill: 3  -     TSH w/reflex to FT4; Future; Expected date: 07/20/2023    Paroxysmal atrial fibrillation  Continue Eliquis to prevent blood clots.  Continue amiodarone as prescribed by Cardiology.  -     propranoloL (INDERAL) 40 MG tablet; Take 1 tablet (40 mg total) by mouth 2 (two) times daily.  Dispense: 180 tablet; Refill: 3  -     amiodarone (PACERONE) 200 MG Tab; Take 0.5 tablets (100 mg total) by mouth once daily.  Dispense: 45 tablet; Refill: 3    Idiopathic chronic gout of multiple sites without tophus  Patient requests refills today.  This problem otherwise not addressed  -     allopurinoL (ZYLOPRIM) 100 MG tablet; Take 1 tablet (100 mg total) by mouth once daily.  Dispense: 90 tablet; Refill: 3        Gastroesophageal reflux disease without esophagitis  Patient needs refills today, otherwise not addressed this visit  -     omeprazole (PRILOSEC) 20 MG capsule; Take 2 capsules (40 mg total) by mouth once daily.  Dispense: 180 capsule; Refill: 1    Mixed hyperlipidemia  Patient needs refills today, otherwise not addressed this visit  -     rosuvastatin (CRESTOR) 20 MG tablet; Take 1 tablet (20 mg total) by mouth once daily.  Dispense: 90 tablet; Refill: 3    Abrasion  Small skin abrasion  -     mupirocin (BACTROBAN) 2 % ointment; Apply topically once daily.  Dispense: 30 g; Refill: 3    Weakness  Home health to evaluate for need for physical therapy and to evaluate blood pressure  -     Ambulatory referral/consult to Home Health; Future;  Expected date: 07/21/2023  -     Basic Metabolic Panel; Future; Expected date: 07/21/2023    Other orders  Requests refills  -     vitamins A,C,E-zinc-copper (PRESERVISION AREDS) 4,296 mcg-226 mg-90 mg Cap; Take 1 capsule by mouth 2 (two) times daily.  Dispense: 180 capsule; Refill: 3      Follow up for Nurse BP Check.        7/21/2023 Cata Cardoso

## 2023-07-21 NOTE — TELEPHONE ENCOUNTER
Spoke with patient states the nausea woke her up this morning, took blood pressure at 5 am and was 102/61. Patient states she feels like a different person and would like to feel better. Took medicines at 6:30 this morning. Patient was calling to give blood pressure number.

## 2023-07-21 NOTE — TELEPHONE ENCOUNTER
----- Message from Minnie Waddell LPN sent at 7/21/2023  9:02 AM CDT -----    ----- Message -----  From: Fozia Quarles  Sent: 7/21/2023   9:01 AM CDT  To: Aamir Rhodes Staff    The patient saw Cata yesterday. She wanted her to call with her B/P this morning. 5:30 am 102/61 at  9:00 it was 124/50. The patient was nauseated when she woke up now she is  not.   Miranda daniel niece # [phone] GH

## 2023-07-22 PROCEDURE — G0180 PR HOME HEALTH MD CERTIFICATION: ICD-10-PCS | Mod: ,,, | Performed by: FAMILY MEDICINE

## 2023-07-22 PROCEDURE — G0180 MD CERTIFICATION HHA PATIENT: HCPCS | Mod: ,,, | Performed by: FAMILY MEDICINE

## 2023-07-24 ENCOUNTER — TELEPHONE (OUTPATIENT)
Dept: FAMILY MEDICINE | Facility: CLINIC | Age: 88
End: 2023-07-24

## 2023-07-24 NOTE — TELEPHONE ENCOUNTER
Spoke to Hyun at [facility]. Said that we can't add that test since it has been more than 3 days. I'm so sorry, I had asked one of our phlebotomist here in the office and they said we could, but when I called [facility] main line to add they test this is what they told me.

## 2023-07-24 NOTE — TELEPHONE ENCOUNTER
Gladis- I need to ask another favor. I put lab orders in for this patient for TSH, and chemistry, but I had only changed the TSH to [facility]. I inadvertently sent the other orders to [facility]. I cancelled them and ordered a BMP to [facility], but after the fact. Can you see if they also have blood to run this on Mrs. Raymundo please? If they do, and they have enough, I would love a CBC and BMP. Thank you!

## 2023-07-24 NOTE — TELEPHONE ENCOUNTER
----- Message from Karlie Penn sent at 7/24/2023 10:53 AM CDT -----  Pt wants her lab results. Pt #[phone]

## 2023-07-26 ENCOUNTER — TELEPHONE (OUTPATIENT)
Dept: FAMILY MEDICINE | Facility: CLINIC | Age: 88
End: 2023-07-26

## 2023-07-26 ENCOUNTER — HOSPITAL ENCOUNTER (OUTPATIENT)
Facility: HOSPITAL | Age: 88
Discharge: HOME HEALTH | End: 2023-07-29
Attending: EMERGENCY MEDICINE | Admitting: STUDENT IN AN ORGANIZED HEALTH CARE EDUCATION/TRAINING PROGRAM
Payer: MEDICARE

## 2023-07-26 DIAGNOSIS — K83.8 DILATION OF BILIARY TRACT: ICD-10-CM

## 2023-07-26 DIAGNOSIS — R10.9 ABDOMINAL PAIN: ICD-10-CM

## 2023-07-26 DIAGNOSIS — I35.0 SEVERE AORTIC STENOSIS: Chronic | ICD-10-CM

## 2023-07-26 DIAGNOSIS — E83.42 HYPOMAGNESEMIA: Primary | ICD-10-CM

## 2023-07-26 LAB
ALBUMIN SERPL BCP-MCNC: 4.1 G/DL (ref 3.5–5.2)
ALP SERPL-CCNC: 121 U/L (ref 55–135)
ALT SERPL W/O P-5'-P-CCNC: 19 U/L (ref 10–44)
ANION GAP SERPL CALC-SCNC: 11 MMOL/L (ref 8–16)
AST SERPL-CCNC: 47 U/L (ref 10–40)
BACTERIA #/AREA URNS HPF: NEGATIVE /HPF
BASOPHILS # BLD AUTO: 0.08 K/UL (ref 0–0.2)
BASOPHILS NFR BLD: 0.9 % (ref 0–1.9)
BILIRUB SERPL-MCNC: 0.9 MG/DL (ref 0.1–1)
BILIRUB UR QL STRIP: NEGATIVE
BNP SERPL-MCNC: 742 PG/ML (ref 0–99)
BUN SERPL-MCNC: 29 MG/DL (ref 8–23)
CALCIUM SERPL-MCNC: 9.3 MG/DL (ref 8.7–10.5)
CHLORIDE SERPL-SCNC: 104 MMOL/L (ref 95–110)
CLARITY UR: CLEAR
CO2 SERPL-SCNC: 22 MMOL/L (ref 23–29)
COLOR UR: YELLOW
CREAT SERPL-MCNC: 1 MG/DL (ref 0.5–1.4)
CREAT SERPL-MCNC: 1 MG/DL (ref 0.5–1.4)
DIFFERENTIAL METHOD: ABNORMAL
EOSINOPHIL # BLD AUTO: 0.2 K/UL (ref 0–0.5)
EOSINOPHIL NFR BLD: 1.6 % (ref 0–8)
ERYTHROCYTE [DISTWIDTH] IN BLOOD BY AUTOMATED COUNT: 21.1 % (ref 11.5–14.5)
EST. GFR  (NO RACE VARIABLE): 53.5 ML/MIN/1.73 M^2
GLUCOSE SERPL-MCNC: 161 MG/DL (ref 70–110)
GLUCOSE UR QL STRIP: NEGATIVE
HCT VFR BLD AUTO: 31.6 % (ref 37–48.5)
HGB BLD-MCNC: 9.5 G/DL (ref 12–16)
HGB UR QL STRIP: ABNORMAL
HYALINE CASTS #/AREA URNS LPF: 3 /LPF
IMM GRANULOCYTES # BLD AUTO: 0.03 K/UL (ref 0–0.04)
IMM GRANULOCYTES NFR BLD AUTO: 0.3 % (ref 0–0.5)
KETONES UR QL STRIP: ABNORMAL
LEUKOCYTE ESTERASE UR QL STRIP: ABNORMAL
LIPASE SERPL-CCNC: 53 U/L (ref 4–60)
LYMPHOCYTES # BLD AUTO: 1.3 K/UL (ref 1–4.8)
LYMPHOCYTES NFR BLD: 14.4 % (ref 18–48)
MAGNESIUM SERPL-MCNC: 1.5 MG/DL (ref 1.6–2.6)
MCH RBC QN AUTO: 20 PG (ref 27–31)
MCHC RBC AUTO-ENTMCNC: 30.1 G/DL (ref 32–36)
MCV RBC AUTO: 67 FL (ref 82–98)
MICROSCOPIC COMMENT: ABNORMAL
MONOCYTES # BLD AUTO: 0.6 K/UL (ref 0.3–1)
MONOCYTES NFR BLD: 6.8 % (ref 4–15)
NEUTROPHILS # BLD AUTO: 7.1 K/UL (ref 1.8–7.7)
NEUTROPHILS NFR BLD: 76 % (ref 38–73)
NITRITE UR QL STRIP: NEGATIVE
NRBC BLD-RTO: 0 /100 WBC
PH UR STRIP: 6 [PH] (ref 5–8)
PLATELET # BLD AUTO: 224 K/UL (ref 150–450)
PMV BLD AUTO: 10.1 FL (ref 9.2–12.9)
POTASSIUM SERPL-SCNC: 4 MMOL/L (ref 3.5–5.1)
PROT SERPL-MCNC: 7.5 G/DL (ref 6–8.4)
PROT UR QL STRIP: NEGATIVE
RBC # BLD AUTO: 4.75 M/UL (ref 4–5.4)
RBC #/AREA URNS HPF: 1 /HPF (ref 0–4)
SAMPLE: NORMAL
SODIUM SERPL-SCNC: 137 MMOL/L (ref 136–145)
SP GR UR STRIP: 1.02 (ref 1–1.03)
SQUAMOUS #/AREA URNS HPF: 0 /HPF
TROPONIN I SERPL HS-MCNC: 14.9 PG/ML (ref 0–14.9)
URN SPEC COLLECT METH UR: ABNORMAL
UROBILINOGEN UR STRIP-ACNC: NEGATIVE EU/DL
WBC # BLD AUTO: 9.29 K/UL (ref 3.9–12.7)
WBC #/AREA URNS HPF: 1 /HPF (ref 0–5)

## 2023-07-26 PROCEDURE — 96365 THER/PROPH/DIAG IV INF INIT: CPT | Mod: 59

## 2023-07-26 PROCEDURE — 82565 ASSAY OF CREATININE: CPT | Mod: 59

## 2023-07-26 PROCEDURE — 99285 EMERGENCY DEPT VISIT HI MDM: CPT | Mod: 25

## 2023-07-26 PROCEDURE — 93010 EKG 12-LEAD: ICD-10-PCS | Mod: ,,, | Performed by: SPECIALIST

## 2023-07-26 PROCEDURE — 83735 ASSAY OF MAGNESIUM: CPT | Performed by: EMERGENCY MEDICINE

## 2023-07-26 PROCEDURE — 80053 COMPREHEN METABOLIC PANEL: CPT | Performed by: EMERGENCY MEDICINE

## 2023-07-26 PROCEDURE — 96375 TX/PRO/DX INJ NEW DRUG ADDON: CPT | Mod: 59

## 2023-07-26 PROCEDURE — 83880 ASSAY OF NATRIURETIC PEPTIDE: CPT | Performed by: EMERGENCY MEDICINE

## 2023-07-26 PROCEDURE — G0378 HOSPITAL OBSERVATION PER HR: HCPCS

## 2023-07-26 PROCEDURE — 85025 COMPLETE CBC W/AUTO DIFF WBC: CPT | Performed by: EMERGENCY MEDICINE

## 2023-07-26 PROCEDURE — 63600175 PHARM REV CODE 636 W HCPCS: Performed by: EMERGENCY MEDICINE

## 2023-07-26 PROCEDURE — 93005 ELECTROCARDIOGRAM TRACING: CPT | Performed by: SPECIALIST

## 2023-07-26 PROCEDURE — 93010 ELECTROCARDIOGRAM REPORT: CPT | Mod: ,,, | Performed by: SPECIALIST

## 2023-07-26 PROCEDURE — 25500020 PHARM REV CODE 255: Performed by: EMERGENCY MEDICINE

## 2023-07-26 PROCEDURE — 84484 ASSAY OF TROPONIN QUANT: CPT | Performed by: EMERGENCY MEDICINE

## 2023-07-26 PROCEDURE — 83690 ASSAY OF LIPASE: CPT | Performed by: EMERGENCY MEDICINE

## 2023-07-26 PROCEDURE — 81001 URINALYSIS AUTO W/SCOPE: CPT | Performed by: EMERGENCY MEDICINE

## 2023-07-26 PROCEDURE — 96366 THER/PROPH/DIAG IV INF ADDON: CPT

## 2023-07-26 RX ORDER — PROPRANOLOL HYDROCHLORIDE 20 MG/1
40 TABLET ORAL 2 TIMES DAILY
Status: DISCONTINUED | OUTPATIENT
Start: 2023-07-27 | End: 2023-07-29 | Stop reason: HOSPADM

## 2023-07-26 RX ORDER — AMLODIPINE BESYLATE 5 MG/1
5 TABLET ORAL DAILY
COMMUNITY
Start: 2023-07-22 | End: 2023-07-26

## 2023-07-26 RX ORDER — MORPHINE SULFATE 4 MG/ML
4 INJECTION, SOLUTION INTRAMUSCULAR; INTRAVENOUS
Status: COMPLETED | OUTPATIENT
Start: 2023-07-26 | End: 2023-07-26

## 2023-07-26 RX ORDER — LANOLIN ALCOHOL/MO/W.PET/CERES
1 CREAM (GRAM) TOPICAL
Status: DISCONTINUED | OUTPATIENT
Start: 2023-07-27 | End: 2023-07-29 | Stop reason: HOSPADM

## 2023-07-26 RX ORDER — DOCUSATE SODIUM 100 MG/1
100 CAPSULE, LIQUID FILLED ORAL
Status: DISCONTINUED | OUTPATIENT
Start: 2023-07-26 | End: 2023-07-29 | Stop reason: HOSPADM

## 2023-07-26 RX ORDER — PANTOPRAZOLE SODIUM 40 MG/1
40 TABLET, DELAYED RELEASE ORAL
Status: DISCONTINUED | OUTPATIENT
Start: 2023-07-27 | End: 2023-07-29 | Stop reason: HOSPADM

## 2023-07-26 RX ORDER — ONDANSETRON 2 MG/ML
4 INJECTION INTRAMUSCULAR; INTRAVENOUS
Status: COMPLETED | OUTPATIENT
Start: 2023-07-26 | End: 2023-07-26

## 2023-07-26 RX ORDER — SODIUM CHLORIDE 0.9 % (FLUSH) 0.9 %
10 SYRINGE (ML) INJECTION
Status: DISCONTINUED | OUTPATIENT
Start: 2023-07-27 | End: 2023-07-29 | Stop reason: HOSPADM

## 2023-07-26 RX ORDER — ATORVASTATIN CALCIUM 40 MG/1
40 TABLET, FILM COATED ORAL NIGHTLY
Status: DISCONTINUED | OUTPATIENT
Start: 2023-07-27 | End: 2023-07-29 | Stop reason: HOSPADM

## 2023-07-26 RX ORDER — MAGNESIUM SULFATE HEPTAHYDRATE 40 MG/ML
2 INJECTION, SOLUTION INTRAVENOUS ONCE
Status: COMPLETED | OUTPATIENT
Start: 2023-07-26 | End: 2023-07-26

## 2023-07-26 RX ORDER — ALLOPURINOL 100 MG/1
100 TABLET ORAL DAILY
Status: DISCONTINUED | OUTPATIENT
Start: 2023-07-27 | End: 2023-07-29 | Stop reason: HOSPADM

## 2023-07-26 RX ORDER — BUSPIRONE HYDROCHLORIDE 5 MG/1
10 TABLET ORAL 2 TIMES DAILY
Status: DISCONTINUED | OUTPATIENT
Start: 2023-07-27 | End: 2023-07-29 | Stop reason: HOSPADM

## 2023-07-26 RX ORDER — AMIODARONE HYDROCHLORIDE 100 MG/1
100 TABLET ORAL DAILY
Status: DISCONTINUED | OUTPATIENT
Start: 2023-07-27 | End: 2023-07-29 | Stop reason: HOSPADM

## 2023-07-26 RX ADMIN — MORPHINE SULFATE 4 MG: 4 INJECTION, SOLUTION INTRAMUSCULAR; INTRAVENOUS at 09:07

## 2023-07-26 RX ADMIN — ONDANSETRON 4 MG: 2 INJECTION INTRAMUSCULAR; INTRAVENOUS at 09:07

## 2023-07-26 RX ADMIN — IOHEXOL 100 ML: 350 INJECTION, SOLUTION INTRAVENOUS at 07:07

## 2023-07-26 RX ADMIN — MAGNESIUM SULFATE IN WATER 2 G: 40 INJECTION, SOLUTION INTRAVENOUS at 09:07

## 2023-07-26 NOTE — TELEPHONE ENCOUNTER
TSH normal. BMP not done. Did not know if I needed to tell her to come back for additional labs. BMP ordered a day later.

## 2023-07-26 NOTE — TELEPHONE ENCOUNTER
Spoke to Miranda. States the ambulance and fire department is there right now. They think pt is having a heart attack. Pt is headed to [facility]. Cata gar.

## 2023-07-26 NOTE — TELEPHONE ENCOUNTER
----- Message from Tg Menon sent at 7/26/2023 10:22 AM CDT -----  Patient called and stated that she had a test done on this past Friday and she has not received any results . Please give her a call at [phone]

## 2023-07-27 ENCOUNTER — DOCUMENT SCAN (OUTPATIENT)
Dept: HOME HEALTH SERVICES | Facility: HOSPITAL | Age: 88
End: 2023-07-27
Payer: MEDICARE

## 2023-07-27 PROBLEM — R94.31 PROLONGED Q-T INTERVAL ON ECG: Status: ACTIVE | Noted: 2023-07-27

## 2023-07-27 LAB
ANION GAP SERPL CALC-SCNC: 7 MMOL/L (ref 8–16)
BASOPHILS # BLD AUTO: 0.06 K/UL (ref 0–0.2)
BASOPHILS NFR BLD: 1 % (ref 0–1.9)
BUN SERPL-MCNC: 25 MG/DL (ref 8–23)
CALCIUM SERPL-MCNC: 9 MG/DL (ref 8.7–10.5)
CHLORIDE SERPL-SCNC: 106 MMOL/L (ref 95–110)
CO2 SERPL-SCNC: 23 MMOL/L (ref 23–29)
CREAT SERPL-MCNC: 0.9 MG/DL (ref 0.5–1.4)
DIFFERENTIAL METHOD: ABNORMAL
EOSINOPHIL # BLD AUTO: 0.1 K/UL (ref 0–0.5)
EOSINOPHIL NFR BLD: 2.1 % (ref 0–8)
ERYTHROCYTE [DISTWIDTH] IN BLOOD BY AUTOMATED COUNT: 21.2 % (ref 11.5–14.5)
EST. GFR  (NO RACE VARIABLE): >60 ML/MIN/1.73 M^2
GLUCOSE SERPL-MCNC: 157 MG/DL (ref 70–110)
HCT VFR BLD AUTO: 31.1 % (ref 37–48.5)
HGB BLD-MCNC: 9.3 G/DL (ref 12–16)
IMM GRANULOCYTES # BLD AUTO: 0.02 K/UL (ref 0–0.04)
IMM GRANULOCYTES NFR BLD AUTO: 0.3 % (ref 0–0.5)
LYMPHOCYTES # BLD AUTO: 1.6 K/UL (ref 1–4.8)
LYMPHOCYTES NFR BLD: 25.5 % (ref 18–48)
MCH RBC QN AUTO: 20.1 PG (ref 27–31)
MCHC RBC AUTO-ENTMCNC: 29.9 G/DL (ref 32–36)
MCV RBC AUTO: 67 FL (ref 82–98)
MONOCYTES # BLD AUTO: 0.7 K/UL (ref 0.3–1)
MONOCYTES NFR BLD: 10.4 % (ref 4–15)
NEUTROPHILS # BLD AUTO: 3.8 K/UL (ref 1.8–7.7)
NEUTROPHILS NFR BLD: 60.7 % (ref 38–73)
NRBC BLD-RTO: 0 /100 WBC
PLATELET # BLD AUTO: 251 K/UL (ref 150–450)
PMV BLD AUTO: 10.2 FL (ref 9.2–12.9)
POTASSIUM SERPL-SCNC: 3.4 MMOL/L (ref 3.5–5.1)
RBC # BLD AUTO: 4.63 M/UL (ref 4–5.4)
SODIUM SERPL-SCNC: 136 MMOL/L (ref 136–145)
TROPONIN I SERPL HS-MCNC: 16.8 PG/ML (ref 0–14.9)
WBC # BLD AUTO: 6.28 K/UL (ref 3.9–12.7)

## 2023-07-27 PROCEDURE — 99214 PR OFFICE/OUTPT VISIT, EST, LEVL IV, 30-39 MIN: ICD-10-PCS | Mod: ,,, | Performed by: INTERNAL MEDICINE

## 2023-07-27 PROCEDURE — 85025 COMPLETE CBC W/AUTO DIFF WBC: CPT | Performed by: STUDENT IN AN ORGANIZED HEALTH CARE EDUCATION/TRAINING PROGRAM

## 2023-07-27 PROCEDURE — 96375 TX/PRO/DX INJ NEW DRUG ADDON: CPT | Mod: 59

## 2023-07-27 PROCEDURE — 84484 ASSAY OF TROPONIN QUANT: CPT | Performed by: STUDENT IN AN ORGANIZED HEALTH CARE EDUCATION/TRAINING PROGRAM

## 2023-07-27 PROCEDURE — G0378 HOSPITAL OBSERVATION PER HR: HCPCS

## 2023-07-27 PROCEDURE — 25000003 PHARM REV CODE 250: Performed by: STUDENT IN AN ORGANIZED HEALTH CARE EDUCATION/TRAINING PROGRAM

## 2023-07-27 PROCEDURE — 80048 BASIC METABOLIC PNL TOTAL CA: CPT | Performed by: STUDENT IN AN ORGANIZED HEALTH CARE EDUCATION/TRAINING PROGRAM

## 2023-07-27 PROCEDURE — 25500020 PHARM REV CODE 255: Performed by: STUDENT IN AN ORGANIZED HEALTH CARE EDUCATION/TRAINING PROGRAM

## 2023-07-27 PROCEDURE — 36415 COLL VENOUS BLD VENIPUNCTURE: CPT | Performed by: STUDENT IN AN ORGANIZED HEALTH CARE EDUCATION/TRAINING PROGRAM

## 2023-07-27 PROCEDURE — 97161 PT EVAL LOW COMPLEX 20 MIN: CPT

## 2023-07-27 PROCEDURE — 94761 N-INVAS EAR/PLS OXIMETRY MLT: CPT

## 2023-07-27 PROCEDURE — 63600175 PHARM REV CODE 636 W HCPCS: Performed by: STUDENT IN AN ORGANIZED HEALTH CARE EDUCATION/TRAINING PROGRAM

## 2023-07-27 PROCEDURE — 99214 OFFICE O/P EST MOD 30 MIN: CPT | Mod: ,,, | Performed by: INTERNAL MEDICINE

## 2023-07-27 RX ORDER — PROCHLORPERAZINE EDISYLATE 5 MG/ML
2.5 INJECTION INTRAMUSCULAR; INTRAVENOUS EVERY 6 HOURS PRN
Status: DISCONTINUED | OUTPATIENT
Start: 2023-07-27 | End: 2023-07-29 | Stop reason: HOSPADM

## 2023-07-27 RX ORDER — ACETAMINOPHEN 325 MG/1
650 TABLET ORAL EVERY 6 HOURS PRN
Status: DISCONTINUED | OUTPATIENT
Start: 2023-07-27 | End: 2023-07-29 | Stop reason: HOSPADM

## 2023-07-27 RX ORDER — PROCHLORPERAZINE EDISYLATE 5 MG/ML
2.5 INJECTION INTRAMUSCULAR; INTRAVENOUS EVERY 6 HOURS PRN
Status: DISCONTINUED | OUTPATIENT
Start: 2023-07-27 | End: 2023-07-27

## 2023-07-27 RX ADMIN — ALLOPURINOL 100 MG: 100 TABLET ORAL at 08:07

## 2023-07-27 RX ADMIN — PROPRANOLOL HYDROCHLORIDE 40 MG: 20 TABLET ORAL at 08:07

## 2023-07-27 RX ADMIN — Medication 200 MG: at 08:07

## 2023-07-27 RX ADMIN — PROCHLORPERAZINE EDISYLATE 2.5 MG: 5 INJECTION INTRAMUSCULAR; INTRAVENOUS at 02:07

## 2023-07-27 RX ADMIN — PANTOPRAZOLE SODIUM 40 MG: 40 TABLET, DELAYED RELEASE ORAL at 05:07

## 2023-07-27 RX ADMIN — ACETAMINOPHEN 650 MG: 325 TABLET ORAL at 10:07

## 2023-07-27 RX ADMIN — IOHEXOL 100 ML: 350 INJECTION, SOLUTION INTRAVENOUS at 11:07

## 2023-07-27 RX ADMIN — BUSPIRONE HYDROCHLORIDE 10 MG: 5 TABLET ORAL at 08:07

## 2023-07-27 RX ADMIN — FERROUS SULFATE TAB 325 MG (65 MG ELEMENTAL FE) 1 EACH: 325 (65 FE) TAB at 12:07

## 2023-07-27 RX ADMIN — PROPRANOLOL HYDROCHLORIDE 40 MG: 20 TABLET ORAL at 12:07

## 2023-07-27 RX ADMIN — ATORVASTATIN CALCIUM 40 MG: 40 TABLET, FILM COATED ORAL at 09:07

## 2023-07-27 RX ADMIN — BUSPIRONE HYDROCHLORIDE 10 MG: 5 TABLET ORAL at 12:07

## 2023-07-27 RX ADMIN — AMIODARONE HYDROCHLORIDE 100 MG: 100 TABLET ORAL at 08:07

## 2023-07-27 RX ADMIN — ATORVASTATIN CALCIUM 40 MG: 40 TABLET, FILM COATED ORAL at 12:07

## 2023-07-27 RX ADMIN — BUSPIRONE HYDROCHLORIDE 10 MG: 5 TABLET ORAL at 09:07

## 2023-07-27 RX ADMIN — PROPRANOLOL HYDROCHLORIDE 40 MG: 20 TABLET ORAL at 09:07

## 2023-07-27 NOTE — PLAN OF CARE
[facility]  Initial Discharge Assessment       Primary Care Provider: Aamir Rhodes MD    Admission Diagnosis: Hypomagnesemia [E83.42]    Admission Date: 7/26/2023  Expected Discharge Date: 7/28/2023    Pt confirmed home address and lives with Martha Martin [phone]. Pt has a home waker, cane and stool. She is active with [facility] . She confirmed PCP, insurance and pharmacy as [facility] [phone]. Pt has Medicare and  insurance. PTs discharge plan is home with resumption of HH .PT signed moon observation form.     Transition of Care Barriers: None    Payor: MEDICARE / Plan: MEDICARE PART A & B / Product Type: Government /     Extended Emergency Contact Information  Primary Emergency Contact: Martha Martin  Mobile Phone: [phone]  Relation: Relative   needed? No  Secondary Emergency Contact: Cinthia Todd  Mobile Phone: [phone]  Relation: Relative  Preferred language: English   needed? No    Discharge Plan A: Home with family, Home Health  Discharge Plan B: Home      [id number] - [place], [place] - [address] [facility]  [address] [address]  Phone: [phone] Fax: [phone]    [facility] - [address] [address]  Phone: [phone] Fax: [phone]    [facility] [address] [address]placed [address] [facility] [address]  [id number] [address]placed by [facility] [address]  [place] [address]  Phone: [phone] Fax: [phone]      Initial Assessment (most recent)       Adult Discharge Assessment - 07/27/23 1119          Discharge Assessment    Assessment Type Discharge Planning Assessment     Confirmed/corrected address, phone number and insurance Yes     Confirmed Demographics Correct on Facesheet     Source of Information family     Does patient/caregiver understand observation status Yes     Communicated AMBIKA with patient/caregiver Yes     People in Home friend(s)      Facility Arrived From: Home     Do you expect to return to your current living situation? Yes     Do you have help at home or someone to help you manage your care at home? Yes     Who are your caregiver(s) and their phone number(s)? Martha Martin [phone]     Prior to hospitilization cognitive status: Alert/Oriented     Current cognitive status: Alert/Oriented     Walking or Climbing Stairs ambulation difficulty, requires equipment     Mobility Management Walker cane stool     Home Accessibility wheelchair accessible     Home Layout Able to live on 1st floor     Equipment Currently Used at Home walker, rolling;cane, straight     Readmission within 30 days? No     Patient currently being followed by outpatient case management? No     Do you currently have service(s) that help you manage your care at home? Yes     Is the pt/caregiver preference to resume services with current agency No     Do you take prescription medications? Yes     Do you have prescription coverage? Yes     Do you have any problems affording any of your prescribed medications? No     Is the patient taking medications as prescribed? yes     Who is going to help you get home at discharge? Martha Martin [phone]     How do you get to doctors appointments? family or friend will provide     Are you on dialysis? No     Do you take coumadin? No     Discharge Plan A Home with family;Home Health     Discharge Plan B Home     DME Needed Upon Discharge  none     Discharge Plan discussed with: Patient     Transition of Care Barriers None        Physical Activity    On average, how many days per week do you engage in moderate to strenuous exercise (like a brisk walk)? Patient refused     On average, how many minutes do you engage in exercise at this level? Patient refused        Financial Resource Strain    How hard is it for you to pay for the very basics like food, housing, medical care, and heating? Patient refused        Housing Stability    In the  last 12 months, was there a time when you were not able to pay the mortgage or rent on time? No     In the last 12 months, was there a time when you did not have a steady place to sleep or slept in a shelter (including now)? No        Transportation Needs    In the past 12 months, has lack of transportation kept you from medical appointments or from getting medications? No     In the past 12 months, has lack of transportation kept you from meetings, work, or from getting things needed for daily living? No        Food Insecurity    Within the past 12 months, you worried that your food would run out before you got the money to buy more. Never true     Within the past 12 months, the food you bought just didn't last and you didn't have money to get more. Never true        Stress    Do you feel stress - tense, restless, nervous, or anxious, or unable to sleep at night because your mind is troubled all the time - these days? Patient refused        Social Connections    In a typical week, how many times do you talk on the phone with family, friends, or neighbors? Patient refused     How often do you get together with friends or relatives? Patient refused     How often do you attend Mandaeism or Orthodoxy services? Patient refused     Do you belong to any clubs or organizations such as Mandaeism groups, unions, fraternal or athletic groups, or school groups? Patient refused     How often do you attend meetings of the clubs or organizations you belong to? Patient refused     Are you , , , , never , or living with a partner?         Alcohol Use    Q1: How often do you have a drink containing alcohol? Patient refused     Q2: How many drinks containing alcohol do you have on a typical day when you are drinking? Patient refused     Q3: How often do you have six or more drinks on one occasion? Patient refused        OTHER    Name(s) of People in Home Martha Martin [phone]

## 2023-07-27 NOTE — PHARMACY MED REC
"            Admission Medication History     The home medication history was taken by [facility].    You may go to "Admission" then "Reconcile Home Medications" tabs to review and/or act upon these items.     The home medication list has been updated by the Pharmacy department.   Please read ALL comments highlighted in yellow.   Please address this information as you see fit.    Feel free to contact us if you have any questions or require assistance.      The medications listed below were removed from the home medication list. Please reorder if appropriate:  Patient reports no longer taking the following medication(s):  Norvasc 5mg  Cozzar 50mg  Fish-Oil    Medications listed below were obtained from: Patient/family and Analytic software- [employer]  No current facility-administered medications on file prior to encounter.     Current Outpatient Medications on File Prior to Encounter   Medication Sig Dispense Refill    amiodarone (PACERONE) 200 MG Tab Take 0.5 tablets (100 mg total) by mouth once daily. 45 tablet 3    busPIRone (BUSPAR) 5 MG Tab Take 2 tablets (10 mg total) by mouth 2 (two) times daily. 180 tablet 3    cholecalciferol, vitamin D3, (VITAMIN D3) 25 mcg (1,000 unit) capsule Take 1 capsule (1,000 Units total) by mouth once daily. 100 capsule 1    hydroCHLOROthiazide (HYDRODIURIL) 12.5 MG Tab Take 1 tablet (12.5 mg total) by mouth once daily. 90 tablet 3    magnesium oxide (MAG-OX) 400 mg (241.3 mg magnesium) tablet Take 1 tablet (400 mg total) by mouth once daily. (Patient taking differently: Take 200 mg by mouth once daily.) 90 tablet 3    mupirocin (BACTROBAN) 2 % ointment Apply topically once daily. (Patient taking differently: Apply 1 g topically once daily.) 30 g 3    omeprazole (PRILOSEC) 20 MG capsule Take 2 capsules (40 mg total) by mouth once daily. 180 capsule 1    propranoloL (INDERAL) 40 MG tablet Take 1 tablet (40 mg total) by mouth 2 (two) times daily. 180 tablet 3    rosuvastatin " (CRESTOR) 20 MG tablet Take 1 tablet (20 mg total) by mouth once daily. 90 tablet 3    vitamins A,C,E-zinc-copper (PRESERVISION AREDS) 4,296 mcg-226 mg-90 mg Cap Take 1 capsule by mouth 2 (two) times daily. 180 capsule 3    allopurinoL (ZYLOPRIM) 100 MG tablet Take 1 tablet (100 mg total) by mouth once daily. 90 tablet 3    amLODIPine (NORVASC) 5 MG tablet Take 5 mg by mouth once daily.      apixaban (ELIQUIS) 2.5 mg Tab Take 1 tablet (2.5 mg total) by mouth 2 (two) times daily. 180 tablet 3    blood sugar diagnostic Strp To check BG 3 times daily, to use with insurance preferred meter 200 strip 1    blood-glucose meter kit To check BG 3 times daily, to use with insurance preferred meter 1 each 0    docusate sodium (COLACE) 100 MG capsule Take 100 mg by mouth as needed for Constipation.      ferrous sulfate 325 (65 FE) MG EC tablet Take 1 tablet (325 mg total) by mouth 2 (two) times daily. 180 tablet 2    FREESTYLE LANCETS 28 gauge lancets       loratadine (CLARITIN) 10 mg tablet Take 1 tablet (10 mg total) by mouth once daily. 90 tablet 3    losartan (COZAAR) 50 MG tablet Take 1 tablet (50 mg total) by mouth 2 (two) times a day. 180 tablet 3    meclizine (ANTIVERT) 12.5 mg tablet Take 1 tablet (12.5 mg total) by mouth 3 (three) times daily as needed for Dizziness. 90 tablet 0    omega-3 fatty acids/fish oil (FISH OIL-OMEGA-3 FATTY ACIDS) 300-1,000 mg capsule Take 1 capsule by mouth once daily. 90 capsule 1    vit C,W-Pj-dvpxr-lutein-zeaxan (PRESERVISION AREDS 2) 250-90-40-1 mg Cap Take 1 capsule by mouth once daily. 90 capsule 3    vit C,U-Xn-ezoai-lutein-zeaxan (VISION FORMULA-2) 250-90-40-1 mg Cap Take 1 tablet by mouth 2 (two) times daily. 180 capsule 1           [facility]  [id number]      .

## 2023-07-27 NOTE — PT/OT/SLP EVAL
Physical Therapy Evaluation    Patient Name:  Rosanna Raymundo   MRN:  [id number]    Recommendations:     Discharge Recommendations: home health PT (resume)   Discharge Equipment Recommendations: none   Barriers to discharge: None    Assessment:     Rosanna Raymundo is a 89 y.o. female admitted with a medical diagnosis of Abdominal pain.  She presents with the following impairments/functional limitations: weakness, impaired endurance, impaired functional mobility, gait instability, impaired balance, decreased lower extremity function, pain. Patient is agreeable to participation with PT evaluation. She reports 3/10 abdominal and back pain at rest. Her niece lives with her. She does not use an AD for household ambulation and uses a rollator for community ambulation. She requires SBA for supine <> sit <> stand with no AD. She declines using RW for ambulation and went 140' with CGA and L HHA for the last 50' with patient reporting LE fatigue. She is agreeable to sit up in chair with chair alarm on and RN notified.     Rehab Prognosis: Good; patient would benefit from acute skilled PT services to address these deficits and reach maximum level of function.    Recent Surgery: * No surgery found *      Plan:     During this hospitalization, patient to be seen 5 x/week to address the identified rehab impairments via gait training, therapeutic activities, therapeutic exercises and progress toward the following goals:    Plan of Care Expires:  08/27/23    Subjective   Patient reports tremors at baseline, but no diagnosis as source. She also notes LE fatigue with ambulatory distance  Chief Complaint: abdominal and back pain   Patient/Family Comments/goals: figure out why LEs fatigue   Pain/Comfort:  Pain Rating 1: 3/10  Location 1: abdomen  Pain Addressed 1: Reposition, Distraction    Patients cultural, spiritual, Sikh conflicts given the current situation:      Living Environment:  Patient's niece lives with her in  a SSH with no SINGH and 1 step up to kitchen. Niece is retired and home with her.   Prior to admission, patients level of function was independent with no AD for household ambulation and rollator for community ambulation. She was having HHPT. She denies any recent falls. She does endorse driving. Equipment used at home: cane, straight, rollator.  DME owned (not currently used): none.  Upon discharge, patient will have assistance from HHPT and niece.    Objective:     Communicated with BRANDON Thomas prior to session.  Patient found HOB elevated with telemetry  upon PT entry to room.    General Precautions: Standard, fall  Orthopedic Precautions:N/A   Braces: N/A  Respiratory Status: Room air    Exams:  RLE Strength: WFL  LLE Strength: WFL    Functional Mobility:  Bed Mobility:     Supine to Sit: stand by assistance  Transfers:     Sit to Stand:  stand by assistance with no AD  Gait: 140' with CGA and L HHA for the last 50' with patient reporting LE fatigue      AM-PAC 6 CLICK MOBILITY  Total Score:22       Treatment & Education:  Patient was educated on the importance of OOB activity and functional mobility to negate negative effects of prolonged bed rest during hospitalization, safe transfers and ambulation, and D/C planning     Patient left up in chair with all lines intact, call button in reach, chair alarm on, and RN notified.    GOALS:   Multidisciplinary Problems       Physical Therapy Goals          Problem: Physical Therapy    Goal Priority Disciplines Outcome Goal Variances Interventions   Physical Therapy Goal     PT, PT/OT      Description: Goals to be met by: 23    Patient will increase functional independence with mobility by performin. Supine to sit with Supervision  2. Sit to stand transfer with Supervision  3. Bed to chair transfer with Supervision using Rolling Walker or no AD  4. Gait  x 250 feet with Supervision using Rolling Walker or no AD.   5. Lower extremity exercise program x20 reps  per handout, with supervision                       History:     Past Medical History:   Diagnosis Date    Anticoagulant long-term use     Cancer     Cataract     CHF (congestive heart failure)     Diabetes mellitus, type 2     Heart murmur     Hypertension     Iron deficiency anemia, unspecified     Anemia, iron def.    Occasional tremors        Past Surgical History:   Procedure Laterality Date    ADENOIDECTOMY      APPENDECTOMY      COLONOSCOPY N/A 12/17/2019    Procedure: COLONOSCOPY;  Surgeon: Raleigh Quarles III, MD;  Location: [facility] ENDO;  Service: Endoscopy;  Laterality: N/A;    COLONOSCOPY N/A 12/18/2019    Procedure: COLONOSCOPY;  Surgeon: Raleigh Quarles III, MD;  Location: [facility] ENDO;  Service: Endoscopy;  Laterality: N/A;    Gall bladder revmoval      HYSTERECTOMY      INSERTION OF PACEMAKER Right 1/2/2020    Procedure: INSERTION, PACEMAKER;  Surgeon: Fly Barragan MD;  Location: [facility] CATH/EP LAB;  Service: Cardiology;  Laterality: Right;    left mastectomy Left 1945    MASTECTOMY Left     KY CABG, ARTERY-VEIN, FOUR  2014    Coronary Artery Bypass, 4    SMALL BOWEL ENTEROSCOPY N/A 12/17/2019    Procedure: ENTEROSCOPY;  Surgeon: Raleigh Quarles III, MD;  Location: [facility] ENDO;  Service: Endoscopy;  Laterality: N/A;    TONSILLECTOMY         Time Tracking:     PT Received On: 07/27/23  PT Start Time: 1119     PT Stop Time: 1135  PT Total Time (min): 16 min     Billable Minutes: Evaluation 16      07/27/2023

## 2023-07-27 NOTE — SUBJECTIVE & OBJECTIVE
Past Medical History:   Diagnosis Date    Anticoagulant long-term use     Cancer     Cataract     CHF (congestive heart failure)     Diabetes mellitus, type 2     Heart murmur     Hypertension     Iron deficiency anemia, unspecified     Anemia, iron def.    Occasional tremors        Past Surgical History:   Procedure Laterality Date    ADENOIDECTOMY      APPENDECTOMY      COLONOSCOPY N/A 12/17/2019    Procedure: COLONOSCOPY;  Surgeon: Raleigh Quarles III, MD;  Location: [facility] ENDO;  Service: Endoscopy;  Laterality: N/A;    COLONOSCOPY N/A 12/18/2019    Procedure: COLONOSCOPY;  Surgeon: Raleigh Quarles III, MD;  Location: [facility] ENDO;  Service: Endoscopy;  Laterality: N/A;    Gall bladder revmoval      HYSTERECTOMY      INSERTION OF PACEMAKER Right 1/2/2020    Procedure: INSERTION, PACEMAKER;  Surgeon: Fly Barragan MD;  Location: [facility] CATH/EP LAB;  Service: Cardiology;  Laterality: Right;    left mastectomy Left 1945    MASTECTOMY Left     ID CABG, ARTERY-VEIN, FOUR  2014    Coronary Artery Bypass, 4    SMALL BOWEL ENTEROSCOPY N/A 12/17/2019    Procedure: ENTEROSCOPY;  Surgeon: Raleigh Quarles III, MD;  Location: [facility] ENDO;  Service: Endoscopy;  Laterality: N/A;    TONSILLECTOMY         Review of patient's allergies indicates:   Allergen Reactions    Penicillin g benzathine Other (See Comments)       No current facility-administered medications on file prior to encounter.     Current Outpatient Medications on File Prior to Encounter   Medication Sig    amiodarone (PACERONE) 200 MG Tab Take 0.5 tablets (100 mg total) by mouth once daily.    busPIRone (BUSPAR) 5 MG Tab Take 2 tablets (10 mg total) by mouth 2 (two) times daily.    cholecalciferol, vitamin D3, (VITAMIN D3) 25 mcg (1,000 unit) capsule Take 1 capsule (1,000 Units total) by mouth once daily.    hydroCHLOROthiazide (HYDRODIURIL) 12.5 MG Tab Take 1 tablet (12.5 mg total) by mouth once daily.    magnesium oxide (MAG-OX) 400 mg (241.3 mg  magnesium) tablet Take 1 tablet (400 mg total) by mouth once daily. (Patient taking differently: Take 200 mg by mouth once daily.)    mupirocin (BACTROBAN) 2 % ointment Apply topically once daily. (Patient taking differently: Apply 1 g topically once daily.)    omeprazole (PRILOSEC) 20 MG capsule Take 2 capsules (40 mg total) by mouth once daily.    propranoloL (INDERAL) 40 MG tablet Take 1 tablet (40 mg total) by mouth 2 (two) times daily.    rosuvastatin (CRESTOR) 20 MG tablet Take 1 tablet (20 mg total) by mouth once daily.    vitamins A,C,E-zinc-copper (PRESERVISION AREDS) 4,296 mcg-226 mg-90 mg Cap Take 1 capsule by mouth 2 (two) times daily.    allopurinoL (ZYLOPRIM) 100 MG tablet Take 1 tablet (100 mg total) by mouth once daily.    apixaban (ELIQUIS) 2.5 mg Tab Take 1 tablet (2.5 mg total) by mouth 2 (two) times daily.    blood sugar diagnostic Strp To check BG 3 times daily, to use with insurance preferred meter    blood-glucose meter kit To check BG 3 times daily, to use with insurance preferred meter    docusate sodium (COLACE) 100 MG capsule Take 100 mg by mouth as needed for Constipation.    ferrous sulfate 325 (65 FE) MG EC tablet Take 1 tablet (325 mg total) by mouth 2 (two) times daily.    FREESTYLE LANCETS 28 gauge lancets     loratadine (CLARITIN) 10 mg tablet Take 1 tablet (10 mg total) by mouth once daily.    meclizine (ANTIVERT) 12.5 mg tablet Take 1 tablet (12.5 mg total) by mouth 3 (three) times daily as needed for Dizziness.     Family History       Problem Relation (Age of Onset)    Heart disease Father    No Known Problems Mother          Tobacco Use    Smoking status: Never    Smokeless tobacco: Never   Substance and Sexual Activity    Alcohol use: Never    Drug use: Never    Sexual activity: Not Currently     Review of Systems   Constitutional:  Positive for diaphoresis. Negative for appetite change.   HENT:  Negative for sore throat.    Eyes: Negative.    Respiratory:  Negative for cough  and shortness of breath.    Cardiovascular:  Negative for chest pain.   Gastrointestinal:  Positive for abdominal pain. Negative for constipation.   Genitourinary:  Negative for dysuria and hematuria.   Musculoskeletal:  Positive for back pain. Negative for gait problem.   Skin:  Negative for rash and wound.   Neurological: Negative.    Psychiatric/Behavioral: Negative.     Objective:     Vital Signs (Most Recent):  Temp: 98.5 °F (36.9 °C) (07/26/23 2352)  Pulse: 61 (07/26/23 2352)  Resp: 18 (07/26/23 2352)  BP: (!) 116/59 (07/26/23 2352)  SpO2: 99 % (07/26/23 2352) Vital Signs (24h Range):  Temp:  [98.5 °F (36.9 °C)] 98.5 °F (36.9 °C)  Pulse:  [58-68] 61  Resp:  [14-18] 18  SpO2:  [97 %-100 %] 99 %  BP: ()/(50-64) 116/59     Weight: 71.3 kg (157 lb 3 oz)  Body mass index is 29.7 kg/m².     Physical Exam  Constitutional:       Appearance: Normal appearance.   HENT:      Head: Normocephalic and atraumatic.      Right Ear: External ear normal.      Left Ear: External ear normal.      Mouth/Throat:      Mouth: Mucous membranes are moist.   Eyes:      Extraocular Movements: Extraocular movements intact.   Cardiovascular:      Rate and Rhythm: Normal rate and regular rhythm.      Heart sounds: Murmur heard.   Pulmonary:      Effort: Pulmonary effort is normal.      Breath sounds: No wheezing.   Abdominal:      General: There is no distension.      Tenderness: There is abdominal tenderness.   Musculoskeletal:      Cervical back: Normal range of motion.      Right lower leg: No edema.      Left lower leg: No edema.   Skin:     General: Skin is warm.      Coloration: Skin is not jaundiced.   Neurological:      General: No focal deficit present.      Mental Status: She is alert and oriented to person, place, and time.   Psychiatric:         Mood and Affect: Mood normal.         Behavior: Behavior normal.              Significant Labs: All pertinent labs within the past 24 hours have been reviewed.    Significant  Imaging: I have reviewed all pertinent imaging results/findings within the past 24 hours.

## 2023-07-27 NOTE — PROGRESS NOTES
Seen this morning.  Abdominal pain is improving.  Check MRCP given right upper quadrant ultrasound showing biliary dilation.  This may be the source of her pain.  If unable to obtain MRI due to pacemaker incompatibility she may need an ERCP.

## 2023-07-27 NOTE — ASSESSMENT & PLAN NOTE
CT abdomen negative for acute findings. However, no mention of aorta in read. Concerning for aortic dissection with how patient is describing pain.  Unable to obtain BP in both arms as patient states she is not to check BP in left arm 2/2 prior mastectomy.   -CT aorta ordered   -hold home eliquis until study completed   -trend trop/EKG

## 2023-07-27 NOTE — ED PROVIDER NOTES
Encounter Date: 7/26/2023       History     Chief Complaint   Patient presents with    Abdominal Pain     Pt is having abdominal pain that is just above the naval that is radiating into her back for about an hour with nausea.     This is a 89-year-old female with past medical history diabetes, hypertension, CHF, iron deficiency anemia comes in complaining of epigastric abdominal pain.  Patient reports that the pain started today.  It started about an hour prior to arrival.  She reports that she got nauseous with it but did not throw up.  She denies any fevers or chills.  No constipation or diarrhea.  No chest pain or shortness of breath.  She denies any exacerbating or alleviating factors.    Review of patient's allergies indicates:   Allergen Reactions    Penicillin g benzathine Other (See Comments)     Past Medical History:   Diagnosis Date    Anticoagulant long-term use     Cancer     Cataract     CHF (congestive heart failure)     Diabetes mellitus, type 2     Heart murmur     Hypertension     Iron deficiency anemia, unspecified     Anemia, iron def.    Occasional tremors      Past Surgical History:   Procedure Laterality Date    ADENOIDECTOMY      APPENDECTOMY      COLONOSCOPY N/A 12/17/2019    Procedure: COLONOSCOPY;  Surgeon: Raleigh Quarles III, MD;  Location: [facility] ENDO;  Service: Endoscopy;  Laterality: N/A;    COLONOSCOPY N/A 12/18/2019    Procedure: COLONOSCOPY;  Surgeon: Raleigh Quarles III, MD;  Location: [facility] ENDO;  Service: Endoscopy;  Laterality: N/A;    Gall bladder revmoval      HYSTERECTOMY      INSERTION OF PACEMAKER Right 1/2/2020    Procedure: INSERTION, PACEMAKER;  Surgeon: Fly Barragan MD;  Location: [facility] CATH/EP LAB;  Service: Cardiology;  Laterality: Right;    left mastectomy Left 1945    MASTECTOMY Left     KY CABG, ARTERY-VEIN, FOUR  2014    Coronary Artery Bypass, 4    SMALL BOWEL ENTEROSCOPY N/A 12/17/2019    Procedure: ENTEROSCOPY;  Surgeon: Raleigh Quarles III, MD;   Location: [facility];  Service: Endoscopy;  Laterality: N/A;    TONSILLECTOMY       Family History   Problem Relation Age of Onset    No Known Problems Mother     Heart disease Father      Social History     Tobacco Use    Smoking status: Never    Smokeless tobacco: Never   Substance Use Topics    Alcohol use: Never    Drug use: Never     Review of Systems   Constitutional:  Negative for chills and fever.   HENT:  Negative for congestion, sore throat and trouble swallowing.    Respiratory:  Negative for cough and shortness of breath.    Cardiovascular:  Negative for chest pain and palpitations.   Gastrointestinal:  Positive for abdominal pain. Negative for diarrhea, nausea and vomiting.   Genitourinary:  Negative for dysuria and flank pain.   Musculoskeletal:  Negative for back pain and neck pain.   Neurological:  Negative for weakness, numbness and headaches.   Psychiatric/Behavioral:  Negative for agitation and confusion.    All other systems reviewed and are negative.    Physical Exam     Initial Vitals [07/26/23 1750]   BP Pulse Resp Temp SpO2   112/64 (!) 58 14 -- 97 %      MAP       --         Physical Exam    Nursing note and vitals reviewed.  Constitutional: Vital signs are normal. She appears well-developed and well-nourished.  Non-toxic appearance. No distress.   HENT:   Head: Normocephalic and atraumatic.   Eyes: Conjunctivae and EOM are normal. Pupils are equal, round, and reactive to light.   Neck: Neck supple.   Cardiovascular:  Normal rate, regular rhythm and intact distal pulses.           Pulmonary/Chest: Breath sounds normal. She has no wheezes.   Abdominal: Abdomen is soft. Bowel sounds are normal. There is abdominal tenderness.   Epigastric tenderness to palpation, no rebound or guarding   Musculoskeletal:         General: Normal range of motion.      Cervical back: Neck supple. No rigidity. No muscular tenderness.     Lymphadenopathy:     She has no cervical adenopathy.     She has no axillary  adenopathy.   Neurological: She is alert and oriented to person, place, and time. She has normal strength. No cranial nerve deficit or sensory deficit. Gait normal.   Skin: Skin is warm, dry and intact.   Psychiatric: She has a normal mood and affect. Her behavior is normal.       ED Course   Procedures  Labs Reviewed   CBC W/ AUTO DIFFERENTIAL - Abnormal; Notable for the following components:       Result Value    Hemoglobin 9.5 (*)     Hematocrit 31.6 (*)     MCV 67 (*)     MCH 20.0 (*)     MCHC 30.1 (*)     RDW 21.1 (*)     Gran % 76.0 (*)     Lymph % 14.4 (*)     All other components within normal limits   COMPREHENSIVE METABOLIC PANEL - Abnormal; Notable for the following components:    CO2 22 (*)     Glucose 161 (*)     BUN 29 (*)     AST 47 (*)     eGFR 53.5 (*)     All other components within normal limits   MAGNESIUM - Abnormal; Notable for the following components:    Magnesium 1.5 (*)     All other components within normal limits   B-TYPE NATRIURETIC PEPTIDE - Abnormal; Notable for the following components:     (*)     All other components within normal limits   URINALYSIS, REFLEX TO URINE CULTURE - Abnormal; Notable for the following components:    Ketones, UA 1+ (*)     Occult Blood UA Trace (*)     Leukocytes, UA Trace (*)     All other components within normal limits    Narrative:     Specimen Source->Urine   URINALYSIS MICROSCOPIC - Abnormal; Notable for the following components:    Hyaline Casts, UA 3 (*)     All other components within normal limits    Narrative:     Specimen Source->Urine   LIPASE   TROPONIN I HIGH SENSITIVITY   ISTAT CREATININE   POCT CREATININE     EKG Readings: (Independently Interpreted)   Time: 1811  Rate: 60 bpm  Paced rhythm  Non-specific T wave abnormality  Unchanged from prior.     Imaging Results              CT Abdomen Pelvis With Contrast (Final result)  Result time 07/26/23 20:28:34      Final result by Juan Matute MD (07/26/23 20:28:34)                    Narrative:    CT ABDOMEN PELVIS WITH IV CONTRAST    COMPARISONS:  none.    ADDITIONAL PERTINENT HISTORY:   Abdominal pain, acute, nonlocalized.    TECHNIQUE:   Multiple axial images were obtained from the lung bases through the lesser trochanters after the adminstration of intravenous contrast.  One of the following dose optimization techniques was utilized in the performance of this exam: Automated exposure control; adjustment of the mA and/or kV according to the patient's size; or use of an iterative  reconstruction technique.  Specific details can be referenced in the facility's radiology CT exam operational policy.    CONTRAST:   100 mL of IV Omnipaque 350.    FINDINGS:  Lung bases: Bibasilar atelectatic change with small bilateral pleural effusions slightly greater on the right than left.    Free air/free fluid: none  Liver:  Intra and extrahepatic ductal dilatation likely related to previous cholecystectomy. No focal lesions within the liver.  Spleen: negative  Adrenal glands: negative  Kidneys /ureters/urinary bladder: Mild renal atrophy bilaterally. Small cysts involving both kidneys. Otherwise negative  Pancreas: negative  Gall Bladder:   Surgically absent.    Bowel / mesentery: Diverticulosis involving the sigmoid colon and descending colon without evidence of diverticulitis. No bowel obstruction.    Lymph node assessment: negative    Pelvic contents: Patient status post hysterectomy.  Abdominal vasculature: Atherosclerotic disease of the abdominal aorta and its major branches.    Surrounding soft tissues: Small fat-containing periumbilical hernia.  Osseous structures: Spondylitic change involving the thoracolumbar spine. Mild scoliotic curvature convex to the left centered at L4-L5. No acute-appearing bony abnormalities noted.      IMPRESSION:    1. Multiple chronic findings involving the abdomen and pelvis as detailed above.  2. Small bilateral pleural effusions with bibasilar atelectatic change.  3. No  acute intra-abdominal or intrapelvic process.    Electronically signed by:  Juan Matute MD  7/26/2023 8:28 PM CDT Workstation: [id number]                                     X-Ray Chest 1 View (Final result)  Result time 07/26/23 18:50:36      Final result by El Gates MD (07/26/23 18:50:36)                   Narrative:    EXAM DESCRIPTION: XR CHEST 1 VIEW    CLINICAL HISTORY: 89 years Female,    COMPARISON: January 2, 2020.    FINDINGS: AP and lateral views of the chest were obtained. The heart is mildly enlarged. The pacemaker is again noted to be present. The pulmonary vasculature is normal. No consolidations are seen, nor is there evidence of pleural fluid. Wire sternotomy sutures are present.    IMPRESSION:  Mild cardiomegaly.    Electronically signed by:  El Gates MD  7/26/2023 6:50 PM CDT Workstation: [id number]                                     Medications   magnesium sulfate 2g in water 50mL IVPB (premix) (has no administration in time range)   morphine injection 4 mg (has no administration in time range)   ondansetron injection 4 mg (has no administration in time range)   iohexoL (OMNIPAQUE 350) injection 100 mL (100 mLs Intravenous Given 7/26/23 1952)     Medical Decision Making:   Initial Assessment:   This is a 89-year-old female with past medical history diabetes, hypertension, CHF, have deficiency anemia comes in complaining of abdominal pain.  On examination vitals are stable.  She has epigastric tenderness to palpation.  Exam is normal otherwise.    Orders included EKG, CBC, CMP, troponin, BNP, chest x-ray, CT abdomen and pelvis.    Comorbidities contributing to her presentation include her history of hypertension, diabetes, CHF.  Acute exacerbation of chronic illness: None    I reviewed patient's external medical notes.  Patient was evaluated in clinic on the 20th for dizziness.  She was discharged with meclizine.  Differential Diagnosis:   AAA, pancreatitis, gastritis, cholecystitis,  diverticulitis, atypical ACS.  Independently Interpreted Test(s):   I have ordered and independently interpreted X-rays - see summary below.       <> Summary of X-Ray Reading(s): Chest x-ray was independently reviewed by me and showed cardiomegaly.  CT abdomen pelvis was reviewed and showed no acute abnormality.  I have ordered and independently interpreted EKG Reading(s) - see prior notes  Clinical Tests:   Lab Tests: Ordered and Reviewed       <> Summary of Lab: Labs were reviewed and were significant for magnesium of 1.5 which was repleted.  ED Management:  Patient's workup was unremarkable.  On re-evaluation she had recurrent pain.  In light of that, her age and comorbidities she was given pain medication admitted for observation.                        Clinical Impression:   Final diagnoses:  [R10.9] Abdominal pain  [E83.42] Hypomagnesemia (Primary)        ED Disposition Condition    Observation Stable                Valerie Hutson MD  07/26/23 2111       Valerie Hutson MD  07/26/23 2112       Valerie Hutson MD  07/26/23 2112

## 2023-07-27 NOTE — HPI
89 yo w/pmhx of CAD s/p CABG presenting with abdominal pain. States she has had chronic abdominal pain for about a month that has been bearable. However, this AM, patient states the pain acutely worsened which prompted her to come to the ED. She states the pain radiates to her back. Denies chest pain, sob, diarrhea, constipation. Denies any relation to food. States she went to a physician for pain 2 weeks ago and was diagnosed with a UTI. States she was also hypotensive and some of her anti-hypertensive medications were discontinued. She also notes severe diaphoresis during these pain episodes.

## 2023-07-27 NOTE — CONSULTS
[facility]  Department of Cardiology  Consult Note      PATIENT NAME: Rosanna Raymundo  MRN: [id number]  TODAY'S DATE: 07/27/2023  ADMIT DATE: 7/26/2023                          CONSULT REQUESTED BY: Francisco Daniel MD    SUBJECTIVE     PRINCIPAL PROBLEM: Abdominal pain      REASON FOR CONSULT:    Severe Aortic Stenosis      HPI:    Patient is a 89-year-old female with past medical history of CHF, CAD s/p CABG, permanent pacemaker, hypertension, aortic stenosis, CHOCO, and diabetes mellitus who presented to the ED with chronic abdominal pain x1 month but acutely worsened yesterday morning.  Patient denies chest pain, shortness of breath.  Recent diagnosis with UTI approximately 2 weeks ago.  Patient complains of diaphoresis during acute abdominal pain episodes.    Patient c/o intermittent dizziness with position changes.  No exertional shortness of breath or dizziness.    In ED H&H 9.5/31.6, K 4.0, creatinine 1.0, magnesium 1.5, , troponin HS 14.9, repeat 16.8. CT Abdomen and pelvis: small bilateral pleural effusions, no acute intra-abdominal or intrapelvic process; EKG atrial paced rhythm with prolonged AV conduction, RBBB, T wave inversion in inferior lateral leads    ECHO 7/26/22  The left ventricle is normal in size with mild concentric hypertrophy and low normal systolic function.  The estimated ejection fraction is 50%.  Normal left ventricular diastolic function.  Normal right ventricular size with normal right ventricular systolic function.  Moderate left atrial enlargement.  There is severe aortic valve stenosis.  Aortic valve area is 0.92 cm2; peak velocity is 2.51 m/s; mean gradient is 25 mmHg.  Moderate tricuspid regurgitation.  Mild-to-moderate mitral regurgitation.       FROM H&P     Abdominal Pain       Pt is having abdominal pain that is just above the naval that is radiating into her back for about an hour with nausea.         HPI: 89 yo w/pmhx of CAD s/p CABG presenting with  abdominal pain. States she has had chronic abdominal pain for about a month that has been bearable. However, this AM, patient states the pain acutely worsened which prompted her to come to the ED. She states the pain radiates to her back. Denies chest pain, sob, diarrhea, constipation. Denies any relation to food. States she went to a physician for pain 2 weeks ago and was diagnosed with a UTI. States she was also hypotensive and some of her anti-hypertensive medications were discontinued. She also notes severe diaphoresis during these pain episodes.     Review of patient's allergies indicates:   Allergen Reactions    Penicillin g benzathine Other (See Comments)       Past Medical History:   Diagnosis Date    Anticoagulant long-term use     Cancer     Cataract     CHF (congestive heart failure)     Diabetes mellitus, type 2     Heart murmur     Hypertension     Iron deficiency anemia, unspecified     Anemia, iron def.    Occasional tremors      Past Surgical History:   Procedure Laterality Date    ADENOIDECTOMY      APPENDECTOMY      COLONOSCOPY N/A 12/17/2019    Procedure: COLONOSCOPY;  Surgeon: Raleihg Quarles III, MD;  Location: [facility] ENDO;  Service: Endoscopy;  Laterality: N/A;    COLONOSCOPY N/A 12/18/2019    Procedure: COLONOSCOPY;  Surgeon: Raleigh Quarles III, MD;  Location: [facility] ENDO;  Service: Endoscopy;  Laterality: N/A;    Gall bladder revmoval      HYSTERECTOMY      INSERTION OF PACEMAKER Right 1/2/2020    Procedure: INSERTION, PACEMAKER;  Surgeon: Fly Barragan MD;  Location: [facility] CATH/EP LAB;  Service: Cardiology;  Laterality: Right;    left mastectomy Left 1945    MASTECTOMY Left     IA CABG, ARTERY-VEIN, FOUR  2014    Coronary Artery Bypass, 4    SMALL BOWEL ENTEROSCOPY N/A 12/17/2019    Procedure: ENTEROSCOPY;  Surgeon: Raleigh Quarles III, MD;  Location: [facility] ENDO;  Service: Endoscopy;  Laterality: N/A;    TONSILLECTOMY       Social History     Tobacco Use    Smoking status: Never     Smokeless tobacco: Never   Substance Use Topics    Alcohol use: Never    Drug use: Never        REVIEW OF SYSTEMS  CONSTITUTIONAL: Negative for chills, fatigue and fever. +diaphoresis  EYES: No double vision, No blurred vision  NEURO: No headaches, + dizziness with orthostatic changes  RESPIRATORY: Negative for cough, shortness of breath and wheezing.    CARDIOVASCULAR: Negative for chest pain. Negative for palpitations and leg swelling.   GI: + for abdominal pain, No melena, diarrhea, nausea and vomiting.   : Negative for dysuria and frequency, Negative for hematuria  SKIN: Negative for bruising, Negative for edema or discoloration noted.   ENDOCRINE: Negative for polyphagia, Negative for heat intolerance, Negative for cold intolerance  PSYCHIATRIC: Negative for depression, Negative for anxiety, Negative for memory loss  MUSCULOSKELETAL: Negative for neck pain, Negative for muscle weakness, Negative for back pain     OBJECTIVE     VITAL SIGNS (Most Recent)  Temp: 97.8 °F (36.6 °C) (07/27/23 0730)  Pulse: 60 (07/27/23 0730)  Resp: 18 (07/27/23 0730)  BP: 138/66 (07/27/23 0730)  SpO2: 95 % (07/27/23 0730)    VENTILATION STATUS  Resp: 18 (07/27/23 0730)  SpO2: 95 % (07/27/23 0730)           I & O (Last 24H):  Intake/Output Summary (Last 24 hours) at 7/27/2023 0909  Last data filed at 7/27/2023 0815  Gross per 24 hour   Intake 350 ml   Output --   Net 350 ml       WEIGHTS  Wt Readings from Last 3 Encounters:   07/26/23 2352 71.3 kg (157 lb 3 oz)   07/26/23 1750 72.6 kg (160 lb)   07/20/23 1100 72.2 kg (159 lb 3.2 oz)   05/09/23 1408 69.4 kg (153 lb)       PHYSICAL EXAM  GENERAL: well built, well nourished, well-developed elderly female in no apparent distress alert and oriented.   HEENT: Normocephalic. Pupils normal and conjunctivae normal.    NECK: No JVD.   THYROID: Thyroid not examined  CARDIAC: Regular rate and rhythm. S1 is normal.S2 is normal. Systolic murmur  CHEST ANATOMY: normal.   LUNGS: Clear to  auscultation.   ABDOMEN: Soft Normal bowel sounds.   URINARY: No vargas catheter   EXTREMITIES: No cyanosis, clubbing or edema noted at this time.  PERIPHERAL VASCULAR SYSTEM: Good palpable distal pulses.   CENTRAL NERVOUS SYSTEM: No focal motor or sensory deficits noted.   SKIN: Skin without lesions, moist, well perfused.   MUSCLE STRENGTH & TONE:Moves all extremities    HOME MEDICATIONS:  No current facility-administered medications on file prior to encounter.     Current Outpatient Medications on File Prior to Encounter   Medication Sig Dispense Refill    amiodarone (PACERONE) 200 MG Tab Take 0.5 tablets (100 mg total) by mouth once daily. 45 tablet 3    busPIRone (BUSPAR) 5 MG Tab Take 2 tablets (10 mg total) by mouth 2 (two) times daily. 180 tablet 3    cholecalciferol, vitamin D3, (VITAMIN D3) 25 mcg (1,000 unit) capsule Take 1 capsule (1,000 Units total) by mouth once daily. 100 capsule 1    hydroCHLOROthiazide (HYDRODIURIL) 12.5 MG Tab Take 1 tablet (12.5 mg total) by mouth once daily. 90 tablet 3    magnesium oxide (MAG-OX) 400 mg (241.3 mg magnesium) tablet Take 1 tablet (400 mg total) by mouth once daily. (Patient taking differently: Take 200 mg by mouth once daily.) 90 tablet 3    mupirocin (BACTROBAN) 2 % ointment Apply topically once daily. (Patient taking differently: Apply 1 g topically once daily.) 30 g 3    omeprazole (PRILOSEC) 20 MG capsule Take 2 capsules (40 mg total) by mouth once daily. 180 capsule 1    propranoloL (INDERAL) 40 MG tablet Take 1 tablet (40 mg total) by mouth 2 (two) times daily. 180 tablet 3    rosuvastatin (CRESTOR) 20 MG tablet Take 1 tablet (20 mg total) by mouth once daily. 90 tablet 3    vitamins A,C,E-zinc-copper (PRESERVISION AREDS) 4,296 mcg-226 mg-90 mg Cap Take 1 capsule by mouth 2 (two) times daily. 180 capsule 3    allopurinoL (ZYLOPRIM) 100 MG tablet Take 1 tablet (100 mg total) by mouth once daily. 90 tablet 3    apixaban (ELIQUIS) 2.5 mg Tab Take 1 tablet (2.5 mg  total) by mouth 2 (two) times daily. 180 tablet 3    blood sugar diagnostic Strp To check BG 3 times daily, to use with insurance preferred meter 200 strip 1    blood-glucose meter kit To check BG 3 times daily, to use with insurance preferred meter 1 each 0    docusate sodium (COLACE) 100 MG capsule Take 100 mg by mouth as needed for Constipation.      ferrous sulfate 325 (65 FE) MG EC tablet Take 1 tablet (325 mg total) by mouth 2 (two) times daily. 180 tablet 2    FREESTYLE LANCETS 28 gauge lancets       loratadine (CLARITIN) 10 mg tablet Take 1 tablet (10 mg total) by mouth once daily. 90 tablet 3    meclizine (ANTIVERT) 12.5 mg tablet Take 1 tablet (12.5 mg total) by mouth 3 (three) times daily as needed for Dizziness. 90 tablet 0       SCHEDULED MEDS:   allopurinoL  100 mg Oral Daily    amiodarone  100 mg Oral Daily    atorvastatin  40 mg Oral QHS    busPIRone  10 mg Oral BID    ferrous sulfate  1 tablet Oral Q48H    magnesium oxide  200 mg Oral Daily    pantoprazole  40 mg Oral Before breakfast    propranoloL  40 mg Oral BID       CONTINUOUS INFUSIONS:    PRN MEDS:docusate sodium, prochlorperazine, promethazine (PHENERGAN) IVPB, sodium chloride 0.9%    LABS AND DIAGNOSTICS     CBC LAST 3 DAYS  Recent Labs   Lab 07/26/23 1846 07/27/23  0530   WBC 9.29 6.28   RBC 4.75 4.63   HGB 9.5* 9.3*   HCT 31.6* 31.1*   MCV 67* 67*   MCH 20.0* 20.1*   MCHC 30.1* 29.9*   RDW 21.1* 21.2*    251   MPV 10.1 10.2   GRAN 76.0*  7.1 60.7  3.8   LYMPH 14.4*  1.3 25.5  1.6   MONO 6.8  0.6 10.4  0.7   BASO 0.08 0.06   NRBC 0 0       COAGULATION LAST 3 DAYS  No results for input(s): LABPT, INR, APTT in the last 168 hours.    CHEMISTRY LAST 3 DAYS  Recent Labs   Lab 07/26/23 1846 07/27/23  0529    136   K 4.0 3.4*    106   CO2 22* 23   ANIONGAP 11 7*   BUN 29* 25*   CREATININE 1.0 0.9   * 157*   CALCIUM 9.3 9.0   MG 1.5*  --    ALBUMIN 4.1  --    PROT 7.5  --    ALKPHOS 121  --    ALT 19  --    AST  47*  --    BILITOT 0.9  --        CARDIAC PROFILE LAST 3 DAYS  Recent Labs   Lab 07/26/23  1846 07/27/23  0530   *  --    TROPONINIHS 14.9 16.8*       ENDOCRINE LAST 3 DAYS  No results for input(s): TSH, PROCAL in the last 168 hours.    LAST ARTERIAL BLOOD GAS  ABG  No results for input(s): PH, PO2, PCO2, HCO3, BE in the last 168 hours.    LAST 7 DAYS MICROBIOLOGY   Microbiology Results (last 7 days)       ** No results found for the last 168 hours. **            MOST RECENT IMAGING  CT Abdomen Pelvis With Contrast  CT ABDOMEN PELVIS WITH IV CONTRAST    COMPARISONS:  none.    ADDITIONAL PERTINENT HISTORY:   Abdominal pain, acute, nonlocalized.    TECHNIQUE:   Multiple axial images were obtained from the lung bases through the lesser trochanters after the adminstration of intravenous contrast.  One of the following dose optimization techniques was utilized in the performance of this exam: Automated exposure control; adjustment of the mA and/or kV according to the patient's size; or use of an iterative  reconstruction technique.  Specific details can be referenced in the facility's radiology CT exam operational policy.    CONTRAST:   100 mL of IV Omnipaque 350.    FINDINGS:  Lung bases: Bibasilar atelectatic change with small bilateral pleural effusions slightly greater on the right than left.    Free air/free fluid: none  Liver:  Intra and extrahepatic ductal dilatation likely related to previous cholecystectomy. No focal lesions within the liver.  Spleen: negative  Adrenal glands: negative  Kidneys /ureters/urinary bladder: Mild renal atrophy bilaterally. Small cysts involving both kidneys. Otherwise negative  Pancreas: negative  Gall Bladder:   Surgically absent.    Bowel / mesentery: Diverticulosis involving the sigmoid colon and descending colon without evidence of diverticulitis. No bowel obstruction.    Lymph node assessment: negative    Pelvic contents: Patient status post hysterectomy.  Abdominal  vasculature: Atherosclerotic disease of the abdominal aorta and its major branches.    Surrounding soft tissues: Small fat-containing periumbilical hernia.  Osseous structures: Spondylitic change involving the thoracolumbar spine. Mild scoliotic curvature convex to the left centered at L4-L5. No acute-appearing bony abnormalities noted.    IMPRESSION:    1. Multiple chronic findings involving the abdomen and pelvis as detailed above.  2. Small bilateral pleural effusions with bibasilar atelectatic change.  3. No acute intra-abdominal or intrapelvic process.    Electronically signed by:  Juan Matute MD  7/26/2023 8:28 PM CDT Workstation: [id number]  X-Ray Chest 1 View  EXAM DESCRIPTION: XR CHEST 1 VIEW    CLINICAL HISTORY: 89 years Female,    COMPARISON: January 2, 2020.    FINDINGS: AP and lateral views of the chest were obtained. The heart is mildly enlarged. The pacemaker is again noted to be present. The pulmonary vasculature is normal. No consolidations are seen, nor is there evidence of pleural fluid. Wire sternotomy sutures are present.    IMPRESSION:  Mild cardiomegaly.    Electronically signed by:  El Gates MD  7/26/2023 6:50 PM CDT Workstation: [id number]      ECHOCARDIOGRAM RESULTS (last 5)  Results for orders placed during the hospital encounter of 07/26/22    Echo    Interpretation Summary  · The left ventricle is normal in size with mild concentric hypertrophy and low normal systolic function.  · The estimated ejection fraction is 50%.  · Normal left ventricular diastolic function.  · Normal right ventricular size with normal right ventricular systolic function.  · Moderate left atrial enlargement.  · There is severe aortic valve stenosis.  · Aortic valve area is 0.92 cm2; peak velocity is 2.51 m/s; mean gradient is 25 mmHg.  · Moderate tricuspid regurgitation.  · Mild-to-moderate mitral regurgitation.      Results for orders placed during the hospital encounter of 01/07/22    Echo    Interpretation  Summary  · The left ventricle is normal in size with mild concentric hypertrophy and low normal systolic function.  · The estimated ejection fraction is 50%.  · Normal left ventricular diastolic function.  · Normal right ventricular size with low normal right ventricular systolic function.  · Mild left atrial enlargement.  · There is severe aortic valve stenosis.  · Aortic valve area is 0.81 cm2; peak velocity is 2.66 m/s; mean gradient is 18 mmHg.  · Mild tricuspid regurgitation.  · Mild mitral regurgitation.      Results for orders placed during the hospital encounter of 11/04/19    Echo Color Flow Doppler? Yes; Bubble Contrast? No    Interpretation Summary  · Mild eccentric left ventricular hypertrophy.  · Normal left ventricular systolic function. The estimated ejection fraction is 60%  · Normal LV diastolic function.  · Mild left atrial enlargement.  · Severe aortic valve stenosis.  · Aortic valve area is 0.88 cm2; peak velocity is 2.13 m/s; mean gradient is 11 mmHg.  · Mild mitral sclerosis.  · Mild-to-moderate mitral regurgitation.      CURRENT/PREVIOUS VISIT EKG  Results for orders placed or performed during the hospital encounter of 07/26/23   EKG 12-lead    Collection Time: 07/26/23  6:11 PM    Narrative    Test Reason : R10.9,    Vent. Rate : 060 BPM     Atrial Rate : 044 BPM     P-R Int : 314 ms          QRS Dur : 178 ms      QT Int : 554 ms       P-R-T Axes : 000 107 -29 degrees     QTc Int : 554 ms    Atrial-paced rhythm with prolonged AV conduction  Right bundle branch block  T wave abnormality, consider inferolateral ischemia  Abnormal ECG  When compared with ECG of 15-DEC-2021 15:29,  Criteria for Septal infarct are no longer Present  T wave inversion now evident in Inferior leads  T wave inversion now evident in Lateral leads    Referred By: [email or web address]   SELF           Confirmed By:            ASSESSMENT/PLAN:     Active Hospital Problems    Diagnosis    *Abdominal pain    Prolonged Q-T interval on  ECG       ASSESSMENT & PLAN:     Acute Abdominal Pain  Severe Aortic stenosis  HTN  CAD S/P CABG  Bilateral Pleural Effusions  Hypomagnesemia  pAF      RECOMMENDATIONS:    Acute abdominal pain with no significant findings on CT abdomen and pelvis.  CTA Chest Aorta pending.   Patient has history of severe aortic stenosis. Denies shortness of breath. Dizziness with position changes.  Discussed option for TAVR and patient does not want to have any interventions performed for her aortic stenosis. Patient understands risks of not having TAVR performed.  Elevated BNP.  Small pleural effusions on CT.   Last BP 91/52. Consider gentle IV diuresis.   Mag 1.5 this admission. Replaced with 2 g IV Mag sulfate. Continue to check and replace potassium and magnesium. Goal for potassium is 4.0, and goal for magnesium is 2.0.    History of pAF.  Eliquis on hold until CTA Chest Aorta is complete.  T wave inversion noted in inferior lateral leads on EKG. Patient denies CP/anginal equivalent symptoms. Patient does not wish to have invasive cardiac procedures.  Will hold off on stress testing/angiogram.   Thank you for the consultation. We will sign off.       Molly Keita NP  Department of Cardiology  Date of Service: 07/27/2023

## 2023-07-27 NOTE — H&P
[facility] Medicine  History & Physical    Patient Name: Rosanna Raymundo  MRN: [id number]  Patient Class: OP- Observation  Admission Date: 7/26/2023  Attending Physician: Luciano Marshall MD   Primary Care Provider: Aaimr Rhodes MD         Patient information was obtained from patient, relative(s) and ER records.     Subjective:     Principal Problem:<principal problem not specified>    Chief Complaint:   Chief Complaint   Patient presents with    Abdominal Pain     Pt is having abdominal pain that is just above the naval that is radiating into her back for about an hour with nausea.        HPI: 89 yo w/pmhx of CAD s/p CABG presenting with abdominal pain. States she has had chronic abdominal pain for about a month that has been bearable. However, this AM, patient states the pain acutely worsened which prompted her to come to the ED. She states the pain radiates to her back. Denies chest pain, sob, diarrhea, constipation. Denies any relation to food. States she went to a physician for pain 2 weeks ago and was diagnosed with a UTI. States she was also hypotensive and some of her anti-hypertensive medications were discontinued. She also notes severe diaphoresis during these pain episodes.       Past Medical History:   Diagnosis Date    Anticoagulant long-term use     Cancer     Cataract     CHF (congestive heart failure)     Diabetes mellitus, type 2     Heart murmur     Hypertension     Iron deficiency anemia, unspecified     Anemia, iron def.    Occasional tremors        Past Surgical History:   Procedure Laterality Date    ADENOIDECTOMY      APPENDECTOMY      COLONOSCOPY N/A 12/17/2019    Procedure: COLONOSCOPY;  Surgeon: Raleigh Quarles III, MD;  Location: [facility] ENDO;  Service: Endoscopy;  Laterality: N/A;    COLONOSCOPY N/A 12/18/2019    Procedure: COLONOSCOPY;  Surgeon: Raleigh Quarles III, MD;  Location: [facility] ENDO;  Service: Endoscopy;  Laterality: N/A;    Gall  bladder revmoval      HYSTERECTOMY      INSERTION OF PACEMAKER Right 1/2/2020    Procedure: INSERTION, PACEMAKER;  Surgeon: Fly Barragan MD;  Location: [facility] CATH/EP LAB;  Service: Cardiology;  Laterality: Right;    left mastectomy Left 1945    MASTECTOMY Left     [place] CABG, ARTERY-VEIN, FOUR  2014    Coronary Artery Bypass, 4    SMALL BOWEL ENTEROSCOPY N/A 12/17/2019    Procedure: ENTEROSCOPY;  Surgeon: Raleigh Quarles III, MD;  Location: [facility] ENDO;  Service: Endoscopy;  Laterality: N/A;    TONSILLECTOMY         Review of patient's allergies indicates:   Allergen Reactions    Penicillin g benzathine Other (See Comments)       No current facility-administered medications on file prior to encounter.     Current Outpatient Medications on File Prior to Encounter   Medication Sig    amiodarone (PACERONE) 200 MG Tab Take 0.5 tablets (100 mg total) by mouth once daily.    busPIRone (BUSPAR) 5 MG Tab Take 2 tablets (10 mg total) by mouth 2 (two) times daily.    cholecalciferol, vitamin D3, (VITAMIN D3) 25 mcg (1,000 unit) capsule Take 1 capsule (1,000 Units total) by mouth once daily.    hydroCHLOROthiazide (HYDRODIURIL) 12.5 MG Tab Take 1 tablet (12.5 mg total) by mouth once daily.    magnesium oxide (MAG-OX) 400 mg (241.3 mg magnesium) tablet Take 1 tablet (400 mg total) by mouth once daily. (Patient taking differently: Take 200 mg by mouth once daily.)    mupirocin (BACTROBAN) 2 % ointment Apply topically once daily. (Patient taking differently: Apply 1 g topically once daily.)    omeprazole (PRILOSEC) 20 MG capsule Take 2 capsules (40 mg total) by mouth once daily.    propranoloL (INDERAL) 40 MG tablet Take 1 tablet (40 mg total) by mouth 2 (two) times daily.    rosuvastatin (CRESTOR) 20 MG tablet Take 1 tablet (20 mg total) by mouth once daily.    vitamins A,C,E-zinc-copper (PRESERVISION AREDS) 4,296 mcg-226 mg-90 mg Cap Take 1 capsule by mouth 2 (two) times daily.    allopurinoL (ZYLOPRIM)  100 MG tablet Take 1 tablet (100 mg total) by mouth once daily.    apixaban (ELIQUIS) 2.5 mg Tab Take 1 tablet (2.5 mg total) by mouth 2 (two) times daily.    blood sugar diagnostic Strp To check BG 3 times daily, to use with insurance preferred meter    blood-glucose meter kit To check BG 3 times daily, to use with insurance preferred meter    docusate sodium (COLACE) 100 MG capsule Take 100 mg by mouth as needed for Constipation.    ferrous sulfate 325 (65 FE) MG EC tablet Take 1 tablet (325 mg total) by mouth 2 (two) times daily.    FREESTYLE LANCETS 28 gauge lancets     loratadine (CLARITIN) 10 mg tablet Take 1 tablet (10 mg total) by mouth once daily.    meclizine (ANTIVERT) 12.5 mg tablet Take 1 tablet (12.5 mg total) by mouth 3 (three) times daily as needed for Dizziness.     Family History       Problem Relation (Age of Onset)    Heart disease Father    No Known Problems Mother          Tobacco Use    Smoking status: Never    Smokeless tobacco: Never   Substance and Sexual Activity    Alcohol use: Never    Drug use: Never    Sexual activity: Not Currently     Review of Systems   Constitutional:  Positive for diaphoresis. Negative for appetite change.   HENT:  Negative for sore throat.    Eyes: Negative.    Respiratory:  Negative for cough and shortness of breath.    Cardiovascular:  Negative for chest pain.   Gastrointestinal:  Positive for abdominal pain. Negative for constipation.   Genitourinary:  Negative for dysuria and hematuria.   Musculoskeletal:  Positive for back pain. Negative for gait problem.   Skin:  Negative for rash and wound.   Neurological: Negative.    Psychiatric/Behavioral: Negative.     Objective:     Vital Signs (Most Recent):  Temp: 98.5 °F (36.9 °C) (07/26/23 2352)  Pulse: 61 (07/26/23 2352)  Resp: 18 (07/26/23 2352)  BP: (!) 116/59 (07/26/23 2352)  SpO2: 99 % (07/26/23 2352) Vital Signs (24h Range):  Temp:  [98.5 °F (36.9 °C)] 98.5 °F (36.9 °C)  Pulse:  [58-68]  61  Resp:  [14-18] 18  SpO2:  [97 %-100 %] 99 %  BP: ()/(50-64) 116/59     Weight: 71.3 kg (157 lb 3 oz)  Body mass index is 29.7 kg/m².     Physical Exam  Constitutional:       Appearance: Normal appearance.   HENT:      Head: Normocephalic and atraumatic.      Right Ear: External ear normal.      Left Ear: External ear normal.      Mouth/Throat:      Mouth: Mucous membranes are moist.   Eyes:      Extraocular Movements: Extraocular movements intact.   Cardiovascular:      Rate and Rhythm: Normal rate and regular rhythm.      Heart sounds: Murmur heard.   Pulmonary:      Effort: Pulmonary effort is normal.      Breath sounds: No wheezing.   Abdominal:      General: There is no distension.      Tenderness: There is abdominal tenderness.   Musculoskeletal:      Cervical back: Normal range of motion.      Right lower leg: No edema.      Left lower leg: No edema.   Skin:     General: Skin is warm.      Coloration: Skin is not jaundiced.   Neurological:      General: No focal deficit present.      Mental Status: She is alert and oriented to person, place, and time.   Psychiatric:         Mood and Affect: Mood normal.         Behavior: Behavior normal.              Significant Labs: All pertinent labs within the past 24 hours have been reviewed.    Significant Imaging: I have reviewed all pertinent imaging results/findings within the past 24 hours.    Assessment/Plan:     Abdominal pain  CT abdomen negative for acute findings. However, no mention of aorta in read. Concerning for aortic dissection with how patient is describing pain.  Unable to obtain BP in both arms as patient states she is not to check BP in left arm 2/2 prior mastectomy.   -CT aorta ordered   -hold home eliquis until study completed   -trend trop/EKG        VTE Risk Mitigation (From admission, onward)         Ordered     IP VTE HIGH RISK PATIENT  Once         07/26/23 3177     Place sequential compression device  Until discontinued          07/26/23 9315                   On 07/27/2023, patient should be placed in hospital observation services under my care.        Luciano Marshall MD  Department of Hospital Medicine  [facility]

## 2023-07-27 NOTE — PLAN OF CARE
07/27/23 1113   MARTINEZ Message   Medicare Outpatient and Observation Notification regarding financial responsibility Given to patient/caregiver;Explained to patient/caregiver;Signed/date by patient/caregiver   Date MARTINEZ was signed 07/27/23   Time MARTINEZ was signed 1111

## 2023-07-28 LAB
ALBUMIN SERPL BCP-MCNC: 3.8 G/DL (ref 3.5–5.2)
ALP SERPL-CCNC: 116 U/L (ref 55–135)
ALT SERPL W/O P-5'-P-CCNC: 31 U/L (ref 10–44)
ANION GAP SERPL CALC-SCNC: 9 MMOL/L (ref 8–16)
AST SERPL-CCNC: 37 U/L (ref 10–40)
BASOPHILS # BLD AUTO: 0.05 K/UL (ref 0–0.2)
BASOPHILS NFR BLD: 0.8 % (ref 0–1.9)
BILIRUB DIRECT SERPL-MCNC: 0.1 MG/DL (ref 0.1–0.3)
BILIRUB SERPL-MCNC: 0.5 MG/DL (ref 0.1–1)
BUN SERPL-MCNC: 26 MG/DL (ref 8–23)
CALCIUM SERPL-MCNC: 9.1 MG/DL (ref 8.7–10.5)
CHLORIDE SERPL-SCNC: 105 MMOL/L (ref 95–110)
CO2 SERPL-SCNC: 25 MMOL/L (ref 23–29)
CREAT SERPL-MCNC: 1.1 MG/DL (ref 0.5–1.4)
DIFFERENTIAL METHOD: ABNORMAL
EOSINOPHIL # BLD AUTO: 0.2 K/UL (ref 0–0.5)
EOSINOPHIL NFR BLD: 3.1 % (ref 0–8)
ERYTHROCYTE [DISTWIDTH] IN BLOOD BY AUTOMATED COUNT: 21.2 % (ref 11.5–14.5)
EST. GFR  (NO RACE VARIABLE): 47.7 ML/MIN/1.73 M^2
FERRITIN SERPL-MCNC: 9 NG/ML (ref 20–300)
FOLATE SERPL-MCNC: 15.5 NG/ML (ref 4–24)
GLUCOSE SERPL-MCNC: 111 MG/DL (ref 70–110)
HCT VFR BLD AUTO: 31.1 % (ref 37–48.5)
HGB BLD-MCNC: 9.2 G/DL (ref 12–16)
IMM GRANULOCYTES # BLD AUTO: 0.02 K/UL (ref 0–0.04)
IMM GRANULOCYTES NFR BLD AUTO: 0.3 % (ref 0–0.5)
IRON SERPL-MCNC: 18 UG/DL (ref 30–160)
LYMPHOCYTES # BLD AUTO: 2.1 K/UL (ref 1–4.8)
LYMPHOCYTES NFR BLD: 34.9 % (ref 18–48)
MCH RBC QN AUTO: 19.8 PG (ref 27–31)
MCHC RBC AUTO-ENTMCNC: 29.6 G/DL (ref 32–36)
MCV RBC AUTO: 67 FL (ref 82–98)
MONOCYTES # BLD AUTO: 0.7 K/UL (ref 0.3–1)
MONOCYTES NFR BLD: 11.6 % (ref 4–15)
NEUTROPHILS # BLD AUTO: 3 K/UL (ref 1.8–7.7)
NEUTROPHILS NFR BLD: 49.3 % (ref 38–73)
NRBC BLD-RTO: 0 /100 WBC
PLATELET # BLD AUTO: 200 K/UL (ref 150–450)
PMV BLD AUTO: ABNORMAL FL (ref 9.2–12.9)
POTASSIUM SERPL-SCNC: 3.8 MMOL/L (ref 3.5–5.1)
PROT SERPL-MCNC: 7.5 G/DL (ref 6–8.4)
RBC # BLD AUTO: 4.64 M/UL (ref 4–5.4)
RETICS/RBC NFR AUTO: 1.4 % (ref 0.5–2.5)
SATURATED IRON: 4 % (ref 20–50)
SODIUM SERPL-SCNC: 139 MMOL/L (ref 136–145)
TOTAL IRON BINDING CAPACITY: 476 UG/DL (ref 250–450)
TRANSFERRIN SERPL-MCNC: 340 MG/DL (ref 200–375)
VIT B12 SERPL-MCNC: 358 PG/ML (ref 210–950)
WBC # BLD AUTO: 6.11 K/UL (ref 3.9–12.7)

## 2023-07-28 PROCEDURE — 80076 HEPATIC FUNCTION PANEL: CPT | Performed by: STUDENT IN AN ORGANIZED HEALTH CARE EDUCATION/TRAINING PROGRAM

## 2023-07-28 PROCEDURE — 36415 COLL VENOUS BLD VENIPUNCTURE: CPT | Performed by: STUDENT IN AN ORGANIZED HEALTH CARE EDUCATION/TRAINING PROGRAM

## 2023-07-28 PROCEDURE — 25000003 PHARM REV CODE 250: Performed by: STUDENT IN AN ORGANIZED HEALTH CARE EDUCATION/TRAINING PROGRAM

## 2023-07-28 PROCEDURE — 94761 N-INVAS EAR/PLS OXIMETRY MLT: CPT

## 2023-07-28 PROCEDURE — 85045 AUTOMATED RETICULOCYTE COUNT: CPT | Performed by: STUDENT IN AN ORGANIZED HEALTH CARE EDUCATION/TRAINING PROGRAM

## 2023-07-28 PROCEDURE — G0378 HOSPITAL OBSERVATION PER HR: HCPCS

## 2023-07-28 PROCEDURE — 80048 BASIC METABOLIC PNL TOTAL CA: CPT | Performed by: STUDENT IN AN ORGANIZED HEALTH CARE EDUCATION/TRAINING PROGRAM

## 2023-07-28 PROCEDURE — 97116 GAIT TRAINING THERAPY: CPT

## 2023-07-28 PROCEDURE — 84466 ASSAY OF TRANSFERRIN: CPT | Performed by: STUDENT IN AN ORGANIZED HEALTH CARE EDUCATION/TRAINING PROGRAM

## 2023-07-28 PROCEDURE — 82728 ASSAY OF FERRITIN: CPT | Performed by: STUDENT IN AN ORGANIZED HEALTH CARE EDUCATION/TRAINING PROGRAM

## 2023-07-28 PROCEDURE — 85025 COMPLETE CBC W/AUTO DIFF WBC: CPT | Performed by: STUDENT IN AN ORGANIZED HEALTH CARE EDUCATION/TRAINING PROGRAM

## 2023-07-28 PROCEDURE — 82746 ASSAY OF FOLIC ACID SERUM: CPT | Performed by: STUDENT IN AN ORGANIZED HEALTH CARE EDUCATION/TRAINING PROGRAM

## 2023-07-28 PROCEDURE — 82607 VITAMIN B-12: CPT | Performed by: STUDENT IN AN ORGANIZED HEALTH CARE EDUCATION/TRAINING PROGRAM

## 2023-07-28 RX ADMIN — PANTOPRAZOLE SODIUM 40 MG: 40 TABLET, DELAYED RELEASE ORAL at 06:07

## 2023-07-28 RX ADMIN — Medication 200 MG: at 10:07

## 2023-07-28 RX ADMIN — PROPRANOLOL HYDROCHLORIDE 40 MG: 20 TABLET ORAL at 09:07

## 2023-07-28 RX ADMIN — BUSPIRONE HYDROCHLORIDE 10 MG: 5 TABLET ORAL at 10:07

## 2023-07-28 RX ADMIN — PROPRANOLOL HYDROCHLORIDE 40 MG: 20 TABLET ORAL at 10:07

## 2023-07-28 RX ADMIN — ACETAMINOPHEN 650 MG: 325 TABLET ORAL at 03:07

## 2023-07-28 RX ADMIN — ACETAMINOPHEN 650 MG: 325 TABLET ORAL at 10:07

## 2023-07-28 RX ADMIN — ATORVASTATIN CALCIUM 40 MG: 40 TABLET, FILM COATED ORAL at 09:07

## 2023-07-28 RX ADMIN — AMIODARONE HYDROCHLORIDE 100 MG: 100 TABLET ORAL at 10:07

## 2023-07-28 RX ADMIN — BUSPIRONE HYDROCHLORIDE 10 MG: 5 TABLET ORAL at 09:07

## 2023-07-28 RX ADMIN — DOCUSATE SODIUM 100 MG: 100 CAPSULE, LIQUID FILLED ORAL at 03:07

## 2023-07-28 RX ADMIN — ALLOPURINOL 100 MG: 100 TABLET ORAL at 10:07

## 2023-07-28 NOTE — PROGRESS NOTES
[facility]  Progress Note    Patient Name: Rosanna Raymundo  MRN: [id number]  Patient Class: OP- Observation   Admission Date: 7/26/2023  Length of Stay: 0 days  Attending Physician: Francisco Daniel MD  Primary Care Provider: Aamir Rhodes MD        Subjective:     Principal Problem:Abdominal pain        HPI:  89 yo w/pmhx of CAD s/p CABG presenting with abdominal pain. States she has had chronic abdominal pain for about a month that has been bearable. However, this AM, patient states the pain acutely worsened which prompted her to come to the ED. She states the pain radiates to her back. Denies chest pain, sob, diarrhea, constipation. Denies any relation to food. States she went to a physician for pain 2 weeks ago and was diagnosed with a UTI. States she was also hypotensive and some of her anti-hypertensive medications were discontinued. She also notes severe diaphoresis during these pain episodes.       Overview/Hospital Course:  No notes on file    Interval History:  Admitted early yesterday with abdominal pain.  CTA obtained and shows no signs of aortic dissection.  Ultrasound does show biliary dilation.  MRCP to be done this morning.  Abdominal pain waxes and wanes in intensity.  Per her niece it is mostly associated with food.  Seems more likely GI in origin.    Review of Systems   All other systems reviewed and are negative.  Objective:     Vital Signs (Most Recent):  Temp: 97.7 °F (36.5 °C) (07/28/23 0715)  Pulse: 60 (07/28/23 0715)  Resp: 18 (07/28/23 0715)  BP: (!) 124/59 (notified nurse vj) (07/28/23 0715)  SpO2: (!) 94 % (07/28/23 0715) Vital Signs (24h Range):  Temp:  [97.7 °F (36.5 °C)-98.3 °F (36.8 °C)] 97.7 °F (36.5 °C)  Pulse:  [60-65] 60  Resp:  [16-18] 18  SpO2:  [93 %-99 %] 94 %  BP: ()/(52-59) 124/59     Weight: 71.3 kg (157 lb 3 oz)  Body mass index is 29.7 kg/m².    Intake/Output Summary (Last 24 hours) at 7/28/2023 0949  Last data filed at  7/27/2023 1555  Gross per 24 hour   Intake 240 ml   Output --   Net 240 ml         Physical Exam  Constitutional:       Appearance: Normal appearance. She is normal weight.   HENT:      Head: Normocephalic and atraumatic.      Nose: Nose normal.      Mouth/Throat:      Mouth: Mucous membranes are moist.   Eyes:      Conjunctiva/sclera: Conjunctivae normal.      Pupils: Pupils are equal, round, and reactive to light.   Cardiovascular:      Rate and Rhythm: Normal rate and regular rhythm.      Pulses: Normal pulses.      Heart sounds: Normal heart sounds. No murmur heard.    No friction rub. No gallop.   Pulmonary:      Effort: Pulmonary effort is normal.      Breath sounds: Normal breath sounds. No wheezing or rales.   Abdominal:      General: Abdomen is flat. Bowel sounds are normal. There is no distension.      Palpations: Abdomen is soft.      Tenderness: There is no abdominal tenderness. There is no guarding.   Musculoskeletal:         General: No swelling. Normal range of motion.      Cervical back: Normal range of motion and neck supple.   Skin:     General: Skin is warm and dry.   Neurological:      General: No focal deficit present.      Mental Status: She is alert.   Psychiatric:         Mood and Affect: Mood normal.         Thought Content: Thought content normal.         Judgment: Judgment normal.           Significant Labs: All pertinent labs within the past 24 hours have been reviewed.    Significant Imaging: I have reviewed all pertinent imaging results/findings within the past 24 hours.      Assessment/Plan:      * Abdominal pain with biliary dilation  CT abdomen negative for acute findings. However, no mention of aorta in read. Concerning for aortic dissection with how patient is describing pain.  Unable to obtain BP in both arms as patient states she is not to check BP in left arm 2/2 prior mastectomy.   -CT aorta ordered > no signs of aortic dissection  -hold home eliquis until study completed > can  resume if no further procedures needed  -trend trop/EKG  - follow-up MRCP  - GI consult pending MRCP findings      Hx of CABG  Noted, continue secondary prevention      Cardiac pacemaker  Noted, is MRI compatible      Anticoagulant long-term use  Continue to hold anticoagulation pending further evaluation biliary symptoms      Iron deficiency anemia  History of iron-deficiency anemia  Check iron stores      Essential hypertension  Continue home medications        VTE Risk Mitigation (From admission, onward)         Ordered     IP VTE HIGH RISK PATIENT  Once         07/26/23 2355     Place sequential compression device  Until discontinued         07/26/23 2355                Discharge Planning   AMBIKA: 7/28/2023     Code Status: DNR   Is the patient medically ready for discharge?:     Reason for patient still in hospital (select all that apply): Treatment  Discharge Plan A: Home with family, Home Health                  Francisco Daniel MD  Department of Hospital Medicine   [facility]   triamterene-hydrochlorothiazide (DYAZIDE) 37.5-25 MG per capsule Take 1 capsule by mouth every morning 5/6/19  Yes Angelo Aguilar MD   triamcinolone (KENALOG) 0.1 % cream APPLY TO AFFECTED AREAS TWO TIMES A DAY FOR UP TO 2 WEEKS OR UNTIL IMPROVED 5/6/19  Yes Angelo Aguilar MD   chlorhexidine (HIBICLENS) 4 % external liquid Apply topically daily as needed. 5/6/19  Yes Angelo Aguilar MD   buPROPion (WELLBUTRIN XL) 300 MG extended release tablet Take 1 tablet by mouth every morning 5/6/19  Yes Angelo Aguilar MD   clindamycin (CLEOCIN T) 1 % external solution Apply to affected areas in the arm pits twice daily. 5/6/19  Yes Angelo Aguilar MD   Multiple Vitamins-Minerals (WOMENS MULTI VITAMIN & MINERAL PO)  5/1/13  Yes Historical Provider, MD   Calcium Carbonate-Vitamin D (CALCIUM 600 + D PO) Take  by mouth. Yes Historical Provider, MD   pantoprazole (PROTONIX) 40 MG tablet Take 1 tablet by mouth daily 5/6/19   Angelo Aguilar MD       Allergies:  Patient has no known allergies.      Review of Systems:    [x]Full ROS obtained and negative except as mentioned in HPI    Physical Examination:    BP (!) 118/94   Pulse 79   Ht 5' 4\" (1.626 m)   Wt 230 lb (104.3 kg)   LMP 12/08/2015   SpO2 99%   BMI 39.48 kg/m²    Wt Readings from Last 3 Encounters:   08/14/19 230 lb (104.3 kg)   08/12/19 227 lb (103 kg)   08/06/19 225 lb 6.4 oz (102.2 kg)       Vitals:    08/14/19 1226   BP: (!) 118/94   Pulse:    SpO2:        · GENERAL: Well developed, well nourished, no acute distress  · NEUROLOGICAL: Alert and oriented x3  · PSYCH: Normal mood and affect   · SKIN: Warm and dry  · HEENT: Normocephalic, atraumatic, Sclera non-icteric, mucous membranes moist  · NECK: supple, JVP normal  · CARDIAC: Normal PMI, regular rate and rhythm, normal S1S2, no murmur, rub, or gallop  · RESPIRATORY: Normal respiratory effort, clear to auscultation bilaterally

## 2023-07-28 NOTE — SUBJECTIVE & OBJECTIVE
Interval History:  Admitted early yesterday with abdominal pain.  CTA obtained and shows no signs of aortic dissection.  Ultrasound does show biliary dilation.  MRCP to be done this morning.  Abdominal pain waxes and wanes in intensity.  Per her niece it is mostly associated with food.  Seems more likely GI in origin.    Review of Systems   All other systems reviewed and are negative.  Objective:     Vital Signs (Most Recent):  Temp: 97.7 °F (36.5 °C) (07/28/23 0715)  Pulse: 60 (07/28/23 0715)  Resp: 18 (07/28/23 0715)  BP: (!) 124/59 (notified nurse vj) (07/28/23 0715)  SpO2: (!) 94 % (07/28/23 0715) Vital Signs (24h Range):  Temp:  [97.7 °F (36.5 °C)-98.3 °F (36.8 °C)] 97.7 °F (36.5 °C)  Pulse:  [60-65] 60  Resp:  [16-18] 18  SpO2:  [93 %-99 %] 94 %  BP: ()/(52-59) 124/59     Weight: 71.3 kg (157 lb 3 oz)  Body mass index is 29.7 kg/m².    Intake/Output Summary (Last 24 hours) at 7/28/2023 0949  Last data filed at 7/27/2023 1555  Gross per 24 hour   Intake 240 ml   Output --   Net 240 ml         Physical Exam  Constitutional:       Appearance: Normal appearance. She is normal weight.   HENT:      Head: Normocephalic and atraumatic.      Nose: Nose normal.      Mouth/Throat:      Mouth: Mucous membranes are moist.   Eyes:      Conjunctiva/sclera: Conjunctivae normal.      Pupils: Pupils are equal, round, and reactive to light.   Cardiovascular:      Rate and Rhythm: Normal rate and regular rhythm.      Pulses: Normal pulses.      Heart sounds: Normal heart sounds. No murmur heard.    No friction rub. No gallop.   Pulmonary:      Effort: Pulmonary effort is normal.      Breath sounds: Normal breath sounds. No wheezing or rales.   Abdominal:      General: Abdomen is flat. Bowel sounds are normal. There is no distension.      Palpations: Abdomen is soft.      Tenderness: There is no abdominal tenderness. There is no guarding.   Musculoskeletal:         General: No swelling. Normal range of motion.       Cervical back: Normal range of motion and neck supple.   Skin:     General: Skin is warm and dry.   Neurological:      General: No focal deficit present.      Mental Status: She is alert.   Psychiatric:         Mood and Affect: Mood normal.         Thought Content: Thought content normal.         Judgment: Judgment normal.           Significant Labs: All pertinent labs within the past 24 hours have been reviewed.    Significant Imaging: I have reviewed all pertinent imaging results/findings within the past 24 hours.

## 2023-07-28 NOTE — PT/OT/SLP PROGRESS
Physical Therapy Treatment    Patient Name:  Rosanna Raymundo   MRN:  [id number]    Recommendations:     Discharge Recommendations: home health PT (resume)  Discharge Equipment Recommendations: none  Barriers to discharge: None    Assessment:     Rosanna Raymundo is a 89 y.o. female admitted with a medical diagnosis of Abdominal pain.  She presents with the following impairments/functional limitations: weakness, impaired endurance, impaired functional mobility, gait instability, impaired balance, decreased lower extremity function, decreased safety awareness, impaired cardiopulmonary response to activity. Patient is agreeable to participation with PT treatment. She requires SBA for supine <> sit <> stand with no AD. She declines AD for gait and ambulated 200' with no AD holding R rail in hallway and CGA. Upon return to room she is agreeable to sit up in chair with chair alarm on and all needs met.     Rehab Prognosis: Good; patient would benefit from acute skilled PT services to address these deficits and reach maximum level of function.    Recent Surgery: * No surgery found *      Plan:     During this hospitalization, patient to be seen 5 x/week to address the identified rehab impairments via gait training, therapeutic activities, therapeutic exercises and progress toward the following goals:    Plan of Care Expires:  08/27/23    Subjective     Chief Complaint: none  Patient/Family Comments/goals: home  Pain/Comfort:  Pain Rating 1: 0/10      Objective:     Communicated with BRANDON Almendarez prior to session.  Patient found up in chair with bed alarm, telemetry upon PT entry to room.     General Precautions: Standard, fall  Orthopedic Precautions: N/A  Braces: N/A  Respiratory Status: Room air     Functional Mobility:  Bed Mobility:     Supine to Sit: stand by assistance  Transfers:     Sit to Stand:  stand by assistance with no AD  Gait: 200' with no AD holding R rail in hallway and CGA      AM-PAC 6 CLICK  MOBILITY  Turning over in bed (including adjusting bedclothes, sheets and blankets)?: 4  Sitting down on and standing up from a chair with arms (e.g., wheelchair, bedside commode, etc.): 4  Moving from lying on back to sitting on the side of the bed?: 4  Moving to and from a bed to a chair (including a wheelchair)?: 4  Need to walk in hospital room?: 3  Climbing 3-5 steps with a railing?: 3  Basic Mobility Total Score: 22       Treatment & Education:  Patient was educated on the importance of OOB activity and functional mobility to negate negative effects of prolonged bed rest during hospitalization, safe transfers and ambulation, and D/C planning     Patient left up in chair with all lines intact, call button in reach, and chair alarm on..    GOALS:   Multidisciplinary Problems       Physical Therapy Goals          Problem: Physical Therapy    Goal Priority Disciplines Outcome Goal Variances Interventions   Physical Therapy Goal     PT, PT/OT Ongoing, Progressing     Description: Goals to be met by: 23    Patient will increase functional independence with mobility by performin. Supine to sit with Supervision  2. Sit to stand transfer with Supervision  3. Bed to chair transfer with Supervision using Rolling Walker or no AD  4. Gait  x 250 feet with Supervision using Rolling Walker or no AD.   5. Lower extremity exercise program x20 reps per handout, with supervision                       Time Tracking:     PT Received On: 23  PT Start Time: 1115     PT Stop Time: 1126  PT Total Time (min): 11 min     Billable Minutes: Gait Training 11    Treatment Type: Treatment  PT/PTA: PT     Number of PTA visits since last PT visit: 0     2023

## 2023-07-28 NOTE — PLAN OF CARE
Problem: Physical Therapy  Goal: Physical Therapy Goal  Description: Goals to be met by: 23    Patient will increase functional independence with mobility by performin. Supine to sit with Supervision  2. Sit to stand transfer with Supervision  3. Bed to chair transfer with Supervision using Rolling Walker or no AD  4. Gait  x 250 feet with Supervision using Rolling Walker or no AD.   5. Lower extremity exercise program x20 reps per handout, with supervision  Outcome: Ongoing, Progressing

## 2023-07-28 NOTE — ASSESSMENT & PLAN NOTE
CT abdomen negative for acute findings. However, no mention of aorta in read. Concerning for aortic dissection with how patient is describing pain.  Unable to obtain BP in both arms as patient states she is not to check BP in left arm 2/2 prior mastectomy.   -CT aorta ordered > no signs of aortic dissection  -hold home eliquis until study completed > can resume if no further procedures needed  -trend trop/EKG  - follow-up MRCP  - GI consult pending MRCP findings

## 2023-07-28 NOTE — CARE UPDATE
07/28/23 0820   Patient Assessment/Suction   Level of Consciousness (AVPU) alert   Respiratory Effort Normal;Unlabored   Expansion/Accessory Muscles/Retractions no use of accessory muscles;no retractions;expansion symmetric   Rhythm/Pattern, Respiratory unlabored;pattern regular;depth regular;no shortness of breath reported   PRE-TX-O2   Device (Oxygen Therapy) room air   Oxygen Analyzed Concentration (%) 93 %   Pulse Oximetry Type Intermittent   $ Pulse Oximetry - Multiple Charge Pulse Oximetry - Multiple   Pulse 62   Resp 18   Positioning HOB elevated 30 degrees

## 2023-07-29 VITALS
BODY MASS INDEX: 29.68 KG/M2 | RESPIRATION RATE: 17 BRPM | HEART RATE: 62 BPM | DIASTOLIC BLOOD PRESSURE: 56 MMHG | SYSTOLIC BLOOD PRESSURE: 115 MMHG | OXYGEN SATURATION: 95 % | TEMPERATURE: 98 F | WEIGHT: 157.19 LBS | HEIGHT: 61 IN

## 2023-07-29 LAB
ALBUMIN SERPL BCP-MCNC: 3.7 G/DL (ref 3.5–5.2)
ALP SERPL-CCNC: 120 U/L (ref 55–135)
ALT SERPL W/O P-5'-P-CCNC: 25 U/L (ref 10–44)
ANION GAP SERPL CALC-SCNC: 7 MMOL/L (ref 8–16)
AST SERPL-CCNC: 27 U/L (ref 10–40)
BASOPHILS # BLD AUTO: 0.07 K/UL (ref 0–0.2)
BASOPHILS NFR BLD: 1.1 % (ref 0–1.9)
BILIRUB DIRECT SERPL-MCNC: 0.1 MG/DL (ref 0.1–0.3)
BILIRUB SERPL-MCNC: 0.8 MG/DL (ref 0.1–1)
BUN SERPL-MCNC: 28 MG/DL (ref 8–23)
CALCIUM SERPL-MCNC: 8.9 MG/DL (ref 8.7–10.5)
CHLORIDE SERPL-SCNC: 106 MMOL/L (ref 95–110)
CO2 SERPL-SCNC: 26 MMOL/L (ref 23–29)
CREAT SERPL-MCNC: 1.3 MG/DL (ref 0.5–1.4)
DIFFERENTIAL METHOD: ABNORMAL
EOSINOPHIL # BLD AUTO: 0.2 K/UL (ref 0–0.5)
EOSINOPHIL NFR BLD: 2.6 % (ref 0–8)
ERYTHROCYTE [DISTWIDTH] IN BLOOD BY AUTOMATED COUNT: 21.5 % (ref 11.5–14.5)
EST. GFR  (NO RACE VARIABLE): 39.1 ML/MIN/1.73 M^2
GLUCOSE SERPL-MCNC: 165 MG/DL (ref 70–110)
HCT VFR BLD AUTO: 30 % (ref 37–48.5)
HGB BLD-MCNC: 9.1 G/DL (ref 12–16)
IMM GRANULOCYTES # BLD AUTO: 0.03 K/UL (ref 0–0.04)
IMM GRANULOCYTES NFR BLD AUTO: 0.5 % (ref 0–0.5)
LYMPHOCYTES # BLD AUTO: 1.7 K/UL (ref 1–4.8)
LYMPHOCYTES NFR BLD: 26 % (ref 18–48)
MCH RBC QN AUTO: 20.5 PG (ref 27–31)
MCHC RBC AUTO-ENTMCNC: 30.3 G/DL (ref 32–36)
MCV RBC AUTO: 68 FL (ref 82–98)
MONOCYTES # BLD AUTO: 0.7 K/UL (ref 0.3–1)
MONOCYTES NFR BLD: 10.8 % (ref 4–15)
NEUTROPHILS # BLD AUTO: 3.9 K/UL (ref 1.8–7.7)
NEUTROPHILS NFR BLD: 59 % (ref 38–73)
NRBC BLD-RTO: 0 /100 WBC
PLATELET # BLD AUTO: 192 K/UL (ref 150–450)
PMV BLD AUTO: ABNORMAL FL (ref 9.2–12.9)
POTASSIUM SERPL-SCNC: 4.4 MMOL/L (ref 3.5–5.1)
PROT SERPL-MCNC: 7 G/DL (ref 6–8.4)
RBC # BLD AUTO: 4.44 M/UL (ref 4–5.4)
SODIUM SERPL-SCNC: 139 MMOL/L (ref 136–145)
WBC # BLD AUTO: 6.66 K/UL (ref 3.9–12.7)

## 2023-07-29 PROCEDURE — 25000003 PHARM REV CODE 250: Performed by: INTERNAL MEDICINE

## 2023-07-29 PROCEDURE — 36415 COLL VENOUS BLD VENIPUNCTURE: CPT | Performed by: STUDENT IN AN ORGANIZED HEALTH CARE EDUCATION/TRAINING PROGRAM

## 2023-07-29 PROCEDURE — 94761 N-INVAS EAR/PLS OXIMETRY MLT: CPT

## 2023-07-29 PROCEDURE — 63600175 PHARM REV CODE 636 W HCPCS: Performed by: STUDENT IN AN ORGANIZED HEALTH CARE EDUCATION/TRAINING PROGRAM

## 2023-07-29 PROCEDURE — 25000003 PHARM REV CODE 250: Performed by: STUDENT IN AN ORGANIZED HEALTH CARE EDUCATION/TRAINING PROGRAM

## 2023-07-29 PROCEDURE — 63600175 PHARM REV CODE 636 W HCPCS: Performed by: INTERNAL MEDICINE

## 2023-07-29 PROCEDURE — 96367 TX/PROPH/DG ADDL SEQ IV INF: CPT

## 2023-07-29 PROCEDURE — 80048 BASIC METABOLIC PNL TOTAL CA: CPT | Performed by: STUDENT IN AN ORGANIZED HEALTH CARE EDUCATION/TRAINING PROGRAM

## 2023-07-29 PROCEDURE — 80076 HEPATIC FUNCTION PANEL: CPT | Performed by: STUDENT IN AN ORGANIZED HEALTH CARE EDUCATION/TRAINING PROGRAM

## 2023-07-29 PROCEDURE — G0378 HOSPITAL OBSERVATION PER HR: HCPCS

## 2023-07-29 PROCEDURE — 85025 COMPLETE CBC W/AUTO DIFF WBC: CPT | Performed by: STUDENT IN AN ORGANIZED HEALTH CARE EDUCATION/TRAINING PROGRAM

## 2023-07-29 RX ADMIN — SODIUM CHLORIDE, SODIUM LACTATE, POTASSIUM CHLORIDE, AND CALCIUM CHLORIDE 1000 ML: .6; .31; .03; .02 INJECTION, SOLUTION INTRAVENOUS at 10:07

## 2023-07-29 RX ADMIN — DOCUSATE SODIUM 100 MG: 100 CAPSULE, LIQUID FILLED ORAL at 12:07

## 2023-07-29 RX ADMIN — AMIODARONE HYDROCHLORIDE 100 MG: 100 TABLET ORAL at 10:07

## 2023-07-29 RX ADMIN — Medication 200 MG: at 10:07

## 2023-07-29 RX ADMIN — ALLOPURINOL 100 MG: 100 TABLET ORAL at 10:07

## 2023-07-29 RX ADMIN — FERROUS SULFATE TAB 325 MG (65 MG ELEMENTAL FE) 1 EACH: 325 (65 FE) TAB at 12:07

## 2023-07-29 RX ADMIN — PROMETHAZINE HYDROCHLORIDE 6.25 MG: 25 INJECTION INTRAMUSCULAR; INTRAVENOUS at 02:07

## 2023-07-29 RX ADMIN — BUSPIRONE HYDROCHLORIDE 10 MG: 5 TABLET ORAL at 10:07

## 2023-07-29 RX ADMIN — PROPRANOLOL HYDROCHLORIDE 40 MG: 20 TABLET ORAL at 10:07

## 2023-07-29 RX ADMIN — PANTOPRAZOLE SODIUM 40 MG: 40 TABLET, DELAYED RELEASE ORAL at 10:07

## 2023-07-29 NOTE — NURSING
Pt received discharge instructions, states understanding.  NAD noted.  No c/o pain.  Friend at bedside.  PIV removed after IV fluids infiltrated.  Escorted out in wheelchair to friends private family vehicle.

## 2023-07-29 NOTE — DISCHARGE SUMMARY
[facility]  Discharge Summary      Patient Name: Rosanna Raymundo  MRN: [id number]  MURPHY: [id number]  Patient Class: OP- Observation  Admission Date: 7/26/2023  Hospital Length of Stay: 0 days  Discharge Date and Time:  07/29/2023 9:34 AM  Attending Physician: Francisco Daniel MD   Discharging Provider: Francisco Daniel MD  Primary Care Provider: Aamir Rhodes MD    Primary Care Team: Networked reference to record PCT     HPI:   89 yo w/pmhx of CAD s/p CABG presenting with abdominal pain. States she has had chronic abdominal pain for about a month that has been bearable. However, this AM, patient states the pain acutely worsened which prompted her to come to the ED. She states the pain radiates to her back. Denies chest pain, sob, diarrhea, constipation. Denies any relation to food. States she went to a physician for pain 2 weeks ago and was diagnosed with a UTI. States she was also hypotensive and some of her anti-hypertensive medications were discontinued. She also notes severe diaphoresis during these pain episodes.       * No surgery found *      Hospital Course:   Patient is a 89-year-old female who was admitted with abdominal pain.  She has a history of CAD and CABG.  Initial concerns for possible abdominal aortic dissection however this has been ruled out with CT angiogram.  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  Abdominal ultrasound revealed dilation of her biliary tree.  Her abdominal pain is primarily worse with eating.  She has had cholecystectomy in the past.  MRCP was obtained which revealed intrahepatic and extrahepatic dilation of the biliary tree.  Her liver function tests are within normal limits and bilirubin is normal.  Her pain has improved during her hospital stay.  She will need ERCP for further evaluation of possible ampullary stricture, possible mass, pancreatic duct dilation.  GI was consulted,  but unfortunately,  Willam is out until Thursday. At this point, given improvement in her symptoms, she is tolerating a regular diet, and lack of bilirubin elevation or transaminitis, we will have her follow up in GI clinic next week for scheduling of ERCP as an outpatient. I have stressed the importance of this and the importance of close follow up as she will need to rule out possible underlying malignancy. Her family were in the room as well and received these instructions also. I saw and evaluated the patient the day discharge and reviewed the discharge plan of care instructions with her in detail.  She was discharged in good condition with plans for close outpatient follow-up with Gastroenterology and her primary physician.       Goals of Care Treatment Preferences:  Code Status: DNR      Consults:   Consults (From admission, onward)        Status Ordering Provider     Inpatient consult to Gastroenterology  Once        Provider:  Juan Stafford MD    Acknowledged LEI BHATTI     Inpatient consult to Cardiology  Once        Provider:  Evan Noel MD    Completed LEI BHATTI          No new Assessment & Plan notes have been filed under this hospital service since the last note was generated.  Service: Hospital Medicine    Final Active Diagnoses:    Diagnosis Date Noted POA    PRINCIPAL PROBLEM:  Abdominal pain with biliary dilation [R10.9] 07/26/2023 Yes    Prolonged Q-T interval on ECG [R94.31] 07/27/2023 Unknown    Hx of CABG [Z95.1] 12/07/2022 Not Applicable    Cardiac pacemaker [Z95.0] 08/18/2021 Yes    Anticoagulant long-term use [Z79.01] 12/31/2019 Not Applicable     Chronic    Iron deficiency anemia [D50.9] 12/17/2019 Yes    Essential hypertension [I10] 10/03/2019 Yes     Chronic      Problems Resolved During this Admission:       Discharged Condition: good    Disposition: Home-Health Care Svc    Follow Up:   Follow-up Information     Aamir Rhodes MD. Schedule an  appointment as soon as possible for a visit in 1 week(s).    Specialties: Family Medicine, Home Health Services, Hospice Services  Contact information:  [address] [facility]  [address]  [facility]  [place] [address]  [phone]             Hodan Benites III, MD. Schedule an appointment as soon as possible for a visit in 1 week(s).    Specialty: Gastroenterology  Contact information:  [address] [facility]  [place] [address] [phone]                       Patient Instructions:      Ambulatory referral/consult to Home Health   Standing Status: Future   Referral Priority: Routine Referral Type: Home Health   Referral Reason: Specialty Services Required   Requested Specialty: Home Health Services   Number of Visits Requested: 1     Ambulatory referral/consult to Gastroenterology   Standing Status: Future   Referral Priority: Routine Referral Type: Consultation   Referral Reason: Specialty Services Required   Referred to Provider: HODAN BENITES III Requested Specialty: Gastroenterology   Number of Visits Requested: 1     Diet Adult Regular     No dressing needed     Activity as tolerated       Significant Diagnostic Studies: Labs:   CMP   Recent Labs   Lab 07/28/23  0455 07/29/23  0529    139   K 3.8 4.4    106   CO2 25 26   * 165*   BUN 26* 28*   CREATININE 1.1 1.3   CALCIUM 9.1 8.9   PROT 7.5 7.0   ALBUMIN 3.8 3.7   BILITOT 0.5 0.8   ALKPHOS 116 120   AST 37 27   ALT 31 25   ANIONGAP 9 7*   , CBC   Recent Labs   Lab 07/28/23  0455 07/29/23  0529   WBC 6.11 6.66   HGB 9.2* 9.1*   HCT 31.1* 30.0*    192    and All labs within the past 24 hours have been reviewed    Pending Diagnostic Studies:     None         Medications:  Reconciled Home Medications:      Medication List      CONTINUE taking these medications    allopurinoL 100 MG tablet  Commonly known as: ZYLOPRIM  Take 1 tablet (100 mg total) by mouth once daily.     amiodarone 200 MG Tab  Commonly known as:  PACERONE  Take 0.5 tablets (100 mg total) by mouth once daily.     apixaban 2.5 mg Tab  Commonly known as: ELIQUIS  Take 1 tablet (2.5 mg total) by mouth 2 (two) times daily.     blood sugar diagnostic Strp  To check BG 3 times daily, to use with insurance preferred meter     blood-glucose meter kit  To check BG 3 times daily, to use with insurance preferred meter     busPIRone 5 MG Tab  Commonly known as: BUSPAR  Take 2 tablets (10 mg total) by mouth 2 (two) times daily.     cholecalciferol (vitamin D3) 25 mcg (1,000 unit) capsule  Commonly known as: VITAMIN D3  Take 1 capsule (1,000 Units total) by mouth once daily.     docusate sodium 100 MG capsule  Commonly known as: COLACE  Take 100 mg by mouth as needed for Constipation.     ferrous sulfate 325 (65 FE) MG EC tablet  Take 1 tablet (325 mg total) by mouth 2 (two) times daily.     FREESTYLE LANCETS 28 gauge Misc  Generic drug: lancets     hydroCHLOROthiazide 12.5 MG Tab  Commonly known as: HYDRODIURIL  Take 1 tablet (12.5 mg total) by mouth once daily.     loratadine 10 mg tablet  Commonly known as: CLARITIN  Take 1 tablet (10 mg total) by mouth once daily.     magnesium oxide 400 mg (241.3 mg magnesium) tablet  Commonly known as: MAG-OX  Take 1 tablet (400 mg total) by mouth once daily.     meclizine 12.5 mg tablet  Commonly known as: ANTIVERT  Take 1 tablet (12.5 mg total) by mouth 3 (three) times daily as needed for Dizziness.     mupirocin 2 % ointment  Commonly known as: BACTROBAN  Apply topically once daily.     omeprazole 20 MG capsule  Commonly known as: PRILOSEC  Take 2 capsules (40 mg total) by mouth once daily.     PRESERVISION AREDS 4,296 mcg-226 mg-90 mg Cap  Generic drug: vitamins A,C,E-zinc-copper  Take 1 capsule by mouth 2 (two) times daily.     propranoloL 40 MG tablet  Commonly known as: INDERAL  Take 1 tablet (40 mg total) by mouth 2 (two) times daily.        STOP taking these medications    rosuvastatin 20 MG tablet  Commonly known as:  CRESTOR            Indwelling Lines/Drains at time of discharge:   Lines/Drains/Airways     None                 Time spent on the discharge of patient: 50 minutes         Francisco Daniel MD  Department of Hospital Medicine  [facility]

## 2023-07-29 NOTE — PLAN OF CARE
07/29/23 0854   Final Note   Assessment Type Final Discharge Note   Anticipated Discharge Disposition Home-Health   What phone number can be called within the next 1-3 days to see how you are doing after discharge? [phone]   Post-Acute Status   Post-Acute Authorization Home Health   Home Health Status Referrals Sent   Discharge Delays None known at this time     Patient cleared for discharge from case management standpoint.  Chart and discharge orders reviewed.  Home Health referral sent, [facility] accepted Pt. Referral number [id number].Patient discharged home with no further case management needs.

## 2023-07-29 NOTE — HOSPITAL COURSE
Patient is a 89-year-old female who was admitted with abdominal pain.  She has a history of CAD and CABG.  Initial concerns for possible abdominal aortic dissection however this has been ruled out with CT angiogram.  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  Abdominal ultrasound revealed dilation of her biliary tree.  Her abdominal pain is primarily worse with eating.  She has had cholecystectomy in the past.  MRCP was obtained which revealed intrahepatic and extrahepatic dilation of the biliary tree.  Her liver function tests are within normal limits and bilirubin is normal.  Her pain has improved during her hospital stay.  She will need ERCP for further evaluation of possible ampullary stricture, possible mass, pancreatic duct dilation.  GI was consulted, but unfortunately, Dr Quarles is out until Thursday. At this point, given improvement in her symptoms, she is tolerating a regular diet, and lack of bilirubin elevation or transaminitis, we will have her follow up in GI clinic next week for scheduling of ERCP as an outpatient. I have stressed the importance of this and the importance of close follow up as she will need to rule out possible underlying malignancy. Her family were in the room as well and received these instructions also. I saw and evaluated the patient the day discharge and reviewed the discharge plan of care instructions with her in detail.  She was discharged in good condition with plans for close outpatient follow-up with Gastroenterology and her primary physician.

## 2023-07-29 NOTE — PROGRESS NOTES
Noted d/c summary.  Dilated bile duct with normal LFTS  Will set up with Dr Quarles next Thursday/Friday in Clinic

## 2023-07-29 NOTE — NURSING
PT had MRCP in am.  Back to regular diet, tolerating well.  Pleasant and cooperative.  NAD noted, no new needs expressed.  Eager for discharge.  Report to MARSHA Loepz.  Awaiting GI consult.  Tylenol and colace given per PRN order.

## 2023-07-31 ENCOUNTER — TELEPHONE (OUTPATIENT)
Dept: FAMILY MEDICINE | Facility: CLINIC | Age: 88
End: 2023-07-31

## 2023-07-31 NOTE — TELEPHONE ENCOUNTER
----- Message from Yue Arellano MA sent at 7/31/2023  8:49 AM CDT -----    ----- Message -----  From: Tg Menon  Sent: 7/31/2023   8:29 AM CDT  To: Aamir Rhodes Staff    Patient gustavo Jean called and stated that the patient was in the hospital and she need to schedule a hospital follow up. The patient was discharged on Saturday 07/29/23 and she was in [facility], she was not told to follow up within a week. Please give her a call at [phone]

## 2023-07-31 NOTE — TELEPHONE ENCOUNTER
Spoke with patient confirmed appt for 08/07/2023 with Cata Cardoso.     Patient states when hospitalized two cardiologist were discussing her BM. Patient states her BM's have been the same for decades. Would like to know if needs an angiogram. Patient overheard of possibly getting a valve replacement or angiogram. Patient wondering if will need any of these two procedures. States is SOB and on 02, runs between 95-98 %. Willing to discuss at HFU. Sees gastroenterology on Thursday at 3:30 with Dr. Greenberg.

## 2023-07-31 NOTE — TELEPHONE ENCOUNTER
----- Message from Cata Cramer sent at 7/31/2023  2:58 PM CDT -----  - 1:48-pt is calling back   [phone]

## 2023-07-31 NOTE — TELEPHONE ENCOUNTER
----- Message from Shelly Up LPN sent at 7/28/2023  1:09 PM CDT -----  Regarding: HFU  Call patient - needs post-hospital phone call within 2 business days and hospital follow up visit scheduled within 7-14 days.    Expected discharge- 7/28/23

## 2023-08-01 ENCOUNTER — OFFICE VISIT (OUTPATIENT)
Dept: FAMILY MEDICINE | Facility: CLINIC | Age: 88
End: 2023-08-01
Payer: MEDICARE

## 2023-08-01 VITALS
BODY MASS INDEX: 29.83 KG/M2 | HEIGHT: 61 IN | WEIGHT: 158 LBS | SYSTOLIC BLOOD PRESSURE: 90 MMHG | DIASTOLIC BLOOD PRESSURE: 62 MMHG | HEART RATE: 60 BPM

## 2023-08-01 DIAGNOSIS — I10 ESSENTIAL HYPERTENSION: ICD-10-CM

## 2023-08-01 DIAGNOSIS — R10.9 ABDOMINAL PAIN, UNSPECIFIED ABDOMINAL LOCATION: Primary | ICD-10-CM

## 2023-08-01 DIAGNOSIS — Z09 HOSPITAL DISCHARGE FOLLOW-UP: ICD-10-CM

## 2023-08-01 DIAGNOSIS — I48.0 PAROXYSMAL ATRIAL FIBRILLATION: ICD-10-CM

## 2023-08-01 DIAGNOSIS — I35.0 SEVERE AORTIC STENOSIS: ICD-10-CM

## 2023-08-01 PROCEDURE — 99496 TRANSITIONAL CARE MANAGE SERVICE 7 DAY DISCHARGE: ICD-10-PCS | Mod: S$GLB,,, | Performed by: PHYSICIAN ASSISTANT

## 2023-08-01 PROCEDURE — 99496 TRANSJ CARE MGMT HIGH F2F 7D: CPT | Mod: S$GLB,,, | Performed by: PHYSICIAN ASSISTANT

## 2023-08-01 NOTE — PROGRESS NOTES
SUBJECTIVE:    Patient ID: Rosanna Raymundo is a 89 y.o. female.    Chief Complaint: Follow-up ( [facility] f/u for abdominal pain//no med bottles//tc)    This is a 89-year-old female who presents today for hospital discharge follow-up.  Admitted for abdominal pain.  Discharge summary is pasted below:    Hospital Course:   Patient is a 89-year-old female who was admitted with abdominal pain.  She has a history of CAD and CABG.  Initial concerns for possible abdominal aortic dissection however this has been ruled out with CT angiogram.  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  Abdominal ultrasound revealed dilation of her biliary tree.  Her abdominal pain is primarily worse with eating.  She has had cholecystectomy in the past.  MRCP was obtained which revealed intrahepatic and extrahepatic dilation of the biliary tree.  Her liver function tests are within normal limits and bilirubin is normal.  Her pain has improved during her hospital stay.  She will need ERCP for further evaluation of possible ampullary stricture, possible mass, pancreatic duct dilation.  GI was consulted, but unfortunately, Dr Quarles is out until Thursday. At this point, given improvement in her symptoms, she is tolerating a regular diet, and lack of bilirubin elevation or transaminitis, we will have her follow up in GI clinic next week for scheduling of ERCP as an outpatient. I have stressed the importance of this and the importance of close follow up as she will need to rule out possible underlying malignancy. Her family were in the room as well and received these instructions also. I saw and evaluated the patient the day discharge and reviewed the discharge plan of care instructions with her in detail.  She was discharged in good condition with plans for close outpatient follow-up with Gastroenterology and her primary physician.    Today she reports that she continues to do ok.  Pain has not returned. Good bowel movements. Just feels dizzy and lightheaded. Amlodipine was stopped at last visit with [facility]. She has continued to take losartan. Pressure today 80-90s/60s. Confirmed by me. Orthostatic pressures also present. Mobile per rollator. Will see Dr. Palomares on Thursday for consult. Needs consideration for ERCP.        Admit Date: 7/26/23   Discharge Date: 7/29/23  Discharge Facility: Hospital    Medication Reconciliation:  Medications changed/added/deleted. Losartan stopped  New Prescriptions filled after discharge: not applicable  Discharge summary reviewed:  yes  Pending test results at discharge reviewed:   no  Follow up appointments scheduled:  yes              with Gastroenterology   Follow up labs/tests ordered:   not applicable  Home Health ordered on discharge:   yes  Home Health company name: [facility]  DME ordered at discharge:   no  How patient is feeling since discharge from the hospital?  improved.     Patient follow up phone call documented on separate encounter.      Admission on 07/26/2023, Discharged on 07/29/2023   Component Date Value Ref Range Status    WBC 07/26/2023 9.29  3.90 - 12.70 K/uL Final    RBC 07/26/2023 4.75  4.00 - 5.40 M/uL Final    Hemoglobin 07/26/2023 9.5 (L)  12.0 - 16.0 g/dL Final    Hematocrit 07/26/2023 31.6 (L)  37.0 - 48.5 % Final    MCV 07/26/2023 67 (L)  82 - 98 fL Final    MCH 07/26/2023 20.0 (L)  27.0 - 31.0 pg Final    MCHC 07/26/2023 30.1 (L)  32.0 - 36.0 g/dL Final    RDW 07/26/2023 21.1 (H)  11.5 - 14.5 % Final    Platelets 07/26/2023 224  150 - 450 K/uL Final    MPV 07/26/2023 10.1  9.2 - 12.9 fL Final    Immature Granulocytes 07/26/2023 0.3  0.0 - 0.5 % Final    Gran # (ANC) 07/26/2023 7.1  1.8 - 7.7 K/uL Final    Immature Grans (Abs) 07/26/2023 0.03  0.00 - 0.04 K/uL Final    Lymph # 07/26/2023 1.3  1.0 - 4.8 K/uL Final    Mono # 07/26/2023 0.6  0.3 - 1.0 K/uL Final    Eos # 07/26/2023 0.2  0.0 - 0.5 K/uL Final     Baso # 07/26/2023 0.08  0.00 - 0.20 K/uL Final    nRBC 07/26/2023 0  0 /100 WBC Final    Gran % 07/26/2023 76.0 (H)  38.0 - 73.0 % Final    Lymph % 07/26/2023 14.4 (L)  18.0 - 48.0 % Final    Mono % 07/26/2023 6.8  4.0 - 15.0 % Final    Eosinophil % 07/26/2023 1.6  0.0 - 8.0 % Final    Basophil % 07/26/2023 0.9  0.0 - 1.9 % Final    Differential Method 07/26/2023 Automated   Final    Sodium 07/26/2023 137  136 - 145 mmol/L Final    Potassium 07/26/2023 4.0  3.5 - 5.1 mmol/L Final    Chloride 07/26/2023 104  95 - 110 mmol/L Final    CO2 07/26/2023 22 (L)  23 - 29 mmol/L Final    Glucose 07/26/2023 161 (H)  70 - 110 mg/dL Final    BUN 07/26/2023 29 (H)  8 - 23 mg/dL Final    Creatinine 07/26/2023 1.0  0.5 - 1.4 mg/dL Final    Calcium 07/26/2023 9.3  8.7 - 10.5 mg/dL Final    Total Protein 07/26/2023 7.5  6.0 - 8.4 g/dL Final    Albumin 07/26/2023 4.1  3.5 - 5.2 g/dL Final    Total Bilirubin 07/26/2023 0.9  0.1 - 1.0 mg/dL Final    Alkaline Phosphatase 07/26/2023 121  55 - 135 U/L Final    AST 07/26/2023 47 (H)  10 - 40 U/L Final    ALT 07/26/2023 19  10 - 44 U/L Final    eGFR 07/26/2023 53.5 (A)  >60 mL/min/1.73 m^2 Final    Anion Gap 07/26/2023 11  8 - 16 mmol/L Final    Lipase 07/26/2023 53  4 - 60 U/L Final    Magnesium 07/26/2023 1.5 (L)  1.6 - 2.6 mg/dL Final    Troponin I High Sensitivity 07/26/2023 14.9  0.0 - 14.9 pg/mL Final    BNP 07/26/2023 742 (H)  0 - 99 pg/mL Final    Specimen UA 07/26/2023 Urine, Clean Catch   Final    Color, UA 07/26/2023 Yellow  Yellow, Straw, Dorys Final    Appearance, UA 07/26/2023 Clear  Clear Final    pH, UA 07/26/2023 6.0  5.0 - 8.0 Final    Specific Gravity, UA 07/26/2023 1.020  1.005 - 1.030 Final    Protein, UA 07/26/2023 Negative  Negative Final    Glucose, UA 07/26/2023 Negative  Negative Final    Ketones, UA 07/26/2023 1+ (A)  Negative Final    Bilirubin (UA) 07/26/2023 Negative  Negative Final    Occult Blood UA 07/26/2023 Trace (A)  Negative Final    Nitrite, UA  07/26/2023 Negative  Negative Final    Urobilinogen, UA 07/26/2023 Negative  Negative EU/dL Final    Leukocytes, UA 07/26/2023 Trace (A)  Negative Final    POC Creatinine 07/26/2023 1.0  0.5 - 1.4 mg/dL Final    Sample 07/26/2023 VENOUS   Final    RBC, UA 07/26/2023 1  0 - 4 /hpf Final    WBC, UA 07/26/2023 1  0 - 5 /hpf Final    Bacteria 07/26/2023 Negative  None-Occ /hpf Final    Squam Epithel, UA 07/26/2023 0  /hpf Final    Hyaline Casts, UA 07/26/2023 3 (A)  0-1/lpf /lpf Final    Microscopic Comment 07/26/2023 SEE COMMENT   Final    Troponin I High Sensitivity 07/27/2023 16.8 (H)  0.0 - 14.9 pg/mL Final    WBC 07/27/2023 6.28  3.90 - 12.70 K/uL Final    RBC 07/27/2023 4.63  4.00 - 5.40 M/uL Final    Hemoglobin 07/27/2023 9.3 (L)  12.0 - 16.0 g/dL Final    Hematocrit 07/27/2023 31.1 (L)  37.0 - 48.5 % Final    MCV 07/27/2023 67 (L)  82 - 98 fL Final    MCH 07/27/2023 20.1 (L)  27.0 - 31.0 pg Final    MCHC 07/27/2023 29.9 (L)  32.0 - 36.0 g/dL Final    RDW 07/27/2023 21.2 (H)  11.5 - 14.5 % Final    Platelets 07/27/2023 251  150 - 450 K/uL Final    MPV 07/27/2023 10.2  9.2 - 12.9 fL Final    Immature Granulocytes 07/27/2023 0.3  0.0 - 0.5 % Final    Gran # (ANC) 07/27/2023 3.8  1.8 - 7.7 K/uL Final    Immature Grans (Abs) 07/27/2023 0.02  0.00 - 0.04 K/uL Final    Lymph # 07/27/2023 1.6  1.0 - 4.8 K/uL Final    Mono # 07/27/2023 0.7  0.3 - 1.0 K/uL Final    Eos # 07/27/2023 0.1  0.0 - 0.5 K/uL Final    Baso # 07/27/2023 0.06  0.00 - 0.20 K/uL Final    nRBC 07/27/2023 0  0 /100 WBC Final    Gran % 07/27/2023 60.7  38.0 - 73.0 % Final    Lymph % 07/27/2023 25.5  18.0 - 48.0 % Final    Mono % 07/27/2023 10.4  4.0 - 15.0 % Final    Eosinophil % 07/27/2023 2.1  0.0 - 8.0 % Final    Basophil % 07/27/2023 1.0  0.0 - 1.9 % Final    Differential Method 07/27/2023 Automated   Final    Sodium 07/27/2023 136  136 - 145 mmol/L Final    Potassium 07/27/2023 3.4 (L)  3.5 - 5.1 mmol/L Final    Chloride 07/27/2023 106  95 - 110  mmol/L Final    CO2 07/27/2023 23  23 - 29 mmol/L Final    Glucose 07/27/2023 157 (H)  70 - 110 mg/dL Final    BUN 07/27/2023 25 (H)  8 - 23 mg/dL Final    Creatinine 07/27/2023 0.9  0.5 - 1.4 mg/dL Final    Calcium 07/27/2023 9.0  8.7 - 10.5 mg/dL Final    Anion Gap 07/27/2023 7 (L)  8 - 16 mmol/L Final    eGFR 07/27/2023 >60.0  >60 mL/min/1.73 m^2 Final    WBC 07/28/2023 6.11  3.90 - 12.70 K/uL Final    RBC 07/28/2023 4.64  4.00 - 5.40 M/uL Final    Hemoglobin 07/28/2023 9.2 (L)  12.0 - 16.0 g/dL Final    Hematocrit 07/28/2023 31.1 (L)  37.0 - 48.5 % Final    MCV 07/28/2023 67 (L)  82 - 98 fL Final    MCH 07/28/2023 19.8 (L)  27.0 - 31.0 pg Final    MCHC 07/28/2023 29.6 (L)  32.0 - 36.0 g/dL Final    RDW 07/28/2023 21.2 (H)  11.5 - 14.5 % Final    Platelets 07/28/2023 200  150 - 450 K/uL Final    MPV 07/28/2023 SEE COMMENT  9.2 - 12.9 fL Final    Immature Granulocytes 07/28/2023 0.3  0.0 - 0.5 % Final    Gran # (ANC) 07/28/2023 3.0  1.8 - 7.7 K/uL Final    Immature Grans (Abs) 07/28/2023 0.02  0.00 - 0.04 K/uL Final    Lymph # 07/28/2023 2.1  1.0 - 4.8 K/uL Final    Mono # 07/28/2023 0.7  0.3 - 1.0 K/uL Final    Eos # 07/28/2023 0.2  0.0 - 0.5 K/uL Final    Baso # 07/28/2023 0.05  0.00 - 0.20 K/uL Final    nRBC 07/28/2023 0  0 /100 WBC Final    Gran % 07/28/2023 49.3  38.0 - 73.0 % Final    Lymph % 07/28/2023 34.9  18.0 - 48.0 % Final    Mono % 07/28/2023 11.6  4.0 - 15.0 % Final    Eosinophil % 07/28/2023 3.1  0.0 - 8.0 % Final    Basophil % 07/28/2023 0.8  0.0 - 1.9 % Final    Differential Method 07/28/2023 Automated   Final    Sodium 07/28/2023 139  136 - 145 mmol/L Final    Potassium 07/28/2023 3.8  3.5 - 5.1 mmol/L Final    Chloride 07/28/2023 105  95 - 110 mmol/L Final    CO2 07/28/2023 25  23 - 29 mmol/L Final    Glucose 07/28/2023 111 (H)  70 - 110 mg/dL Final    BUN 07/28/2023 26 (H)  8 - 23 mg/dL Final    Creatinine 07/28/2023 1.1  0.5 - 1.4 mg/dL Final    Calcium 07/28/2023 9.1  8.7 - 10.5 mg/dL Final     Anion Gap 07/28/2023 9  8 - 16 mmol/L Final    eGFR 07/28/2023 47.7 (A)  >60 mL/min/1.73 m^2 Final    Total Protein 07/28/2023 7.5  6.0 - 8.4 g/dL Final    Albumin 07/28/2023 3.8  3.5 - 5.2 g/dL Final    Total Bilirubin 07/28/2023 0.5  0.1 - 1.0 mg/dL Final    Bilirubin, Direct 07/28/2023 0.1  0.1 - 0.3 mg/dL Final    AST 07/28/2023 37  10 - 40 U/L Final    ALT 07/28/2023 31  10 - 44 U/L Final    Alkaline Phosphatase 07/28/2023 116  55 - 135 U/L Final    Iron 07/28/2023 18 (L)  30 - 160 ug/dL Final    Transferrin 07/28/2023 340  200 - 375 mg/dL Final    TIBC 07/28/2023 476 (H)  250 - 450 ug/dL Final    Saturated Iron 07/28/2023 4 (L)  20 - 50 % Final    Folate 07/28/2023 15.5  4.0 - 24.0 ng/mL Final    Vitamin B-12 07/28/2023 358  210 - 950 pg/mL Final    Retic 07/28/2023 1.4  0.5 - 2.5 % Final    Ferritin 07/28/2023 9 (L)  20.0 - 300.0 ng/mL Final    WBC 07/29/2023 6.66  3.90 - 12.70 K/uL Final    RBC 07/29/2023 4.44  4.00 - 5.40 M/uL Final    Hemoglobin 07/29/2023 9.1 (L)  12.0 - 16.0 g/dL Final    Hematocrit 07/29/2023 30.0 (L)  37.0 - 48.5 % Final    MCV 07/29/2023 68 (L)  82 - 98 fL Final    MCH 07/29/2023 20.5 (L)  27.0 - 31.0 pg Final    MCHC 07/29/2023 30.3 (L)  32.0 - 36.0 g/dL Final    RDW 07/29/2023 21.5 (H)  11.5 - 14.5 % Final    Platelets 07/29/2023 192  150 - 450 K/uL Final    MPV 07/29/2023 SEE COMMENT  9.2 - 12.9 fL Final    Immature Granulocytes 07/29/2023 0.5  0.0 - 0.5 % Final    Gran # (ANC) 07/29/2023 3.9  1.8 - 7.7 K/uL Final    Immature Grans (Abs) 07/29/2023 0.03  0.00 - 0.04 K/uL Final    Lymph # 07/29/2023 1.7  1.0 - 4.8 K/uL Final    Mono # 07/29/2023 0.7  0.3 - 1.0 K/uL Final    Eos # 07/29/2023 0.2  0.0 - 0.5 K/uL Final    Baso # 07/29/2023 0.07  0.00 - 0.20 K/uL Final    nRBC 07/29/2023 0  0 /100 WBC Final    Gran % 07/29/2023 59.0  38.0 - 73.0 % Final    Lymph % 07/29/2023 26.0  18.0 - 48.0 % Final    Mono % 07/29/2023 10.8  4.0 - 15.0 % Final    Eosinophil % 07/29/2023 2.6  0.0 -  8.0 % Final    Basophil % 07/29/2023 1.1  0.0 - 1.9 % Final    Differential Method 07/29/2023 Automated   Final    Sodium 07/29/2023 139  136 - 145 mmol/L Final    Potassium 07/29/2023 4.4  3.5 - 5.1 mmol/L Final    Chloride 07/29/2023 106  95 - 110 mmol/L Final    CO2 07/29/2023 26  23 - 29 mmol/L Final    Glucose 07/29/2023 165 (H)  70 - 110 mg/dL Final    BUN 07/29/2023 28 (H)  8 - 23 mg/dL Final    Creatinine 07/29/2023 1.3  0.5 - 1.4 mg/dL Final    Calcium 07/29/2023 8.9  8.7 - 10.5 mg/dL Final    Anion Gap 07/29/2023 7 (L)  8 - 16 mmol/L Final    eGFR 07/29/2023 39.1 (A)  >60 mL/min/1.73 m^2 Final    Total Protein 07/29/2023 7.0  6.0 - 8.4 g/dL Final    Albumin 07/29/2023 3.7  3.5 - 5.2 g/dL Final    Total Bilirubin 07/29/2023 0.8  0.1 - 1.0 mg/dL Final    Bilirubin, Direct 07/29/2023 0.1  0.1 - 0.3 mg/dL Final    AST 07/29/2023 27  10 - 40 U/L Final    ALT 07/29/2023 25  10 - 44 U/L Final    Alkaline Phosphatase 07/29/2023 120  55 - 135 U/L Final   Hospital Outpatient Visit on 07/20/2023   Component Date Value Ref Range Status    Battery Voltage (V) 07/20/2023 2.99  V In process    P/R-wave RA Lead 07/20/2023 1.0  mV In process    P/R-wave RA Lead (native) 07/20/2023 >12.0  mV In process    Impedance RA Lead 07/20/2023 450  Ohms In process    Impedance RA Lead (native) 07/20/2023 430  Ohms In process    Threshold V RA Lead 07/20/2023 1.0  V In process    Theshold ms RA Lead 07/20/2023 0.4  ms In process   Office Visit on 07/20/2023   Component Date Value Ref Range Status    TSH w/reflex to FT4 07/20/2023 1.99  0.40 - 4.50 mIU/L Final   Refill on 07/07/2023   Component Date Value Ref Range Status    POC Blood, Urine 07/07/2023 Negative  Negative Final    POC Bilirubin, Urine 07/07/2023 Negative  Negative Final    POC Urobilinogen, Urine 07/07/2023 Negative  0.1 - 1.1 Final    POC Ketones, Urine 07/07/2023 Negative  Negative Final    POC Protein, Urine 07/07/2023 Negative  Negative Final    POC Nitrates, Urine  07/07/2023 Negative  Negative Final    POC Glucose, Urine 07/07/2023 Negative  Negative Final    pH, UA 07/07/2023 6.0   Final    POC Specific Gravity, Urine 07/07/2023 1.015  1.003 - 1.029 Final    POC Leukocytes, Urine 07/07/2023 Negative  Negative Final    Urine Culture, Routine 07/07/2023  (A)   Final   Patient Outreach on 06/20/2023   Component Date Value Ref Range Status    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   Final    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 Positive   Final   Office Visit on 05/09/2023   Component Date Value Ref Range Status    POC Molecular Influenza A Ag 05/09/2023 Negative  Negative, Not Reported Final    POC Molecular Influenza B Ag 05/09/2023 Negative  Negative, Not Reported Final     Acceptable 05/09/2023 Yes   Final   Office Visit on 03/23/2023   Component Date Value Ref Range Status    Hemoglobin A1C, POC 03/23/2023 6.7  % Final       Past Medical History:   Diagnosis Date    Anticoagulant long-term use     Cancer     Cataract     CHF (congestive heart failure)     Diabetes mellitus, type 2     Heart murmur     Hypertension     Iron deficiency anemia, unspecified     Anemia, iron def.    Occasional tremors      Past Surgical History:   Procedure Laterality Date    ADENOIDECTOMY      APPENDECTOMY      COLONOSCOPY N/A 12/17/2019    Procedure: COLONOSCOPY;  Surgeon: Raleigh Quarles III, MD;  Location: [facility] ENDO;  Service: Endoscopy;  Laterality: N/A;    COLONOSCOPY N/A 12/18/2019    Procedure: COLONOSCOPY;  Surgeon: Raleigh Quarles III, MD;  Location: [facility] ENDO;  Service: Endoscopy;  Laterality: N/A;    Gall bladder revmoval      HYSTERECTOMY      INSERTION OF PACEMAKER Right 1/2/2020    Procedure: INSERTION, PACEMAKER;  Surgeon: Fly Barragan MD;  Location: [facility] CATH/EP LAB;  Service: Cardiology;  Laterality: Right;    left mastectomy Left 1945    MASTECTOMY Left     SC CABG, ARTERY-VEIN, FOUR  2014    Coronary Artery Bypass, 4    SMALL BOWEL  ENTEROSCOPY N/A 12/17/2019    Procedure: ENTEROSCOPY;  Surgeon: Raleigh Quarles III, MD;  Location: [facility];  Service: Endoscopy;  Laterality: N/A;    TONSILLECTOMY       Family History   Problem Relation Age of Onset    No Known Problems Mother     Heart disease Father        Marital Status:   Alcohol History:  reports no history of alcohol use.  Tobacco History:  reports that she has never smoked. She has never used smokeless tobacco.  Drug History:  reports no history of drug use.    Review of patient's allergies indicates:   Allergen Reactions    Penicillin g benzathine Other (See Comments)       Current Outpatient Medications:     allopurinoL (ZYLOPRIM) 100 MG tablet, Take 1 tablet (100 mg total) by mouth once daily. (Patient not taking: Reported on 8/1/2023), Disp: 90 tablet, Rfl: 3    amiodarone (PACERONE) 200 MG Tab, Take 0.5 tablets (100 mg total) by mouth once daily., Disp: 45 tablet, Rfl: 3    apixaban (ELIQUIS) 2.5 mg Tab, Take 1 tablet (2.5 mg total) by mouth 2 (two) times daily., Disp: 180 tablet, Rfl: 3    blood sugar diagnostic Strp, To check BG 3 times daily, to use with insurance preferred meter, Disp: 200 strip, Rfl: 1    blood-glucose meter kit, To check BG 3 times daily, to use with insurance preferred meter, Disp: 1 each, Rfl: 0    busPIRone (BUSPAR) 5 MG Tab, Take 2 tablets (10 mg total) by mouth 2 (two) times daily., Disp: 180 tablet, Rfl: 3    cholecalciferol, vitamin D3, (VITAMIN D3) 25 mcg (1,000 unit) capsule, Take 1 capsule (1,000 Units total) by mouth once daily., Disp: 100 capsule, Rfl: 1    docusate sodium (COLACE) 100 MG capsule, Take 100 mg by mouth as needed for Constipation., Disp: , Rfl:     ferrous sulfate 325 (65 FE) MG EC tablet, Take 1 tablet (325 mg total) by mouth 2 (two) times daily., Disp: 180 tablet, Rfl: 2    FREESTYLE LANCETS 28 gauge lancets, , Disp: , Rfl:     hydroCHLOROthiazide (HYDRODIURIL) 12.5 MG Tab, Take 1 tablet (12.5 mg total) by mouth once  daily., Disp: 90 tablet, Rfl: 3    loratadine (CLARITIN) 10 mg tablet, Take 1 tablet (10 mg total) by mouth once daily., Disp: 90 tablet, Rfl: 3    magnesium oxide (MAG-OX) 400 mg (241.3 mg magnesium) tablet, Take 1 tablet (400 mg total) by mouth once daily. (Patient taking differently: Take 200 mg by mouth once daily.), Disp: 90 tablet, Rfl: 3    meclizine (ANTIVERT) 12.5 mg tablet, Take 1 tablet (12.5 mg total) by mouth 3 (three) times daily as needed for Dizziness., Disp: 90 tablet, Rfl: 0    mupirocin (BACTROBAN) 2 % ointment, Apply topically once daily. (Patient taking differently: Apply 1 g topically once daily.), Disp: 30 g, Rfl: 3    omeprazole (PRILOSEC) 20 MG capsule, Take 2 capsules (40 mg total) by mouth once daily., Disp: 180 capsule, Rfl: 1    propranoloL (INDERAL) 40 MG tablet, Take 1 tablet (40 mg total) by mouth 2 (two) times daily., Disp: 180 tablet, Rfl: 3    vitamins A,C,E-zinc-copper (PRESERVISION AREDS) 4,296 mcg-226 mg-90 mg Cap, Take 1 capsule by mouth 2 (two) times daily., Disp: 180 capsule, Rfl: 3    Review of Systems   Constitutional:  Positive for unexpected weight change. Negative for appetite change, chills, fatigue and fever.        Has lost 10 lbs recently. Says she is eating less due to tremors. Still eats lots of fruits.   HENT:  Negative for congestion.    Respiratory:  Negative for cough, chest tightness and shortness of breath.    Cardiovascular:  Negative for chest pain and palpitations.   Gastrointestinal:  Negative for abdominal distention, abdominal pain, constipation, diarrhea, nausea and vomiting.   Endocrine: Negative for cold intolerance and heat intolerance.   Genitourinary:  Negative for difficulty urinating and dysuria.   Musculoskeletal:  Negative for arthralgias and back pain.   Skin:  Positive for color change. Negative for rash.        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   Neurological:  Positive for dizziness, tremors (chronic) and light-headedness. Negative for syncope,  "weakness and headaches.        Objective:      Vitals:    08/01/23 1404   BP: 90/62   Pulse: 60   Weight: 71.7 kg (158 lb)   Height: 5' 1" (1.549 m)     Physical Exam  Constitutional:       General: She is not in acute distress.     Appearance: She is well-developed. She is not ill-appearing.   HENT:      Head: Normocephalic and atraumatic.      Nose: No congestion.   Eyes:      General: No scleral icterus.     Conjunctiva/sclera: Conjunctivae normal.      Pupils: Pupils are equal, round, and reactive to light.   Neck:      Thyroid: No thyromegaly.      Vascular: No carotid bruit.   Cardiovascular:      Rate and Rhythm: Normal rate and regular rhythm.      Pulses: Normal pulses.           Dorsalis pedis pulses are 2+ on the right side and 2+ on the left side.        Posterior tibial pulses are 2+ on the right side and 2+ on the left side.      Heart sounds: Murmur (Harsh, systolic Aortic murmur appreciated) heard.      No friction rub. No gallop.   Pulmonary:      Effort: Pulmonary effort is normal. No respiratory distress.      Breath sounds: Normal breath sounds. No stridor. No wheezing, rhonchi or rales.   Abdominal:      General: Bowel sounds are normal. There is no distension.      Palpations: Abdomen is soft.      Tenderness: There is no abdominal tenderness.   Musculoskeletal:         General: No swelling or tenderness. Normal range of motion.      Cervical back: Normal range of motion and neck supple.      Right lower leg: No edema.      Left lower leg: No edema.   Feet:      Right foot:      Protective Sensation: 6 sites tested.  6 sites sensed.      Skin integrity: Dry skin present. No ulcer, blister or fissure.      Toenail Condition: Right toenails are abnormally thick.      Left foot:      Protective Sensation: 6 sites tested.  6 sites sensed.      Skin integrity: Dry skin present. No ulcer, blister or fissure.      Toenail Condition: Left toenails are abnormally thick.   Skin:     General: Skin is warm " and dry.      Capillary Refill: Capillary refill takes less than 2 seconds.      Coloration: Skin is not jaundiced.      Findings: No erythema.      Comments: Senile purpura on her LE and UE.   Neurological:      Mental Status: She is alert and oriented to person, place, and time.      Cranial Nerves: No cranial nerve deficit.   Psychiatric:         Mood and Affect: Mood normal.         Behavior: Behavior normal.         Assessment:       1. Abdominal pain, unspecified abdominal location    2. Hospital discharge follow-up    3. Essential hypertension    4. Paroxysmal atrial fibrillation    5. Severe aortic stenosis         Plan:       Abdominal pain, unspecified abdominal location  Comments:  Unclear etiology. overall improved but with dilation of biliary tree we want to get ERCP completed for further eval. will be meeting with GI Thursday    Hospital discharge follow-up  Comments:  meds reconciled and fully reviewed with patient since hospital discharge.     Essential hypertension  Comments:  BP is running a bit low. we are going to stop losartan now as well. continue to monitor-  will send me logs.     Paroxysmal atrial fibrillation  Comments:  stable, rate controlled. continue as is.    Severe aortic stenosis  Comments:  Does not wish for TAVR. Again discussed the potential benefit for her but she wishes to hold off.       Follow up if symptoms worsen or fail to improve.

## 2023-08-04 ENCOUNTER — DOCUMENT SCAN (OUTPATIENT)
Dept: HOME HEALTH SERVICES | Facility: HOSPITAL | Age: 88
End: 2023-08-04
Payer: MEDICARE

## 2023-08-08 ENCOUNTER — EXTERNAL HOME HEALTH (OUTPATIENT)
Dept: HOME HEALTH SERVICES | Facility: HOSPITAL | Age: 88
End: 2023-08-08
Payer: MEDICARE

## 2023-08-10 ENCOUNTER — ANESTHESIA (OUTPATIENT)
Dept: SURGERY | Facility: HOSPITAL | Age: 88
End: 2023-08-10
Payer: MEDICARE

## 2023-08-10 ENCOUNTER — ANESTHESIA EVENT (OUTPATIENT)
Dept: SURGERY | Facility: HOSPITAL | Age: 88
End: 2023-08-10
Payer: MEDICARE

## 2023-08-10 ENCOUNTER — HOSPITAL ENCOUNTER (OUTPATIENT)
Facility: HOSPITAL | Age: 88
Discharge: HOME | End: 2023-08-10
Attending: INTERNAL MEDICINE | Admitting: INTERNAL MEDICINE
Payer: MEDICARE

## 2023-08-10 VITALS
TEMPERATURE: 98 F | DIASTOLIC BLOOD PRESSURE: 69 MMHG | HEART RATE: 80 BPM | OXYGEN SATURATION: 99 % | SYSTOLIC BLOOD PRESSURE: 126 MMHG | RESPIRATION RATE: 18 BRPM

## 2023-08-10 DIAGNOSIS — R10.9 ABDOMINAL PAIN: ICD-10-CM

## 2023-08-10 LAB — GLUCOSE SERPL-MCNC: 113 MG/DL (ref 70–110)

## 2023-08-10 PROCEDURE — D9220A PRA ANESTHESIA: ICD-10-PCS | Mod: ANES,,, | Performed by: ANESTHESIOLOGY

## 2023-08-10 PROCEDURE — 82962 GLUCOSE BLOOD TEST: CPT | Performed by: INTERNAL MEDICINE

## 2023-08-10 PROCEDURE — 43239 EGD BIOPSY SINGLE/MULTIPLE: CPT | Performed by: INTERNAL MEDICINE

## 2023-08-10 PROCEDURE — 43264 ERCP REMOVE DUCT CALCULI: CPT | Performed by: INTERNAL MEDICINE

## 2023-08-10 PROCEDURE — 25500020 PHARM REV CODE 255: Performed by: INTERNAL MEDICINE

## 2023-08-10 PROCEDURE — D9220A PRA ANESTHESIA: Mod: CRNA,,, | Performed by: NURSE ANESTHETIST, CERTIFIED REGISTERED

## 2023-08-10 PROCEDURE — 63600175 PHARM REV CODE 636 W HCPCS: Performed by: NURSE ANESTHETIST, CERTIFIED REGISTERED

## 2023-08-10 PROCEDURE — 43236 UPPR GI SCOPE W/SUBMUC INJ: CPT | Performed by: INTERNAL MEDICINE

## 2023-08-10 PROCEDURE — 43262 ENDO CHOLANGIOPANCREATOGRAPH: CPT | Performed by: INTERNAL MEDICINE

## 2023-08-10 PROCEDURE — 63600175 PHARM REV CODE 636 W HCPCS: Performed by: INTERNAL MEDICINE

## 2023-08-10 PROCEDURE — 27201013 HC FORCEPS, HOT BIOPSY, DISP: Performed by: INTERNAL MEDICINE

## 2023-08-10 PROCEDURE — 63600175 PHARM REV CODE 636 W HCPCS: Performed by: ANESTHESIOLOGY

## 2023-08-10 PROCEDURE — 25000003 PHARM REV CODE 250: Performed by: NURSE ANESTHETIST, CERTIFIED REGISTERED

## 2023-08-10 PROCEDURE — D9220A PRA ANESTHESIA: ICD-10-PCS | Mod: CRNA,,, | Performed by: NURSE ANESTHETIST, CERTIFIED REGISTERED

## 2023-08-10 PROCEDURE — 37000008 HC ANESTHESIA 1ST 15 MINUTES: Performed by: INTERNAL MEDICINE

## 2023-08-10 PROCEDURE — 27201028 HC NEEDLE, SCLERO: Performed by: INTERNAL MEDICINE

## 2023-08-10 PROCEDURE — 25000003 PHARM REV CODE 250: Performed by: INTERNAL MEDICINE

## 2023-08-10 PROCEDURE — C1769 GUIDE WIRE: HCPCS | Performed by: INTERNAL MEDICINE

## 2023-08-10 PROCEDURE — D9220A PRA ANESTHESIA: Mod: ANES,,, | Performed by: ANESTHESIOLOGY

## 2023-08-10 PROCEDURE — 37000009 HC ANESTHESIA EA ADD 15 MINS: Performed by: INTERNAL MEDICINE

## 2023-08-10 PROCEDURE — 27202125 HC BALLOON, EXTRACTION (ANY): Performed by: INTERNAL MEDICINE

## 2023-08-10 RX ORDER — LIDOCAINE HYDROCHLORIDE 20 MG/ML
INJECTION, SOLUTION EPIDURAL; INFILTRATION; INTRACAUDAL; PERINEURAL
Status: DISCONTINUED | OUTPATIENT
Start: 2023-08-10 | End: 2023-08-10

## 2023-08-10 RX ORDER — FAMOTIDINE 10 MG/ML
INJECTION INTRAVENOUS
Status: DISCONTINUED | OUTPATIENT
Start: 2023-08-10 | End: 2023-08-10

## 2023-08-10 RX ORDER — FENTANYL CITRATE 50 UG/ML
25 INJECTION, SOLUTION INTRAMUSCULAR; INTRAVENOUS ONCE
Status: DISCONTINUED | OUTPATIENT
Start: 2023-08-10 | End: 2023-08-10 | Stop reason: HOSPADM

## 2023-08-10 RX ORDER — OXYCODONE HYDROCHLORIDE 5 MG/1
5 TABLET ORAL
Status: DISCONTINUED | OUTPATIENT
Start: 2023-08-10 | End: 2023-08-10 | Stop reason: HOSPADM

## 2023-08-10 RX ORDER — EPINEPHRINE 0.1 MG/ML
INJECTION INTRAVENOUS
Status: COMPLETED | OUTPATIENT
Start: 2023-08-10 | End: 2023-08-10

## 2023-08-10 RX ORDER — INDOMETHACIN 50 MG/1
SUPPOSITORY RECTAL
Status: COMPLETED | OUTPATIENT
Start: 2023-08-10 | End: 2023-08-10

## 2023-08-10 RX ORDER — SUCCINYLCHOLINE CHLORIDE 20 MG/ML
INJECTION INTRAMUSCULAR; INTRAVENOUS
Status: DISCONTINUED | OUTPATIENT
Start: 2023-08-10 | End: 2023-08-10

## 2023-08-10 RX ORDER — DIPHENHYDRAMINE HYDROCHLORIDE 50 MG/ML
12.5 INJECTION INTRAMUSCULAR; INTRAVENOUS
Status: DISCONTINUED | OUTPATIENT
Start: 2023-08-10 | End: 2023-08-10 | Stop reason: HOSPADM

## 2023-08-10 RX ORDER — ONDANSETRON 2 MG/ML
4 INJECTION INTRAMUSCULAR; INTRAVENOUS DAILY PRN
Status: COMPLETED | OUTPATIENT
Start: 2023-08-10 | End: 2023-08-10

## 2023-08-10 RX ORDER — ACETAMINOPHEN 10 MG/ML
INJECTION, SOLUTION INTRAVENOUS
Status: DISCONTINUED | OUTPATIENT
Start: 2023-08-10 | End: 2023-08-10

## 2023-08-10 RX ORDER — PROPOFOL 10 MG/ML
VIAL (ML) INTRAVENOUS
Status: DISCONTINUED | OUTPATIENT
Start: 2023-08-10 | End: 2023-08-10

## 2023-08-10 RX ADMIN — ACETAMINOPHEN 1000 MG: 10 INJECTION, SOLUTION INTRAVENOUS at 02:08

## 2023-08-10 RX ADMIN — Medication 120 MG: at 02:08

## 2023-08-10 RX ADMIN — LIDOCAINE HYDROCHLORIDE 50 MG: 20 INJECTION, SOLUTION INTRAVENOUS at 02:08

## 2023-08-10 RX ADMIN — FAMOTIDINE 20 MG: 10 INJECTION, SOLUTION INTRAVENOUS at 02:08

## 2023-08-10 RX ADMIN — SODIUM CHLORIDE, SODIUM LACTATE, POTASSIUM CHLORIDE, AND CALCIUM CHLORIDE: .6; .31; .03; .02 INJECTION, SOLUTION INTRAVENOUS at 01:08

## 2023-08-10 RX ADMIN — ONDANSETRON 4 MG: 2 INJECTION INTRAMUSCULAR; INTRAVENOUS at 01:08

## 2023-08-10 RX ADMIN — PROPOFOL 100 MG: 10 INJECTION, EMULSION INTRAVENOUS at 02:08

## 2023-08-10 RX ADMIN — ONDANSETRON 4 MG: 2 INJECTION INTRAMUSCULAR; INTRAVENOUS at 03:08

## 2023-08-10 NOTE — H&P
GASTROENTEROLOGY PRE-PROCEDURE H&P NOTE  Patient Name: Rosanna Raymundo  Patient MRN: [id number]  Patient : 11/3/1932    Service date: 8/10/2023    PCP: Aamir Rhodes MD    No chief complaint on file.      HPI: Patient is a 89 y.o. female with PMHx as below here for evaluation of abd pain, bilairy dilation, lFTs. Off eliquis x 3 days.     Past Medical History:  Past Medical History:   Diagnosis Date    Anticoagulant long-term use     Cancer     breast/female/throat    Cataract     CHF (congestive heart failure)     Diabetes mellitus, type 2     Heart murmur     Hypertension     Iron deficiency anemia, unspecified     Anemia, iron def.    Occasional tremors         Past Surgical History:  Past Surgical History:   Procedure Laterality Date    ADENOIDECTOMY      APPENDECTOMY      COLONOSCOPY N/A 2019    Procedure: COLONOSCOPY;  Surgeon: Raleigh Quarles III, MD;  Location: [facility] ENDO;  Service: Endoscopy;  Laterality: N/A;    COLONOSCOPY N/A 2019    Procedure: COLONOSCOPY;  Surgeon: Raleigh Quarles III, MD;  Location: [facility] ENDO;  Service: Endoscopy;  Laterality: N/A;    Gall bladder revmoval      HYSTERECTOMY      INSERTION OF PACEMAKER Right 2020    Procedure: INSERTION, PACEMAKER;  Surgeon: Fly Barragan MD;  Location: [facility] CATH/EP LAB;  Service: Cardiology;  Laterality: Right;    left mastectomy Left     MASTECTOMY Left     [place] CABG, ARTERY-VEIN, FOUR      Coronary Artery Bypass, 4    SMALL BOWEL ENTEROSCOPY N/A 2019    Procedure: ENTEROSCOPY;  Surgeon: Raleigh Quarles III, MD;  Location: [facility] ENDO;  Service: Endoscopy;  Laterality: N/A;    TONSILLECTOMY          Home Medications:  Medications Prior to Admission   Medication Sig Dispense Refill Last Dose    amiodarone (PACERONE) 200 MG Tab Take 0.5 tablets (100 mg total) by mouth once daily. 45 tablet 3     apixaban (ELIQUIS) 2.5 mg Tab Take 1 tablet (2.5 mg total) by mouth 2 (two) times daily. 180 tablet 3      aspirin (ECOTRIN) 81 MG EC tablet Take 81 mg by mouth once daily. States MD instructed her to take while holding eliquis       blood sugar diagnostic Strp To check BG 3 times daily, to use with insurance preferred meter 200 strip 1     blood-glucose meter kit To check BG 3 times daily, to use with insurance preferred meter 1 each 0     busPIRone (BUSPAR) 5 MG Tab Take 2 tablets (10 mg total) by mouth 2 (two) times daily. 180 tablet 3     cholecalciferol, vitamin D3, (VITAMIN D3) 25 mcg (1,000 unit) capsule Take 1 capsule (1,000 Units total) by mouth once daily. 100 capsule 1     docusate sodium (COLACE) 100 MG capsule Take 100 mg by mouth as needed for Constipation.       ferrous sulfate 325 (65 FE) MG EC tablet Take 1 tablet (325 mg total) by mouth 2 (two) times daily. 180 tablet 2     FREESTYLE LANCETS 28 gauge lancets        hydroCHLOROthiazide (HYDRODIURIL) 12.5 MG Tab Take 1 tablet (12.5 mg total) by mouth once daily. 90 tablet 3     loratadine (CLARITIN) 10 mg tablet Take 1 tablet (10 mg total) by mouth once daily. 90 tablet 3     magnesium oxide (MAG-OX) 400 mg (241.3 mg magnesium) tablet Take 1 tablet (400 mg total) by mouth once daily. (Patient taking differently: Take 200 mg by mouth once daily.) 90 tablet 3     meclizine (ANTIVERT) 12.5 mg tablet Take 1 tablet (12.5 mg total) by mouth 3 (three) times daily as needed for Dizziness. 90 tablet 0     mupirocin (BACTROBAN) 2 % ointment Apply topically once daily. (Patient taking differently: Apply 1 g topically once daily.) 30 g 3     omeprazole (PRILOSEC) 20 MG capsule Take 2 capsules (40 mg total) by mouth once daily. (Patient taking differently: Take 20 mg by mouth 2 (two) times a day.) 180 capsule 1     propranoloL (INDERAL) 40 MG tablet Take 1 tablet (40 mg total) by mouth 2 (two) times daily. 180 tablet 3     vitamins A,C,E-zinc-copper (PRESERVISION AREDS) 4,296 mcg-226 mg-90 mg Cap Take 1 capsule by mouth 2 (two) times daily. 180 capsule 3            "fentaNYL  25 mcg Intravenous Once     diphenhydrAMINE, meperidine, ondansetron, oxyCODONE    Review of patient's allergies indicates:   Allergen Reactions    Penicillin g benzathine Other (See Comments)       Social History:   Social History     Occupational History    Not on file   Tobacco Use    Smoking status: Never    Smokeless tobacco: Never   Substance and Sexual Activity    Alcohol use: Never    Drug use: Never    Sexual activity: Not Currently       Family History:   Family History   Problem Relation Age of Onset    No Known Problems Mother     Heart disease Father        Review of Systems:  A 10 point review of systems was performed and was normal, except as mentioned in the HPI, including constitutional, HEENT, heme, lymph, cardiovascular, respiratory, gastrointestinal, genitourinary, neurologic, endocrine, psychiatric and musculoskeletal.      OBJECTIVE:    Physical Exam:  24 Hour Vital Sign Ranges: Temp:  [97.9 °F (36.6 °C)] 97.9 °F (36.6 °C)  Pulse:  [60] 60  Resp:  [17] 17  SpO2:  [96 %] 96 %  BP: (134)/(66) 134/66  Most recent vitals: /66 (BP Location: Right arm, Patient Position: Sitting)   Pulse 60   Temp 97.9 °F (36.6 °C) (Oral)   Resp 17   SpO2 96%   Breastfeeding No    GEN: well-developed, well-nourished, awake and alert, non-toxic appearing adult  HEENT: PERRL, sclera anicteric, oral mucosa pink and moist without lesion  NECK: trachea midline; Good ROM  CV: regular rate and rhythm, no murmurs or gallops  RESP: clear to auscultation bilaterally, no wheezes, rhonci or rales  ABD: soft, non-tender, non-distended, normal bowel sounds  EXT: no swelling or edema, 2+ pulses distally  SKIN: no rashes or jaundice  PSYCH: normal affect    Labs:   No results for input(s): "WBC", "MCV", "PLT" in the last 72 hours.    Invalid input(s): "HGBAU"  No results for input(s): "NA", "K", "CL", "CO2", "BUN", "GLU" in the last 72 hours.    Invalid input(s): "CREA"  No results for input(s): "ALB" in the last " "72 hours.    Invalid input(s): "ALKP", "SGOT", "SGPT", "TBIL", "DBIL", "TPRO"  No results for input(s): "PT", "INR", "PTT" in the last 72 hours.      IMPRESSION / RECOMMENDATIONS:  ERCP  with interventions as warranted.   RIsks, benefits, alternatives discussed in detail regarding upcoming procedures and sedation. Some of the more common endoscopic complications include but not limited to immediate or delayed perforation, bleeding, infections, pain, inadvertent injury to surrounding tissue / organs and possible need for surgical evaluation. Patient expressed understanding, all questions answered and will proceed with procedure as planned.     Raleigh Quarles III  8/10/2023  1:13 PM      "

## 2023-08-10 NOTE — TRANSFER OF CARE
Anesthesia Transfer of Care Note    Patient: Rosanna Raymundo    Procedure(s) Performed: Procedure(s) (LRB):  ERCP (ENDOSCOPIC RETROGRADE CHOLANGIOPANCREATOGRAPHY) (N/A)    Patient location: PACU    Anesthesia Type: general    Transport from OR: Transported from OR on room air with adequate spontaneous ventilation    Post pain: adequate analgesia    Post assessment: no apparent anesthetic complications and tolerated procedure well    Post vital signs: stable    Level of consciousness: responds to stimulation and awake    Nausea/Vomiting: no nausea/vomiting    Complications: none    Transfer of care protocol was followed      Last vitals:   Visit Vitals  /66 (BP Location: Right arm, Patient Position: Sitting)   Pulse 60   Temp 36.6 °C (97.9 °F) (Oral)   Resp 17   SpO2 96%   Breastfeeding No

## 2023-08-10 NOTE — ANESTHESIA PROCEDURE NOTES
Intubation    Date/Time: 8/10/2023 2:03 PM    Performed by: Robles Albright CRNA  Authorized by: Casper Zamora MD    Intubation:     Induction:  Intravenous    Intubated:  Postinduction    Mask Ventilation:  Not attempted    Attempts:  1    Attempted By:  CRNA    Method of Intubation:  Video laryngoscopy    Blade:  Brown 3    Laryngeal View Grade: Grade I - full view of cords      Difficult Airway Encountered?: No      Complications:  None    Airway Device:  Oral endotracheal tube    Airway Device Size:  6.5    Style/Cuff Inflation:  Cuffed    Inflation Amount (mL):  5    Tube secured:  20    Secured at:  The lips    Placement Verified By:  Capnometry    Complicating Factors:  None    Findings Post-Intubation:  BS equal bilateral and atraumatic/condition of teeth unchanged

## 2023-08-10 NOTE — ANESTHESIA POSTPROCEDURE EVALUATION
Anesthesia Post Evaluation    Patient: Rosanna Raymundo    Procedure(s) Performed: Procedure(s) (LRB):  ERCP (ENDOSCOPIC RETROGRADE CHOLANGIOPANCREATOGRAPHY) (N/A)    Final Anesthesia Type: general      Patient location during evaluation: PACU  Patient participation: Yes- Able to Participate  Level of consciousness: awake and alert and oriented  Post-procedure vital signs: reviewed and stable  Pain management: adequate  Airway patency: patent    PONV status at discharge: No PONV  Anesthetic complications: no      Cardiovascular status: blood pressure returned to baseline and hemodynamically stable  Respiratory status: unassisted, spontaneous ventilation and room air  Hydration status: euvolemic  Follow-up not needed.          Vitals Value Taken Time   /68 08/10/23 1505   Temp 36.5 °C (97.7 °F) 08/10/23 1446   Pulse 60 08/10/23 1510   Resp 33 08/10/23 1510   SpO2 96 % 08/10/23 1510   Vitals shown include unvalidated device data.      No case tracking events are documented in the log.      Pain/Trae Score: Trae Score: 10 (8/10/2023  3:05 PM)

## 2023-08-10 NOTE — [employer]ATION PATIENT INSTRUCTIONS
Discharge Summary/Instructions after an Endoscopic Procedure  Patient Name: Rosanna Raymundo  Patient MRN: [id number]  Patient YOB: 1932  Thursday, August 10, 2023  Raleigh Quarles III, MD  RESTRICTIONS:  During your procedure today, you received medications for sedation.  These   medications may affect your judgment, balance and coordination.  Therefore,   for 24 hours, you have the following restrictions:   - DO NOT drive a car, operate machinery, make legal/financial decisions,   sign important papers or drink alcohol.    ACTIVITY:  Today: no heavy lifting, straining or running due to procedural   sedation/anesthesia.  The following day: return to full activity including work.  DIET:  Eat and drink normally unless instructed otherwise.     TREATMENT FOR COMMON SIDE EFFECTS:  - Mild abdominal pain, nausea, belching, bloating or excessive gas:  rest,   eat lightly and use a heating pad.  - Sore Throat: treat with throat lozenges and/or gargle with warm salt   water.  - Because air was used during the procedure, expelling large amounts of air   from your rectum or belching is normal.  - If a bowel prep was taken, you may not have a bowel movement for 1-3 days.    This is normal.  SYMPTOMS TO WATCH FOR AND REPORT TO YOUR PHYSICIAN:  1. Abdominal pain or bloating, other than gas cramps.  2. Chest pain.  3. Back pain.  4. Signs of infection such as: chills or fever occurring within 24 hours   after the procedure.  5. Rectal bleeding, which would show as bright red, maroon, or black stools.   (A tablespoon of blood from the rectum is not serious, especially if   hemorrhoids are present.)  6. Vomiting.  7. Weakness or dizziness.  GO DIRECTLY TO THE NEAREST EMERGENCY ROOM IF YOU HAVE ANY OF THE FOLLOWING:      Difficulty breathing              Chills and/or fever over 101 F   Persistent vomiting and/or vomiting blood   Severe abdominal pain   Severe chest pain   Black, tarry stools   Bleeding- more than  one tablespoon   Any other symptom or condition that you feel may need urgent attention  Your doctor recommends these additional instructions:  If any biopsies were taken, your doctors clinic will contact you in 1 to 2   weeks with any results.  - Continue present medications.   - Resume Eliquis (apixaban) at prior dose in 2 days. Discussed w/ family   this increases risk fo bleeding but decreases risks of CVA etc. Stressed   some of the symptoms to be on look out ant told to call or come to ER if   any bleeding. .   - Patient has a contact number available for emergencies.  The signs and   symptoms of potential delayed complications were discussed with the   patient.  Return to normal activities tomorrow.  Written discharge   instructions were provided to the patient.   - Discharge patient to home (with escort).  For questions, problems or results please call your physician - Raleigh Quarles III, MD at Work:  [phone].  [facility], EMERGENCY ROOM PHONE NUMBER: [phone]  IF A COMPLICATION OR EMERGENCY SITUATION ARISES AND YOU ARE UNABLE TO REACH   YOUR PHYSICIAN - GO DIRECTLY TO THE EMERGENCY ROOM.  Raleigh Qaurles III, MD  8/10/2023 3:46:29 PM  This report has been verified and signed electronically.  Dear patient,  As a result of recent federal legislation (The Federal Cures Act), you may   receive lab or pathology results from your procedure in your [facility]   account before your physician is able to contact you. Your physician or   their representative will relay the results to you with their   recommendations at their soonest availability.  Thank you,  [employer]ATION

## 2023-08-10 NOTE — ANESTHESIA PREPROCEDURE EVALUATION
08/10/2023  Rosanna Raymundo is a 89 y.o., female.  Patient Active Problem List   Diagnosis    Paroxysmal atrial fibrillation    Coronary artery disease    Type 2 diabetes mellitus with stage 3a chronic kidney disease, without long-term current use of insulin    Mixed hyperlipidemia    History of lobular carcinoma of breast    Non-seasonal allergic rhinitis due to pollen    Essential hypertension    Gastroesophageal reflux disease without esophagitis    History of anemia    Other specified anxiety disorders    Iron deficiency anemia    Occasional tremors    Anticoagulant long-term use    Chronic diastolic congestive heart failure    Symptomatic bradycardia    Severe aortic stenosis    Essential tremor    Cardiac pacemaker    Vertigo    Hx of CABG    Abrasion    Weakness    Idiopathic chronic gout of multiple sites without tophus    Senile purpura    Chronic kidney disease, stage 3a    Aortic atherosclerosis    Abdominal pain with biliary dilation    Prolonged Q-T interval on ECG       Past Surgical History:   Procedure Laterality Date    ADENOIDECTOMY      APPENDECTOMY      COLONOSCOPY N/A 12/17/2019    Procedure: COLONOSCOPY;  Surgeon: Raleigh Quarles III, MD;  Location: [facility] ENDO;  Service: Endoscopy;  Laterality: N/A;    COLONOSCOPY N/A 12/18/2019    Procedure: COLONOSCOPY;  Surgeon: Raleigh Quarles III, MD;  Location: [facility] ENDO;  Service: Endoscopy;  Laterality: N/A;    Gall bladder revmoval      HYSTERECTOMY      INSERTION OF PACEMAKER Right 1/2/2020    Procedure: INSERTION, PACEMAKER;  Surgeon: Fly Barragan MD;  Location: [facility] CATH/EP LAB;  Service: Cardiology;  Laterality: Right;    left mastectomy Left 1945    MASTECTOMY Left     [place] CABG, ARTERY-VEIN, FOUR  2014    Coronary Artery Bypass, 4    SMALL BOWEL ENTEROSCOPY N/A 12/17/2019     Procedure: ENTEROSCOPY;  Surgeon: Raleigh Quarles III, MD;  Location: [facility];  Service: Endoscopy;  Laterality: N/A;    TONSILLECTOMY        Performed Procedure     TRANSTHORACIC ECHO (TTE) COMPLETE   Conclusion     · Mild eccentric left ventricular hypertrophy.  · Normal left ventricular systolic function. The estimated ejection fraction is 60%  · Normal LV diastolic function.  · Mild left atrial enlargement.  · Severe aortic valve stenosis.  · Aortic valve area is 0.88 cm2; peak velocity is 2.13 m/s; mean gradient is 11 mmHg.  · Mild mitral sclerosis.  · Mild-to-moderate mitral regurgitation.          Tobacco Use:  The patient  reports that she has never smoked. She has never used smokeless tobacco.     Results for orders placed or performed during the hospital encounter of 07/26/23   EKG 12-lead    Collection Time: 07/26/23  6:11 PM    Narrative    Test Reason : R10.9,    Vent. Rate : 060 BPM     Atrial Rate : 044 BPM     P-R Int : 314 ms          QRS Dur : 178 ms      QT Int : 554 ms       P-R-T Axes : 000 107 -29 degrees     QTc Int : 554 ms    Atrial-paced rhythm with prolonged AV conduction  Right bundle branch block  T wave abnormality, consider inferolateral ischemia  Abnormal ECG  When compared with ECG of 15-DEC-2021 15:29,  Criteria for Septal infarct are no longer Present  T wave inversion now evident in Inferior leads  T wave inversion now evident in Lateral leads  Confirmed by Johnathan LOW, Aamir CAM ([id number]) on 7/28/2023 8:08:08 PM    Referred By: [email or web address]   SELF           Confirmed By:Aamir Mann MD             Lab Results   Component Value Date    WBC 6.66 07/29/2023    HGB 9.1 (L) 07/29/2023    HCT 30.0 (L) 07/29/2023    MCV 68 (L) 07/29/2023     07/29/2023     BMP  Lab Results   Component Value Date     07/29/2023    K 4.4 07/29/2023     07/29/2023    CO2 26 07/29/2023    BUN 28 (H) 07/29/2023    CREATININE 1.3 07/29/2023    CALCIUM 8.9 07/29/2023    ANIONGAP 7 (L)  07/29/2023    ESTGFRAFRICA 62 08/18/2021    EGFRNONAA 53 (L) 08/18/2021       TRANSTHORACIC ECHO (TTE) COMPLETE   Conclusion     · Mild eccentric left ventricular hypertrophy.  · Normal left ventricular systolic function. The estimated ejection fraction is 60%  · Normal LV diastolic function.  · Mild left atrial enlargement.  · Severe aortic valve stenosis.  · Aortic valve area is 0.88 cm2; peak velocity is 2.13 m/s; mean gradient is 11 mmHg.  · Mild mitral sclerosis.  · Mild-to-moderate mitral regurgitation.          Pre-op Assessment    I have reviewed the Patient Summary Reports.    I have reviewed the Nursing Notes.    I have reviewed the Medications.     Review of Systems  Anesthesia Hx:  No problems with previous Anesthesia  History of prior surgery of interest to airway management or planning: heart surgery. Previous anesthesia: General Denies Family Hx of Anesthesia complications.   Denies Personal Hx of Anesthesia complications.   Social:  Non-Smoker, No Alcohol Use    Hematology/Oncology:         -- Anemia: Hematology Comments: Eliquis stopped Satuday  --  Cancer in past history:  Breast left axillary node dissection no lymphedema chemotherapy, radiation and surgery    EENT/Dental:   chronic allergic rhinitis   Cardiovascular:   Pacemaker ([place] pacemaker) Hypertension, well controlled CAD asymptomatic CABG/stent (CABG 2014. Dr. Bell.  Cardiologist is Dr. MATT Herrera) Dysrhythmias atrial fibrillation CHF (Good exercise tolerance.  She is very active only limited by her tremors.) ECG has been reviewed.    Pulmonary:  Pulmonary Normal    Renal/:   Chronic Renal Disease, CKD    Hepatic/GI:   GERD Abdominal pains.   Musculoskeletal:  Musculoskeletal Normal    Neurological:  Neurology Normal Essential Tremors    Endocrine:   Diabetes, poorly controlled, type 2, using insulin    Dermatological:  Skin Normal    Psych:   Psychiatric History anxiety depression          Physical Exam  General:  Well  nourished      Airway/Jaw/Neck:  Airway Findings: Mouth Opening: Normal   Tongue: Normal   General Airway Assessment: Adult Mallampati: III  Improves to III with phonation.  TM Distance: < 4 cm   Jaw/Neck Findings:  Neck ROM: Normal ROM       Dental:  Dental Findings: Upper partial dentures      Chest/Lungs:  Chest/Lungs Findings: Clear to auscultation, Normal Respiratory Rate      Heart/Vascular:  Heart Findings: Rate: Normal  Rhythm: Irregularly Irregular  Sounds: Normal  Vascular Findings: Normal    Abdomen:  Abdomen Findings: Normal    Musculoskeletal:  Musculoskeletal Findings: Normal   Skin:  Skin Findings: Normal    Mental Status:  Mental Status Findings:  Cooperative, Alert and Oriented         Anesthesia Plan  Type of Anesthesia, risks & benefits discussed:  Anesthesia Type:  general, Gen ETT    Patient's Preference:   Plan Factors:          Intra-op Monitoring Plan: standard ASA monitors  Intra-op Monitoring Plan Comments:   Post Op Pain Control Plan: per primary service following discharge from PACU  Post Op Pain Control Plan Comments:     Induction:    Beta Blocker:  Patient is on a Beta-Blocker and has received one dose within the past 24 hours (No further documentation required).       Informed Consent: Informed consent signed with the Patient and all parties understand the risks and agree with anesthesia plan.  All questions answered.  Anesthesia consent signed with patient.  ASA Score: 3     Day of Surgery Review of History & Physical:        Anesthesia Plan Notes: GETA.  No versed.  Minimal narcotics  Ofirmev 1000 mg.  PONV prophylaxis with Pepcid 20 mg IV, and Zofran 4 mg IV.          Ready For Surgery From Anesthesia Perspective.           Physical Exam  General: Well nourished    Airway:  Mallampati: III / III  Mouth Opening: Normal  TM Distance: < 4 cm  Tongue: Normal  Neck ROM: Normal ROM    Dental:  Upper partial dentures    Chest/Lungs:  Clear to auscultation, Normal Respiratory  Rate    Heart:  Rate: Normal  Rhythm: Irregularly Irregular  Sounds: Normal          Anesthesia Plan  Type of Anesthesia, risks & benefits discussed:    Anesthesia Type: general, Gen ETT  Intra-op Monitoring Plan: standard ASA monitors  Post Op Pain Control Plan: per primary service following discharge from PACU  Informed Consent: Informed consent signed with the Patient and all parties understand the risks and agree with anesthesia plan.  All questions answered.   ASA Score: 3  Anesthesia Plan Notes: GETA.  No versed.  Minimal narcotics  Ofirmev 1000 mg.  PONV prophylaxis with Pepcid 20 mg IV, and Zofran 4 mg IV.      Ready For Surgery From Anesthesia Perspective.       .

## 2023-08-11 ENCOUNTER — TELEPHONE (OUTPATIENT)
Dept: FAMILY MEDICINE | Facility: CLINIC | Age: 88
End: 2023-08-11

## 2023-08-11 NOTE — TELEPHONE ENCOUNTER
----- Message from Daniel Chavez sent at 8/11/2023 11:30 AM CDT -----  Pt wants to let Dr. Rhodes know she was discharged from [facility] on 8/10 and everything went well except they found stones. . Pt would like a follow up appt asap. [phone]

## 2023-08-11 NOTE — TELEPHONE ENCOUNTER
Spoke with pt. States she knows she has a follow up appt with mallory, but could not remember when. Gave pt appt info. Pt states she will keep scheduled appt. Does not need to schedule sooner. Pt advised to call if she needs anything sooner.

## 2023-08-25 ENCOUNTER — DOCUMENT SCAN (OUTPATIENT)
Dept: HOME HEALTH SERVICES | Facility: HOSPITAL | Age: 88
End: 2023-08-25
Payer: MEDICARE

## 2023-09-01 ENCOUNTER — TELEPHONE (OUTPATIENT)
Dept: FAMILY MEDICINE | Facility: CLINIC | Age: 88
End: 2023-09-01

## 2023-09-01 ENCOUNTER — DOCUMENT SCAN (OUTPATIENT)
Dept: HOME HEALTH SERVICES | Facility: HOSPITAL | Age: 88
End: 2023-09-01
Payer: MEDICARE

## 2023-09-01 DIAGNOSIS — Z79.01 ANTICOAGULANT LONG-TERM USE: Chronic | ICD-10-CM

## 2023-09-01 DIAGNOSIS — R53.81 PHYSICAL DECONDITIONING: ICD-10-CM

## 2023-09-01 DIAGNOSIS — D50.9 IRON DEFICIENCY ANEMIA, UNSPECIFIED IRON DEFICIENCY ANEMIA TYPE: ICD-10-CM

## 2023-09-01 DIAGNOSIS — R53.1 WEAKNESS: Primary | ICD-10-CM

## 2023-09-01 NOTE — TELEPHONE ENCOUNTER
I do not want her to make any changes to any medications as far as aspirin or anticoagulants without seeing cardiology or hematology

## 2023-09-01 NOTE — TELEPHONE ENCOUNTER
Spoke with patient states Dr. Quarles told patient to stop aspirin for three days while on Eliquis. Per Cata, patient to call Dr. Greenberg to see if can start aspirin.     Per patient , when communicates with Dr. Herrera, she will ask if she can start aspirin due to endo provider only did endo procedure.

## 2023-09-01 NOTE — TELEPHONE ENCOUNTER
Spoke with patient she is doing pretty good right now, states she is taking Eliquis 2.5 mg 1 tablet daily (she stopped taking two tablets on her own) and getting blood spots, she would like to know if she can take a baby aspirin.     Per Cata, informed patient to make an appt with Dr. Herrera cardiologist to discuss her medications.    Per patient she had endo with Dr. Quarles on 08/10/2023    Referrals for HH and hematology pended to Maribel TOMAS

## 2023-09-01 NOTE — TELEPHONE ENCOUNTER
----- Message from Cata Cramer sent at 9/1/2023 10:38 AM CDT -----  - 9:56-luther heaton is calling and her bp was low this morning. 89/50 she does  not feel good. Would like home health nurse to come check her out. She has blood work that is due next week and would like that done today,. She had a low cbc when in the hospital and would like to know if it is low again  [phone]

## 2023-09-01 NOTE — TELEPHONE ENCOUNTER
Yes. Definitely. I also think it is a good idea to send her to hematology to see if she needs an iron infusion. I had not reviewed her labs because Gilberto was the last provider to see her. But she was very low. I don't like her bp that low either, but I am hesitant to stop her other heart meds because of need for rate control. We could probably stop propanolol, because she takes that for tremors, not for her blood pressure, but I think it may be time for her to be seen by Dr Rhodes, or cardiology.

## 2023-09-06 ENCOUNTER — TELEPHONE (OUTPATIENT)
Dept: FAMILY MEDICINE | Facility: CLINIC | Age: 88
End: 2023-09-06

## 2023-09-06 NOTE — TELEPHONE ENCOUNTER
----- Message from Daniel Chavez sent at 9/6/2023 11:12 AM CDT -----  Pt missed callback [phone] - Miranda chen

## 2023-09-06 NOTE — TELEPHONE ENCOUNTER
Patient called back asking if we called her. Informed patient we have not called her. Verbalized understanding.

## 2023-09-07 ENCOUNTER — TELEPHONE (OUTPATIENT)
Dept: FAMILY MEDICINE | Facility: CLINIC | Age: 88
End: 2023-09-07

## 2023-09-07 NOTE — TELEPHONE ENCOUNTER
----- Message from Daniel Chavez sent at 9/7/2023 12:15 PM CDT -----  Pt needs a nurse callback in regards to an order for [facility] [phone]

## 2023-09-07 NOTE — TELEPHONE ENCOUNTER
Spoke with patient,  [facility] went to her home today, they did not have orders for lab work. Patient states  said most likely will get drawn at next visit in 1 week. Would you like lab work orders for next week? or patient states she is able to come in for lab work. Has appt for Hematology on 10/06/2023.     Per Yue, [facility] lab orders get sent to [facility] and we fax orders once signed to [facility].    Please enter lab work needed.     Call patient if okay to wait until next week or come by clinic once lab orders placed.

## 2023-09-07 NOTE — TELEPHONE ENCOUNTER
Spoke with Yue at [facility], per Yue they do not have lab orders for patient. Per Yue the  nurse has not completed notes for today's visit. Yue will call if there are any lab orders.     Spoke with patient informed there are currently no lab work to be drawn but we are awaiting  nurse notes to see if she recommends any. Patient verbalized understanding.

## 2023-09-07 NOTE — TELEPHONE ENCOUNTER
No clue what labs are being referenced. She needs labs for a routine visit? Did someone change a medication and she needs a level checked? I have only seen this patient once in clinic and that was for an acute complaint, not a routine visit/check up.

## 2023-09-15 ENCOUNTER — TELEPHONE (OUTPATIENT)
Dept: FAMILY MEDICINE | Facility: CLINIC | Age: 88
End: 2023-09-15

## 2023-09-15 NOTE — TELEPHONE ENCOUNTER
I do not see where any labs are due for her unless Dr. Palomares or 1 of her specialists requested some.  She having any symptoms that need to be evaluated?

## 2023-09-15 NOTE — TELEPHONE ENCOUNTER
----- Message from Yue Arellano MA sent at 9/15/2023 11:37 AM CDT -----  Contact: Catherine    ----- Message -----  From: Daniel Chavez  Sent: 9/15/2023  11:36 AM CDT  To: Karlie Diaz Staff    Catherine with [facility] would like to have the patients labs drawn by the home health company, if so they would need the orders.  [phone]  Fax [phone]

## 2023-09-20 PROCEDURE — G0179 PR HOME HEALTH MD RECERTIFICATION: ICD-10-PCS | Mod: ,,, | Performed by: FAMILY MEDICINE

## 2023-09-20 PROCEDURE — G0179 MD RECERTIFICATION HHA PT: HCPCS | Mod: ,,, | Performed by: FAMILY MEDICINE

## 2023-09-25 ENCOUNTER — OFFICE VISIT (OUTPATIENT)
Dept: FAMILY MEDICINE | Facility: CLINIC | Age: 88
End: 2023-09-25
Payer: MEDICARE

## 2023-09-25 VITALS
WEIGHT: 148.63 LBS | OXYGEN SATURATION: 96 % | SYSTOLIC BLOOD PRESSURE: 94 MMHG | BODY MASS INDEX: 28.06 KG/M2 | HEIGHT: 61 IN | HEART RATE: 61 BPM | DIASTOLIC BLOOD PRESSURE: 57 MMHG

## 2023-09-25 DIAGNOSIS — E83.42 LOW SERUM MAGNESIUM LEVEL: ICD-10-CM

## 2023-09-25 DIAGNOSIS — B34.8 RHINOVIRUS: ICD-10-CM

## 2023-09-25 DIAGNOSIS — Z79.01 ANTICOAGULANT LONG-TERM USE: Chronic | ICD-10-CM

## 2023-09-25 DIAGNOSIS — D50.9 IRON DEFICIENCY ANEMIA, UNSPECIFIED IRON DEFICIENCY ANEMIA TYPE: ICD-10-CM

## 2023-09-25 DIAGNOSIS — R94.31 PROLONGED Q-T INTERVAL ON ECG: ICD-10-CM

## 2023-09-25 DIAGNOSIS — I35.0 SEVERE AORTIC STENOSIS: Chronic | ICD-10-CM

## 2023-09-25 DIAGNOSIS — E11.22 TYPE 2 DIABETES MELLITUS WITH STAGE 3A CHRONIC KIDNEY DISEASE, WITHOUT LONG-TERM CURRENT USE OF INSULIN: ICD-10-CM

## 2023-09-25 DIAGNOSIS — Z23 FLU VACCINE NEED: ICD-10-CM

## 2023-09-25 DIAGNOSIS — I95.9 HYPOTENSION, UNSPECIFIED HYPOTENSION TYPE: ICD-10-CM

## 2023-09-25 DIAGNOSIS — N18.31 TYPE 2 DIABETES MELLITUS WITH STAGE 3A CHRONIC KIDNEY DISEASE, WITHOUT LONG-TERM CURRENT USE OF INSULIN: ICD-10-CM

## 2023-09-25 DIAGNOSIS — Z95.0 CARDIAC PACEMAKER: ICD-10-CM

## 2023-09-25 DIAGNOSIS — I50.32 CHRONIC DIASTOLIC CONGESTIVE HEART FAILURE: Primary | Chronic | ICD-10-CM

## 2023-09-25 DIAGNOSIS — R42 VERTIGO: ICD-10-CM

## 2023-09-25 DIAGNOSIS — M10.9 GOUT, UNSPECIFIED CAUSE, UNSPECIFIED CHRONICITY, UNSPECIFIED SITE: ICD-10-CM

## 2023-09-25 DIAGNOSIS — N18.31 CHRONIC KIDNEY DISEASE, STAGE 3A: ICD-10-CM

## 2023-09-25 PROCEDURE — 99214 OFFICE O/P EST MOD 30 MIN: CPT | Mod: S$GLB,,,

## 2023-09-25 PROCEDURE — G0008 FLU VACCINE - QUADRIVALENT - ADJUVANTED: ICD-10-PCS | Mod: S$GLB,,,

## 2023-09-25 PROCEDURE — G0008 ADMIN INFLUENZA VIRUS VAC: HCPCS | Mod: S$GLB,,,

## 2023-09-25 PROCEDURE — 99214 PR OFFICE/OUTPT VISIT, EST, LEVL IV, 30-39 MIN: ICD-10-PCS | Mod: S$GLB,,,

## 2023-09-25 PROCEDURE — 90694 FLU VACCINE - QUADRIVALENT - ADJUVANTED: ICD-10-PCS | Mod: S$GLB,,,

## 2023-09-25 PROCEDURE — 90694 VACC AIIV4 NO PRSRV 0.5ML IM: CPT | Mod: S$GLB,,,

## 2023-09-25 RX ORDER — FUROSEMIDE 20 MG/1
TABLET ORAL
Qty: 30 TABLET | Refills: 11 | Status: SHIPPED | OUTPATIENT
Start: 2023-09-25

## 2023-09-25 RX ORDER — OMEPRAZOLE 20 MG/1
40 CAPSULE, DELAYED RELEASE ORAL DAILY
Qty: 180 CAPSULE | Refills: 1 | Status: CANCELLED | OUTPATIENT
Start: 2023-09-25

## 2023-09-25 RX ORDER — VIT A/VIT C/VIT E/ZINC/COPPER 4296-226
1 CAPSULE ORAL 2 TIMES DAILY
Qty: 180 CAPSULE | Refills: 3 | Status: SHIPPED | OUTPATIENT
Start: 2023-09-25 | End: 2023-09-25 | Stop reason: SDUPTHER

## 2023-09-25 RX ORDER — DEXTROMETHORPHAN HYDROBROMIDE, GUAIFENESIN 20; 400 MG/20ML; MG/20ML
10 SOLUTION ORAL EVERY 6 HOURS PRN
Qty: 237 ML | Refills: 0 | Status: SHIPPED | OUTPATIENT
Start: 2023-09-25 | End: 2023-10-05

## 2023-09-25 RX ORDER — MECLIZINE HCL 12.5 MG 12.5 MG/1
TABLET ORAL
Qty: 30 TABLET | Refills: 11 | Status: SHIPPED | OUTPATIENT
Start: 2023-09-25 | End: 2023-11-20 | Stop reason: SDUPTHER

## 2023-09-25 RX ORDER — POTASSIUM CHLORIDE 750 MG/1
10 TABLET, EXTENDED RELEASE ORAL DAILY
Qty: 30 TABLET | Refills: 11 | Status: SHIPPED | OUTPATIENT
Start: 2023-09-25 | End: 2024-09-24

## 2023-09-25 RX ORDER — LANOLIN ALCOHOL/MO/W.PET/CERES
400 CREAM (GRAM) TOPICAL DAILY
Qty: 90 TABLET | Refills: 3 | Status: CANCELLED | OUTPATIENT
Start: 2023-09-25

## 2023-09-25 RX ORDER — MIDODRINE HYDROCHLORIDE 2.5 MG/1
2.5 TABLET ORAL EVERY 12 HOURS PRN
Qty: 30 TABLET | Refills: 11 | Status: SHIPPED | OUTPATIENT
Start: 2023-09-25 | End: 2023-09-28

## 2023-09-25 RX ORDER — FERROUS SULFATE 325(65) MG
325 TABLET, DELAYED RELEASE (ENTERIC COATED) ORAL 2 TIMES DAILY
Qty: 180 TABLET | Refills: 2 | Status: CANCELLED | OUTPATIENT
Start: 2023-09-25

## 2023-09-25 NOTE — PATIENT INSTRUCTIONS
HOLD THE HYDROCHLOROTHIAZIDE FOR NOW. IF BLOOD PRESSURE BEGINS TO CREEP UP, WE WILL ADD SOMETHING BACK, BUT PROBABLY NOT THIS.   IT IS A FLUID PILL AS WELL AS A BLOOD PRESSURE PILL.     FOR EDEMA, I HAVE ADDED FUROSEMIDE. YOU CAN TAKE THIS AS NEEDED. IT WILL MAKE YOU HAVE TO URINATE OFTEN FOR ABOUT 6 HOURS SO TRY TO AVOID TAKING IT LATE IN THE DAY. TAKE THIS ONLY IF YOU ARE HAVING SWELLING.     IN CASE YOU ARE TAKING THE FUROSEMIDE, I HAVE ADDED A POTASSIUM SUPPLEMENT. THIS IS AN IMPORTANT ELECTROLYTE FOR YOUR HEART AND OTHER MUSCLES. TAKE THIS EVERY MORNING.     TAKE THE MIDODRINE IF YOUR BLOOD PRESSURE IS LESS THAN 100/65.

## 2023-09-25 NOTE — PROGRESS NOTES
SUBJECTIVE:    Patient ID: Rosanna Raymundo is a 89 y.o. female.    Chief Complaint: Follow-up (2 mo follow up, then went to hospital for abd pain / continues with dizziness/need flu vaccine//mp)    Patient is an established 89-year-old female who presents to clinic today for follow-up.  Patient was previously seen here by me for an episode of dizziness with low blood pressure at home.  Following this she had a subsequent appointment with [facility] and has had hospital visits.  She is following up with me today regarding the dizziness and her chronic medical conditions.  Patient drove herself here today.  She looks remarkably better than when I saw her on July 21st.  She states she is feeling good.  She does still have some episodes of dizziness on occasion.  She does take her meclizine as needed.  She is still suffering with low blood pressure.  This does leave her feeling fatigued at times and occasionally weak. She is 90/60 and 94/57 manually today in the office.  She does have an appointment with Cardiology on 09/28.  Patient has significant cardiac history.  She is chronic diastolic congestive heart failure and severe aortic stenosis.  Patient denies any chest pain, shortness of breath, patient is not holding any fluid.  She is no acute complaints.        Office Visit on 09/25/2023   Component Date Value Ref Range Status    WBC 09/25/2023 7.1  3.8 - 10.8 Thousand/uL Final    RBC 09/25/2023 5.98 (H)  3.80 - 5.10 Million/uL Final    Hemoglobin 09/25/2023 13.8  11.7 - 15.5 g/dL Final    Hematocrit 09/25/2023 46.7 (H)  35.0 - 45.0 % Final    MCV 09/25/2023 78.1 (L)  80.0 - 100.0 fL Final    MCH 09/25/2023 23.1 (L)  27.0 - 33.0 pg Final    MCHC 09/25/2023 29.6 (L)  32.0 - 36.0 g/dL Final    RDW 09/25/2023 27.5 (H)  11.0 - 15.0 % Final    Platelets 09/25/2023 170  140 - 400 Thousand/uL Final    MPV 09/25/2023   7.5 - 12.5 fL Final    Neutrophils, Abs 09/25/2023 4,388  1,500 - 7,800 cells/uL Final    Lymph #  09/25/2023 1,676  850 - 3,900 cells/uL Final    Mono # 09/25/2023 781  200 - 950 cells/uL Final    Eos # 09/25/2023 199  15 - 500 cells/uL Final    Baso # 09/25/2023 57  0 - 200 cells/uL Final    Neutrophils Relative 09/25/2023 61.8  % Final    Lymph % 09/25/2023 23.6  % Final    Mono % 09/25/2023 11.0  % Final    Eosinophil % 09/25/2023 2.8  % Final    Basophil % 09/25/2023 0.8  % Final    Differential Comment 09/25/2023 Review of peripheral smear confirms automated results.   Final    Iron 09/25/2023 38 (L)  45 - 160 mcg/dL Final    TIBC 09/25/2023 376  250 - 450 mcg/dL (calc) Final    Iron Saturation 09/25/2023 10 (L)  16 - 45 % (calc) Final    Ferritin 09/25/2023 17  16 - 288 ng/mL Final    Glucose 09/25/2023 125 (H)  65 - 99 mg/dL Final    BUN 09/25/2023 20  7 - 25 mg/dL Final    Creatinine 09/25/2023 0.90  0.60 - 0.95 mg/dL Final    eGFR 09/25/2023 61  > OR = 60 mL/min/1.73m2 Final    BUN/Creatinine Ratio 09/25/2023 SEE NOTE:  6 - 22 (calc) Final    Sodium 09/25/2023 138  135 - 146 mmol/L Final    Potassium 09/25/2023 4.3  3.5 - 5.3 mmol/L Final    Chloride 09/25/2023 105  98 - 110 mmol/L Final    CO2 09/25/2023 21  20 - 32 mmol/L Final    Calcium 09/25/2023 9.8  8.6 - 10.4 mg/dL Final    BNP 09/25/2023 938 (H)  <100 pg/mL Final    Hemoglobin A1C 09/25/2023 6.3 (H)  <5.7 % of total Hgb Final    Magnesium 09/25/2023 1.7  1.5 - 2.5 mg/dL Final    TSH 09/25/2023 2.76  0.40 - 4.50 mIU/L Final    T4, Free 09/25/2023 1.3  0.8 - 1.8 ng/dL Final    Uric Acid 09/25/2023 4.9  2.5 - 7.0 mg/dL Final    Creatinine, Urine 09/25/2023 103  20 - 275 mg/dL Final    Microalb, Ur 09/25/2023 45.6  See Note: mg/dL Final    Microalb/Creat Ratio 09/25/2023 443 (H)  <30 mcg/mg creat Final   Admission on 08/10/2023, Discharged on 08/10/2023   Component Date Value Ref Range Status    POC Glucose 08/10/2023 113 (H)  70 - 110 Final   Admission on 07/26/2023, Discharged on 07/29/2023   Component Date Value Ref Range Status    WBC  07/26/2023 9.29  3.90 - 12.70 K/uL Final    RBC 07/26/2023 4.75  4.00 - 5.40 M/uL Final    Hemoglobin 07/26/2023 9.5 (L)  12.0 - 16.0 g/dL Final    Hematocrit 07/26/2023 31.6 (L)  37.0 - 48.5 % Final    MCV 07/26/2023 67 (L)  82 - 98 fL Final    MCH 07/26/2023 20.0 (L)  27.0 - 31.0 pg Final    MCHC 07/26/2023 30.1 (L)  32.0 - 36.0 g/dL Final    RDW 07/26/2023 21.1 (H)  11.5 - 14.5 % Final    Platelets 07/26/2023 224  150 - 450 K/uL Final    MPV 07/26/2023 10.1  9.2 - 12.9 fL Final    Immature Granulocytes 07/26/2023 0.3  0.0 - 0.5 % Final    Gran # (ANC) 07/26/2023 7.1  1.8 - 7.7 K/uL Final    Immature Grans (Abs) 07/26/2023 0.03  0.00 - 0.04 K/uL Final    Lymph # 07/26/2023 1.3  1.0 - 4.8 K/uL Final    Mono # 07/26/2023 0.6  0.3 - 1.0 K/uL Final    Eos # 07/26/2023 0.2  0.0 - 0.5 K/uL Final    Baso # 07/26/2023 0.08  0.00 - 0.20 K/uL Final    nRBC 07/26/2023 0  0 /100 WBC Final    Gran % 07/26/2023 76.0 (H)  38.0 - 73.0 % Final    Lymph % 07/26/2023 14.4 (L)  18.0 - 48.0 % Final    Mono % 07/26/2023 6.8  4.0 - 15.0 % Final    Eosinophil % 07/26/2023 1.6  0.0 - 8.0 % Final    Basophil % 07/26/2023 0.9  0.0 - 1.9 % Final    Differential Method 07/26/2023 Automated   Final    Sodium 07/26/2023 137  136 - 145 mmol/L Final    Potassium 07/26/2023 4.0  3.5 - 5.1 mmol/L Final    Chloride 07/26/2023 104  95 - 110 mmol/L Final    CO2 07/26/2023 22 (L)  23 - 29 mmol/L Final    Glucose 07/26/2023 161 (H)  70 - 110 mg/dL Final    BUN 07/26/2023 29 (H)  8 - 23 mg/dL Final    Creatinine 07/26/2023 1.0  0.5 - 1.4 mg/dL Final    Calcium 07/26/2023 9.3  8.7 - 10.5 mg/dL Final    Total Protein 07/26/2023 7.5  6.0 - 8.4 g/dL Final    Albumin 07/26/2023 4.1  3.5 - 5.2 g/dL Final    Total Bilirubin 07/26/2023 0.9  0.1 - 1.0 mg/dL Final    Alkaline Phosphatase 07/26/2023 121  55 - 135 U/L Final    AST 07/26/2023 47 (H)  10 - 40 U/L Final    ALT 07/26/2023 19  10 - 44 U/L Final    eGFR 07/26/2023 53.5 (A)  >60 mL/min/1.73 m^2 Final     Anion Gap 07/26/2023 11  8 - 16 mmol/L Final    Lipase 07/26/2023 53  4 - 60 U/L Final    Magnesium 07/26/2023 1.5 (L)  1.6 - 2.6 mg/dL Final    Troponin I High Sensitivity 07/26/2023 14.9  0.0 - 14.9 pg/mL Final    BNP 07/26/2023 742 (H)  0 - 99 pg/mL Final    Specimen UA 07/26/2023 Urine, Clean Catch   Final    Color, UA 07/26/2023 Yellow  Yellow, Straw, Dorys Final    Appearance, UA 07/26/2023 Clear  Clear Final    pH, UA 07/26/2023 6.0  5.0 - 8.0 Final    Specific Gravity, UA 07/26/2023 1.020  1.005 - 1.030 Final    Protein, UA 07/26/2023 Negative  Negative Final    Glucose, UA 07/26/2023 Negative  Negative Final    Ketones, UA 07/26/2023 1+ (A)  Negative Final    Bilirubin (UA) 07/26/2023 Negative  Negative Final    Occult Blood UA 07/26/2023 Trace (A)  Negative Final    Nitrite, UA 07/26/2023 Negative  Negative Final    Urobilinogen, UA 07/26/2023 Negative  Negative EU/dL Final    Leukocytes, UA 07/26/2023 Trace (A)  Negative Final    POC Creatinine 07/26/2023 1.0  0.5 - 1.4 mg/dL Final    Sample 07/26/2023 VENOUS   Final    RBC, UA 07/26/2023 1  0 - 4 /hpf Final    WBC, UA 07/26/2023 1  0 - 5 /hpf Final    Bacteria 07/26/2023 Negative  None-Occ /hpf Final    Squam Epithel, UA 07/26/2023 0  /hpf Final    Hyaline Casts, UA 07/26/2023 3 (A)  0-1/lpf /lpf Final    Microscopic Comment 07/26/2023 SEE COMMENT   Final    Troponin I High Sensitivity 07/27/2023 16.8 (H)  0.0 - 14.9 pg/mL Final    WBC 07/27/2023 6.28  3.90 - 12.70 K/uL Final    RBC 07/27/2023 4.63  4.00 - 5.40 M/uL Final    Hemoglobin 07/27/2023 9.3 (L)  12.0 - 16.0 g/dL Final    Hematocrit 07/27/2023 31.1 (L)  37.0 - 48.5 % Final    MCV 07/27/2023 67 (L)  82 - 98 fL Final    MCH 07/27/2023 20.1 (L)  27.0 - 31.0 pg Final    MCHC 07/27/2023 29.9 (L)  32.0 - 36.0 g/dL Final    RDW 07/27/2023 21.2 (H)  11.5 - 14.5 % Final    Platelets 07/27/2023 251  150 - 450 K/uL Final    MPV 07/27/2023 10.2  9.2 - 12.9 fL Final    Immature Granulocytes 07/27/2023 0.3   0.0 - 0.5 % Final    Gran # (ANC) 07/27/2023 3.8  1.8 - 7.7 K/uL Final    Immature Grans (Abs) 07/27/2023 0.02  0.00 - 0.04 K/uL Final    Lymph # 07/27/2023 1.6  1.0 - 4.8 K/uL Final    Mono # 07/27/2023 0.7  0.3 - 1.0 K/uL Final    Eos # 07/27/2023 0.1  0.0 - 0.5 K/uL Final    Baso # 07/27/2023 0.06  0.00 - 0.20 K/uL Final    nRBC 07/27/2023 0  0 /100 WBC Final    Gran % 07/27/2023 60.7  38.0 - 73.0 % Final    Lymph % 07/27/2023 25.5  18.0 - 48.0 % Final    Mono % 07/27/2023 10.4  4.0 - 15.0 % Final    Eosinophil % 07/27/2023 2.1  0.0 - 8.0 % Final    Basophil % 07/27/2023 1.0  0.0 - 1.9 % Final    Differential Method 07/27/2023 Automated   Final    Sodium 07/27/2023 136  136 - 145 mmol/L Final    Potassium 07/27/2023 3.4 (L)  3.5 - 5.1 mmol/L Final    Chloride 07/27/2023 106  95 - 110 mmol/L Final    CO2 07/27/2023 23  23 - 29 mmol/L Final    Glucose 07/27/2023 157 (H)  70 - 110 mg/dL Final    BUN 07/27/2023 25 (H)  8 - 23 mg/dL Final    Creatinine 07/27/2023 0.9  0.5 - 1.4 mg/dL Final    Calcium 07/27/2023 9.0  8.7 - 10.5 mg/dL Final    Anion Gap 07/27/2023 7 (L)  8 - 16 mmol/L Final    eGFR 07/27/2023 >60.0  >60 mL/min/1.73 m^2 Final    WBC 07/28/2023 6.11  3.90 - 12.70 K/uL Final    RBC 07/28/2023 4.64  4.00 - 5.40 M/uL Final    Hemoglobin 07/28/2023 9.2 (L)  12.0 - 16.0 g/dL Final    Hematocrit 07/28/2023 31.1 (L)  37.0 - 48.5 % Final    MCV 07/28/2023 67 (L)  82 - 98 fL Final    MCH 07/28/2023 19.8 (L)  27.0 - 31.0 pg Final    MCHC 07/28/2023 29.6 (L)  32.0 - 36.0 g/dL Final    RDW 07/28/2023 21.2 (H)  11.5 - 14.5 % Final    Platelets 07/28/2023 200  150 - 450 K/uL Final    MPV 07/28/2023 SEE COMMENT  9.2 - 12.9 fL Final    Immature Granulocytes 07/28/2023 0.3  0.0 - 0.5 % Final    Gran # (ANC) 07/28/2023 3.0  1.8 - 7.7 K/uL Final    Immature Grans (Abs) 07/28/2023 0.02  0.00 - 0.04 K/uL Final    Lymph # 07/28/2023 2.1  1.0 - 4.8 K/uL Final    Mono # 07/28/2023 0.7  0.3 - 1.0 K/uL Final    Eos # 07/28/2023  0.2  0.0 - 0.5 K/uL Final    Baso # 07/28/2023 0.05  0.00 - 0.20 K/uL Final    nRBC 07/28/2023 0  0 /100 WBC Final    Gran % 07/28/2023 49.3  38.0 - 73.0 % Final    Lymph % 07/28/2023 34.9  18.0 - 48.0 % Final    Mono % 07/28/2023 11.6  4.0 - 15.0 % Final    Eosinophil % 07/28/2023 3.1  0.0 - 8.0 % Final    Basophil % 07/28/2023 0.8  0.0 - 1.9 % Final    Differential Method 07/28/2023 Automated   Final    Sodium 07/28/2023 139  136 - 145 mmol/L Final    Potassium 07/28/2023 3.8  3.5 - 5.1 mmol/L Final    Chloride 07/28/2023 105  95 - 110 mmol/L Final    CO2 07/28/2023 25  23 - 29 mmol/L Final    Glucose 07/28/2023 111 (H)  70 - 110 mg/dL Final    BUN 07/28/2023 26 (H)  8 - 23 mg/dL Final    Creatinine 07/28/2023 1.1  0.5 - 1.4 mg/dL Final    Calcium 07/28/2023 9.1  8.7 - 10.5 mg/dL Final    Anion Gap 07/28/2023 9  8 - 16 mmol/L Final    eGFR 07/28/2023 47.7 (A)  >60 mL/min/1.73 m^2 Final    Total Protein 07/28/2023 7.5  6.0 - 8.4 g/dL Final    Albumin 07/28/2023 3.8  3.5 - 5.2 g/dL Final    Total Bilirubin 07/28/2023 0.5  0.1 - 1.0 mg/dL Final    Bilirubin, Direct 07/28/2023 0.1  0.1 - 0.3 mg/dL Final    AST 07/28/2023 37  10 - 40 U/L Final    ALT 07/28/2023 31  10 - 44 U/L Final    Alkaline Phosphatase 07/28/2023 116  55 - 135 U/L Final    Iron 07/28/2023 18 (L)  30 - 160 ug/dL Final    Transferrin 07/28/2023 340  200 - 375 mg/dL Final    TIBC 07/28/2023 476 (H)  250 - 450 ug/dL Final    Saturated Iron 07/28/2023 4 (L)  20 - 50 % Final    Folate 07/28/2023 15.5  4.0 - 24.0 ng/mL Final    Vitamin B-12 07/28/2023 358  210 - 950 pg/mL Final    Retic 07/28/2023 1.4  0.5 - 2.5 % Final    Ferritin 07/28/2023 9 (L)  20.0 - 300.0 ng/mL Final    WBC 07/29/2023 6.66  3.90 - 12.70 K/uL Final    RBC 07/29/2023 4.44  4.00 - 5.40 M/uL Final    Hemoglobin 07/29/2023 9.1 (L)  12.0 - 16.0 g/dL Final    Hematocrit 07/29/2023 30.0 (L)  37.0 - 48.5 % Final    MCV 07/29/2023 68 (L)  82 - 98 fL Final    MCH 07/29/2023 20.5 (L)  27.0 -  31.0 pg Final    MCHC 07/29/2023 30.3 (L)  32.0 - 36.0 g/dL Final    RDW 07/29/2023 21.5 (H)  11.5 - 14.5 % Final    Platelets 07/29/2023 192  150 - 450 K/uL Final    MPV 07/29/2023 SEE COMMENT  9.2 - 12.9 fL Final    Immature Granulocytes 07/29/2023 0.5  0.0 - 0.5 % Final    Gran # (ANC) 07/29/2023 3.9  1.8 - 7.7 K/uL Final    Immature Grans (Abs) 07/29/2023 0.03  0.00 - 0.04 K/uL Final    Lymph # 07/29/2023 1.7  1.0 - 4.8 K/uL Final    Mono # 07/29/2023 0.7  0.3 - 1.0 K/uL Final    Eos # 07/29/2023 0.2  0.0 - 0.5 K/uL Final    Baso # 07/29/2023 0.07  0.00 - 0.20 K/uL Final    nRBC 07/29/2023 0  0 /100 WBC Final    Gran % 07/29/2023 59.0  38.0 - 73.0 % Final    Lymph % 07/29/2023 26.0  18.0 - 48.0 % Final    Mono % 07/29/2023 10.8  4.0 - 15.0 % Final    Eosinophil % 07/29/2023 2.6  0.0 - 8.0 % Final    Basophil % 07/29/2023 1.1  0.0 - 1.9 % Final    Differential Method 07/29/2023 Automated   Final    Sodium 07/29/2023 139  136 - 145 mmol/L Final    Potassium 07/29/2023 4.4  3.5 - 5.1 mmol/L Final    Chloride 07/29/2023 106  95 - 110 mmol/L Final    CO2 07/29/2023 26  23 - 29 mmol/L Final    Glucose 07/29/2023 165 (H)  70 - 110 mg/dL Final    BUN 07/29/2023 28 (H)  8 - 23 mg/dL Final    Creatinine 07/29/2023 1.3  0.5 - 1.4 mg/dL Final    Calcium 07/29/2023 8.9  8.7 - 10.5 mg/dL Final    Anion Gap 07/29/2023 7 (L)  8 - 16 mmol/L Final    eGFR 07/29/2023 39.1 (A)  >60 mL/min/1.73 m^2 Final    Total Protein 07/29/2023 7.0  6.0 - 8.4 g/dL Final    Albumin 07/29/2023 3.7  3.5 - 5.2 g/dL Final    Total Bilirubin 07/29/2023 0.8  0.1 - 1.0 mg/dL Final    Bilirubin, Direct 07/29/2023 0.1  0.1 - 0.3 mg/dL Final    AST 07/29/2023 27  10 - 40 U/L Final    ALT 07/29/2023 25  10 - 44 U/L Final    Alkaline Phosphatase 07/29/2023 120  55 - 135 U/L Final   Hospital Outpatient Visit on 07/20/2023   Component Date Value Ref Range Status    Battery Voltage (V) 07/20/2023 2.99  V Final    P/R-wave RA Lead 07/20/2023 1.0  mV Final     P/R-wave RA Lead (native) 07/20/2023 >12.0  mV Final    Impedance RA Lead 07/20/2023 450  Ohms Final    Impedance RA Lead (native) 07/20/2023 430  Ohms Final    Threshold V RA Lead 07/20/2023 1.0  V Final    Theshold ms RA Lead 07/20/2023 0.4  ms Final   Office Visit on 07/20/2023   Component Date Value Ref Range Status    TSH w/reflex to FT4 07/20/2023 1.99  0.40 - 4.50 mIU/L Final   Refill on 07/07/2023   Component Date Value Ref Range Status    POC Blood, Urine 07/07/2023 Negative  Negative Final    POC Bilirubin, Urine 07/07/2023 Negative  Negative Final    POC Urobilinogen, Urine 07/07/2023 Negative  0.1 - 1.1 Final    POC Ketones, Urine 07/07/2023 Negative  Negative Final    POC Protein, Urine 07/07/2023 Negative  Negative Final    POC Nitrates, Urine 07/07/2023 Negative  Negative Final    POC Glucose, Urine 07/07/2023 Negative  Negative Final    pH, UA 07/07/2023 6.0   Final    POC Specific Gravity, Urine 07/07/2023 1.015  1.003 - 1.029 Final    POC Leukocytes, Urine 07/07/2023 Negative  Negative Final    Urine Culture, Routine 07/07/2023  (A)   Final   Patient Outreach on 06/20/2023   Component Date Value Ref Range Status    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   Final    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    Occasional tremors      Social History     Socioeconomic History    Marital status:    Tobacco Use    Smoking  status: Never    Smokeless tobacco: Never   Substance and Sexual Activity    Alcohol use: Never    Drug use: Never    Sexual activity: Not Currently     Social Determinants of Health     Financial Resource Strain: Unknown (7/27/2023)    Overall Financial Resource Strain (CARDIA)     Difficulty of Paying Living Expenses: Patient refused   Food Insecurity: No Food Insecurity (7/27/2023)    Hunger Vital Sign     Worried About Running Out of Food in the Last Year: Never true     Ran Out of Food in the Last Year: Never true   Transportation Needs: No Transportation Needs (7/27/2023)    PRAPARE - Transportation     Lack of Transportation (Medical): No     Lack of Transportation (Non-Medical): No   Physical Activity: Unknown (7/27/2023)    Exercise Vital Sign     Days of Exercise per Week: Patient refused     Minutes of Exercise per Session: Patient refused   Stress: Unknown (7/27/2023)    [employer] - Occupational Stress Questionnaire     Feeling of Stress : Patient refused   Social Connections: Unknown (7/27/2023)    Social Connection and Isolation Panel [NHANES]     Frequency of Communication with Friends and Family: Patient refused     Frequency of Social Gatherings with Friends and Family: Patient refused     Attends Scientology Services: Patient refused     Active Member of Clubs or Organizations: Patient refused     Attends Club or Organization Meetings: Patient refused     Marital Status:    Housing Stability: Unknown (7/27/2023)    Housing Stability Vital Sign     Unable to Pay for Housing in the Last Year: No     Unstable Housing in the Last Year: No     Past Surgical History:   Procedure Laterality Date    ADENOIDECTOMY      APPENDECTOMY      COLONOSCOPY N/A 12/17/2019    Procedure: COLONOSCOPY;  Surgeon: Raleigh Quarles III, MD;  Location: [facility];  Service: Endoscopy;  Laterality: N/A;    COLONOSCOPY N/A 12/18/2019    Procedure: COLONOSCOPY;  Surgeon: Raleigh WHITE  Willam NELSON MD;  Location: [facility] ENDO;  Service: Endoscopy;  Laterality: N/A;    ERCP N/A 8/10/2023    Procedure: ERCP (ENDOSCOPIC RETROGRADE CHOLANGIOPANCREATOGRAPHY);  Surgeon: Raleigh Quarles III, MD;  Location: [facility] ENDO;  Service: Endoscopy;  Laterality: N/A;    Gall bladder revmoval      HYSTERECTOMY      INSERTION OF PACEMAKER Right 1/2/2020    Procedure: INSERTION, PACEMAKER;  Surgeon: Fly Barragan MD;  Location: [facility] CATH/EP LAB;  Service: Cardiology;  Laterality: Right;    left mastectomy Left 1945    MASTECTOMY Left     NC CABG, ARTERY-VEIN, FOUR  2014    Coronary Artery Bypass, 4    SMALL BOWEL ENTEROSCOPY N/A 12/17/2019    Procedure: ENTEROSCOPY;  Surgeon: Raleigh Quarles III, MD;  Location: [facility] ENDO;  Service: Endoscopy;  Laterality: N/A;    TONSILLECTOMY       Family History   Problem Relation Age of Onset    No Known Problems Mother     Heart disease Father        Review of patient's allergies indicates:   Allergen Reactions    Penicillin g benzathine Other (See Comments)       Current Outpatient Medications:     amiodarone (PACERONE) 200 MG Tab, Take 0.5 tablets (100 mg total) by mouth once daily., Disp: 45 tablet, Rfl: 3    apixaban (ELIQUIS) 2.5 mg Tab, Take 1 tablet (2.5 mg total) by mouth 2 (two) times daily., Disp: 180 tablet, Rfl: 3    blood sugar diagnostic Strp, To check BG 3 times daily, to use with insurance preferred meter, Disp: 200 strip, Rfl: 1    busPIRone (BUSPAR) 5 MG Tab, Take 2 tablets (10 mg total) by mouth 2 (two) times daily., Disp: 180 tablet, Rfl: 3    cholecalciferol, vitamin D3, (VITAMIN D3) 25 mcg (1,000 unit) capsule, Take 1 capsule (1,000 Units total) by mouth once daily., Disp: 100 capsule, Rfl: 1    docusate sodium (COLACE) 100 MG capsule, Take 100 mg by mouth as needed for Constipation., Disp: , Rfl:     ferrous sulfate 325 (65 FE) MG EC tablet, Take 1 tablet (325 mg total) by mouth 2 (two) times daily., Disp: 180 tablet, Rfl: 2    FREESTYLE LANCETS  28 gauge lancets, , Disp: , Rfl:     loratadine (CLARITIN) 10 mg tablet, Take 1 tablet (10 mg total) by mouth once daily., Disp: 90 tablet, Rfl: 3    magnesium oxide (MAG-OX) 400 mg (241.3 mg magnesium) tablet, Take 1 tablet (400 mg total) by mouth once daily. (Patient taking differently: Take 200 mg by mouth once daily.), Disp: 90 tablet, Rfl: 3    mupirocin (BACTROBAN) 2 % ointment, Apply topically once daily. (Patient taking differently: Apply 1 g topically once daily.), Disp: 30 g, Rfl: 3    omeprazole (PRILOSEC) 20 MG capsule, Take 2 capsules (40 mg total) by mouth once daily. (Patient taking differently: Take 20 mg by mouth 2 (two) times a day.), Disp: 180 capsule, Rfl: 1    propranoloL (INDERAL) 40 MG tablet, Take 1 tablet (40 mg total) by mouth 2 (two) times daily., Disp: 180 tablet, Rfl: 3    blood-glucose meter kit, To check BG 3 times daily, to use with insurance preferred meter, Disp: 1 each, Rfl: 0    dextromethorphan-guaiFENesin (ROBITUSSIN COUGH-CHEST OTTO DM) 5-100 mg/5 mL Liqd, Take 10 mLs by mouth every 6 (six) hours as needed (cough and congestion)., Disp: 237 mL, Rfl: 0    furosemide (LASIX) 20 MG tablet, Take a half to one tablet in the morning if needed for swelling., Disp: 30 tablet, Rfl: 11    meclizine (ANTIVERT) 12.5 mg tablet, Take daily in the morning, and then may also take two more times in a day as needed, every 8 hours., Disp: 30 tablet, Rfl: 11    midodrine (PROAMATINE) 2.5 MG Tab, Take 1 tablet (2.5 mg total) by mouth 2 (two) times a day. Take first thing in the morning and then again in the afternoon around 3 pm., Disp: 180 tablet, Rfl: 3    potassium chloride SA (K-DUR,KLOR-CON M) 10 MEQ tablet, Take 1 tablet (10 mEq total) by mouth once daily., Disp: 30 tablet, Rfl: 11    vitamins A,C,E-zinc-copper (PRESERVISION AREDS) 4,296 mcg-226 mg-90 mg Cap, Take 1 capsule by mouth 2 (two) times daily., Disp: 180 capsule, Rfl: 3    Review of Systems   Constitutional:  Positive for fatigue.  "Negative for activity change, appetite change, chills, fever and unexpected weight change.   HENT:  Negative for nasal congestion, ear pain, rhinorrhea and sore throat.         No ear pain but ears do feel full   Eyes:  Negative for visual disturbance.   Respiratory:  Negative for apnea, cough, shortness of breath and wheezing.    Cardiovascular:  Negative for chest pain, palpitations and leg swelling.   Gastrointestinal:  Negative for abdominal pain, blood in stool, constipation, diarrhea, nausea and vomiting.   Genitourinary:  Negative for dysuria, frequency and urgency.   Integumentary:  Negative for rash.   Neurological:  Positive for dizziness, vertigo, weakness and light-headedness. Negative for tremors, syncope, facial asymmetry, speech difficulty, numbness and headaches.           Objective:      Vitals:    09/25/23 0809 09/25/23 0829   BP: 90/60 (!) 94/57   Pulse: 61    SpO2: 96%    Weight: 67.4 kg (148 lb 9.6 oz)    Height: 5' 0.63" (1.54 m)      Physical Exam  Vitals and nursing note reviewed.   Constitutional:       General: She is not in acute distress.     Appearance: Normal appearance. She is well-developed, well-groomed and normal weight. She is not ill-appearing, toxic-appearing or diaphoretic.   HENT:      Head: Normocephalic and atraumatic.      Right Ear: External ear normal. There is no impacted cerumen.      Left Ear: External ear normal. There is no impacted cerumen.      Nose: Nose normal. No congestion or rhinorrhea.      Mouth/Throat:      Lips: Pink.      Mouth: Mucous membranes are moist.      Pharynx: Oropharynx is clear. No oropharyngeal exudate or posterior oropharyngeal erythema.   Eyes:      General: No scleral icterus.        Right eye: No discharge.         Left eye: No discharge.      Conjunctiva/sclera: Conjunctivae normal.      Pupils: Pupils are equal, round, and reactive to light.   Neck:      Thyroid: No thyromegaly.      Vascular: No carotid bruit or JVD.   Cardiovascular: "      Rate and Rhythm: Normal rate. Rhythm irregular. Occasional Extrasystoles are present.     Pulses:           Radial pulses are 2+ on the right side and 2+ on the left side.        Dorsalis pedis pulses are 1+ on the right side and 1+ on the left side.      Heart sounds: Murmur heard.      Systolic murmur is present with a grade of 4/6.   Pulmonary:      Effort: Pulmonary effort is normal.      Breath sounds: Normal breath sounds. No stridor or decreased air movement. No decreased breath sounds, wheezing, rhonchi or rales.   Abdominal:      General: Bowel sounds are normal. There is no distension.      Palpations: Abdomen is soft.      Tenderness: There is no abdominal tenderness.   Musculoskeletal:         General: No tenderness or deformity. Normal range of motion.      Cervical back: Normal range of motion and neck supple.      Lumbar back: Normal. No spasms.      Right lower leg: No edema.      Left lower leg: No edema.   Skin:     General: Skin is warm and dry.      Capillary Refill: Capillary refill takes 2 to 3 seconds.      Coloration: Skin is not ashen, cyanotic, jaundiced, mottled, pale or sallow.      Findings: No rash.   Neurological:      General: No focal deficit present.      Mental Status: She is alert and oriented to person, place, and time. Mental status is at baseline.      Cranial Nerves: No facial asymmetry.      Sensory: Sensation is intact.      Coordination: Romberg sign negative.      Gait: Gait is intact.   Psychiatric:         Attention and Perception: Attention and perception normal.         Mood and Affect: Mood and affect normal.         Speech: Speech normal.         Behavior: Behavior normal. Behavior is cooperative.         Thought Content: Thought content normal.         Cognition and Memory: Cognition normal.         Judgment: Judgment normal.           Assessment:       1. Essential hypertension    2. Flu vaccine need    3. Gastroesophageal reflux disease without esophagitis     4. Essential tremor    5. Other specified anxiety disorders    6. Vertigo    7. Paroxysmal atrial fibrillation    8. Chronic diastolic congestive heart failure    9. Severe aortic stenosis    10. Cardiac pacemaker    11. Prolonged Q-T interval on ECG    12. Iron deficiency anemia, unspecified iron deficiency anemia type    13. Senile purpura    14. Anticoagulant long-term use    15. Chronic kidney disease, stage 3a    16. Type 2 diabetes mellitus with stage 3a chronic kidney disease, without long-term current use of insulin    17. Hypotension, unspecified hypotension type    18. Low serum magnesium level    19. Gout, unspecified cause, unspecified chronicity, unspecified site    20. Rhinovirus         Plan:       Essential hypertension  I did advise patient to hold her antihypertensives at this time because she is now hypotensive.  We did discuss blood pressure parameters.  Patient has an appointment with Cardiology on 09/28.  I will reach out and discuss with Erika Chin.    Flu vaccine need  -     Influenza - Quadrivalent (Adjuvanted)    Gastroesophageal reflux disease without esophagitis  Patient states this is managed with medication    Essential tremor  Patient does take propranolol to control her tremors.  She is on high dose, 40 mg twice a day.  We did discuss the potential of maybe needing to decrease this if her blood pressure remains low.  Patient verbalized understanding but she does state that her tremors do cause significant disruption to her daily life.    Other specified anxiety disorders  Well-controlled with buspirone    Vertigo  It is debatable whether or not patient's dizziness is actually related to vertigo or if this is sequela from her cardiac issues, her age, but she does take her meclizine as needed and it does help some.  -     meclizine (ANTIVERT) 12.5 mg tablet; Take daily in the morning, and then may also take two more times in a day as needed, every 8 hours.  Dispense: 30 tablet;  Refill: 11    Paroxysmal atrial fibrillation  Controlled with amiodarone and patient does take Eliquis.    Chronic diastolic congestive heart failure  Advised patient that I only want her to take her Lasix if she wakes up and is having edema.  Patient has no fluid retention today.  Her legs look great.  She is wearing her compression hose.  We did discuss that if she does take her Lasix she needs to take a potassium tablet with it.  Patient does verbalize understanding.  -     furosemide (LASIX) 20 MG tablet; Take a half to one tablet in the morning if needed for swelling.  Dispense: 30 tablet; Refill: 11  -     potassium chloride SA (K-DUR,KLOR-CON M) 10 MEQ tablet; Take 1 tablet (10 mEq total) by mouth once daily.  Dispense: 30 tablet; Refill: 11  -     BNP; Future; Expected date: 09/25/2023    Severe aortic stenosis  Followed by cardiology    Cardiac pacemaker    Prolonged Q-T interval on ECG    Iron deficiency anemia, unspecified iron deficiency anemia type  This is a chronic condition and I have referred patient to Hematology for assistance with management due to complicated comorbid conditions  -     CBC Auto Differential; Future; Expected date: 09/25/2023  -     Iron and TIBC; Future; Expected date: 09/25/2023  -     Ferritin; Future; Expected date: 09/25/2023      Anticoagulant long-term use  On Eliquis 2.5 mg twice a day    Chronic kidney disease, stage 3a  Stable at this time  -     Basic Metabolic Panel; Future; Expected date: 09/25/2023    Type 2 diabetes mellitus with stage 3a chronic kidney disease, without long-term current use of insulin  Well-controlled  -     Hemoglobin A1C; Future; Expected date: 09/25/2023  -     Microalbumin/Creatinine Ratio, Urine; Future; Expected date: 09/25/2023    Hypotension, unspecified hypotension type  Discussed parameters for midodrine.  Patient verbalized understanding  -      midodrine (PROAMATINE) 2.5 MG Tab; Take 1 tablet (2.5 mg total) by mouth every 12 (twelve)  hours as needed (blood pressure- if less than 100 (top) and less than 65 (bottom) take one tablet).  Dispense: 30 tablet; Refill: 11  -     CBC Auto Differential; Future; Expected date: 09/25/2023  -     TSH; Future; Expected date: 09/25/2023  -     T4, Free; Future; Expected date: 09/25/2023    Low serum magnesium level  For surveillance  -     Magnesium; Future; Expected date: 09/25/2023    Gout, unspecified cause, unspecified chronicity, unspecified site  For surveillance  -     Uric Acid; Future; Expected date: 09/25/2023    Rhinovirus  States she  come down with a light cold.  -     dextromethorphan-guaiFENesin (ROBITUSSIN COUGH-CHEST OTTO DM) 5-100 mg/5 mL Liqd; Take 10 mLs by mouth every 6 (six) hours as needed (cough and congestion).  Dispense: 237 mL; Refill: 0    Other orders  -     vitamins A,C,E-zinc-copper (PRESERVISION AREDS) 4,296 mcg-226 mg-90 mg Cap; Take 1 capsule by mouth 2 (two) times daily.  Dispense: 180 capsule; Refill: 3    I did alert Erika that this patient will be seeing her in about a week.  I did advise her of the adding of protamine.  At let her know that we have orders for labs.    Follow up in about 3 months (around 12/25/2023) for With Dr. Rhodes  heart failure, DM, CKD, anemia.        10/1/2023 Cata Cardoso

## 2023-09-26 LAB
ALBUMIN/CREAT UR: 443 MCG/MG CREAT
BASOPHILS # BLD AUTO: 57 CELLS/UL (ref 0–200)
BASOPHILS NFR BLD AUTO: 0.8 %
BNP SERPL-MCNC: 938 PG/ML
BUN SERPL-MCNC: 20 MG/DL (ref 7–25)
BUN/CREAT SERPL: ABNORMAL (CALC) (ref 6–22)
CALCIUM SERPL-MCNC: 9.8 MG/DL (ref 8.6–10.4)
CHLORIDE SERPL-SCNC: 105 MMOL/L (ref 98–110)
CO2 SERPL-SCNC: 21 MMOL/L (ref 20–32)
CREAT SERPL-MCNC: 0.9 MG/DL (ref 0.6–0.95)
CREAT UR-MCNC: 103 MG/DL (ref 20–275)
EGFR: 61 ML/MIN/1.73M2
EOSINOPHIL # BLD AUTO: 199 CELLS/UL (ref 15–500)
EOSINOPHIL NFR BLD AUTO: 2.8 %
ERYTHROCYTE [DISTWIDTH] IN BLOOD BY AUTOMATED COUNT: 27.5 % (ref 11–15)
FERRITIN SERPL-MCNC: 17 NG/ML (ref 16–288)
GLUCOSE SERPL-MCNC: 125 MG/DL (ref 65–99)
HBA1C MFR BLD: 6.3 % OF TOTAL HGB
HCT VFR BLD AUTO: 46.7 % (ref 35–45)
HGB BLD-MCNC: 13.8 G/DL (ref 11.7–15.5)
IRON SATN MFR SERPL: 10 % (CALC) (ref 16–45)
IRON SERPL-MCNC: 38 MCG/DL (ref 45–160)
LYMPHOCYTES # BLD AUTO: 1676 CELLS/UL (ref 850–3900)
LYMPHOCYTES NFR BLD AUTO: 23.6 %
MAGNESIUM SERPL-MCNC: 1.7 MG/DL (ref 1.5–2.5)
MCH RBC QN AUTO: 23.1 PG (ref 27–33)
MCHC RBC AUTO-ENTMCNC: 29.6 G/DL (ref 32–36)
MCV RBC AUTO: 78.1 FL (ref 80–100)
MICROALBUMIN UR-MCNC: 45.6 MG/DL
MONOCYTES # BLD AUTO: 781 CELLS/UL (ref 200–950)
MONOCYTES NFR BLD AUTO: 11 %
NEUTROPHILS # BLD AUTO: 4388 CELLS/UL (ref 1500–7800)
NEUTROPHILS NFR BLD AUTO: 61.8 %
PLATELET # BLD AUTO: 170 THOUSAND/UL (ref 140–400)
PMV BLD REES-ECKER: ABNORMAL FL (ref 7.5–12.5)
POTASSIUM SERPL-SCNC: 4.3 MMOL/L (ref 3.5–5.3)
RBC # BLD AUTO: 5.98 MILLION/UL (ref 3.8–5.1)
SERVICE CMNT-IMP: ABNORMAL
SODIUM SERPL-SCNC: 138 MMOL/L (ref 135–146)
T4 FREE SERPL-MCNC: 1.3 NG/DL (ref 0.8–1.8)
TIBC SERPL-MCNC: 376 MCG/DL (CALC) (ref 250–450)
TSH SERPL-ACNC: 2.76 MIU/L (ref 0.4–4.5)
URATE SERPL-MCNC: 4.9 MG/DL (ref 2.5–7)
WBC # BLD AUTO: 7.1 THOUSAND/UL (ref 3.8–10.8)

## 2023-09-27 ENCOUNTER — TELEPHONE (OUTPATIENT)
Dept: FAMILY MEDICINE | Facility: CLINIC | Age: 88
End: 2023-09-27

## 2023-09-27 RX ORDER — VIT A/VIT C/VIT E/ZINC/COPPER 4296-226
1 CAPSULE ORAL 2 TIMES DAILY
Qty: 180 CAPSULE | Refills: 3 | Status: SHIPPED | OUTPATIENT
Start: 2023-09-27

## 2023-09-27 NOTE — TELEPHONE ENCOUNTER
Thank you. I sent a note to cardiology regarding her results yesterday for them to take a look. She is asymptomatic, thank goodness.

## 2023-09-28 ENCOUNTER — OFFICE VISIT (OUTPATIENT)
Dept: CARDIOLOGY | Facility: CLINIC | Age: 88
End: 2023-09-28
Payer: MEDICARE

## 2023-09-28 VITALS
RESPIRATION RATE: 16 BRPM | DIASTOLIC BLOOD PRESSURE: 68 MMHG | HEIGHT: 61 IN | HEART RATE: 68 BPM | SYSTOLIC BLOOD PRESSURE: 104 MMHG | OXYGEN SATURATION: 96 % | WEIGHT: 148 LBS | BODY MASS INDEX: 27.94 KG/M2

## 2023-09-28 DIAGNOSIS — I35.0 SEVERE AORTIC STENOSIS: Chronic | ICD-10-CM

## 2023-09-28 DIAGNOSIS — I95.9 HYPOTENSION, UNSPECIFIED HYPOTENSION TYPE: Primary | ICD-10-CM

## 2023-09-28 DIAGNOSIS — Z95.1 HX OF CABG: ICD-10-CM

## 2023-09-28 DIAGNOSIS — R00.1 SYMPTOMATIC BRADYCARDIA: ICD-10-CM

## 2023-09-28 DIAGNOSIS — I25.10 CORONARY ARTERY DISEASE INVOLVING NATIVE CORONARY ARTERY OF NATIVE HEART WITHOUT ANGINA PECTORIS: Chronic | ICD-10-CM

## 2023-09-28 DIAGNOSIS — I48.0 PAROXYSMAL ATRIAL FIBRILLATION: Chronic | ICD-10-CM

## 2023-09-28 DIAGNOSIS — Z95.0 CARDIAC PACEMAKER: ICD-10-CM

## 2023-09-28 DIAGNOSIS — I50.32 CHRONIC DIASTOLIC CONGESTIVE HEART FAILURE: Chronic | ICD-10-CM

## 2023-09-28 PROCEDURE — 99214 PR OFFICE/OUTPT VISIT, EST, LEVL IV, 30-39 MIN: ICD-10-PCS | Mod: S$PBB,,, | Performed by: NURSE PRACTITIONER

## 2023-09-28 PROCEDURE — 99215 OFFICE O/P EST HI 40 MIN: CPT | Mod: PBBFAC,PN | Performed by: NURSE PRACTITIONER

## 2023-09-28 PROCEDURE — 99999 PR PBB SHADOW E&M-EST. PATIENT-LVL V: ICD-10-PCS | Mod: PBBFAC,,, | Performed by: NURSE PRACTITIONER

## 2023-09-28 PROCEDURE — 99999 PR PBB SHADOW E&M-EST. PATIENT-LVL V: CPT | Mod: PBBFAC,,, | Performed by: NURSE PRACTITIONER

## 2023-09-28 PROCEDURE — 99214 OFFICE O/P EST MOD 30 MIN: CPT | Mod: S$PBB,,, | Performed by: NURSE PRACTITIONER

## 2023-09-28 RX ORDER — MIDODRINE HYDROCHLORIDE 2.5 MG/1
2.5 TABLET ORAL 2 TIMES DAILY
Qty: 180 TABLET | Refills: 3 | Status: ON HOLD | OUTPATIENT
Start: 2023-09-28 | End: 2023-11-28 | Stop reason: SDUPTHER

## 2023-09-28 NOTE — PROGRESS NOTES
Subjective:    Patient ID:  Rosanna Raymundo is a 89 y.o. female   Chief Complaint   Patient presents with    Follow-up    Results     Recent labs in epic       HPI:    Patient seen today for follow-up appointment.  She denies any chest discomfort or shortness of breath.  She states she is been doing okay overall and has not had any swelling.  Denies any falls or head injuries.  She has been experiencing dizziness and low blood pressure.  She was started on midodrine 2.5 mg p.o. twice daily p.r.n..  Her antihypertensives were DC except propranolol.  Her blood pressure today is 104/68.  Review of patient's allergies indicates:   Allergen Reactions    Penicillin g benzathine Other (See Comments)       Past Medical History:   Diagnosis Date    Anticoagulant long-term use     Cancer     breast/female/throat    Cataract     CHF (congestive heart failure)     Diabetes mellitus, type 2     Heart murmur     Hypertension     Iron deficiency anemia, unspecified     Anemia, iron def.    Occasional tremors      Past Surgical History:   Procedure Laterality Date    ADENOIDECTOMY      APPENDECTOMY      COLONOSCOPY N/A 12/17/2019    Procedure: COLONOSCOPY;  Surgeon: Raleigh Quarles III, MD;  Location: [facility] ENDO;  Service: Endoscopy;  Laterality: N/A;    COLONOSCOPY N/A 12/18/2019    Procedure: COLONOSCOPY;  Surgeon: Raleigh Quarles III, MD;  Location: [facility] ENDO;  Service: Endoscopy;  Laterality: N/A;    ERCP N/A 8/10/2023    Procedure: ERCP (ENDOSCOPIC RETROGRADE CHOLANGIOPANCREATOGRAPHY);  Surgeon: Raleigh Quarles III, MD;  Location: [facility] ENDO;  Service: Endoscopy;  Laterality: N/A;    Gall bladder revmoval      HYSTERECTOMY      INSERTION OF PACEMAKER Right 1/2/2020    Procedure: INSERTION, PACEMAKER;  Surgeon: Fly Barragan MD;  Location: [facility] CATH/EP LAB;  Service: Cardiology;  Laterality: Right;    left mastectomy Left 1945    MASTECTOMY Left     GA CABG, ARTERY-VEIN, FOUR  2014    Coronary Artery  Bypass, 4    SMALL BOWEL ENTEROSCOPY N/A 12/17/2019    Procedure: ENTEROSCOPY;  Surgeon: Raleigh Quarles III, MD;  Location: [facility];  Service: Endoscopy;  Laterality: N/A;    TONSILLECTOMY       Social History     Tobacco Use    Smoking status: Never    Smokeless tobacco: Never   Substance Use Topics    Alcohol use: Never    Drug use: Never     Family History   Problem Relation Age of Onset    No Known Problems Mother     Heart disease Father         Review of Systems:   Per HPI         Objective:        Vitals:    09/28/23 1341   BP: 104/68   Pulse: 68   Resp: 16       Lab Results   Component Value Date    WBC 7.1 09/25/2023    HGB 13.8 09/25/2023    HCT 46.7 (H) 09/25/2023     09/25/2023    CHOL 161 11/21/2022    TRIG 190 (H) 11/21/2022    HDL 42 (L) 11/21/2022    ALT 25 07/29/2023    AST 27 07/29/2023     09/25/2023    K 4.3 09/25/2023     09/25/2023    CREATININE 0.90 09/25/2023    BUN 20 09/25/2023    CO2 21 09/25/2023    TSH 2.76 09/25/2023    INR 1.1 01/01/2020    HGBA1C 6.3 (H) 09/25/2023    MICROALBUR 45.6 09/25/2023        ECHOCARDIOGRAM RESULTS  Results for orders placed during the hospital encounter of 07/26/22    Echo    Interpretation Summary  · The left ventricle is normal in size with mild concentric hypertrophy and low normal systolic function.  · The estimated ejection fraction is 50%.  · Normal left ventricular diastolic function.  · Normal right ventricular size with normal right ventricular systolic function.  · Moderate left atrial enlargement.  · There is severe aortic valve stenosis.  · Aortic valve area is 0.92 cm2; peak velocity is 2.51 m/s; mean gradient is 25 mmHg.  · Moderate tricuspid regurgitation.  · Mild-to-moderate mitral regurgitation.        CURRENT/PREVIOUS VISIT EKG  Results for orders placed or performed during the hospital encounter of 07/26/23   EKG 12-lead    Collection Time: 07/26/23  6:11 PM    Narrative    Test Reason : R10.9,    Vent. Rate : 060  BPM     Atrial Rate : 044 BPM     P-R Int : 314 ms          QRS Dur : 178 ms      QT Int : 554 ms       P-R-T Axes : 000 107 -29 degrees     QTc Int : 554 ms    Atrial-paced rhythm with prolonged AV conduction  Right bundle branch block  T wave abnormality, consider inferolateral ischemia  Abnormal ECG  When compared with ECG of 15-DEC-2021 15:29,  Criteria for Septal infarct are no longer Present  T wave inversion now evident in Inferior leads  T wave inversion now evident in Lateral leads  Confirmed by Johnathan LOW, Aamir CAM ([id number]) on 7/28/2023 8:08:08 PM    Referred By: JENNIFER   SELF           Confirmed By:Aamir Mann MD     No valid procedures specified.   No results found for this or any previous visit.      Physical Exam:  CONSTITUTIONAL: No fever, no chills  HEENT: Normocephalic, atraumatic,pupils reactive to light                 NECK:  No JVD no carotid bruit  CVS: S1S2+, RRR; + murmur  LUNGS: Clear  ABDOMEN: Soft, NT, BS+  EXTREMITIES: No cyanosis, edema  : No vargas catheter  NEURO: AAO X 3  PSY: Normal affect      Medication List with Changes/Refills   Current Medications    AMIODARONE (PACERONE) 200 MG TAB    Take 0.5 tablets (100 mg total) by mouth once daily.    APIXABAN (ELIQUIS) 2.5 MG TAB    Take 1 tablet (2.5 mg total) by mouth 2 (two) times daily.    BLOOD SUGAR DIAGNOSTIC STRP    To check BG 3 times daily, to use with insurance preferred meter    BLOOD-GLUCOSE METER KIT    To check BG 3 times daily, to use with insurance preferred meter    BUSPIRONE (BUSPAR) 5 MG TAB    Take 2 tablets (10 mg total) by mouth 2 (two) times daily.    CHOLECALCIFEROL, VITAMIN D3, (VITAMIN D3) 25 MCG (1,000 UNIT) CAPSULE    Take 1 capsule (1,000 Units total) by mouth once daily.    DEXTROMETHORPHAN-GUAIFENESIN (ROBITUSSIN COUGH-CHEST OTTO DM) 5-100 MG/5 ML LIQD    Take 10 mLs by mouth every 6 (six) hours as needed (cough and congestion).    DOCUSATE SODIUM (COLACE) 100 MG CAPSULE    Take 100 mg by mouth as  needed for Constipation.    FERROUS SULFATE 325 (65 FE) MG EC TABLET    Take 1 tablet (325 mg total) by mouth 2 (two) times daily.    FREESTYLE LANCETS 28 GAUGE LANCETS        FUROSEMIDE (LASIX) 20 MG TABLET    Take a half to one tablet in the morning if needed for swelling.    LORATADINE (CLARITIN) 10 MG TABLET    Take 1 tablet (10 mg total) by mouth once daily.    MAGNESIUM OXIDE (MAG-OX) 400 MG (241.3 MG MAGNESIUM) TABLET    Take 1 tablet (400 mg total) by mouth once daily.    MECLIZINE (ANTIVERT) 12.5 MG TABLET    Take daily in the morning, and then may also take two more times in a day as needed, every 8 hours.    MUPIROCIN (BACTROBAN) 2 % OINTMENT    Apply topically once daily.    OMEPRAZOLE (PRILOSEC) 20 MG CAPSULE    Take 2 capsules (40 mg total) by mouth once daily.    POTASSIUM CHLORIDE SA (K-DUR,KLOR-CON M) 10 MEQ TABLET    Take 1 tablet (10 mEq total) by mouth once daily.    PROPRANOLOL (INDERAL) 40 MG TABLET    Take 1 tablet (40 mg total) by mouth 2 (two) times daily.    VITAMINS A,C,E-ZINC-COPPER (PRESERVISION AREDS) 4,296 MCG-226 MG-90 MG CAP    Take 1 capsule by mouth 2 (two) times daily.   Changed and/or Refilled Medications    Modified Medication Previous Medication    MIDODRINE (PROAMATINE) 2.5 MG TAB midodrine (PROAMATINE) 2.5 MG Tab       Take 1 tablet (2.5 mg total) by mouth 2 (two) times a day. Take first thing in the morning and then again in the afternoon around 3 pm.    Take 1 tablet (2.5 mg total) by mouth every 12 (twelve) hours as needed (blood pressure- if less than 100 (top) and less than 65 (bottom) take one tablet).             Assessment:       1. Hypotension, unspecified hypotension type    2. Paroxysmal atrial fibrillation    3. Severe aortic stenosis    4. Symptomatic bradycardia    5. Hx of CABG    6. Coronary artery disease involving native coronary artery of native heart without angina pectoris    7. Cardiac pacemaker    8. Chronic diastolic congestive heart failure          Plan:     Problem List Items Addressed This Visit          Unprioritized    Paroxysmal atrial fibrillation (Chronic)    Current Assessment & Plan     In SR per auscultation.          Coronary artery disease (Chronic)    Current Assessment & Plan     Asymptomatic and doing well on medical therapy. Continue the same.          Chronic diastolic congestive heart failure (Chronic)    Current Assessment & Plan     Euvolemic on exam despite recent elevated BNP.         Severe aortic stenosis (Chronic)    Current Assessment & Plan     Repeat echocardiogram.          Relevant Orders    Echo    Symptomatic bradycardia    Cardiac pacemaker    Hx of CABG    Hypotension - Primary    Current Assessment & Plan     Change to midodrine 2.5 mg po daily in the AM and second dose at 3PM.   Change positions slowly.   Avoid adding other antihypertensives. Will leave her propranolol alone for now as tremors is a significant bother in her daily life.          Relevant Medications    midodrine (PROAMATINE) 2.5 MG Tab       Follow up in about 4 weeks (around 10/26/2023).

## 2023-09-28 NOTE — ASSESSMENT & PLAN NOTE
Change to midodrine 2.5 mg po daily in the AM and second dose at 3PM.   Change positions slowly.   Avoid adding other antihypertensives. Will leave her propranolol alone for now as tremors is a significant bother in her daily life.

## 2023-09-30 ENCOUNTER — TELEPHONE (OUTPATIENT)
Dept: HEMATOLOGY/ONCOLOGY | Facility: CLINIC | Age: 88
End: 2023-09-30

## 2023-09-30 NOTE — TELEPHONE ENCOUNTER
Dayanara, Is this some one I have seen before.    Does she have an appt?  When?   A-T Advancement Flap Text: The defect edges were debeveled with a #15 scalpel blade.  Given the location of the defect, shape of the defect and the proximity to free margins an A-T advancement flap was deemed most appropriate.  Using a sterile surgical marker, an appropriate advancement flap was drawn incorporating the defect and placing the expected incisions within the relaxed skin tension lines where possible.    The area thus outlined was incised deep to adipose tissue with a #15 scalpel blade.  The skin margins were undermined to an appropriate distance in all directions utilizing iris scissors.

## 2023-10-03 PROBLEM — E83.42 LOW SERUM MAGNESIUM LEVEL: Status: ACTIVE | Noted: 2023-10-03

## 2023-10-03 PROBLEM — M10.9 GOUT: Status: ACTIVE | Noted: 2023-10-03

## 2023-10-03 PROBLEM — B34.8 RHINOVIRUS: Status: ACTIVE | Noted: 2023-10-03

## 2023-10-04 ENCOUNTER — HOSPITAL ENCOUNTER (OUTPATIENT)
Dept: CARDIOLOGY | Facility: HOSPITAL | Age: 88
Discharge: HOME | End: 2023-10-04
Attending: NURSE PRACTITIONER
Payer: MEDICARE

## 2023-10-04 VITALS — HEIGHT: 61 IN | WEIGHT: 147.94 LBS | BODY MASS INDEX: 27.93 KG/M2

## 2023-10-04 DIAGNOSIS — I35.0 SEVERE AORTIC STENOSIS: Chronic | ICD-10-CM

## 2023-10-04 LAB
AORTIC ROOT ANNULUS: 2.9 CM
AORTIC VALVE CUSP SEPERATION: 0.4 CM
AV INDEX (PROSTH): 0.24
AV MEAN GRADIENT: 16 MMHG
AV PEAK GRADIENT: 28 MMHG
AV VALVE AREA BY VELOCITY RATIO: 0.78 CM²
AV VALVE AREA: 0.75 CM²
AV VELOCITY RATIO: 0.25
BSA FOR ECHO PROCEDURE: 1.69 M2
CV ECHO LV RWT: 0.38 CM
DOP CALC AO PEAK VEL: 2.63 M/S
DOP CALC AO VTI: 67.7 CM
DOP CALC LVOT AREA: 3.1 CM2
DOP CALC LVOT DIAMETER: 2 CM
DOP CALC LVOT PEAK VEL: 0.65 M/S
DOP CALC LVOT STROKE VOLUME: 50.55 CM3
DOP CALC MV VTI: 23.6 CM
DOP CALCLVOT PEAK VEL VTI: 16.1 CM
E WAVE DECELERATION TIME: 243 MSEC
E/A RATIO: 1.37
E/E' RATIO: 17.75 M/S
ECHO LV POSTERIOR WALL: 1.12 CM (ref 0.6–1.1)
FRACTIONAL SHORTENING: 12 % (ref 28–44)
INTERVENTRICULAR SEPTUM: 1.12 CM (ref 0.6–1.1)
IVC DIAMETER: 1.79 CM
IVRT: 85 MSEC
LEFT ATRIUM SIZE: 4.5 CM
LEFT ATRIUM VOLUME INDEX MOD: 59.3 ML/M2
LEFT ATRIUM VOLUME MOD: 97.8 CM3
LEFT INTERNAL DIMENSION IN SYSTOLE: 5.13 CM (ref 2.1–4)
LEFT VENTRICLE DIASTOLIC VOLUME INDEX: 101.82 ML/M2
LEFT VENTRICLE DIASTOLIC VOLUME: 168 ML
LEFT VENTRICLE MASS INDEX: 165 G/M2
LEFT VENTRICLE SYSTOLIC VOLUME INDEX: 76.4 ML/M2
LEFT VENTRICLE SYSTOLIC VOLUME: 126 ML
LEFT VENTRICULAR INTERNAL DIMENSION IN DIASTOLE: 5.82 CM (ref 3.5–6)
LEFT VENTRICULAR MASS: 272.22 G
LV LATERAL E/E' RATIO: 17.75 M/S
LV SEPTAL E/E' RATIO: 17.75 M/S
LVOT MG: 1 MMHG
LVOT MV: 0.44 CM/S
MV MEAN GRADIENT: 1 MMHG
MV PEAK A VEL: 0.52 M/S
MV PEAK E VEL: 0.71 M/S
MV PEAK GRADIENT: 3 MMHG
MV VALVE AREA BY CONTINUITY EQUATION: 2.14 CM2
PISA MRMAX VEL: 5.41 M/S
PISA TR MAX VEL: 2.33 M/S
PV MV: 0.48 M/S
PV PEAK GRADIENT: 2 MMHG
PV PEAK VELOCITY: 0.69 M/S
RA PRESSURE ESTIMATED: 3 MMHG
RIGHT VENTRICULAR END-DIASTOLIC DIMENSION: 2.15 CM
RV TB RVSP: 5 MMHG
RV TISSUE DOPPLER FREE WALL SYSTOLIC VELOCITY 1 (APICAL 4 CHAMBER VIEW): 6.31 CM/S
TDI LATERAL: 0.04 M/S
TDI SEPTAL: 0.04 M/S
TDI: 0.04 M/S
TR MAX PG: 22 MMHG
TRICUSPID ANNULAR PLANE SYSTOLIC EXCURSION: 1.17 CM
TV REST PULMONARY ARTERY PRESSURE: 25 MMHG
Z-SCORE OF LEFT VENTRICULAR DIMENSION IN END DIASTOLE: 2.28
Z-SCORE OF LEFT VENTRICULAR DIMENSION IN END SYSTOLE: 4.59

## 2023-10-04 PROCEDURE — 93306 TTE W/DOPPLER COMPLETE: CPT | Mod: 26,,, | Performed by: INTERNAL MEDICINE

## 2023-10-04 PROCEDURE — 93306 ECHO (CUPID ONLY): ICD-10-PCS | Mod: 26,,, | Performed by: INTERNAL MEDICINE

## 2023-10-04 PROCEDURE — 93306 TTE W/DOPPLER COMPLETE: CPT

## 2023-10-05 DIAGNOSIS — I35.0 SEVERE AORTIC STENOSIS: Primary | ICD-10-CM

## 2023-10-06 ENCOUNTER — OFFICE VISIT (OUTPATIENT)
Dept: HEMATOLOGY/ONCOLOGY | Facility: CLINIC | Age: 88
End: 2023-10-06
Payer: MEDICARE

## 2023-10-06 ENCOUNTER — EXTERNAL HOME HEALTH (OUTPATIENT)
Dept: HOME HEALTH SERVICES | Facility: HOSPITAL | Age: 88
End: 2023-10-06
Payer: MEDICARE

## 2023-10-06 VITALS
DIASTOLIC BLOOD PRESSURE: 67 MMHG | TEMPERATURE: 97 F | RESPIRATION RATE: 18 BRPM | SYSTOLIC BLOOD PRESSURE: 101 MMHG | HEART RATE: 73 BPM | HEIGHT: 60 IN | BODY MASS INDEX: 29.67 KG/M2 | WEIGHT: 151.13 LBS

## 2023-10-06 DIAGNOSIS — D51.8 DIETARY VITAMIN B12 DEFICIENCY ANEMIA: Primary | ICD-10-CM

## 2023-10-06 DIAGNOSIS — D50.9 IRON DEFICIENCY ANEMIA, UNSPECIFIED IRON DEFICIENCY ANEMIA TYPE: ICD-10-CM

## 2023-10-06 DIAGNOSIS — Z79.01 ANTICOAGULANT LONG-TERM USE: Chronic | ICD-10-CM

## 2023-10-06 PROCEDURE — 99204 PR OFFICE/OUTPT VISIT, NEW, LEVL IV, 45-59 MIN: ICD-10-PCS | Mod: S$GLB,,, | Performed by: INTERNAL MEDICINE

## 2023-10-06 PROCEDURE — 99204 OFFICE O/P NEW MOD 45 MIN: CPT | Mod: S$GLB,,, | Performed by: INTERNAL MEDICINE

## 2023-10-06 RX ORDER — ALLOPURINOL 100 MG/1
100 TABLET ORAL DAILY
COMMUNITY

## 2023-10-06 NOTE — LETTER
October 7, 2023        Aamir Rhodes MD  [address]  [address]  [facility]  [place] [address]             [facility]ology [facility]  [address]  [place] [address]  Phone: [phone]  Fax: [phone]   Patient: Rosanna Raymundo   MR Number: [id number]   YOB: 1932   Date of Visit: 10/6/2023       Dear Dr. Rhodes:    Thank you for referring Rosanna Raymundo to me for evaluation. Below are the relevant portions of my assessment and plan of care.            If you have questions, please do not hesitate to call me. I look forward to following Rosanna along with you.    Sincerely,      CLAUDIA Turpin MD           CC    No Recipients

## 2023-10-07 ENCOUNTER — TELEPHONE (OUTPATIENT)
Dept: HEMATOLOGY/ONCOLOGY | Facility: CLINIC | Age: 88
End: 2023-10-07

## 2023-10-07 DIAGNOSIS — D51.8 DIETARY VITAMIN B12 DEFICIENCY ANEMIA: ICD-10-CM

## 2023-10-07 DIAGNOSIS — D50.0 IRON DEFICIENCY ANEMIA DUE TO CHRONIC BLOOD LOSS: ICD-10-CM

## 2023-10-07 RX ORDER — DIPHENHYDRAMINE HYDROCHLORIDE 50 MG/ML
50 INJECTION INTRAMUSCULAR; INTRAVENOUS ONCE AS NEEDED
Status: CANCELLED | OUTPATIENT
Start: 2023-10-25

## 2023-10-07 RX ORDER — SODIUM CHLORIDE 0.9 % (FLUSH) 0.9 %
10 SYRINGE (ML) INJECTION
Status: CANCELLED | OUTPATIENT
Start: 2023-10-25

## 2023-10-07 RX ORDER — HEPARIN 100 UNIT/ML
5 SYRINGE INTRAVENOUS
Status: CANCELLED | OUTPATIENT
Start: 2023-10-25

## 2023-10-07 RX ORDER — SODIUM CHLORIDE 9 MG/ML
INJECTION, SOLUTION INTRAVENOUS CONTINUOUS
Status: CANCELLED | OUTPATIENT
Start: 2023-10-25

## 2023-10-07 RX ORDER — DIPHENHYDRAMINE HYDROCHLORIDE 50 MG/ML
25 INJECTION INTRAMUSCULAR; INTRAVENOUS ONCE
Status: CANCELLED
Start: 2023-10-25

## 2023-10-07 RX ORDER — CYANOCOBALAMIN 1000 UG/ML
1000 INJECTION, SOLUTION INTRAMUSCULAR; SUBCUTANEOUS ONCE
Status: CANCELLED
Start: 2023-10-25

## 2023-10-07 RX ORDER — EPINEPHRINE 0.3 MG/.3ML
0.3 INJECTION SUBCUTANEOUS ONCE AS NEEDED
Status: CANCELLED | OUTPATIENT
Start: 2023-10-25

## 2023-10-07 NOTE — TELEPHONE ENCOUNTER
Orders placed in epic for injectafer x2 and B12 injections.  [facility] infusion center    Get date and time    Get authorization    Return to clinic in 4 months

## 2023-10-07 NOTE — PROGRESS NOTES
[facility] hematology Oncology in office initial encounter note    October 6, 2023.      Subjective:      Patient ID:   Rosanna Raymundo  89 y.o. female  11/3/1932  Aamir Rhodes MD      Chief Complaint:  Anemia      HPI:  89 y.o. female was referred for the evaluation of anemia.  She presented with a hemoglobin of 5 and had shortness of breath with exertion, her energy level was 3/10.  She was transfused up to a hemoglobin of 9.2 and energy level improved to 8/10.  She had upper endoscopy and was found to have AVMs, these were cauterized.  She was felt to have iron deficiency anemia and started on oral iron, iron pills constipate her.  She has been on iron and presently hemoglobin is improved to 13.8.  Ferritin level has improved from 5-17 on oral iron.    I have discussed with her that she appears to be responding to oral iron therapy with improvement in hemoglobin, however with GI symptoms of constipation.  I discussed with her the option of giving her intravenous iron replenishment to replace iron stores and facilitate red blood cell production here.  She would be able to come off of oral iron.  She wants to take the IV iron.  This will be given weekly x2 weeks.  There is a 1 or 2% risk of reaction to iron infusion, will premedicate her with Benadryl.  She will stop her oral iron once the IV iron has been initiated.  She denies craving of ice, pickle juice, [place] corn starch or flour or angelic.    He has history of left breast cancer 30 years ago treated with mastectomy, chemotherapy, hormonal therapy, and radiation.  She has not had breast cancer recurrence.  Other history includes pacemaker placement.  She had complete hysterectomy at age 13 and 1943 for the management excessive gyn blood loss.  She is status post CABG surgery.    She has history of paroxysmal atrial fibrillation, coronary artery disease, high cholesterol, hypertension, congestive heart failure history, severe aortic stenosis,  bradycardia, aortic atherosclerosis proces, prolonged QT and episode of hypotension.  She also has history of chronic renal disease stage IIIA and is on long-term anticoagulation.  Other history includes diabetes with stage IIIA chronic kidney disease without long-term use of insulin.  She has history of GERD, abdominal pain with biliary dilation.  Other history includes gout of multiple joints.    Her medications include amiodarone and Eliquis, ferrous sulfate, midodrine and Prilosec and Inderal.    She does not smoke, she does not drink alcohol with regularity.  She is allergic to penicillin.    There is family history of heart disease.    She had onset of menses at age 8, she had the complete hysterectomy at age 13, she never had pregnancy.  She did have the breast cancer as outlined above.    There is no other family history of breast cancer in the family, she did have an aunt with lung cancer.    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      ROS:   GEN: normal without any fever, night sweats or weight loss  HEENT: normal with no HA's, sore throat, stiff neck, changes in vision  CV:  See history of present illness  PULM: normal with no SOB, cough, hemoptysis, sputum or pleuritic pain  GI:  See history of present illness  : normal with no hematuria, dysuria  BREAST:  See history of present illness  SKIN: normal with no rash, erythema, bruising, or swelling     Past Medical History:   Diagnosis Date    Anticoagulant long-term use     Cancer     breast/female/throat    Cataract     CHF (congestive heart failure)     Diabetes mellitus, type 2     Heart murmur     Hypertension     Iron deficiency anemia, unspecified     Anemia, iron def.    Occasional tremors      Past Surgical History:   Procedure Laterality Date    ADENOIDECTOMY      APPENDECTOMY      COLONOSCOPY N/A 12/17/2019    Procedure: COLONOSCOPY;  Surgeon:  Raleigh Quarles III, MD;  Location: [facility] ENDO;  Service: Endoscopy;  Laterality: N/A;    COLONOSCOPY N/A 12/18/2019    Procedure: COLONOSCOPY;  Surgeon: Raleigh Quarles III, MD;  Location: [facility] ENDO;  Service: Endoscopy;  Laterality: N/A;    ERCP N/A 8/10/2023    Procedure: ERCP (ENDOSCOPIC RETROGRADE CHOLANGIOPANCREATOGRAPHY);  Surgeon: Raleigh Quarles III, MD;  Location: [facility] ENDO;  Service: Endoscopy;  Laterality: N/A;    Gall bladder revmoval      HYSTERECTOMY      INSERTION OF PACEMAKER Right 1/2/2020    Procedure: INSERTION, PACEMAKER;  Surgeon: Fly Barragan MD;  Location: [facility] CATH/EP LAB;  Service: Cardiology;  Laterality: Right;    left mastectomy Left 1945    MASTECTOMY Left     SC CABG, ARTERY-VEIN, FOUR  2014    Coronary Artery Bypass, 4    SMALL BOWEL ENTEROSCOPY N/A 12/17/2019    Procedure: ENTEROSCOPY;  Surgeon: Raleigh Quarles III, MD;  Location: [facility] ENDO;  Service: Endoscopy;  Laterality: N/A;    TONSILLECTOMY         Review of patient's allergies indicates:   Allergen Reactions    Penicillin g benzathine Other (See Comments)     Social History     Socioeconomic History    Marital status:    Tobacco Use    Smoking status: Never    Smokeless tobacco: Never   Substance and Sexual Activity    Alcohol use: Never    Drug use: Never    Sexual activity: Not Currently     Social Determinants of Health     Financial Resource Strain: Unknown (7/27/2023)    Overall Financial Resource Strain (CARDIA)     Difficulty of Paying Living Expenses: Patient refused   Food Insecurity: No Food Insecurity (7/27/2023)    Hunger Vital Sign     Worried About Running Out of Food in the Last Year: Never true     Ran Out of Food in the Last Year: Never true   Transportation Needs: No Transportation Needs (7/27/2023)    PRAPARE - Transportation     Lack of Transportation (Medical): No     Lack of Transportation (Non-Medical): No   Physical Activity: Unknown (7/27/2023)    Exercise Vital Sign      Days of Exercise per Week: Patient refused     Minutes of Exercise per Session: Patient refused   Stress: Unknown (7/27/2023)    [employer] - Occupational Stress Questionnaire     Feeling of Stress : Patient refused   Social Connections: Unknown (7/27/2023)    Social Connection and Isolation Panel [NHANES]     Frequency of Communication with Friends and Family: Patient refused     Frequency of Social Gatherings with Friends and Family: Patient refused     Attends [employer] Services: Patient refused     Active Member of Clubs or Organizations: Patient refused     Attends Club or Organization Meetings: Patient refused     Marital Status:    Housing Stability: Unknown (7/27/2023)    Housing Stability Vital Sign     Unable to Pay for Housing in the Last Year: No     Unstable Housing in the Last Year: No         Current Outpatient Medications:     allopurinoL (ZYLOPRIM) 100 MG tablet, Take 100 mg by mouth once daily., Disp: , Rfl:     amiodarone (PACERONE) 200 MG Tab, Take 0.5 tablets (100 mg total) by mouth once daily., Disp: 45 tablet, Rfl: 3    apixaban (ELIQUIS) 2.5 mg Tab, Take 1 tablet (2.5 mg total) by mouth 2 (two) times daily., Disp: 180 tablet, Rfl: 3    blood sugar diagnostic Strp, To check BG 3 times daily, to use with insurance preferred meter, Disp: 200 strip, Rfl: 1    busPIRone (BUSPAR) 5 MG Tab, Take 2 tablets (10 mg total) by mouth 2 (two) times daily., Disp: 180 tablet, Rfl: 3    cholecalciferol, vitamin D3, (VITAMIN D3) 25 mcg (1,000 unit) capsule, Take 1 capsule (1,000 Units total) by mouth once daily., Disp: 100 capsule, Rfl: 1    docusate sodium (COLACE) 100 MG capsule, Take 100 mg by mouth as needed for Constipation., Disp: , Rfl:     ferrous sulfate 325 (65 FE) MG EC tablet, Take 1 tablet (325 mg total) by mouth 2 (two) times daily., Disp: 180 tablet, Rfl: 2    FREESTYLE LANCETS 28 gauge lancets, , Disp: , Rfl:     furosemide (LASIX) 20 MG tablet, Take a half  to one tablet in the morning if needed for swelling., Disp: 30 tablet, Rfl: 11    magnesium oxide (MAG-OX) 400 mg (241.3 mg magnesium) tablet, Take 1 tablet (400 mg total) by mouth once daily. (Patient taking differently: Take 200 mg by mouth once daily.), Disp: 90 tablet, Rfl: 3    meclizine (ANTIVERT) 12.5 mg tablet, Take daily in the morning, and then may also take two more times in a day as needed, every 8 hours., Disp: 30 tablet, Rfl: 11    midodrine (PROAMATINE) 2.5 MG Tab, Take 1 tablet (2.5 mg total) by mouth 2 (two) times a day. Take first thing in the morning and then again in the afternoon around 3 pm., Disp: 180 tablet, Rfl: 3    mupirocin (BACTROBAN) 2 % ointment, Apply topically once daily. (Patient taking differently: Apply 1 g topically once daily.), Disp: 30 g, Rfl: 3    omeprazole (PRILOSEC) 20 MG capsule, Take 2 capsules (40 mg total) by mouth once daily. (Patient taking differently: Take 20 mg by mouth 2 (two) times a day.), Disp: 180 capsule, Rfl: 1    potassium chloride SA (K-DUR,KLOR-CON M) 10 MEQ tablet, Take 1 tablet (10 mEq total) by mouth once daily., Disp: 30 tablet, Rfl: 11    propranoloL (INDERAL) 40 MG tablet, Take 1 tablet (40 mg total) by mouth 2 (two) times daily., Disp: 180 tablet, Rfl: 3    vitamins A,C,E-zinc-copper (PRESERVISION AREDS) 4,296 mcg-226 mg-90 mg Cap, Take 1 capsule by mouth 2 (two) times daily., Disp: 180 capsule, Rfl: 3    blood-glucose meter kit, To check BG 3 times daily, to use with insurance preferred meter, Disp: 1 each, Rfl: 0    loratadine (CLARITIN) 10 mg tablet, Take 1 tablet (10 mg total) by mouth once daily. (Patient not taking: Reported on 10/6/2023), Disp: 90 tablet, Rfl: 3          Objective:   Vitals:  Blood pressure 101/67, pulse 73, temperature 97.4 °F (36.3 °C), resp. rate 18, height 5' (1.524 m), weight 68.5 kg (151 lb 1.6 oz).    Physical Examination:   GEN: no apparent distress, comfortable  HEAD: atraumatic and normocephalic  EYES: no pallor,  no icterus  ENT:  no pharyngeal erythema, external ears WNL; no nasal discharge; no thrush  NECK: no masses, thyroid normal, trachea midline, no LAD/LN's, supple  CV: RRR with no murmur; normal pulse; normal S1 and S2; no pedal edema  CHEST: Normal respiratory effort; CTAB; normal breath sounds; no wheeze or crackles  ABDOM: nontender and nondistended; soft; no rebound/guarding, liver and spleen are not palpable  MUSC/Skeletal: ROM normal; no crepitus; joints normal  EXTREM: no clubbing, cyanosis, inflammation or swelling  SKIN: no rashes, lesions, ulcers, petechiae   : no CVAT  NEURO: grossly intact; motor/sensory WNL; no tremors  PSYCH: normal mood, affect and behavior  LYMPH: normal cervical, supraclavicular, axillary LN's  BREASTS:  Left chest shows postoperative change without palpable mass or lymphadenopathy.  Right breast is nontender without discharge and without palpable mass.  She does not have axillary or supraclavicular palpable lymphadenopathy at the left or right.      Labs:   Lab Results   Component Value Date    WBC 7.1 09/25/2023    HGB 13.8 09/25/2023    HCT 46.7 (H) 09/25/2023    MCV 78.1 (L) 09/25/2023     09/25/2023    CMP  Ferritin has improved from 5 to 17 on oral iron.  B12 is 358, within the low normal range, we try to keep the B12 greater than 400.  Will start her on B12 injections soon facilitate increase in B12 level and try and correct the anemia here.    Assessment:   (1) 89 y.o. female with diagnosis of iron deficiency anemia secondary to chronic GI blood loss secondary to AVMs.  These have been cauterized.  She appears to be responding and improving with oral iron therapy.  However she has unacceptable GI symptoms of constipation while on oral iron, even while taking MiraLax and stool softeners.    (2) have discussed with her the option of going with IV iron to replenish iron stores, she would be able to come off of oral iron.  Several options include Venofer, injected for,  and Ferrlecit.  Will see which product is covered by her carrier and get authorization.  There is a 1 or 2% risk of reaction to iron infusion, will premedicate her with Benadryl to try and prevent allergic reaction here.    After her iron infusions have been initiated, she will stop her oral iron.  She will follow-up here in 4 months with a repeat CBC and ferritin.  She is on chronic Eliquis anticoagulation, which does aggravate GI bleeding from AVMs.  The Eliquis is ongoing to reduce the risk of stroke event in the setting of her paroxysmal atrial fibrillation.    Will begin her on B12 1000 mcg subQ with each iron infusion to see if we can facilitate red blood cell production here, and then if she strengthens we will continue the B12 injection monthly to replenish B12 stores    I have explained and the patient understands all of  the current recommendation(s). I have answered all of their questions to the best of my ability and to their complete satisfaction.

## 2023-10-09 RX ORDER — CYANOCOBALAMIN 1000 UG/ML
1000 INJECTION, SOLUTION INTRAMUSCULAR; SUBCUTANEOUS
Status: CANCELLED | OUTPATIENT
Start: 2023-10-09

## 2023-10-10 ENCOUNTER — TELEPHONE (OUTPATIENT)
Dept: CARDIOLOGY | Facility: CLINIC | Age: 88
End: 2023-10-10
Payer: MEDICARE

## 2023-10-10 NOTE — TELEPHONE ENCOUNTER
----- Message from Yasir Daugherty sent at 10/10/2023  9:10 AM CDT -----  Regarding: Return Call  Contact: [place]: Martha Martin  Type:  Patient Returning Call    Who Called:Martha Martin niece/caregiver  Who Left Message for Patient:office nurse  Does the patient know what this is regarding?:patient is confused on recent phone call can nurse please call back and speak with Martha  Would the patient rather a call back or a response via [facility]?   Best Call Back Number:[phone]  Additional Information:

## 2023-10-10 NOTE — TELEPHONE ENCOUNTER
----- Message from Erika Chin NP sent at 10/5/2023  2:18 PM CDT -----  Echocardiogram shows severe Aortic stenosis. Will refer to Dr. Gipson in [place] for evaluation.

## 2023-10-20 DIAGNOSIS — Z95.0 CARDIAC PACEMAKER: Primary | ICD-10-CM

## 2023-10-24 ENCOUNTER — INFUSION (OUTPATIENT)
Dept: INFUSION THERAPY | Facility: HOSPITAL | Age: 88
End: 2023-10-24
Attending: INTERNAL MEDICINE
Payer: MEDICARE

## 2023-10-24 VITALS
DIASTOLIC BLOOD PRESSURE: 47 MMHG | TEMPERATURE: 97 F | RESPIRATION RATE: 16 BRPM | WEIGHT: 150.81 LBS | SYSTOLIC BLOOD PRESSURE: 120 MMHG | BODY MASS INDEX: 29.61 KG/M2 | OXYGEN SATURATION: 96 % | HEIGHT: 60 IN | HEART RATE: 58 BPM

## 2023-10-24 DIAGNOSIS — D51.8 DIETARY VITAMIN B12 DEFICIENCY ANEMIA: Primary | ICD-10-CM

## 2023-10-24 DIAGNOSIS — D50.0 IRON DEFICIENCY ANEMIA DUE TO CHRONIC BLOOD LOSS: ICD-10-CM

## 2023-10-24 PROCEDURE — 63600175 PHARM REV CODE 636 W HCPCS: Performed by: INTERNAL MEDICINE

## 2023-10-24 PROCEDURE — 25000003 PHARM REV CODE 250: Performed by: INTERNAL MEDICINE

## 2023-10-24 PROCEDURE — 96367 TX/PROPH/DG ADDL SEQ IV INF: CPT

## 2023-10-24 PROCEDURE — 96365 THER/PROPH/DIAG IV INF INIT: CPT

## 2023-10-24 PROCEDURE — 96372 THER/PROPH/DIAG INJ SC/IM: CPT | Mod: 59

## 2023-10-24 RX ORDER — CYANOCOBALAMIN 1000 UG/ML
1000 INJECTION, SOLUTION INTRAMUSCULAR; SUBCUTANEOUS
OUTPATIENT
Start: 2023-10-24

## 2023-10-24 RX ORDER — DIPHENHYDRAMINE HYDROCHLORIDE 50 MG/ML
50 INJECTION INTRAMUSCULAR; INTRAVENOUS ONCE AS NEEDED
Status: CANCELLED | OUTPATIENT
Start: 2023-10-31

## 2023-10-24 RX ORDER — SODIUM CHLORIDE 0.9 % (FLUSH) 0.9 %
10 SYRINGE (ML) INJECTION
Status: CANCELLED | OUTPATIENT
Start: 2023-10-31

## 2023-10-24 RX ORDER — SODIUM CHLORIDE 9 MG/ML
INJECTION, SOLUTION INTRAVENOUS CONTINUOUS
Status: CANCELLED | OUTPATIENT
Start: 2023-10-31

## 2023-10-24 RX ORDER — HEPARIN 100 UNIT/ML
5 SYRINGE INTRAVENOUS
Status: CANCELLED | OUTPATIENT
Start: 2023-10-31

## 2023-10-24 RX ORDER — SODIUM CHLORIDE 9 MG/ML
INJECTION, SOLUTION INTRAVENOUS CONTINUOUS
Status: DISCONTINUED | OUTPATIENT
Start: 2023-10-24 | End: 2023-10-24 | Stop reason: HOSPADM

## 2023-10-24 RX ORDER — EPINEPHRINE 0.3 MG/.3ML
0.3 INJECTION SUBCUTANEOUS ONCE AS NEEDED
Status: CANCELLED | OUTPATIENT
Start: 2023-10-31

## 2023-10-24 RX ORDER — CYANOCOBALAMIN 1000 UG/ML
1000 INJECTION, SOLUTION INTRAMUSCULAR; SUBCUTANEOUS
Status: COMPLETED | OUTPATIENT
Start: 2023-10-24 | End: 2023-10-24

## 2023-10-24 RX ORDER — SODIUM CHLORIDE 0.9 % (FLUSH) 0.9 %
10 SYRINGE (ML) INJECTION
Status: DISCONTINUED | OUTPATIENT
Start: 2023-10-24 | End: 2023-10-24 | Stop reason: HOSPADM

## 2023-10-24 RX ORDER — DIPHENHYDRAMINE HYDROCHLORIDE 50 MG/ML
25 INJECTION INTRAMUSCULAR; INTRAVENOUS ONCE
Status: CANCELLED
Start: 2023-10-31

## 2023-10-24 RX ADMIN — DIPHENHYDRAMINE HYDROCHLORIDE 25 MG: 50 INJECTION, SOLUTION INTRAMUSCULAR; INTRAVENOUS at 11:10

## 2023-10-24 RX ADMIN — FERRIC CARBOXYMALTOSE INJECTION 750 MG: 50 INJECTION, SOLUTION INTRAVENOUS at 11:10

## 2023-10-24 RX ADMIN — SODIUM CHLORIDE: 0.9 INJECTION, SOLUTION INTRAVENOUS at 11:10

## 2023-10-24 RX ADMIN — CYANOCOBALAMIN 1000 MCG: 1000 INJECTION, SOLUTION INTRAMUSCULAR at 11:10

## 2023-10-24 NOTE — PLAN OF CARE
Problem: Anemia  Goal: Anemia Symptom Improvement  Outcome: Ongoing, Progressing  Intervention: Monitor and Manage Anemia  Flowsheets (Taken 10/24/2023 1053)  Oral Nutrition Promotion: safe use of adaptive equipment encouraged  Safety Promotion/Fall Prevention: assistive device/personal item within reach  Fatigue Management: activity schedule adjusted

## 2023-10-31 ENCOUNTER — INFUSION (OUTPATIENT)
Dept: INFUSION THERAPY | Facility: HOSPITAL | Age: 88
End: 2023-10-31
Attending: INTERNAL MEDICINE
Payer: MEDICARE

## 2023-10-31 VITALS
OXYGEN SATURATION: 95 % | DIASTOLIC BLOOD PRESSURE: 76 MMHG | SYSTOLIC BLOOD PRESSURE: 125 MMHG | TEMPERATURE: 98 F | RESPIRATION RATE: 16 BRPM | HEART RATE: 61 BPM

## 2023-10-31 DIAGNOSIS — D51.8 DIETARY VITAMIN B12 DEFICIENCY ANEMIA: Primary | ICD-10-CM

## 2023-10-31 DIAGNOSIS — D50.0 IRON DEFICIENCY ANEMIA DUE TO CHRONIC BLOOD LOSS: ICD-10-CM

## 2023-10-31 PROCEDURE — 96365 THER/PROPH/DIAG IV INF INIT: CPT

## 2023-10-31 PROCEDURE — 63600175 PHARM REV CODE 636 W HCPCS: Performed by: INTERNAL MEDICINE

## 2023-10-31 PROCEDURE — 25000003 PHARM REV CODE 250: Performed by: INTERNAL MEDICINE

## 2023-10-31 PROCEDURE — 96367 TX/PROPH/DG ADDL SEQ IV INF: CPT

## 2023-10-31 RX ORDER — SODIUM CHLORIDE 0.9 % (FLUSH) 0.9 %
10 SYRINGE (ML) INJECTION
Status: DISCONTINUED | OUTPATIENT
Start: 2023-10-31 | End: 2023-10-31 | Stop reason: HOSPADM

## 2023-10-31 RX ORDER — SODIUM CHLORIDE 0.9 % (FLUSH) 0.9 %
10 SYRINGE (ML) INJECTION
Status: CANCELLED | OUTPATIENT
Start: 2023-11-07

## 2023-10-31 RX ORDER — DIPHENHYDRAMINE HYDROCHLORIDE 50 MG/ML
50 INJECTION INTRAMUSCULAR; INTRAVENOUS ONCE AS NEEDED
OUTPATIENT
Start: 2023-11-07

## 2023-10-31 RX ORDER — DIPHENHYDRAMINE HYDROCHLORIDE 50 MG/ML
25 INJECTION INTRAMUSCULAR; INTRAVENOUS ONCE
Status: CANCELLED
Start: 2023-11-07

## 2023-10-31 RX ORDER — HEPARIN 100 UNIT/ML
5 SYRINGE INTRAVENOUS
OUTPATIENT
Start: 2023-11-07

## 2023-10-31 RX ORDER — EPINEPHRINE 0.3 MG/.3ML
0.3 INJECTION SUBCUTANEOUS ONCE AS NEEDED
OUTPATIENT
Start: 2023-11-07

## 2023-10-31 RX ORDER — SODIUM CHLORIDE 9 MG/ML
INJECTION, SOLUTION INTRAVENOUS CONTINUOUS
Status: DISCONTINUED | OUTPATIENT
Start: 2023-10-31 | End: 2023-10-31 | Stop reason: HOSPADM

## 2023-10-31 RX ORDER — SODIUM CHLORIDE 9 MG/ML
INJECTION, SOLUTION INTRAVENOUS CONTINUOUS
Status: CANCELLED | OUTPATIENT
Start: 2023-11-07

## 2023-10-31 RX ADMIN — SODIUM CHLORIDE: 0.9 INJECTION, SOLUTION INTRAVENOUS at 01:10

## 2023-10-31 RX ADMIN — DIPHENHYDRAMINE HYDROCHLORIDE 25 MG: 50 INJECTION, SOLUTION INTRAMUSCULAR; INTRAVENOUS at 01:10

## 2023-10-31 RX ADMIN — FERRIC CARBOXYMALTOSE INJECTION 750 MG: 50 INJECTION, SOLUTION INTRAVENOUS at 01:10

## 2023-10-31 NOTE — PLAN OF CARE
Problem: Anemia  Goal: Anemia Symptom Improvement  Outcome: Ongoing, Progressing  Intervention: Monitor and Manage Anemia  Flowsheets (Taken 10/31/2023 1315)  Oral Nutrition Promotion:   nutritional therapy counseling provided   medicated  Safety Promotion/Fall Prevention:   assistive device/personal item within reach   Fall Risk reviewed with patient/family   supervised activity   in recliner, wheels locked   instructed to call staff for mobility  Fatigue Management:   frequent rest breaks encouraged   fatigue-related activity identified

## 2023-11-07 ENCOUNTER — TELEPHONE (OUTPATIENT)
Dept: CARDIOLOGY | Facility: CLINIC | Age: 88
End: 2023-11-07
Payer: MEDICARE

## 2023-11-07 NOTE — TELEPHONE ENCOUNTER
----- Message from Mat Cruz sent at 11/7/2023  8:04 AM CST -----  Type:  Sooner Apoointment Request    Caller is requesting a sooner appointment.  Caller declined first available appointment listed below.  Caller will not accept being placed on the waitlist and is requesting a message be sent to doctor.  Name of Caller:pt   When is the first available appointment?12/12   Symptoms:f/u echo  Would the patient rather a call back or a response via [facility]? call  Best Call Back Number:[phone]  Additional Information:

## 2023-11-08 ENCOUNTER — TELEPHONE (OUTPATIENT)
Dept: FAMILY MEDICINE | Facility: CLINIC | Age: 88
End: 2023-11-08

## 2023-11-08 NOTE — TELEPHONE ENCOUNTER
----- Message from Yue Arellano MA sent at 11/8/2023  3:26 PM CST -----    ----- Message -----  From: Cata Cramer  Sent: 11/8/2023   3:10 PM CST  To: Aamir Rhodes Staff    Her assistance living is in need of a note about her diet per the cardiologist     Tamra rincon daughter [phone]

## 2023-11-08 NOTE — TELEPHONE ENCOUNTER
Spoke with Tamra from [facility] who states that Mrs. Nelson from administration at 's assisted living is requesting a diet order of what pt can and cannot eat. Tamra stated that pt was previously told by a  From 12 years ago that she cannot have any milk, sugar, chocolate, wheat, caffeine, sweeney, or grease. Please advise.    Printed for Dr. Rhodes review.

## 2023-11-08 NOTE — LETTER
[address]  [place] [address]  Phone: [phone]   [phone]                        MD Fidel Vivas, MD Casper Dale PA-C Linda Melerine, CANDIE Rodríguez, CANDIE Cardoso, CANDIE      Date: 11/08/2023        Patient: Rosanna Raymundo  YOB: 1932      To whom it may concern. Per Dr. Rhodes the patient named above is restricted to a diet with no added salt and no concentrated sweets. Please contact our office if you have any questions.         Sincerely,     Dianne Elise LPN

## 2023-11-20 DIAGNOSIS — R42 VERTIGO: ICD-10-CM

## 2023-11-20 RX ORDER — MECLIZINE HCL 12.5 MG 12.5 MG/1
TABLET ORAL
Qty: 30 TABLET | Refills: 11 | Status: SHIPPED | OUTPATIENT
Start: 2023-11-20

## 2023-11-20 NOTE — TELEPHONE ENCOUNTER
----- Message from Cata Cramer sent at 11/20/2023 10:23 AM CST -----  Pt needs refill on meclazine   [facility]   [phone]

## 2023-11-21 DIAGNOSIS — D51.8 DIETARY VITAMIN B12 DEFICIENCY ANEMIA: Primary | ICD-10-CM

## 2023-11-21 RX ORDER — SYRINGE-NEEDLE,INSULIN,0.5 ML 28GX1/2"
SYRINGE, EMPTY DISPOSABLE MISCELLANEOUS
Qty: 3 EACH | Refills: 4 | Status: SHIPPED | OUTPATIENT
Start: 2023-11-21 | End: 2024-01-10 | Stop reason: SDUPTHER

## 2023-11-21 RX ORDER — CYANOCOBALAMIN 1000 UG/ML
INJECTION, SOLUTION INTRAMUSCULAR; SUBCUTANEOUS
Qty: 3 ML | Refills: 4 | Status: SHIPPED | OUTPATIENT
Start: 2023-11-21 | End: 2024-01-10 | Stop reason: SDUPTHER

## 2023-11-22 ENCOUNTER — TELEPHONE (OUTPATIENT)
Dept: HEMATOLOGY/ONCOLOGY | Facility: CLINIC | Age: 88
End: 2023-11-22

## 2023-11-27 ENCOUNTER — HOSPITAL ENCOUNTER (OUTPATIENT)
Facility: HOSPITAL | Age: 88
LOS: 1 days | Discharge: HOME | End: 2023-11-28
Attending: EMERGENCY MEDICINE | Admitting: INTERNAL MEDICINE
Payer: MEDICARE

## 2023-11-27 DIAGNOSIS — I50.9 ACUTE ON CHRONIC CONGESTIVE HEART FAILURE, UNSPECIFIED HEART FAILURE TYPE: Primary | ICD-10-CM

## 2023-11-27 DIAGNOSIS — R07.9 CHEST PAIN: ICD-10-CM

## 2023-11-27 DIAGNOSIS — R06.02 SHORTNESS OF BREATH: ICD-10-CM

## 2023-11-27 DIAGNOSIS — I95.9 HYPOTENSION, UNSPECIFIED HYPOTENSION TYPE: ICD-10-CM

## 2023-11-27 DIAGNOSIS — R06.02 SOB (SHORTNESS OF BREATH): ICD-10-CM

## 2023-11-27 DIAGNOSIS — I50.32 CHRONIC DIASTOLIC CONGESTIVE HEART FAILURE: Chronic | ICD-10-CM

## 2023-11-27 DIAGNOSIS — I35.0 SEVERE AORTIC STENOSIS: Chronic | ICD-10-CM

## 2023-11-27 LAB
ALBUMIN SERPL BCP-MCNC: 3.9 G/DL (ref 3.5–5.2)
ALP SERPL-CCNC: 62 U/L (ref 55–135)
ALT SERPL W/O P-5'-P-CCNC: 11 U/L (ref 10–44)
ANION GAP SERPL CALC-SCNC: 9 MMOL/L (ref 8–16)
ANISOCYTOSIS BLD QL SMEAR: SLIGHT
AST SERPL-CCNC: 18 U/L (ref 10–40)
BACTERIA #/AREA URNS HPF: NEGATIVE /HPF
BASOPHILS # BLD AUTO: 0.05 K/UL (ref 0–0.2)
BASOPHILS # BLD AUTO: 0.07 K/UL (ref 0–0.2)
BASOPHILS NFR BLD: 1 % (ref 0–1.9)
BASOPHILS NFR BLD: 1.4 % (ref 0–1.9)
BILIRUB SERPL-MCNC: 0.7 MG/DL (ref 0.1–1)
BILIRUB UR QL STRIP: NEGATIVE
BNP SERPL-MCNC: 1352 PG/ML (ref 0–99)
BUN SERPL-MCNC: 16 MG/DL (ref 10–30)
CALCIUM SERPL-MCNC: 9.3 MG/DL (ref 8.7–10.5)
CHLORIDE SERPL-SCNC: 107 MMOL/L (ref 95–110)
CLARITY UR: CLEAR
CO2 SERPL-SCNC: 23 MMOL/L (ref 23–29)
COLOR UR: YELLOW
CREAT SERPL-MCNC: 0.9 MG/DL (ref 0.5–1.4)
DIFFERENTIAL METHOD: ABNORMAL
DIFFERENTIAL METHOD: ABNORMAL
EOSINOPHIL # BLD AUTO: 0.1 K/UL (ref 0–0.5)
EOSINOPHIL # BLD AUTO: 0.2 K/UL (ref 0–0.5)
EOSINOPHIL NFR BLD: 1.8 % (ref 0–8)
EOSINOPHIL NFR BLD: 3.2 % (ref 0–8)
ERYTHROCYTE [DISTWIDTH] IN BLOOD BY AUTOMATED COUNT: 25.1 % (ref 11.5–14.5)
ERYTHROCYTE [DISTWIDTH] IN BLOOD BY AUTOMATED COUNT: 25.2 % (ref 11.5–14.5)
EST. GFR  (NO RACE VARIABLE): >60 ML/MIN/1.73 M^2
GLUCOSE SERPL-MCNC: 120 MG/DL (ref 70–110)
GLUCOSE SERPL-MCNC: 126 MG/DL (ref 70–110)
GLUCOSE SERPL-MCNC: 96 MG/DL (ref 70–110)
GLUCOSE UR QL STRIP: NEGATIVE
HCT VFR BLD AUTO: 44.2 % (ref 37–48.5)
HCT VFR BLD AUTO: 45.1 % (ref 37–48.5)
HGB BLD-MCNC: 14.4 G/DL (ref 12–16)
HGB BLD-MCNC: 14.5 G/DL (ref 12–16)
HGB UR QL STRIP: NEGATIVE
HYALINE CASTS #/AREA URNS LPF: 3 /LPF
IMM GRANULOCYTES # BLD AUTO: 0.02 K/UL (ref 0–0.04)
IMM GRANULOCYTES # BLD AUTO: 0.02 K/UL (ref 0–0.04)
IMM GRANULOCYTES NFR BLD AUTO: 0.4 % (ref 0–0.5)
IMM GRANULOCYTES NFR BLD AUTO: 0.4 % (ref 0–0.5)
INFLUENZA A, MOLECULAR: NEGATIVE
INFLUENZA B, MOLECULAR: NEGATIVE
KETONES UR QL STRIP: NEGATIVE
LEUKOCYTE ESTERASE UR QL STRIP: NEGATIVE
LYMPHOCYTES # BLD AUTO: 1.2 K/UL (ref 1–4.8)
LYMPHOCYTES # BLD AUTO: 1.2 K/UL (ref 1–4.8)
LYMPHOCYTES NFR BLD: 24.3 % (ref 18–48)
LYMPHOCYTES NFR BLD: 24.6 % (ref 18–48)
MCH RBC QN AUTO: 28 PG (ref 27–31)
MCH RBC QN AUTO: 28.3 PG (ref 27–31)
MCHC RBC AUTO-ENTMCNC: 32.2 G/DL (ref 32–36)
MCHC RBC AUTO-ENTMCNC: 32.6 G/DL (ref 32–36)
MCV RBC AUTO: 87 FL (ref 82–98)
MCV RBC AUTO: 87 FL (ref 82–98)
MICROSCOPIC COMMENT: ABNORMAL
MONOCYTES # BLD AUTO: 0.4 K/UL (ref 0.3–1)
MONOCYTES # BLD AUTO: 0.4 K/UL (ref 0.3–1)
MONOCYTES NFR BLD: 7 % (ref 4–15)
MONOCYTES NFR BLD: 7.9 % (ref 4–15)
NEUTROPHILS # BLD AUTO: 3.2 K/UL (ref 1.8–7.7)
NEUTROPHILS # BLD AUTO: 3.2 K/UL (ref 1.8–7.7)
NEUTROPHILS NFR BLD: 63.7 % (ref 38–73)
NEUTROPHILS NFR BLD: 64.3 % (ref 38–73)
NITRITE UR QL STRIP: NEGATIVE
NRBC BLD-RTO: 0 /100 WBC
NRBC BLD-RTO: 0 /100 WBC
PH UR STRIP: 7 [PH] (ref 5–8)
PLATELET # BLD AUTO: 155 K/UL (ref 150–450)
PLATELET # BLD AUTO: 157 K/UL (ref 150–450)
PLATELET BLD QL SMEAR: ABNORMAL
PLATELET BLD QL SMEAR: ABNORMAL
PMV BLD AUTO: 9.8 FL (ref 9.2–12.9)
PMV BLD AUTO: ABNORMAL FL (ref 9.2–12.9)
POIKILOCYTOSIS BLD QL SMEAR: SLIGHT
POLYCHROMASIA BLD QL SMEAR: ABNORMAL
POTASSIUM SERPL-SCNC: 4.2 MMOL/L (ref 3.5–5.1)
PROT SERPL-MCNC: 6.8 G/DL (ref 6–8.4)
PROT UR QL STRIP: ABNORMAL
RBC # BLD AUTO: 5.09 M/UL (ref 4–5.4)
RBC # BLD AUTO: 5.17 M/UL (ref 4–5.4)
RBC #/AREA URNS HPF: 3 /HPF (ref 0–4)
SARS-COV-2 RDRP RESP QL NAA+PROBE: NEGATIVE
SODIUM SERPL-SCNC: 139 MMOL/L (ref 136–145)
SP GR UR STRIP: 1.01 (ref 1–1.03)
SPECIMEN SOURCE: NORMAL
SPHEROCYTES BLD QL SMEAR: ABNORMAL
SQUAMOUS #/AREA URNS HPF: 0 /HPF
TARGETS BLD QL SMEAR: ABNORMAL
TROPONIN I SERPL HS-MCNC: 14 PG/ML (ref 0–14.9)
URN SPEC COLLECT METH UR: ABNORMAL
UROBILINOGEN UR STRIP-ACNC: NEGATIVE EU/DL
VIT B12 SERPL-MCNC: 667 PG/ML (ref 210–950)
WBC # BLD AUTO: 5.03 K/UL (ref 3.9–12.7)
WBC # BLD AUTO: 5.04 K/UL (ref 3.9–12.7)
WBC #/AREA URNS HPF: 1 /HPF (ref 0–5)

## 2023-11-27 PROCEDURE — 21400001 HC TELEMETRY ROOM

## 2023-11-27 PROCEDURE — 81001 URINALYSIS AUTO W/SCOPE: CPT | Performed by: EMERGENCY MEDICINE

## 2023-11-27 PROCEDURE — 82962 GLUCOSE BLOOD TEST: CPT

## 2023-11-27 PROCEDURE — 85025 COMPLETE CBC W/AUTO DIFF WBC: CPT | Mod: 91 | Performed by: INTERNAL MEDICINE

## 2023-11-27 PROCEDURE — 25000003 PHARM REV CODE 250: Performed by: INTERNAL MEDICINE

## 2023-11-27 PROCEDURE — 99285 EMERGENCY DEPT VISIT HI MDM: CPT | Mod: 25

## 2023-11-27 PROCEDURE — 87502 INFLUENZA DNA AMP PROBE: CPT | Performed by: EMERGENCY MEDICINE

## 2023-11-27 PROCEDURE — 85025 COMPLETE CBC W/AUTO DIFF WBC: CPT | Performed by: EMERGENCY MEDICINE

## 2023-11-27 PROCEDURE — 93010 ELECTROCARDIOGRAM REPORT: CPT | Mod: 76,,, | Performed by: GENERAL PRACTICE

## 2023-11-27 PROCEDURE — 84484 ASSAY OF TROPONIN QUANT: CPT | Performed by: EMERGENCY MEDICINE

## 2023-11-27 PROCEDURE — 93010 ELECTROCARDIOGRAM REPORT: CPT | Mod: ,,, | Performed by: GENERAL PRACTICE

## 2023-11-27 PROCEDURE — 99223 1ST HOSP IP/OBS HIGH 75: CPT | Mod: ,,, | Performed by: INTERNAL MEDICINE

## 2023-11-27 PROCEDURE — 27000221 HC OXYGEN, UP TO 24 HOURS

## 2023-11-27 PROCEDURE — 99223 PR INITIAL HOSPITAL CARE,LEVL III: ICD-10-PCS | Mod: ,,, | Performed by: INTERNAL MEDICINE

## 2023-11-27 PROCEDURE — 93010 EKG 12-LEAD: ICD-10-PCS | Mod: 76,,, | Performed by: GENERAL PRACTICE

## 2023-11-27 PROCEDURE — 83880 ASSAY OF NATRIURETIC PEPTIDE: CPT | Performed by: EMERGENCY MEDICINE

## 2023-11-27 PROCEDURE — 82607 VITAMIN B-12: CPT | Performed by: INTERNAL MEDICINE

## 2023-11-27 PROCEDURE — U0002 COVID-19 LAB TEST NON-CDC: HCPCS | Performed by: EMERGENCY MEDICINE

## 2023-11-27 PROCEDURE — 96375 TX/PRO/DX INJ NEW DRUG ADDON: CPT

## 2023-11-27 PROCEDURE — 93005 ELECTROCARDIOGRAM TRACING: CPT | Performed by: GENERAL PRACTICE

## 2023-11-27 PROCEDURE — 94761 N-INVAS EAR/PLS OXIMETRY MLT: CPT | Mod: XB

## 2023-11-27 PROCEDURE — 36415 COLL VENOUS BLD VENIPUNCTURE: CPT | Performed by: EMERGENCY MEDICINE

## 2023-11-27 PROCEDURE — 80053 COMPREHEN METABOLIC PANEL: CPT | Performed by: EMERGENCY MEDICINE

## 2023-11-27 PROCEDURE — 63600175 PHARM REV CODE 636 W HCPCS: Performed by: EMERGENCY MEDICINE

## 2023-11-27 PROCEDURE — 36415 COLL VENOUS BLD VENIPUNCTURE: CPT | Performed by: INTERNAL MEDICINE

## 2023-11-27 RX ORDER — CETIRIZINE HYDROCHLORIDE 10 MG/1
10 TABLET ORAL DAILY
Status: DISCONTINUED | OUTPATIENT
Start: 2023-11-28 | End: 2023-11-28 | Stop reason: HOSPADM

## 2023-11-27 RX ORDER — ACETAMINOPHEN 325 MG/1
650 TABLET ORAL EVERY 4 HOURS PRN
Status: DISCONTINUED | OUTPATIENT
Start: 2023-11-27 | End: 2023-11-28 | Stop reason: HOSPADM

## 2023-11-27 RX ORDER — DOCUSATE SODIUM 100 MG/1
100 CAPSULE, LIQUID FILLED ORAL
Status: DISCONTINUED | OUTPATIENT
Start: 2023-11-27 | End: 2023-11-28 | Stop reason: HOSPADM

## 2023-11-27 RX ORDER — IBUPROFEN 200 MG
16 TABLET ORAL
Status: DISCONTINUED | OUTPATIENT
Start: 2023-11-27 | End: 2023-11-28 | Stop reason: HOSPADM

## 2023-11-27 RX ORDER — SODIUM CHLORIDE 0.9 % (FLUSH) 0.9 %
10 SYRINGE (ML) INJECTION
Status: DISCONTINUED | OUTPATIENT
Start: 2023-11-27 | End: 2023-11-28 | Stop reason: HOSPADM

## 2023-11-27 RX ORDER — SODIUM CHLORIDE 0.9 % (FLUSH) 0.9 %
10 SYRINGE (ML) INJECTION EVERY 12 HOURS PRN
Status: DISCONTINUED | OUTPATIENT
Start: 2023-11-27 | End: 2023-11-28 | Stop reason: HOSPADM

## 2023-11-27 RX ORDER — IBUPROFEN 200 MG
24 TABLET ORAL
Status: DISCONTINUED | OUTPATIENT
Start: 2023-11-27 | End: 2023-11-28 | Stop reason: HOSPADM

## 2023-11-27 RX ORDER — FUROSEMIDE 10 MG/ML
40 INJECTION INTRAMUSCULAR; INTRAVENOUS
Status: COMPLETED | OUTPATIENT
Start: 2023-11-27 | End: 2023-11-27

## 2023-11-27 RX ORDER — PROPRANOLOL HYDROCHLORIDE 10 MG/1
40 TABLET ORAL 2 TIMES DAILY
Status: DISCONTINUED | OUTPATIENT
Start: 2023-11-27 | End: 2023-11-28 | Stop reason: HOSPADM

## 2023-11-27 RX ORDER — MECLIZINE HCL 12.5 MG 12.5 MG/1
12.5 TABLET ORAL EVERY 8 HOURS PRN
Status: DISCONTINUED | OUTPATIENT
Start: 2023-11-27 | End: 2023-11-28 | Stop reason: HOSPADM

## 2023-11-27 RX ORDER — BUSPIRONE HYDROCHLORIDE 5 MG/1
10 TABLET ORAL 2 TIMES DAILY
Status: DISCONTINUED | OUTPATIENT
Start: 2023-11-27 | End: 2023-11-28 | Stop reason: HOSPADM

## 2023-11-27 RX ORDER — GLUCAGON 1 MG
1 KIT INJECTION
Status: DISCONTINUED | OUTPATIENT
Start: 2023-11-27 | End: 2023-11-28 | Stop reason: HOSPADM

## 2023-11-27 RX ORDER — PANTOPRAZOLE SODIUM 40 MG/1
40 TABLET, DELAYED RELEASE ORAL DAILY
Status: DISCONTINUED | OUTPATIENT
Start: 2023-11-28 | End: 2023-11-28 | Stop reason: HOSPADM

## 2023-11-27 RX ORDER — ONDANSETRON 2 MG/ML
4 INJECTION INTRAMUSCULAR; INTRAVENOUS EVERY 8 HOURS PRN
Status: DISCONTINUED | OUTPATIENT
Start: 2023-11-27 | End: 2023-11-28 | Stop reason: HOSPADM

## 2023-11-27 RX ORDER — INSULIN ASPART 100 [IU]/ML
0-5 INJECTION, SOLUTION INTRAVENOUS; SUBCUTANEOUS
Status: DISCONTINUED | OUTPATIENT
Start: 2023-11-27 | End: 2023-11-28 | Stop reason: HOSPADM

## 2023-11-27 RX ORDER — ALLOPURINOL 100 MG/1
100 TABLET ORAL DAILY
Status: DISCONTINUED | OUTPATIENT
Start: 2023-11-28 | End: 2023-11-28 | Stop reason: HOSPADM

## 2023-11-27 RX ORDER — AMIODARONE HYDROCHLORIDE 100 MG/1
100 TABLET ORAL DAILY
Status: DISCONTINUED | OUTPATIENT
Start: 2023-11-27 | End: 2023-11-28 | Stop reason: HOSPADM

## 2023-11-27 RX ORDER — FUROSEMIDE 10 MG/ML
40 INJECTION INTRAMUSCULAR; INTRAVENOUS DAILY
Status: DISCONTINUED | OUTPATIENT
Start: 2023-11-28 | End: 2023-11-28

## 2023-11-27 RX ORDER — MIDODRINE HYDROCHLORIDE 2.5 MG/1
2.5 TABLET ORAL 2 TIMES DAILY
Status: DISCONTINUED | OUTPATIENT
Start: 2023-11-27 | End: 2023-11-28

## 2023-11-27 RX ORDER — NALOXONE HCL 0.4 MG/ML
0.02 VIAL (ML) INJECTION
Status: DISCONTINUED | OUTPATIENT
Start: 2023-11-27 | End: 2023-11-28 | Stop reason: HOSPADM

## 2023-11-27 RX ADMIN — MIDODRINE HYDROCHLORIDE 2.5 MG: 2.5 TABLET ORAL at 10:11

## 2023-11-27 RX ADMIN — FUROSEMIDE 40 MG: 10 INJECTION, SOLUTION INTRAMUSCULAR; INTRAVENOUS at 12:11

## 2023-11-27 RX ADMIN — AMIODARONE HYDROCHLORIDE 100 MG: 100 TABLET ORAL at 04:11

## 2023-11-27 RX ADMIN — BUSPIRONE HYDROCHLORIDE 10 MG: 5 TABLET ORAL at 10:11

## 2023-11-27 RX ADMIN — PROPRANOLOL HYDROCHLORIDE 40 MG: 10 TABLET ORAL at 10:11

## 2023-11-27 RX ADMIN — APIXABAN 2.5 MG: 2.5 TABLET, FILM COATED ORAL at 10:11

## 2023-11-27 NOTE — PHARMACY MED REC
"              .        Admission Medication History     The home medication history was taken by Elsie Bradley.    You may go to "Admission" then "Reconcile Home Medications" tabs to review and/or act upon these items.     The home medication list has been updated by the Pharmacy department.   Please read ALL comments highlighted in yellow.   Please address this information as you see fit.    Feel free to contact us if you have any questions or require assistance.        Medications listed below were obtained from: Patient/family and Analytic software- [employer]  No current facility-administered medications on file prior to encounter.     Current Outpatient Medications on File Prior to Encounter   Medication Sig Dispense Refill    allopurinoL (ZYLOPRIM) 100 MG tablet Take 100 mg by mouth once daily.      amiodarone (PACERONE) 200 MG Tab Take 0.5 tablets (100 mg total) by mouth once daily. 45 tablet 3    apixaban (ELIQUIS) 2.5 mg Tab Take 1 tablet (2.5 mg total) by mouth 2 (two) times daily. 180 tablet 3    busPIRone (BUSPAR) 5 MG Tab Take 2 tablets (10 mg total) by mouth 2 (two) times daily. 180 tablet 3    cholecalciferol, vitamin D3, (VITAMIN D3) 25 mcg (1,000 unit) capsule Take 1 capsule (1,000 Units total) by mouth once daily. 100 capsule 1    cyanocobalamin 1,000 mcg/mL injection Inject 1 ml B 12 subq monthly. (Patient taking differently: Inject 1,000 mcg into the muscle every 30 days. Inject 1 ml B 12 subq monthly.) 3 mL 4    docusate sodium (COLACE) 100 MG capsule Take 100 mg by mouth as needed for Constipation.      furosemide (LASIX) 20 MG tablet Take a half to one tablet in the morning if needed for swelling. (Patient taking differently: Take 20 mg by mouth once daily. Take a half to one tablet in the morning if needed for swelling.) 30 tablet 11    loratadine (CLARITIN) 10 mg tablet Take 1 tablet (10 mg total) by mouth once daily. 90 tablet 3    magnesium oxide (MAG-OX) 400 mg (241.3 mg magnesium) tablet " "Take 1 tablet (400 mg total) by mouth once daily. (Patient taking differently: Take 200 mg by mouth once daily.) 90 tablet 3    meclizine (ANTIVERT) 12.5 mg tablet Take daily in the morning, and then may also take two more times in a day as needed, every 8 hours. (Patient taking differently: Take 12.5 mg by mouth every 8 (eight) hours as needed for Dizziness or Nausea. Take daily in the morning, and then may also take two more times in a day as needed, every 8 hours.) 30 tablet 11    midodrine (PROAMATINE) 2.5 MG Tab Take 1 tablet (2.5 mg total) by mouth 2 (two) times a day. Take first thing in the morning and then again in the afternoon around 3 pm. 180 tablet 3    mupirocin (BACTROBAN) 2 % ointment Apply topically once daily. (Patient taking differently: Apply 1 g topically once daily.) 30 g 3    omeprazole (PRILOSEC) 20 MG capsule Take 2 capsules (40 mg total) by mouth once daily. (Patient taking differently: Take 20 mg by mouth 2 (two) times a day.) 180 capsule 1    potassium chloride SA (K-DUR,KLOR-CON M) 10 MEQ tablet Take 1 tablet (10 mEq total) by mouth once daily. 30 tablet 11    propranoloL (INDERAL) 40 MG tablet Take 1 tablet (40 mg total) by mouth 2 (two) times daily. 180 tablet 3    vitamins A,C,E-zinc-copper (PRESERVISION AREDS) 4,296 mcg-226 mg-90 mg Cap Take 1 capsule by mouth 2 (two) times daily. 180 capsule 3    blood sugar diagnostic Strp To check BG 3 times daily, to use with insurance preferred meter 200 strip 1    blood-glucose meter kit To check BG 3 times daily, to use with insurance preferred meter 1 each 0    ferrous sulfate 325 (65 FE) MG EC tablet Take 1 tablet (325 mg total) by mouth 2 (two) times daily. (Patient not taking: Reported on 11/27/2023) 180 tablet 2    FREESTYLE LANCETS 28 gauge lancets       syringe with needle (TUBERCULIN SYRINGE) 1 mL 27 x 1/2" Syrg Use for B 12 subq injection monthly. 3 each 4       Potential issues to be addressed PRIOR TO DISCHARGE  Patient reported not " taking the following medications: (Ferrous Sulfate). These medications remain on the home medication list. Please address accordingly.     Elsie Bradley  EXT [phone]

## 2023-11-27 NOTE — CONSULTS
[facility] - Emergency Dept  Department of Cardiology  Consult Note      PATIENT NAME: Rosanna Raymundo    MRN: [id number]  TODAY'S DATE: 11/27/2023  ADMIT DATE: 11/27/2023                          CONSULT REQUESTED BY: Gabriel Armstrong MD    SUBJECTIVE     PRINCIPAL PROBLEM: <principal problem not specified>      REASON FOR CONSULT:    From H&P: 89 yo F Hx dCHF with prn lasix , severe aortic stenosis, atrial fibrillation, history of symptomatic bradycardia, cardiac pacemaker, history of CABG, hypotension on midodrine, essential tremors on propranolol, type 2 diabetes, cataracts, CKD 3A.  Also History of breast cancer 30 years ago treated with mastectomy, chemotherapy, hormonal therapy, radiation, currently in remission. 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     Pt p/w progressive sob over 2 weeks, also dizziness and weakness, no chest pain. 92% on RA. XR with pulmonary edema. She is adherent to low salt diet, has not been taking lasix d/t no leg swelling.     In ER pt given iv lasix, plan to admit to IM         HPI:    Ms. Raymundo is a 89 year old female who presented to the ER with complaints of progressively worsening SOB and weakness over the past two weeks. She was found to have elevated Bnp as well as CHF on chest xray. She has been given IV furosemide with very good UOP response. Patient is in SR with intermittent pacing on telemetry. BP stable. She has moderate to severe AS. She does report having a reaction after iron infusion recently. She states her first infusion was fine but after the second, she felt very poorly.         Review of patient's allergies indicates:   Allergen Reactions    Penicillin g benzathine Other (See Comments)       Past Medical History:   Diagnosis Date    Anticoagulant long-term use     Cancer     breast/female/throat    Cataract     CHF (congestive heart failure)      Diabetes mellitus, type 2     Heart murmur     Hypertension     Iron deficiency anemia, unspecified     Anemia, iron def.    Occasional tremors      Past Surgical History:   Procedure Laterality Date    ADENOIDECTOMY      APPENDECTOMY      COLONOSCOPY N/A 12/17/2019    Procedure: COLONOSCOPY;  Surgeon: Raleigh Quarles III, MD;  Location: [facility] ENDO;  Service: Endoscopy;  Laterality: N/A;    COLONOSCOPY N/A 12/18/2019    Procedure: COLONOSCOPY;  Surgeon: Raleigh Quarles III, MD;  Location: [facility] ENDO;  Service: Endoscopy;  Laterality: N/A;    ERCP N/A 8/10/2023    Procedure: ERCP (ENDOSCOPIC RETROGRADE CHOLANGIOPANCREATOGRAPHY);  Surgeon: Raleigh Quarles III, MD;  Location: [facility] ENDO;  Service: Endoscopy;  Laterality: N/A;    Gall bladder revmoval      HYSTERECTOMY      INSERTION OF PACEMAKER Right 1/2/2020    Procedure: INSERTION, PACEMAKER;  Surgeon: Fly Barragan MD;  Location: [facility] CATH/EP LAB;  Service: Cardiology;  Laterality: Right;    left mastectomy Left 1945    MASTECTOMY Left     NC CABG, ARTERY-VEIN, FOUR  2014    Coronary Artery Bypass, 4    SMALL BOWEL ENTEROSCOPY N/A 12/17/2019    Procedure: ENTEROSCOPY;  Surgeon: Raleigh Quarles III, MD;  Location: [facility] ENDO;  Service: Endoscopy;  Laterality: N/A;    TONSILLECTOMY       Social History     Tobacco Use    Smoking status: Never    Smokeless tobacco: Never   Substance Use Topics    Alcohol use: Never    Drug use: Never        REVIEW OF SYSTEMS  CONSTITUTIONAL:+ fatigue and weakness  EYES: No double vision, No blurred vision  NEURO: No headaches, No dizziness  RESPIRATORY: Negative for cough, +shortness of breath   CARDIOVASCULAR: Negative for chest pain. Negative for palpitations and leg swelling.   GI: Negative for abdominal pain, No melena, diarrhea, nausea and vomiting.   : Negative for dysuria and frequency, Negative for hematuria  SKIN: Negative for bruising, Negative for edema or discoloration noted.   PSYCHIATRIC: Negative  for depression, Negative for anxiety, Negative for memory loss  MUSCULOSKELETAL: Negative for neck pain, Negative for muscle weakness, Negative for back pain     OBJECTIVE     VITAL SIGNS (Most Recent)  Temp: 98 °F (36.7 °C) (11/27/23 0906)  Pulse: 60 (11/27/23 1401)  Resp: 14 (11/27/23 1421)  BP: 120/86 (11/27/23 1401)  SpO2: 96 % (11/27/23 1401)    VENTILATION STATUS  Resp: 14 (11/27/23 1421)  SpO2: 96 % (11/27/23 1401)           I & O (Last 24H):No intake or output data in the 24 hours ending 11/27/23 1606    WEIGHTS  Wt Readings from Last 1 Encounters:   11/27/23 0909 68 kg (150 lb)       PHYSICAL EXAM  CONSTITUTIONAL: No fever, no chills  HEENT: Normocephalic, atraumatic,pupils reactive to light                 NECK:  No JVD no carotid bruit  CVS: S1S2+, RRR, + murmur  LUNGS: Clear  ABDOMEN: Soft, NT, BS+  EXTREMITIES: No cyanosis, edema  : No vargas catheter  NEURO: AAO X 3  PSY: Normal affect      HOME MEDICATIONS:  No current facility-administered medications on file prior to encounter.     Current Outpatient Medications on File Prior to Encounter   Medication Sig Dispense Refill    allopurinoL (ZYLOPRIM) 100 MG tablet Take 100 mg by mouth once daily.      amiodarone (PACERONE) 200 MG Tab Take 0.5 tablets (100 mg total) by mouth once daily. 45 tablet 3    apixaban (ELIQUIS) 2.5 mg Tab Take 1 tablet (2.5 mg total) by mouth 2 (two) times daily. 180 tablet 3    busPIRone (BUSPAR) 5 MG Tab Take 2 tablets (10 mg total) by mouth 2 (two) times daily. 180 tablet 3    cholecalciferol, vitamin D3, (VITAMIN D3) 25 mcg (1,000 unit) capsule Take 1 capsule (1,000 Units total) by mouth once daily. 100 capsule 1    cyanocobalamin 1,000 mcg/mL injection Inject 1 ml B 12 subq monthly. (Patient taking differently: Inject 1,000 mcg into the muscle every 30 days. Inject 1 ml B 12 subq monthly.) 3 mL 4    docusate sodium (COLACE) 100 MG capsule Take 100 mg by mouth as needed for Constipation.      furosemide (LASIX) 20 MG tablet  Take a half to one tablet in the morning if needed for swelling. (Patient taking differently: Take 20 mg by mouth once daily. Take a half to one tablet in the morning if needed for swelling.) 30 tablet 11    loratadine (CLARITIN) 10 mg tablet Take 1 tablet (10 mg total) by mouth once daily. 90 tablet 3    magnesium oxide (MAG-OX) 400 mg (241.3 mg magnesium) tablet Take 1 tablet (400 mg total) by mouth once daily. (Patient taking differently: Take 200 mg by mouth once daily.) 90 tablet 3    meclizine (ANTIVERT) 12.5 mg tablet Take daily in the morning, and then may also take two more times in a day as needed, every 8 hours. (Patient taking differently: Take 12.5 mg by mouth every 8 (eight) hours as needed for Dizziness or Nausea. Take daily in the morning, and then may also take two more times in a day as needed, every 8 hours.) 30 tablet 11    midodrine (PROAMATINE) 2.5 MG Tab Take 1 tablet (2.5 mg total) by mouth 2 (two) times a day. Take first thing in the morning and then again in the afternoon around 3 pm. 180 tablet 3    mupirocin (BACTROBAN) 2 % ointment Apply topically once daily. (Patient taking differently: Apply 1 g topically once daily.) 30 g 3    omeprazole (PRILOSEC) 20 MG capsule Take 2 capsules (40 mg total) by mouth once daily. (Patient taking differently: Take 20 mg by mouth 2 (two) times a day.) 180 capsule 1    potassium chloride SA (K-DUR,KLOR-CON M) 10 MEQ tablet Take 1 tablet (10 mEq total) by mouth once daily. 30 tablet 11    propranoloL (INDERAL) 40 MG tablet Take 1 tablet (40 mg total) by mouth 2 (two) times daily. 180 tablet 3    vitamins A,C,E-zinc-copper (PRESERVISION AREDS) 4,296 mcg-226 mg-90 mg Cap Take 1 capsule by mouth 2 (two) times daily. 180 capsule 3    blood sugar diagnostic Strp To check BG 3 times daily, to use with insurance preferred meter 200 strip 1    blood-glucose meter kit To check BG 3 times daily, to use with insurance preferred meter 1 each 0    ferrous sulfate 325  "(65 FE) MG EC tablet Take 1 tablet (325 mg total) by mouth 2 (two) times daily. (Patient not taking: Reported on 11/27/2023) 180 tablet 2    FREESTYLE LANCETS 28 gauge lancets       syringe with needle (TUBERCULIN SYRINGE) 1 mL 27 x 1/2" Syrg Use for B 12 subq injection monthly. 3 each 4       SCHEDULED MEDS:   [START ON 11/28/2023] allopurinoL  100 mg Oral Daily    amiodarone  100 mg Oral Daily    apixaban  2.5 mg Oral BID    busPIRone  10 mg Oral BID    [START ON 11/28/2023] cetirizine  10 mg Oral Daily    midodrine  2.5 mg Oral BID    [START ON 11/28/2023] pantoprazole  40 mg Oral Daily    propranoloL  40 mg Oral BID       CONTINUOUS INFUSIONS:    PRN MEDS:acetaminophen, dextrose 50%, dextrose 50%, dextrose 50%, dextrose 50%, docusate sodium, glucagon (human recombinant), glucagon (human recombinant), glucose, glucose, glucose, glucose, insulin aspart U-100, meclizine, naloxone, ondansetron, potassium bicarbonate, potassium bicarbonate, potassium bicarbonate, sodium chloride 0.9%, sodium chloride 0.9%    LABS AND DIAGNOSTICS     CBC LAST 3 DAYS  Recent Labs   Lab 11/27/23  0904 11/27/23  1512   WBC 5.03 5.04   RBC 5.09 5.17   HGB 14.4 14.5   HCT 44.2 45.1   MCV 87 87   MCH 28.3 28.0   MCHC 32.6 32.2   RDW 25.2* 25.1*    157   MPV 9.8 SEE COMMENT   GRAN 63.7  3.2  --    LYMPH 24.3  1.2  --    MONO 7.0  0.4  --    BASO 0.07  --    NRBC 0  --        COAGULATION LAST 3 DAYS  No results for input(s): "LABPT", "INR", "APTT" in the last 168 hours.    CHEMISTRY LAST 3 DAYS  Recent Labs   Lab 11/27/23  0904      K 4.2      CO2 23   ANIONGAP 9   BUN 16   CREATININE 0.9   *   CALCIUM 9.3   ALBUMIN 3.9   PROT 6.8   ALKPHOS 62   ALT 11   AST 18   BILITOT 0.7       CARDIAC PROFILE LAST 3 DAYS  Recent Labs   Lab 11/27/23  0904 11/27/23  1038   BNP 1,352*  --    TROPONINIHS  --  14.0       ENDOCRINE LAST 3 DAYS  No results for input(s): "TSH", "PROCAL" in the last 168 hours.    LAST ARTERIAL BLOOD " "GAS  ABG  No results for input(s): "PH", "PO2", "PCO2", "HCO3", "BE" in the last 168 hours.    LAST 7 DAYS MICROBIOLOGY   Microbiology Results (last 7 days)       ** No results found for the last 168 hours. **            MOST RECENT IMAGING  X-Ray Chest AP  CLINICAL HISTORY:  89 years (11/3/1932) Female shortness of breath Shortness of breath    TECHNIQUE:  Portable AP radiograph the chest. One view.    COMPARISON:  Radiograph from July 26, 2023.    FINDINGS:  The lungs show mild perihilar pulmonary vascular prominence. There is blunting of both costophrenic angles suggesting trace pleural effusions and adjacent atelectasis. No pneumothorax is identified. The heart is mildly enlarged. The median sternotomy wires and the right sided pacemaker are unchanged. Atheromatous calcifications are seen at the aortic arch. Osseous structures show degenerative disc disease and degenerative changes in the shoulders. The visualized upper abdomen is unremarkable.    IMPRESSION:  Cardiomegaly and findings of mild pulmonary vascular congestion/trace edema.    .    Electronically signed by:  Gurpreet Loaiza MD  11/27/2023 09:03 AM CST Workstation: [id number]      ECHOCARDIOGRAM RESULTS (last 5)  Results for orders placed during the hospital encounter of 10/04/23    Echo    Interpretation Summary    Left Ventricle: The left ventricle is normal in size. Mildly increased wall thickness. Moderate global hypokinesis present. There is low normal systolic function with a visually estimated ejection fraction of 50 - 55%. There is normal diastolic function.    Left Atrium: Left atrium is moderately dilated.    Right Ventricle: Normal right ventricular cavity size. Wall thickness is normal. Right ventricle wall motion  is normal. Systolic function is borderline low.    Aortic Valve: The aortic valve is a trileaflet valve. Mildly calcified left, right and noncoronary cusps. Severely restricted motion. There is severe stenosis. Aortic valve " area by VTI is 0.75 cm². Aortic Valve VTI is 67.70 cm. Aortic valve peak velocity is 2.63 m/s. Mean gradient is 16 mmHg. Aortic Valve peak gradient is 28 mmHg. The dimensionless index is 0.24.    Tricuspid Valve: There is moderate regurgitation.    IVC/SVC: Normal venous pressure at 3 mmHg.    ICD/ Pacemaker lead seen in RA and RV.      Results for orders placed during the hospital encounter of 07/26/22    Echo    Interpretation Summary  · The left ventricle is normal in size with mild concentric hypertrophy and low normal systolic function.  · The estimated ejection fraction is 50%.  · Normal left ventricular diastolic function.  · Normal right ventricular size with normal right ventricular systolic function.  · Moderate left atrial enlargement.  · There is severe aortic valve stenosis.  · Aortic valve area is 0.92 cm2; peak velocity is 2.51 m/s; mean gradient is 25 mmHg.  · Moderate tricuspid regurgitation.  · Mild-to-moderate mitral regurgitation.      Results for orders placed during the hospital encounter of 01/07/22    Echo    Interpretation Summary  · The left ventricle is normal in size with mild concentric hypertrophy and low normal systolic function.  · The estimated ejection fraction is 50%.  · Normal left ventricular diastolic function.  · Normal right ventricular size with low normal right ventricular systolic function.  · Mild left atrial enlargement.  · There is severe aortic valve stenosis.  · Aortic valve area is 0.81 cm2; peak velocity is 2.66 m/s; mean gradient is 18 mmHg.  · Mild tricuspid regurgitation.  · Mild mitral regurgitation.      Results for orders placed during the hospital encounter of 11/04/19    Echo Color Flow Doppler? Yes; Bubble Contrast? No    Interpretation Summary  · Mild eccentric left ventricular hypertrophy.  · Normal left ventricular systolic function. The estimated ejection fraction is 60%  · Normal LV diastolic function.  · Mild left atrial enlargement.  · Severe aortic  valve stenosis.  · Aortic valve area is 0.88 cm2; peak velocity is 2.13 m/s; mean gradient is 11 mmHg.  · Mild mitral sclerosis.  · Mild-to-moderate mitral regurgitation.      CURRENT/PREVIOUS VISIT EKG  Results for orders placed or performed during the hospital encounter of 11/27/23   EKG 12-lead    Collection Time: 11/27/23  9:00 AM    Narrative    Test Reason : R06.02,    Vent. Rate : 060 BPM     Atrial Rate : 060 BPM     P-R Int : 304 ms          QRS Dur : 176 ms      QT Int : 520 ms       P-R-T Axes : 000 101 -71 degrees     QTc Int : 520 ms    Atrial-paced rhythm with prolonged AV conduction  Right bundle branch block  T wave abnormality, consider inferolateral ischemia  Abnormal ECG  When compared with ECG of 26-JUL-2023 18:11,  No significant change was found    Referred By: JENNIFER   SELF           Confirmed By:            ASSESSMENT/PLAN:     There are no active hospital problems to display for this patient.      ASSESSMENT & PLAN:     Acute on chronic HFpEF  Moderate to severe AS  CAD with hx of CABG  Hx of Hypotension  CKD stage III  Type II DM  Pacemaker in situ      RECOMMENDATIONS:    Continue to diurese with furosemide 40 mg IV daily. Strict I&O.   Continue to check and replace potassium and magnesium. Goal for potassium is 4.0, and goal for magnesium is 2.0.   Echo pending. Will need to evaluate for worsening AS. Consider dobutamine stress echo for AS.   Will follow.         Erika Chin NP  Date of Service: 11/27/2023  4:06 PM    I have personally interviewed and examined the patient. I have reviewed the Nurse Practitioner's history and physical, assessment, and plan. I agree with the findings and made appropriate changes as necessary in recommendations.    1. Patient is responding to diuretics continue the same.  2. Patient needs further evaluation by Interventional Cardiology for possible aortic valve TAVR.      Ashwin Herrera MD  Department of Cardiology  [facility]  [facility]  11/27/2023 4:12 PM

## 2023-11-27 NOTE — H&P
[facility] - Emergency Dept    History & Physical      Patient Name: Rosanna Raymundo  MRN: [id number]  Admission Date: 11/27/2023  Attending Physician: Gabriel Armstrong MD   Primary Care Provider: Aamir Rhodes MD         Patient information was obtained from patient, past medical records, and ER records.     Subjective:     Principal Problem:<principal problem not specified>    Chief Complaint:   Chief Complaint   Patient presents with    Shortness of Breath        HPI: 89 yo F Hx dCHF with prn lasix , severe aortic stenosis, atrial fibrillation, history of symptomatic bradycardia, cardiac pacemaker, history of CABG, hypotension on midodrine, essential tremors on propranolol, type 2 diabetes, cataracts, CKD 3A.  Also History of breast cancer 30 years ago treated with mastectomy, chemotherapy, hormonal therapy, radiation, currently in remission. 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    Pt p/w progressive sob over 2 weeks, also dizziness and weakness, no chest pain. 92% on RA. XR with pulmonary edema. She is adherent to low salt diet, has not been taking lasix d/t no leg swelling.    In ER pt given iv lasix, plan to admit to IM    Past Medical History:   Diagnosis Date    Anticoagulant long-term use     Cancer     breast/female/throat    Cataract     CHF (congestive heart failure)     Diabetes mellitus, type 2     Heart murmur     Hypertension     Iron deficiency anemia, unspecified     Anemia, iron def.    Occasional tremors        Past Surgical History:   Procedure Laterality Date    ADENOIDECTOMY      APPENDECTOMY      COLONOSCOPY N/A 12/17/2019    Procedure: COLONOSCOPY;  Surgeon: Raleigh Quarles III, MD;  Location: [place] [facility];  Service: Endoscopy;  Laterality: N/A;    COLONOSCOPY N/A 12/18/2019    Procedure: COLONOSCOPY;  Surgeon: Raleigh Quarles III, MD;  Location: [facility];  Service:  Endoscopy;  Laterality: N/A;    ERCP N/A 8/10/2023    Procedure: ERCP (ENDOSCOPIC RETROGRADE CHOLANGIOPANCREATOGRAPHY);  Surgeon: Raleigh Quarles III, MD;  Location: [facility];  Service: Endoscopy;  Laterality: N/A;    Gall bladder revmoval      HYSTERECTOMY      INSERTION OF PACEMAKER Right 1/2/2020    Procedure: INSERTION, PACEMAKER;  Surgeon: Fly Barragan MD;  Location: [facility] CATH/EP LAB;  Service: Cardiology;  Laterality: Right;    left mastectomy Left 1945    MASTECTOMY Left     IA CABG, ARTERY-VEIN, FOUR  2014    Coronary Artery Bypass, 4    SMALL BOWEL ENTEROSCOPY N/A 12/17/2019    Procedure: ENTEROSCOPY;  Surgeon: Raleigh Quarles III, MD;  Location: [facility] ENDO;  Service: Endoscopy;  Laterality: N/A;    TONSILLECTOMY         Review of patient's allergies indicates:   Allergen Reactions    Penicillin g benzathine Other (See Comments)       No current facility-administered medications on file prior to encounter.     Current Outpatient Medications on File Prior to Encounter   Medication Sig    allopurinoL (ZYLOPRIM) 100 MG tablet Take 100 mg by mouth once daily.    amiodarone (PACERONE) 200 MG Tab Take 0.5 tablets (100 mg total) by mouth once daily.    apixaban (ELIQUIS) 2.5 mg Tab Take 1 tablet (2.5 mg total) by mouth 2 (two) times daily.    blood sugar diagnostic Strp To check BG 3 times daily, to use with insurance preferred meter    blood-glucose meter kit To check BG 3 times daily, to use with insurance preferred meter    busPIRone (BUSPAR) 5 MG Tab Take 2 tablets (10 mg total) by mouth 2 (two) times daily.    cholecalciferol, vitamin D3, (VITAMIN D3) 25 mcg (1,000 unit) capsule Take 1 capsule (1,000 Units total) by mouth once daily.    cyanocobalamin 1,000 mcg/mL injection Inject 1 ml B 12 subq monthly. (Patient taking differently: Inject 1,000 mcg into the muscle every 30 days. Inject 1 ml B 12 subq monthly.)    docusate sodium (COLACE) 100 MG capsule Take 100 mg by mouth as needed for  "Constipation.    ferrous sulfate 325 (65 FE) MG EC tablet Take 1 tablet (325 mg total) by mouth 2 (two) times daily.    FREESTYLE LANCETS 28 gauge lancets     furosemide (LASIX) 20 MG tablet Take a half to one tablet in the morning if needed for swelling. (Patient taking differently: Take 20 mg by mouth once daily. Take a half to one tablet in the morning if needed for swelling.)    loratadine (CLARITIN) 10 mg tablet Take 1 tablet (10 mg total) by mouth once daily.    magnesium oxide (MAG-OX) 400 mg (241.3 mg magnesium) tablet Take 1 tablet (400 mg total) by mouth once daily. (Patient taking differently: Take 200 mg by mouth once daily.)    meclizine (ANTIVERT) 12.5 mg tablet Take daily in the morning, and then may also take two more times in a day as needed, every 8 hours. (Patient taking differently: Take 12.5 mg by mouth every 8 (eight) hours as needed for Dizziness or Nausea. Take daily in the morning, and then may also take two more times in a day as needed, every 8 hours.)    midodrine (PROAMATINE) 2.5 MG Tab Take 1 tablet (2.5 mg total) by mouth 2 (two) times a day. Take first thing in the morning and then again in the afternoon around 3 pm.    mupirocin (BACTROBAN) 2 % ointment Apply topically once daily. (Patient taking differently: Apply 1 g topically once daily.)    omeprazole (PRILOSEC) 20 MG capsule Take 2 capsules (40 mg total) by mouth once daily. (Patient taking differently: Take 20 mg by mouth 2 (two) times a day.)    potassium chloride SA (K-DUR,KLOR-CON M) 10 MEQ tablet Take 1 tablet (10 mEq total) by mouth once daily.    propranoloL (INDERAL) 40 MG tablet Take 1 tablet (40 mg total) by mouth 2 (two) times daily.    syringe with needle (TUBERCULIN SYRINGE) 1 mL 27 x 1/2" Syrg Use for B 12 subq injection monthly.    vitamins A,C,E-zinc-copper (PRESERVISION AREDS) 4,296 mcg-226 mg-90 mg Cap Take 1 capsule by mouth 2 (two) times daily.     Family History       Problem Relation (Age of Onset)    Heart " disease Father    No Known Problems Mother          Tobacco Use    Smoking status: Never    Smokeless tobacco: Never   Substance and Sexual Activity    Alcohol use: Never    Drug use: Never    Sexual activity: Not Currently     Review of Systems  Objective:     Vital Signs (Most Recent):  Temp: 98 °F (36.7 °C) (11/27/23 0906)  Pulse: 60 (11/27/23 1401)  Resp: 14 (11/27/23 1421)  BP: 120/86 (11/27/23 1401)  SpO2: 96 % (11/27/23 1401) Vital Signs (24h Range):  Temp:  [98 °F (36.7 °C)] 98 °F (36.7 °C)  Pulse:  [60-61] 60  Resp:  [14-21] 14  SpO2:  [90 %-98 %] 96 %  BP: (101-120)/(52-86) 120/86     Weight: 68 kg (150 lb)  Body mass index is 29.29 kg/m².    Physical Exam  Constitutional:       General: She is in acute distress.   Cardiovascular:      Rate and Rhythm: Normal rate and regular rhythm.      Pulses: Normal pulses.      Heart sounds: Normal heart sounds.   Pulmonary:      Effort: Pulmonary effort is normal.      Breath sounds: Normal breath sounds.   Abdominal:      General: Abdomen is flat.      Palpations: Abdomen is soft.   Skin:     General: Skin is warm.      Capillary Refill: Capillary refill takes less than 2 seconds.   Neurological:      General: No focal deficit present.      Mental Status: She is alert and oriented to person, place, and time.   Psychiatric:         Mood and Affect: Mood normal.            Significant Labs: All pertinent labs within the past 24 hours have been reviewed.    Significant Imaging: I have reviewed all pertinent imaging results/findings within the past 24 hours.    Assessment/Plan:     There are no hospital problems to display for this patient.    VTE Risk Mitigation (From admission, onward)           Ordered     apixaban tablet 2.5 mg  2 times daily         11/27/23 1453     IP VTE HIGH RISK PATIENT  Once         11/27/23 1446     Place sequential compression device  Until discontinued         11/27/23 1427                  Shortness of breaths secondary to diastolic CHF,  severe aortic stenosis  -was referred to Dr. HEART as outpatient, she is awaiting call from their office  -admit to telemetry via CHF pathway  -cautious use of IV diuretic diuretics given history of dizziness, hypotension, severe aortic stenosis  -cardiology consult  -repeat limited echo for EF in aortic valve    atrial fibrillation  -c/w Eliquis    Hx Hx hypotension  -c/w midodrine    PMHx  cardiac pacemaker, history of CABG    iron-deficiency anemia secondary to chronic GI blood loss secondary to AVMs, history of cauterization    B12 deficiency.    Chronic kidney disease, stage 3a    Type 2 diabetes mellitus with stage 3a chronic kidney disease, without long-term current use of insulin    Gout    Breast cancer in remission, 30 years ago    -c/w home meds    Gabriel Armstrong MD  Department of Hospital Medicine   [facility] - Emergency Dept

## 2023-11-27 NOTE — ED PROVIDER NOTES
Encounter Date: 11/27/2023       History     Chief Complaint   Patient presents with    Shortness of Breath     This is a 89-year-old female with history CAD, CHF, diabetes, presenting with complaint of shortness a breath, generalized weakness.  Patient says she has felt bad for the last 1-2 weeks with generalized weakness and no energy, and that this morning she had severe shortness of breath so came to the ED for evaluation.  She is had associated nausea.  She denies abdominal pain or vomiting.  She denies fever.  She denies cough.  She denies any chest pain.  Patient has p.r.n. Lasix but has not been taking her Lasix because she has not had any leg edema.    The history is provided by the patient.     Review of patient's allergies indicates:   Allergen Reactions    Penicillin g benzathine Other (See Comments)     Past Medical History:   Diagnosis Date    Anticoagulant long-term use     Cancer     breast/female/throat    Cataract     CHF (congestive heart failure)     Diabetes mellitus, type 2     Heart murmur     Hypertension     Iron deficiency anemia, unspecified     Anemia, iron def.    Occasional tremors      Past Surgical History:   Procedure Laterality Date    ADENOIDECTOMY      APPENDECTOMY      COLONOSCOPY N/A 12/17/2019    Procedure: COLONOSCOPY;  Surgeon: Raleigh Quarles III, MD;  Location: [facility];  Service: Endoscopy;  Laterality: N/A;    COLONOSCOPY N/A 12/18/2019    Procedure: COLONOSCOPY;  Surgeon: Raleigh Quarles III, MD;  Location: [facility];  Service: Endoscopy;  Laterality: N/A;    ERCP N/A 8/10/2023    Procedure: ERCP (ENDOSCOPIC RETROGRADE CHOLANGIOPANCREATOGRAPHY);  Surgeon: Raleigh Quarles III, MD;  Location: [facility] ENDO;  Service: Endoscopy;  Laterality: N/A;    Gall bladder revmoval      HYSTERECTOMY      INSERTION OF PACEMAKER Right 1/2/2020    Procedure: INSERTION, PACEMAKER;  Surgeon: Fly Barragan MD;  Location: [facility] CATH/EP LAB;  Service: Cardiology;  Laterality:  Right;    left mastectomy Left 1945    MASTECTOMY Left     ND CABG, ARTERY-VEIN, FOUR  2014    Coronary Artery Bypass, 4    SMALL BOWEL ENTEROSCOPY N/A 12/17/2019    Procedure: ENTEROSCOPY;  Surgeon: Raleigh Quarles III, MD;  Location: [facility];  Service: Endoscopy;  Laterality: N/A;    TONSILLECTOMY       Family History   Problem Relation Age of Onset    No Known Problems Mother     Heart disease Father      Social History     Tobacco Use    Smoking status: Never    Smokeless tobacco: Never   Substance Use Topics    Alcohol use: Never    Drug use: Never     Review of Systems   Respiratory:  Positive for shortness of breath.    Gastrointestinal:  Positive for nausea.   Neurological:  Positive for weakness.   All other systems reviewed and are negative.      Physical Exam     Initial Vitals   BP Pulse Resp Temp SpO2   11/27/23 0905 11/27/23 0905 11/27/23 0905 11/27/23 0906 11/27/23 0906   (!) 101/52 60 17 98 °F (36.7 °C) (!) 90 %      MAP       --                Physical Exam    Nursing note and vitals reviewed.  Constitutional: She appears well-developed and well-nourished. She is not diaphoretic. No distress.   Elderly, frail   HENT:   Head: Normocephalic.   Eyes: Conjunctivae are normal.   Neck: Neck supple.   Normal range of motion.  Cardiovascular:  Normal rate.           Pulmonary/Chest: Breath sounds normal. No respiratory distress.   Abdominal: Abdomen is soft. She exhibits no distension. There is no abdominal tenderness.   Musculoskeletal:         General: No edema.      Cervical back: Normal range of motion and neck supple.     Neurological: She is alert. She has normal strength. GCS eye subscore is 4. GCS verbal subscore is 5. GCS motor subscore is 6.   Skin: Skin is warm and dry.   Psychiatric: She has a normal mood and affect.         ED Course   Procedures  Labs Reviewed   CBC W/ AUTO DIFFERENTIAL - Abnormal; Notable for the following components:       Result Value    RDW 25.2 (*)     All other  components within normal limits   COMPREHENSIVE METABOLIC PANEL - Abnormal; Notable for the following components:    Glucose 126 (*)     All other components within normal limits   B-TYPE NATRIURETIC PEPTIDE - Abnormal; Notable for the following components:    BNP 1,352 (*)     All other components within normal limits   URINALYSIS, REFLEX TO URINE CULTURE - Abnormal; Notable for the following components:    Protein, UA 1+ (*)     All other components within normal limits    Narrative:     Specimen Source->Urine   URINALYSIS MICROSCOPIC - Abnormal; Notable for the following components:    Hyaline Casts, UA 3 (*)     All other components within normal limits    Narrative:     Specimen Source->Urine   CBC W/ AUTO DIFFERENTIAL - Abnormal; Notable for the following components:    RDW 25.1 (*)     All other components within normal limits   TROPONIN I HIGH SENSITIVITY   SARS-COV-2 RNA AMPLIFICATION, QUAL   INFLUENZA A AND B ANTIGEN    Narrative:     Specimen Source->Nasopharyngeal Swab   TROPONIN I HIGH SENSITIVITY   VITAMIN B12   VITAMIN B12   POCT GLUCOSE   POCT GLUCOSE MONITORING CONTINUOUS     EKG Readings: (Independently Interpreted)   Paced rhythm.  Sixty beats/minute.  Normal axis.  No ST elevation.  Mild inferior and lateral ST depression, present on previous EKG.     ECG Results              EKG 12-lead (In process)  Result time 11/27/23 09:51:44      In process by Interface, Lab In [facility] (11/27/23 09:51:44)                   Narrative:    Test Reason : R06.02,    Vent. Rate : 060 BPM     Atrial Rate : 060 BPM     P-R Int : 304 ms          QRS Dur : 176 ms      QT Int : 520 ms       P-R-T Axes : 000 101 -71 degrees     QTc Int : 520 ms    Atrial-paced rhythm with prolonged AV conduction  Right bundle branch block  T wave abnormality, consider inferolateral ischemia  Abnormal ECG  When compared with ECG of 26-JUL-2023 18:11,  No significant change was found    Referred By: [email or web address]   SELF           Confirmed  By:                                   Imaging Results              X-Ray Chest AP (Final result)  Result time 11/27/23 09:03:44      Final result by Gurpreet Loaiza MD (11/27/23 09:03:44)                   Narrative:    CLINICAL HISTORY:  89 years (11/3/1932) Female shortness of breath Shortness of breath    TECHNIQUE:  Portable AP radiograph the chest. One view.    COMPARISON:  Radiograph from July 26, 2023.    FINDINGS:  The lungs show mild perihilar pulmonary vascular prominence. There is blunting of both costophrenic angles suggesting trace pleural effusions and adjacent atelectasis. No pneumothorax is identified. The heart is mildly enlarged. The median sternotomy wires and the right sided pacemaker are unchanged. Atheromatous calcifications are seen at the aortic arch. Osseous structures show degenerative disc disease and degenerative changes in the shoulders. The visualized upper abdomen is unremarkable.    IMPRESSION:  Cardiomegaly and findings of mild pulmonary vascular congestion/trace edema.                  .            Electronically signed by:  Gurpreet Loaiza MD  11/27/2023 09:03 AM CST Workstation: [id number]                                     Medications   sodium chloride 0.9% flush 10 mL (has no administration in time range)   naloxone 0.4 mg/mL injection 0.02 mg (has no administration in time range)   glucose chewable tablet 16 g (has no administration in time range)   glucose chewable tablet 24 g (has no administration in time range)   dextrose 50% injection 12.5 g (has no administration in time range)   dextrose 50% injection 25 g (has no administration in time range)   glucagon (human recombinant) injection 1 mg (has no administration in time range)   potassium bicarbonate disintegrating tablet 50 mEq (has no administration in time range)   potassium bicarbonate disintegrating tablet 35 mEq (has no administration in time range)   potassium bicarbonate disintegrating tablet 60 mEq (has no  administration in time range)   acetaminophen tablet 650 mg (has no administration in time range)   ondansetron injection 4 mg (has no administration in time range)   sodium chloride 0.9% flush 10 mL (has no administration in time range)   allopurinoL tablet 100 mg (has no administration in time range)   amiodarone tablet 100 mg (100 mg Oral Given 11/27/23 1625)   apixaban tablet 2.5 mg (has no administration in time range)   busPIRone tablet 10 mg (has no administration in time range)   docusate sodium capsule 100 mg (has no administration in time range)   cetirizine tablet 10 mg (has no administration in time range)   meclizine tablet 12.5 mg (has no administration in time range)   midodrine tablet 2.5 mg (has no administration in time range)   pantoprazole EC tablet 40 mg (has no administration in time range)   propranoloL tablet 40 mg (has no administration in time range)   glucose chewable tablet 16 g (has no administration in time range)   glucose chewable tablet 24 g (has no administration in time range)   dextrose 50% injection 12.5 g (has no administration in time range)   dextrose 50% injection 25 g (has no administration in time range)   glucagon (human recombinant) injection 1 mg (has no administration in time range)   insulin aspart U-100 pen 0-5 Units (has no administration in time range)   furosemide injection 40 mg (40 mg Intravenous Given 11/27/23 1242)     Medical Decision Making  89-year-old female with worsening shortness a breath and generalized weakness.  Pulse ox is 90-92% on room air.  Normal work of breathing.  EKG shows no acute changes.  CXR shows findings of pulmonary edema, and patient's BNP is greater than a 1000.  Patient was given 40 mg Lasix IV.  Given her age and hypoxia, hospitalist has been consulted for inpatient diuresis.      Amount and/or Complexity of Data Reviewed  Labs: ordered.    Risk  Prescription drug management.  Decision regarding hospitalization.                                       Clinical Impression:  Final diagnoses:  [R06.02] Shortness of breath  [I50.9] Acute on chronic congestive heart failure, unspecified heart failure type (Primary)          ED Disposition Condition    Admit                 Guerrero Marrufo MD  11/27/23 6086

## 2023-11-28 ENCOUNTER — CLINICAL SUPPORT (OUTPATIENT)
Dept: CARDIOLOGY | Facility: HOSPITAL | Age: 88
End: 2023-11-28
Attending: INTERNAL MEDICINE
Payer: MEDICARE

## 2023-11-28 VITALS — HEIGHT: 63 IN | WEIGHT: 143.75 LBS | BODY MASS INDEX: 25.47 KG/M2

## 2023-11-28 VITALS
OXYGEN SATURATION: 99 % | BODY MASS INDEX: 25.47 KG/M2 | TEMPERATURE: 99 F | HEART RATE: 61 BPM | SYSTOLIC BLOOD PRESSURE: 94 MMHG | DIASTOLIC BLOOD PRESSURE: 67 MMHG | RESPIRATION RATE: 16 BRPM | WEIGHT: 143.75 LBS | HEIGHT: 63 IN

## 2023-11-28 PROBLEM — I50.33 ACUTE ON CHRONIC HEART FAILURE WITH PRESERVED EJECTION FRACTION: Status: ACTIVE | Noted: 2023-11-28

## 2023-11-28 LAB
ALBUMIN SERPL BCP-MCNC: 3.7 G/DL (ref 3.5–5.2)
ALP SERPL-CCNC: 66 U/L (ref 55–135)
ALT SERPL W/O P-5'-P-CCNC: 9 U/L (ref 10–44)
ANION GAP SERPL CALC-SCNC: 8 MMOL/L (ref 8–16)
AORTIC ROOT ANNULUS: 2.9 CM
AORTIC VALVE CUSP SEPERATION: 0.6 CM
AST SERPL-CCNC: 14 U/L (ref 10–40)
AV INDEX (PROSTH): 0.49
AV MEAN GRADIENT: 17 MMHG
AV PEAK GRADIENT: 28 MMHG
AV VALVE AREA BY VELOCITY RATIO: 1.77 CM²
AV VALVE AREA: 1.7 CM²
AV VELOCITY RATIO: 0.51
BASOPHILS # BLD AUTO: 0.05 K/UL (ref 0–0.2)
BASOPHILS NFR BLD: 1.1 % (ref 0–1.9)
BILIRUB SERPL-MCNC: 0.7 MG/DL (ref 0.1–1)
BSA FOR ECHO PROCEDURE: 1.7 M2
BUN SERPL-MCNC: 17 MG/DL (ref 10–30)
CALCIUM SERPL-MCNC: 9.1 MG/DL (ref 8.7–10.5)
CHLORIDE SERPL-SCNC: 106 MMOL/L (ref 95–110)
CO2 SERPL-SCNC: 29 MMOL/L (ref 23–29)
CREAT SERPL-MCNC: 0.8 MG/DL (ref 0.5–1.4)
CV ECHO LV RWT: 0.39 CM
DIFFERENTIAL METHOD: ABNORMAL
DOP CALC AO PEAK VEL: 2.66 M/S
DOP CALC AO VTI: 67.5 CM
DOP CALC LVOT AREA: 3.5 CM2
DOP CALC LVOT DIAMETER: 2.1 CM
DOP CALC LVOT PEAK VEL: 1.36 M/S
DOP CALC LVOT STROKE VOLUME: 114.93 CM3
DOP CALCLVOT PEAK VEL VTI: 33.2 CM
ECHO LV POSTERIOR WALL: 1.1 CM (ref 0.6–1.1)
EOSINOPHIL # BLD AUTO: 0.2 K/UL (ref 0–0.5)
EOSINOPHIL NFR BLD: 4.1 % (ref 0–8)
ERYTHROCYTE [DISTWIDTH] IN BLOOD BY AUTOMATED COUNT: 24.5 % (ref 11.5–14.5)
EST. GFR  (NO RACE VARIABLE): >60 ML/MIN/1.73 M^2
FRACTIONAL SHORTENING: 13 % (ref 28–44)
GLUCOSE SERPL-MCNC: 132 MG/DL (ref 70–110)
GLUCOSE SERPL-MCNC: 87 MG/DL (ref 70–110)
GLUCOSE SERPL-MCNC: 91 MG/DL (ref 70–110)
HCT VFR BLD AUTO: 46.9 % (ref 37–48.5)
HGB BLD-MCNC: 15.3 G/DL (ref 12–16)
IMM GRANULOCYTES # BLD AUTO: 0.01 K/UL (ref 0–0.04)
IMM GRANULOCYTES NFR BLD AUTO: 0.2 % (ref 0–0.5)
INTERVENTRICULAR SEPTUM: 0.99 CM (ref 0.6–1.1)
IVC DIAMETER: 1.6 CM
LEFT ATRIUM SIZE: 4.1 CM
LEFT INTERNAL DIMENSION IN SYSTOLE: 4.87 CM (ref 2.1–4)
LEFT VENTRICLE DIASTOLIC VOLUME INDEX: 92.86 ML/M2
LEFT VENTRICLE DIASTOLIC VOLUME: 156 ML
LEFT VENTRICLE MASS INDEX: 140 G/M2
LEFT VENTRICLE SYSTOLIC VOLUME INDEX: 66.1 ML/M2
LEFT VENTRICLE SYSTOLIC VOLUME: 111 ML
LEFT VENTRICULAR INTERNAL DIMENSION IN DIASTOLE: 5.63 CM (ref 3.5–6)
LEFT VENTRICULAR MASS: 234.93 G
LVOT MG: 4 MMHG
LVOT MV: 0.9 CM/S
LYMPHOCYTES # BLD AUTO: 1.6 K/UL (ref 1–4.8)
LYMPHOCYTES NFR BLD: 34.2 % (ref 18–48)
MAGNESIUM SERPL-MCNC: 1.4 MG/DL (ref 1.6–2.6)
MCH RBC QN AUTO: 28.3 PG (ref 27–31)
MCHC RBC AUTO-ENTMCNC: 32.6 G/DL (ref 32–36)
MCV RBC AUTO: 87 FL (ref 82–98)
MONOCYTES # BLD AUTO: 0.5 K/UL (ref 0.3–1)
MONOCYTES NFR BLD: 9.7 % (ref 4–15)
NEUTROPHILS # BLD AUTO: 2.3 K/UL (ref 1.8–7.7)
NEUTROPHILS NFR BLD: 50.7 % (ref 38–73)
NRBC BLD-RTO: 0 /100 WBC
PLATELET # BLD AUTO: 159 K/UL (ref 150–450)
PMV BLD AUTO: 10.4 FL (ref 9.2–12.9)
POTASSIUM SERPL-SCNC: 3.2 MMOL/L (ref 3.5–5.1)
PROT SERPL-MCNC: 6.7 G/DL (ref 6–8.4)
RBC # BLD AUTO: 5.41 M/UL (ref 4–5.4)
RIGHT VENTRICULAR END-DIASTOLIC DIMENSION: 2.96 CM
SODIUM SERPL-SCNC: 143 MMOL/L (ref 136–145)
WBC # BLD AUTO: 4.62 K/UL (ref 3.9–12.7)
Z-SCORE OF LEFT VENTRICULAR DIMENSION IN END DIASTOLE: 1.83
Z-SCORE OF LEFT VENTRICULAR DIMENSION IN END SYSTOLE: 4.09

## 2023-11-28 PROCEDURE — 97161 PT EVAL LOW COMPLEX 20 MIN: CPT

## 2023-11-28 PROCEDURE — 27000221 HC OXYGEN, UP TO 24 HOURS

## 2023-11-28 PROCEDURE — 85025 COMPLETE CBC W/AUTO DIFF WBC: CPT | Performed by: INTERNAL MEDICINE

## 2023-11-28 PROCEDURE — 96376 TX/PRO/DX INJ SAME DRUG ADON: CPT | Mod: 59

## 2023-11-28 PROCEDURE — 63600175 PHARM REV CODE 636 W HCPCS: Performed by: INTERNAL MEDICINE

## 2023-11-28 PROCEDURE — 96365 THER/PROPH/DIAG IV INF INIT: CPT | Mod: 59

## 2023-11-28 PROCEDURE — 36415 COLL VENOUS BLD VENIPUNCTURE: CPT | Performed by: INTERNAL MEDICINE

## 2023-11-28 PROCEDURE — 97530 THERAPEUTIC ACTIVITIES: CPT

## 2023-11-28 PROCEDURE — 93308 TTE F-UP OR LMTD: CPT | Mod: 26,,, | Performed by: INTERNAL MEDICINE

## 2023-11-28 PROCEDURE — 93321 DOPPLER ECHO F-UP/LMTD STD: CPT | Mod: 26,,, | Performed by: INTERNAL MEDICINE

## 2023-11-28 PROCEDURE — 80053 COMPREHEN METABOLIC PANEL: CPT | Performed by: INTERNAL MEDICINE

## 2023-11-28 PROCEDURE — 93325 DOPPLER ECHO COLOR FLOW MAPG: CPT | Mod: 26,,, | Performed by: INTERNAL MEDICINE

## 2023-11-28 PROCEDURE — G0378 HOSPITAL OBSERVATION PER HR: HCPCS

## 2023-11-28 PROCEDURE — 94761 N-INVAS EAR/PLS OXIMETRY MLT: CPT

## 2023-11-28 PROCEDURE — 96366 THER/PROPH/DIAG IV INF ADDON: CPT

## 2023-11-28 PROCEDURE — 99900035 HC TECH TIME PER 15 MIN (STAT)

## 2023-11-28 PROCEDURE — 94618 PULMONARY STRESS TESTING: CPT

## 2023-11-28 PROCEDURE — 99213 PR OFFICE/OUTPT VISIT, EST, LEVL III, 20-29 MIN: ICD-10-PCS | Mod: ,,, | Performed by: INTERNAL MEDICINE

## 2023-11-28 PROCEDURE — 93321 ECHO (CUPID ONLY): ICD-10-PCS | Mod: 26,,, | Performed by: INTERNAL MEDICINE

## 2023-11-28 PROCEDURE — 99900031 HC PATIENT EDUCATION (STAT)

## 2023-11-28 PROCEDURE — 93321 DOPPLER ECHO F-UP/LMTD STD: CPT

## 2023-11-28 PROCEDURE — 93308 ECHO (CUPID ONLY): ICD-10-PCS | Mod: 26,,, | Performed by: INTERNAL MEDICINE

## 2023-11-28 PROCEDURE — 99213 OFFICE O/P EST LOW 20 MIN: CPT | Mod: ,,, | Performed by: INTERNAL MEDICINE

## 2023-11-28 PROCEDURE — 93325 ECHO (CUPID ONLY): ICD-10-PCS | Mod: 26,,, | Performed by: INTERNAL MEDICINE

## 2023-11-28 PROCEDURE — 25000003 PHARM REV CODE 250: Performed by: INTERNAL MEDICINE

## 2023-11-28 PROCEDURE — 97165 OT EVAL LOW COMPLEX 30 MIN: CPT

## 2023-11-28 PROCEDURE — 93308 TTE F-UP OR LMTD: CPT

## 2023-11-28 PROCEDURE — 83735 ASSAY OF MAGNESIUM: CPT | Performed by: INTERNAL MEDICINE

## 2023-11-28 RX ORDER — MIDODRINE HYDROCHLORIDE 5 MG/1
5 TABLET ORAL EVERY 8 HOURS
Qty: 90 TABLET | Refills: 0 | Status: SHIPPED | OUTPATIENT
Start: 2023-11-28 | End: 2023-12-20

## 2023-11-28 RX ORDER — MAGNESIUM SULFATE HEPTAHYDRATE 40 MG/ML
2 INJECTION, SOLUTION INTRAVENOUS ONCE
Status: COMPLETED | OUTPATIENT
Start: 2023-11-28 | End: 2023-11-28

## 2023-11-28 RX ORDER — MIDODRINE HYDROCHLORIDE 2.5 MG/1
5 TABLET ORAL EVERY 8 HOURS
Status: DISCONTINUED | OUTPATIENT
Start: 2023-11-28 | End: 2023-11-28 | Stop reason: HOSPADM

## 2023-11-28 RX ADMIN — FUROSEMIDE 40 MG: 10 INJECTION, SOLUTION INTRAVENOUS at 09:11

## 2023-11-28 RX ADMIN — AMIODARONE HYDROCHLORIDE 100 MG: 100 TABLET ORAL at 09:11

## 2023-11-28 RX ADMIN — POTASSIUM BICARBONATE 35 MEQ: 391 TABLET, EFFERVESCENT ORAL at 12:11

## 2023-11-28 RX ADMIN — MAGNESIUM SULFATE HEPTAHYDRATE 2 G: 40 INJECTION, SOLUTION INTRAVENOUS at 12:11

## 2023-11-28 RX ADMIN — CETIRIZINE HYDROCHLORIDE 10 MG: 10 TABLET, FILM COATED ORAL at 09:11

## 2023-11-28 RX ADMIN — PANTOPRAZOLE SODIUM 40 MG: 40 TABLET, DELAYED RELEASE ORAL at 09:11

## 2023-11-28 RX ADMIN — ALLOPURINOL 100 MG: 100 TABLET ORAL at 09:11

## 2023-11-28 RX ADMIN — APIXABAN 2.5 MG: 2.5 TABLET, FILM COATED ORAL at 09:11

## 2023-11-28 RX ADMIN — MIDODRINE HYDROCHLORIDE 2.5 MG: 2.5 TABLET ORAL at 09:11

## 2023-11-28 RX ADMIN — PROPRANOLOL HYDROCHLORIDE 40 MG: 10 TABLET ORAL at 09:11

## 2023-11-28 RX ADMIN — BUSPIRONE HYDROCHLORIDE 10 MG: 5 TABLET ORAL at 09:11

## 2023-11-28 RX ADMIN — POTASSIUM BICARBONATE 35 MEQ: 391 TABLET, EFFERVESCENT ORAL at 06:11

## 2023-11-28 RX ADMIN — MIDODRINE HYDROCHLORIDE 5 MG: 2.5 TABLET ORAL at 01:11

## 2023-11-28 NOTE — PLAN OF CARE
Patient to transport home via private vehicle with family. [facility] oxygen [facility] and portable E-cylinder to be delivered to patient's bedside by [facility] rep.   Discharge orders and chart reviewed with no further post-acute discharge needs identified at this time.  At this time, patient is cleared for discharge from Case Management standpoint.        11/28/23 1511   Final Note   Assessment Type Final Discharge Note   Anticipated Discharge Disposition Home   Hospital Resources/Appts/Education Provided Appointments scheduled and added to AVS   Post-Acute Status   Post-Acute Authorization HME   HME Status Set-up Complete/Auth obtained   Coverage Medicare A and B,    Discharge Delays None known at this time

## 2023-11-28 NOTE — PLAN OF CARE
Noted order for home oxygen. Referral sent to [facility] via [facility] and spoke with Cata Almeida at [facility] [phone] to inform of referral sent.

## 2023-11-28 NOTE — RESPIRATORY THERAPY
Found patient on oxygen.     11/27/23 2300   Patient Assessment/Suction   Level of Consciousness (AVPU) alert   Respiratory Effort Normal;Unlabored   Expansion/Accessory Muscles/Retractions no retractions;no use of accessory muscles   Rhythm/Pattern, Respiratory depth regular;no shortness of breath reported;pattern regular;unlabored   PRE-TX-O2   Device (Oxygen Therapy) nasal cannula  (no home oxygen)   $ Is the patient on Low Flow Oxygen? Yes   Flow (L/min) (S)  4  (was on 5lpm, weaned to 4)   SpO2 99 %   Pulse Oximetry Type Intermittent   $ Pulse Oximetry - Multiple Charge Pulse Oximetry - Multiple   Pulse (!) 52   Resp 18

## 2023-11-28 NOTE — PT/OT/SLP EVAL
Physical Therapy Evaluation and Discharge Note    Patient Name:  Rosanna Raymundo   MRN:  [id number]    Recommendations:     Discharge Recommendations: No Therapy Indicated  Discharge Equipment Recommendations: none   Barriers to discharge:  medical status    Assessment:     Rosanna Raymundo is a 89 y.o. female admitted with a medical diagnosis of Acute on chronic heart failure with preserved ejection fraction. .  At this time, patient is functioning at their prior level of function and does not require further acute PT services.     Pt found on 2L O2 via NC. Pt tolerated bed mobility and transfers on RA. No over s/s of shortness of breath with ambulation but upon return difficult to obtain SAO2 as patient's fingers were very cold so returned to 2L O2 via at rest in chair. RN notified.     MD requested orthostatics:  Supine: 79/50  Seated: 110/50  Standin/52    No dizziness or lightheadedness throughout session    Recent Surgery: * No surgery found *      Plan:     During this hospitalization, patient does not require further acute PT services.  Please re-consult if situation changes.      Subjective     Chief Complaint: none  Patient/Family Comments/goals: return home with niece  Pain/Comfort:  Pain Rating 1: 0/10    Patients cultural, spiritual, Anabaptist conflicts given the current situation: no    Living Environment:  Pt lives with her niece in a one story home. Independent within the home with no AD. RW/SPC in community  Prior to admission, patients level of function was I/MI SPC/MI.  Equipment used at home: cane, straight.  DME owned (not currently used): none.  Upon discharge, patient will have assistance from niece.    Objective:     Communicated with RN prior to session.  Patient found HOB elevated with peripheral IV, telemetry, oxygen upon PT entry to room.    General Precautions: Standard, fall    Orthopedic Precautions:N/A   Braces: N/A  Respiratory Status: Nasal cannula, flow 2  L/min    Exams:  Cognitive Exam:  Patient is oriented to Person, Place, Time, and Situation  RLE ROM: WFL  RLE Strength: WFL  LLE ROM: WFL  LLE Strength: WFL    Functional Mobility:  Bed Mobility:     Supine to Sit: independence  Transfers:     Sit to Stand:  independence with no AD  Gait: 150 ft with no AD and supervision    AM-PAC 6 CLICK MOBILITY  Total Score:22       Treatment and Education:  Pt educated on POC, discharge recommendation, pacing/energy conservation, potential needs for O2 with mobility, need for assist with mobility, use of call bell to seek assistance as needed and fall prevention      AM-PAC 6 CLICK MOBILITY  Total Score:22     Patient left up in chair with all lines intact, call button in reach, RN notified, and nieces present.    GOALS:   Multidisciplinary Problems       Physical Therapy Goals       Not on file                    History:     Past Medical History:   Diagnosis Date    Anticoagulant long-term use     Cancer     breast/female/throat    Cataract     CHF (congestive heart failure)     Diabetes mellitus, type 2     Heart murmur     Hypertension     Iron deficiency anemia, unspecified     Anemia, iron def.    Occasional tremors        Past Surgical History:   Procedure Laterality Date    ADENOIDECTOMY      APPENDECTOMY      COLONOSCOPY N/A 12/17/2019    Procedure: COLONOSCOPY;  Surgeon: Raleigh Quarles III, MD;  Location: [facility];  Service: Endoscopy;  Laterality: N/A;    COLONOSCOPY N/A 12/18/2019    Procedure: COLONOSCOPY;  Surgeon: Raleigh Quarles III, MD;  Location: [facility];  Service: Endoscopy;  Laterality: N/A;    ERCP N/A 8/10/2023    Procedure: ERCP (ENDOSCOPIC RETROGRADE CHOLANGIOPANCREATOGRAPHY);  Surgeon: Raleigh Quarles III, MD;  Location: [facility];  Service: Endoscopy;  Laterality: N/A;    Gall bladder revmoval      HYSTERECTOMY      INSERTION OF PACEMAKER Right 1/2/2020    Procedure: INSERTION, PACEMAKER;  Surgeon: Fly Barragan MD;  Location:  [facility] CATH/EP LAB;  Service: Cardiology;  Laterality: Right;    left mastectomy Left 1945    MASTECTOMY Left     VT CABG, ARTERY-VEIN, FOUR  2014    Coronary Artery Bypass, 4    SMALL BOWEL ENTEROSCOPY N/A 12/17/2019    Procedure: ENTEROSCOPY;  Surgeon: Raleigh Quarles III, MD;  Location: [facility] ENDO;  Service: Endoscopy;  Laterality: N/A;    TONSILLECTOMY         Time Tracking:     PT Received On: 11/28/23  PT Start Time: 1041     PT Stop Time: 1103  PT Total Time (min): 22 min     Billable Minutes: Evaluation 10 and Therapeutic Activity 12      11/28/2023

## 2023-11-28 NOTE — NURSING
Patient care tech notified of low BP 87/62. Hospitalist notifed. Given 500 CC fluid bolus. Pt asymptomatic. Will continue to monitor.

## 2023-11-28 NOTE — CARE UPDATE
11/28/23 1125   Home Oxygen Qualification   $ Home O2 Qualification Pulmonary Stress Test/6 min walk;Tech time 15 minutes   Room Air SpO2 At Rest 90 %   Room Air SpO2 During Ambulation (!) 87 %   SpO2 During Ambulation on O2 95 %   Heart Rate on O2 65 bpm   Ambulation O2 LPM 2 LPM   SpO2 Post Ambulation 98 %   Post Ambulation Heart Rate 66 bpm   Post Ambulation O2 LPM 2 LPM   Home O2 Eval Comments Patient is going to require 2L O2

## 2023-11-28 NOTE — HOSPITAL COURSE
Patient was admitted with worsening SOB, due to acute on chronic HFpEF Patient diuresed well with IV lasix. She had good UOP not all of it was charted due to incontinence. Patient feeling much better this am, wanting to go home. She states she does not have SOB with walking today. Home oxygen eval shows RA at rest 2 L on exertion. ANURAG was low this evening, given 500 cc fluid back. Midodrine increased to 5 mg TID. BP improved prior to discharge.   
ambulatory

## 2023-11-28 NOTE — PLAN OF CARE
Met with patient at bedside, explained Medicare Outpatient Observation Notice (MOON), and left Q&A information sheet at bedside. MOON form scanned into media manager.        11/28/23 1510   MARTINEZ Message   Medicare Outpatient and Observation Notification regarding financial responsibility Given to patient/caregiver;Explained to patient/caregiver;Signed/date by patient/caregiver   Date MARTINEZ was signed 11/28/23   Time MARTINEZ was signed [id number]

## 2023-11-28 NOTE — PLAN OF CARE
Met with patient at bedside to complete initial assessment. Patient / family reports patient DOES  have a living will and Martha Martin (Niece) [phone] (Mobile)  is medical POA.     [facility]  Initial Discharge Assessment       Primary Care Provider: Aamir Rhodes MD    Admission Diagnosis: Acute on chronic congestive heart failure, unspecified heart failure type [I50.9]    Admission Date: 11/27/2023  Expected Discharge Date: 11/29/2023    Transition of Care Barriers: (P) None    Payor: MEDICARE / Plan: MEDICARE PART A & B / Product Type: Government /     Extended Emergency Contact Information  Primary Emergency Contact: Martha Martin  Mobile Phone: [phone]  Relation: Relative   needed? No  Secondary Emergency Contact: Cinthia Todd  Mobile Phone: [phone]  Relation: Relative  Preferred language: English   needed? No    Discharge Plan A: (P) Home with family  Discharge Plan B: (P) Home with family      [facility] - [address] [address]  Phone: [phone] Fax: [phone]    [facility] - [place], [place] - [address] [address]  [address] [address]  [place] [address]  Phone: [phone] Fax: [phone]      Initial Assessment (most recent)       Adult Discharge Assessment - 11/28/23 1122          Discharge Assessment    Assessment Type Discharge Planning Assessment (P)      Confirmed/corrected address, phone number and insurance Yes (P)      Confirmed Demographics Correct on Facesheet (P)      Source of Information patient (P)      Communicated AMBIKA with patient/caregiver No (P)      Reason For Admission CHF (P)      People in Home other relative(s) (P)    lives with Martha Piña (Relative)  [phone] (Mobile)    Facility Arrived From: home (P)      Do you expect to return to your current living situation? Yes (P)      Do you have help at home or someone to help you manage  your care at home? Yes (P)      Who are your caregiver(s) and their phone number(s)? Martha Martin ([place])  [phone] (Mobile) (P)      Current cognitive status: Alert/Oriented (P)      Walking or Climbing Stairs ambulation difficulty, requires equipment (P)      Equipment Currently Used at Home cane, straight (P)      Readmission within 30 days? No (P)      Patient currently being followed by outpatient case management? No (P)      Do you currently have service(s) that help you manage your care at home? No (P)      Do you have prescription coverage? Yes (P)      Coverage Medicare A and B,  (P)      Who is going to help you get home at discharge? Martha Martin (Niece) [phone] (Mobile) (P)      How do you get to doctors appointments? family or friend will provide (P)      Are you on dialysis? No (P)      Do you take coumadin? No (P)      DME Needed Upon Discharge  none (P)      Discharge Plan discussed with: Patient (P)      Transition of Care Barriers None (P)      Discharge Plan A Home with family (P)      Discharge Plan B Home with family (P)         OTHER    Name(s) of People in Home Martha Martin ([place]) [phone] (Mobile) (P)

## 2023-11-28 NOTE — CARE UPDATE
11/28/23 0836   Patient Assessment/Suction   Level of Consciousness (AVPU) alert   Skin Integrity   $ Wound Care Tech Time 15 min   Area Observed Left;Right;Behind ear;Cheek;Upper lip;Nares   Skin Appearance without discoloration   PRE-TX-O2   Device (Oxygen Therapy) nasal cannula   $ Is the patient on Low Flow Oxygen? Yes   Flow (L/min) 2   SpO2 98 %   Pulse Oximetry Type Intermittent   $ Pulse Oximetry - Multiple Charge Pulse Oximetry - Multiple   Education   $ Education Other (see comment);15 min

## 2023-11-28 NOTE — DISCHARGE SUMMARY
Critical access hospital Medicine  Discharge Summary      Patient Name: Rosanna Raymundo  MRN: [id number]  MURPHY: [id number]  Patient Class: IP- Inpatient  Admission Date: 11/27/2023  Hospital Length of Stay: 1 days  Discharge Date and Time:  11/28/2023 1:50 PM  Attending Physician: Mercedes Verduzco MD   Discharging Provider: Mercedes Verduzco MD  Primary Care Provider: Aamir Rhodes MD    Primary Care Team: Networked reference to record PCT     HPI:   89 yo F Hx dCHF with prn lasix , severe aortic stenosis, atrial fibrillation, history of symptomatic bradycardia, cardiac pacemaker, history of CABG, hypotension on midodrine, essential tremors on propranolol, type 2 diabetes, cataracts, CKD 3A.  Also History of breast cancer 30 years ago treated with mastectomy, chemotherapy, hormonal therapy, radiation, currently in remission. 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     Pt p/w progressive sob over 2 weeks, also dizziness and weakness, no chest pain. 92% on RA. XR with pulmonary edema. She is adherent to low salt diet, has not been taking lasix d/t no leg swelling.    * No surgery found *      Hospital Course:   Patient was admitted with worsening SOB, due to acute on chronic HFpEF Patient diuresed well with IV lasix. She had good UOP not all of it was charted due to incontinence. Patient feeling much better this am, wanting to go home. She states she does not have SOB with walking today. Home oxygen eval shows RA at rest 2 L on exertion. ANURAG was low this evening, given 500 cc fluid back. Midodrine increased to 5 mg TID. BP improved prior to discharge.      Goals of Care Treatment Preferences:  Code Status: Full Code    Physical Exam  Constitutional:       General: Sno acute distress  Cardiovascular:      Rate and Rhythm: Normal rate and regular rhythm.      Pulses: Normal pulses.      Heart sounds:  Normal heart sounds.   Pulmonary:      Effort: Pulmonary effort is normal.      Breath sounds: Normal breath sounds.   Abdominal:      General: Abdomen is flat.      Palpations: Abdomen is soft.   Skin:     General: Skin is warm.      Capillary Refill: Capillary refill takes less than 2 seconds.   Neurological:      General: No focal deficit present.      Mental Status: She is alert and oriented to person, place, and time.   Psychiatric:         Mood and Affect: Mood normal.      Consults:   Consults (From admission, onward)          Status Ordering Provider     Inpatient consult to Cardiology  Once        Provider:  Ashwin Herrera MD    Completed TERESE KAUFMAN     Inpatient consult to Registered Dietitian/Nutritionist  Once        Provider:  (Not yet assigned)    Acknowledged TERESE KAUFMAN          Assessment & Plan    Acute on chronic HFpEF  - Lasix 20 mg daily resumed on discharge     Chronic hypotension  - Midodrine increased to 5 mg TID     Afib   Secondary hypercoagulable state   - Eliquis     Hx of CABG  Pacemaker insitu    Iron deficiency anemia   B12 deficiency     CKD IIIa    Type II DM     Gout     Breast cancer.  In remission     Service: Hospital Medicine    Final Active Diagnoses:    Diagnosis Date Noted POA    PRINCIPAL PROBLEM:  Acute on chronic heart failure with preserved ejection fraction [I50.33] 11/28/2023 Yes      Problems Resolved During this Admission:       Discharged Condition: good    Disposition: Home or Self Care    Follow Up:   Follow-up Information       Aamir Rhodes MD Follow up in 1 week(s).    Specialties: Family Medicine, Home Health Services, Hospice Services  Contact information:  [address]  [address]  [facility] [address]  [phone]                           Patient Instructions:      OXYGEN FOR HOME USE     Order Specific Question Answer Comments   Liter Flow 2    Duration With activity    Qualifying Test Performed at:  "Activity    Oxygen saturation at rest 90    Oxygen saturation with activity 87    Oxygen saturation with activity on oxygen 95    Portable mode: pulse dose acceptable    Mode: Portable concentrator    Route nasal cannula    Device: home concentrator with portable concentrator    Length of need (in months): 3 mos    Patient condition with qualifying saturation CHF    Height: 5' 3" (1.6 m)    Weight: 65.2 kg (143 lb 11.8 oz)    Alternative treatment measures have been tried or considered and deemed clinically ineffective. Yes        Significant Diagnostic Studies: Labs: CMP   Recent Labs   Lab 11/27/23  0904 11/28/23  0434    143   K 4.2 3.2*    106   CO2 23 29   * 91   BUN 16 17   CREATININE 0.9 0.8   CALCIUM 9.3 9.1   PROT 6.8 6.7   ALBUMIN 3.9 3.7   BILITOT 0.7 0.7   ALKPHOS 62 66   AST 18 14   ALT 11 9*   ANIONGAP 9 8    and CBC   Recent Labs   Lab 11/27/23  0904 11/27/23  1512 11/28/23  0434   WBC 5.03 5.04 4.62   HGB 14.4 14.5 15.3   HCT 44.2 45.1 46.9    157 159       Pending Diagnostic Studies:       Procedure Component Value Units Date/Time    Echo [[id number]]  (Abnormal) Resulted: 11/28/23 0948    Order Status: Sent Lab Status: In process Updated: 11/28/23 0948     IVC diameter 1.60 cm      Ao root annulus 2.90 cm      Left Ventricular Outflow Tract Mean Gradient 4.00 mmHg      Left Ventricular Outflow Tract Mean Velocity 0.90 cm/s      HEENA by Velocity Ratio 1.77 cm²      AV index (prosthetic) 0.49     AV Velocity Ratio 0.51     AV valve area 1.70 cm²      LVOT peak VTI 33.20 cm      Ao VTI 67.50 cm      Ao peak radha 2.66 m/s      AV peak gradient 28 mmHg      AV mean gradient 17 mmHg      LA size 4.10 cm      RVDD 2.96 cm      LV Mass Index 140 g/m2      LV mass 234.93 g      LVOT peak radha 1.36 m/s      Posterior Wall 1.10 cm      Left Ventricle Relative Wall Thickness 0.39 cm      FS 13 %      LVOT area 3.5 cm2      LVOT diameter 2.10 cm      IVS 0.99 cm      LV Diastolic Volume " Index 92.60 mL/m2      LV Diastolic Volume 155.56 mL      LVIDs 4.87 cm      LV Systolic Volume Index 66.2 mL/m2      LV Systolic Volume 111.21 mL      LVIDd 5.63 cm      LVOT stroke volume 114.93 cm3      ZLVIDS 4.09     ZLVIDD 1.83     AORTIC VALVE CUSP SEPERATION 0.60 cm      BSA 1.7 m2            Medications:  Reconciled Home Medications:      Medication List        START taking these medications      ferrous sulfate 325 (65 FE) MG EC tablet  Take 1 tablet (325 mg total) by mouth 2 (two) times daily.            CHANGE how you take these medications      cyanocobalamin 1,000 mcg/mL injection  Inject 1 ml B 12 subq monthly.  What changed:   how much to take  how to take this  when to take this     furosemide 20 MG tablet  Commonly known as: LASIX  Take a half to one tablet in the morning if needed for swelling.  What changed:   how much to take  how to take this  when to take this     magnesium oxide 400 mg (241.3 mg magnesium) tablet  Commonly known as: MAG-OX  Take 1 tablet (400 mg total) by mouth once daily.  What changed: how much to take     meclizine 12.5 mg tablet  Commonly known as: ANTIVERT  Take daily in the morning, and then may also take two more times in a day as needed, every 8 hours.  What changed:   how much to take  how to take this  when to take this  reasons to take this     midodrine 5 MG Tab  Commonly known as: PROAMATINE  Take 1 tablet (5 mg total) by mouth every 8 (eight) hours. Take first thing in the morning and then again in the afternoon around 3 pm.  What changed:   medication strength  how much to take  when to take this     mupirocin 2 % ointment  Commonly known as: BACTROBAN  Apply topically once daily.  What changed: how much to take     omeprazole 20 MG capsule  Commonly known as: PRILOSEC  Take 2 capsules (40 mg total) by mouth once daily.  What changed:   how much to take  when to take this            CONTINUE taking these medications      allopurinoL 100 MG tablet  Commonly  "known as: ZYLOPRIM  Take 100 mg by mouth once daily.     amiodarone 200 MG Tab  Commonly known as: PACERONE  Take 0.5 tablets (100 mg total) by mouth once daily.     apixaban 2.5 mg Tab  Commonly known as: ELIQUIS  Take 1 tablet (2.5 mg total) by mouth 2 (two) times daily.     blood sugar diagnostic Strp  To check BG 3 times daily, to use with insurance preferred meter     blood-glucose meter kit  To check BG 3 times daily, to use with insurance preferred meter     busPIRone 5 MG Tab  Commonly known as: BUSPAR  Take 2 tablets (10 mg total) by mouth 2 (two) times daily.     cholecalciferol (vitamin D3) 25 mcg (1,000 unit) capsule  Commonly known as: VITAMIN D3  Take 1 capsule (1,000 Units total) by mouth once daily.     docusate sodium 100 MG capsule  Commonly known as: COLACE  Take 100 mg by mouth as needed for Constipation.     FREESTYLE LANCETS 28 gauge Misc  Generic drug: lancets     loratadine 10 mg tablet  Commonly known as: CLARITIN  Take 1 tablet (10 mg total) by mouth once daily.     potassium chloride SA 10 MEQ tablet  Commonly known as: K-DUR,KLOR-CON M  Take 1 tablet (10 mEq total) by mouth once daily.     PRESERVISION AREDS 4,296 mcg-226 mg-90 mg Cap  Generic drug: vitamins A,C,E-zinc-copper  Take 1 capsule by mouth 2 (two) times daily.     propranoloL 40 MG tablet  Commonly known as: INDERAL  Take 1 tablet (40 mg total) by mouth 2 (two) times daily.     TUBERCULIN SYRINGE 1 mL 27 x 1/2" Syrg  Generic drug: syringe with needle  Use for B 12 subq injection monthly.              Indwelling Lines/Drains at time of discharge:   Lines/Drains/Airways       None                   Time spent on the discharge of patient: 40 minutes         Mercedes Verduzco MD  Department of Hospital Medicine  [facility]  "

## 2023-11-28 NOTE — PT/OT/SLP EVAL
Occupational Therapy   Evaluation and Discharge Note    Name: Rosanna Raymundo  MRN: [id number]  Admitting Diagnosis: Acute on chronic heart failure with preserved ejection fraction  Recent Surgery: * No surgery found *      Recommendations:     Discharge Recommendations: No Therapy Indicated  Discharge Equipment Recommendations: oxygen  Barriers to discharge:  None    Assessment:     Rosanna Raymundo is a 89 y.o. female with a medical diagnosis of Acute on chronic heart failure with preserved ejection fraction. At this time, patient is functioning at their prior level of function and does not require further acute OT services.     Plan:     During this hospitalization, patient does not require further acute OT services.  Please re-consult if situation changes.    Plan of Care Reviewed with: patient    Subjective     Chief Complaint: none  Patient/Family Comments/goals: none stated    Occupational Profile:  Living Environment: Lives with niece [facility]  Previous level of function: Independent no AD in the home and modified independent with RW or cane in community  Equipment Used at home: walker, rolling, cane, straight  Assistance upon Discharge: niece    Pain/Comfort:  Pain Rating 1: 0/10  Pain Rating Post-Intervention 1: 0/10    Objective:     Communicated with: nurse prior to session.  Patient found supine with oxygen upon OT entry to room.    General Precautions: Standard, fall   Respiratory Status: Nasal cannula, flow 2 L/min     Occupational Performance:  Pt declined need for functional mobility/ADL assessment; reports no concerns    Cognitive/Visual Perceptual:  Cognitive/Psychosocial Skills:     -       Follows Commands/attention:Follows multistep  commands  -       Communication: clear/fluent    Physical Exam:  Upper Extremity Range of Motion:     -       Right Upper Extremity: WFL  -       Left Upper Extremity: WFL  Upper Extremity Strength:    -       Right Upper Extremity: WFL  -       Left Upper  Extremity: WFL   Strength:    -       Right Upper Extremity: WFL  -       Left Upper Extremity: WFL  Fine Motor Coordination:    -       Intact   Gross motor coordination:   WFL    AMPAC 6 Click ADL:  AMPAC Total Score: 24    Treatment & Education:  Pt/niece educated on energy conservation techniques specifically related to bathing; they verbalized understanding    Patient left HOB elevated with all lines intact and call button in reach    History:     Past Medical History:   Diagnosis Date    Anticoagulant long-term use     Cancer     breast/female/throat    Cataract     CHF (congestive heart failure)     Diabetes mellitus, type 2     Heart murmur     Hypertension     Iron deficiency anemia, unspecified     Anemia, iron def.    Occasional tremors          Past Surgical History:   Procedure Laterality Date    ADENOIDECTOMY      APPENDECTOMY      COLONOSCOPY N/A 12/17/2019    Procedure: COLONOSCOPY;  Surgeon: Raleigh Quarles III, MD;  Location: [facility] ENDO;  Service: Endoscopy;  Laterality: N/A;    COLONOSCOPY N/A 12/18/2019    Procedure: COLONOSCOPY;  Surgeon: Raleigh Quarles III, MD;  Location: [facility] ENDO;  Service: Endoscopy;  Laterality: N/A;    ERCP N/A 8/10/2023    Procedure: ERCP (ENDOSCOPIC RETROGRADE CHOLANGIOPANCREATOGRAPHY);  Surgeon: Raleigh Quarles III, MD;  Location: [facility] ENDO;  Service: Endoscopy;  Laterality: N/A;    Gall bladder revmoval      HYSTERECTOMY      INSERTION OF PACEMAKER Right 1/2/2020    Procedure: INSERTION, PACEMAKER;  Surgeon: Fly Barragan MD;  Location: [facility] CATH/EP LAB;  Service: Cardiology;  Laterality: Right;    left mastectomy Left 1945    MASTECTOMY Left     NH CABG, ARTERY-VEIN, FOUR  2014    Coronary Artery Bypass, 4    SMALL BOWEL ENTEROSCOPY N/A 12/17/2019    Procedure: ENTEROSCOPY;  Surgeon: Raleigh Quarles III, MD;  Location: [facility] ENDO;  Service: Endoscopy;  Laterality: N/A;    TONSILLECTOMY         Time Tracking:     OT Date of Treatment:  11/28/23  OT Start Time: 1328  OT Stop Time: 1338  OT Total Time (min): 10 min    Billable Minutes:Evaluation 10    11/28/2023

## 2023-11-28 NOTE — PROGRESS NOTES
[facility]   Department of Cardiology  Progress Note      PATIENT NAME: Rosanna Raymundo    MRN: [id number]  TODAY'S DATE: 11/28/2023  ADMIT DATE: 11/27/2023                          CONSULT REQUESTED BY: Mercedes Verduzco MD    SUBJECTIVE     PRINCIPAL PROBLEM: <principal problem not specified>    11/28/23:  Patient seen resting in bed. She has diuresed well but had incontinence so I&O is not accurately recorded. She is breathing better and anxious to go home.     REASON FOR CONSULT:    From H&P: 89 yo F Hx dCHF with prn lasix , severe aortic stenosis, atrial fibrillation, history of symptomatic bradycardia, cardiac pacemaker, history of CABG, hypotension on midodrine, essential tremors on propranolol, type 2 diabetes, cataracts, CKD 3A.  Also History of breast cancer 30 years ago treated with mastectomy, chemotherapy, hormonal therapy, radiation, currently in remission. 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     Pt p/w progressive sob over 2 weeks, also dizziness and weakness, no chest pain. 92% on RA. XR with pulmonary edema. She is adherent to low salt diet, has not been taking lasix d/t no leg swelling.     In ER pt given iv lasix, plan to admit to IM         HPI:    Ms. Raymundo is a 89 year old female who presented to the ER with complaints of progressively worsening SOB and weakness over the past two weeks. She was found to have elevated Bnp as well as CHF on chest xray. She has been given IV furosemide with very good UOP response. Patient is in SR with intermittent pacing on telemetry. BP stable. She has moderate to severe AS. She does report having a reaction after iron infusion recently. She states her first infusion was fine but after the second, she felt very poorly.         Review of patient's allergies indicates:   Allergen Reactions    Penicillin g benzathine Other (See  Comments)       Past Medical History:   Diagnosis Date    Anticoagulant long-term use     Cancer     breast/female/throat    Cataract     CHF (congestive heart failure)     Diabetes mellitus, type 2     Heart murmur     Hypertension     Iron deficiency anemia, unspecified     Anemia, iron def.    Occasional tremors      Past Surgical History:   Procedure Laterality Date    ADENOIDECTOMY      APPENDECTOMY      COLONOSCOPY N/A 12/17/2019    Procedure: COLONOSCOPY;  Surgeon: Raleigh Quarles III, MD;  Location: [facility] ENDO;  Service: Endoscopy;  Laterality: N/A;    COLONOSCOPY N/A 12/18/2019    Procedure: COLONOSCOPY;  Surgeon: Raleigh Quarles III, MD;  Location: [facility] ENDO;  Service: Endoscopy;  Laterality: N/A;    ERCP N/A 8/10/2023    Procedure: ERCP (ENDOSCOPIC RETROGRADE CHOLANGIOPANCREATOGRAPHY);  Surgeon: Raleigh Quarles III, MD;  Location: [facility] ENDO;  Service: Endoscopy;  Laterality: N/A;    Gall bladder revmoval      HYSTERECTOMY      INSERTION OF PACEMAKER Right 1/2/2020    Procedure: INSERTION, PACEMAKER;  Surgeon: Fly Barragan MD;  Location: [facility] CATH/EP LAB;  Service: Cardiology;  Laterality: Right;    left mastectomy Left 1945    MASTECTOMY Left     MN CABG, ARTERY-VEIN, FOUR  2014    Coronary Artery Bypass, 4    SMALL BOWEL ENTEROSCOPY N/A 12/17/2019    Procedure: ENTEROSCOPY;  Surgeon: Raleigh Quarles III, MD;  Location: [facility] ENDO;  Service: Endoscopy;  Laterality: N/A;    TONSILLECTOMY       Social History     Tobacco Use    Smoking status: Never    Smokeless tobacco: Never   Substance Use Topics    Alcohol use: Never    Drug use: Never        REVIEW OF SYSTEMS  CONSTITUTIONAL:+ fatigue and weakness  EYES: No double vision, No blurred vision  NEURO: No headaches, No dizziness  RESPIRATORY: Negative for cough, +shortness of breath   CARDIOVASCULAR: Negative for chest pain. Negative for palpitations and leg swelling.   GI: Negative for abdominal pain, No melena, diarrhea, nausea and  vomiting.   : Negative for dysuria and frequency, Negative for hematuria  SKIN: Negative for bruising, Negative for edema or discoloration noted.   PSYCHIATRIC: Negative for depression, Negative for anxiety, Negative for memory loss  MUSCULOSKELETAL: Negative for neck pain, Negative for muscle weakness, Negative for back pain     OBJECTIVE     VITAL SIGNS (Most Recent)  Temp: 98.1 °F (36.7 °C) (11/28/23 0700)  Pulse: 60 (11/28/23 0700)  Resp: 16 (11/28/23 0700)  BP: 137/85 (11/28/23 0700)  SpO2: 99 % (11/28/23 0700)    VENTILATION STATUS  Resp: 16 (11/28/23 0700)  SpO2: 99 % (11/28/23 0700)           I & O (Last 24H):  Intake/Output Summary (Last 24 hours) at 11/28/2023 0915  Last data filed at 11/28/2023 0336  Gross per 24 hour   Intake 240 ml   Output 1000 ml   Net -760 ml       WEIGHTS  Wt Readings from Last 1 Encounters:   11/27/23 2204 65.2 kg (143 lb 11.8 oz)   11/27/23 0909 68 kg (150 lb)       PHYSICAL EXAM  CONSTITUTIONAL: No fever, no chills  HEENT: Normocephalic, atraumatic,pupils reactive to light                 NECK:  No JVD no carotid bruit  CVS: S1S2+, RRR, + murmur  LUNGS: Clear  ABDOMEN: Soft, NT, BS+  EXTREMITIES: No cyanosis, edema  : No vargas catheter  NEURO: AAO X 3  PSY: Normal affect      HOME MEDICATIONS:  No current facility-administered medications on file prior to encounter.     Current Outpatient Medications on File Prior to Encounter   Medication Sig Dispense Refill    allopurinoL (ZYLOPRIM) 100 MG tablet Take 100 mg by mouth once daily.      amiodarone (PACERONE) 200 MG Tab Take 0.5 tablets (100 mg total) by mouth once daily. 45 tablet 3    apixaban (ELIQUIS) 2.5 mg Tab Take 1 tablet (2.5 mg total) by mouth 2 (two) times daily. 180 tablet 3    busPIRone (BUSPAR) 5 MG Tab Take 2 tablets (10 mg total) by mouth 2 (two) times daily. 180 tablet 3    cholecalciferol, vitamin D3, (VITAMIN D3) 25 mcg (1,000 unit) capsule Take 1 capsule (1,000 Units total) by mouth once daily. 100 capsule 1     cyanocobalamin 1,000 mcg/mL injection Inject 1 ml B 12 subq monthly. (Patient taking differently: Inject 1,000 mcg into the muscle every 30 days. Inject 1 ml B 12 subq monthly.) 3 mL 4    docusate sodium (COLACE) 100 MG capsule Take 100 mg by mouth as needed for Constipation.      furosemide (LASIX) 20 MG tablet Take a half to one tablet in the morning if needed for swelling. (Patient taking differently: Take 20 mg by mouth once daily. Take a half to one tablet in the morning if needed for swelling.) 30 tablet 11    loratadine (CLARITIN) 10 mg tablet Take 1 tablet (10 mg total) by mouth once daily. 90 tablet 3    magnesium oxide (MAG-OX) 400 mg (241.3 mg magnesium) tablet Take 1 tablet (400 mg total) by mouth once daily. (Patient taking differently: Take 200 mg by mouth once daily.) 90 tablet 3    meclizine (ANTIVERT) 12.5 mg tablet Take daily in the morning, and then may also take two more times in a day as needed, every 8 hours. (Patient taking differently: Take 12.5 mg by mouth every 8 (eight) hours as needed for Dizziness or Nausea. Take daily in the morning, and then may also take two more times in a day as needed, every 8 hours.) 30 tablet 11    midodrine (PROAMATINE) 2.5 MG Tab Take 1 tablet (2.5 mg total) by mouth 2 (two) times a day. Take first thing in the morning and then again in the afternoon around 3 pm. 180 tablet 3    mupirocin (BACTROBAN) 2 % ointment Apply topically once daily. (Patient taking differently: Apply 1 g topically once daily.) 30 g 3    omeprazole (PRILOSEC) 20 MG capsule Take 2 capsules (40 mg total) by mouth once daily. (Patient taking differently: Take 20 mg by mouth 2 (two) times a day.) 180 capsule 1    potassium chloride SA (K-DUR,KLOR-CON M) 10 MEQ tablet Take 1 tablet (10 mEq total) by mouth once daily. 30 tablet 11    propranoloL (INDERAL) 40 MG tablet Take 1 tablet (40 mg total) by mouth 2 (two) times daily. 180 tablet 3    vitamins A,C,E-zinc-copper (PRESERVISION AREDS)  "4,296 mcg-226 mg-90 mg Cap Take 1 capsule by mouth 2 (two) times daily. 180 capsule 3    blood sugar diagnostic Strp To check BG 3 times daily, to use with insurance preferred meter 200 strip 1    blood-glucose meter kit To check BG 3 times daily, to use with insurance preferred meter 1 each 0    ferrous sulfate 325 (65 FE) MG EC tablet Take 1 tablet (325 mg total) by mouth 2 (two) times daily. (Patient not taking: Reported on 11/27/2023) 180 tablet 2    FREESTYLE LANCETS 28 gauge lancets       syringe with needle (TUBERCULIN SYRINGE) 1 mL 27 x 1/2" Syrg Use for B 12 subq injection monthly. 3 each 4       SCHEDULED MEDS:   allopurinoL  100 mg Oral Daily    amiodarone  100 mg Oral Daily    apixaban  2.5 mg Oral BID    busPIRone  10 mg Oral BID    cetirizine  10 mg Oral Daily    furosemide (LASIX) injection  40 mg Intravenous Daily    midodrine  2.5 mg Oral BID    pantoprazole  40 mg Oral Daily    propranoloL  40 mg Oral BID       CONTINUOUS INFUSIONS:    PRN MEDS:acetaminophen, dextrose 50%, dextrose 50%, dextrose 50%, dextrose 50%, docusate sodium, glucagon (human recombinant), glucagon (human recombinant), glucose, glucose, glucose, glucose, insulin aspart U-100, meclizine, naloxone, ondansetron, potassium bicarbonate, potassium bicarbonate, potassium bicarbonate, sodium chloride 0.9%, sodium chloride 0.9%    LABS AND DIAGNOSTICS     CBC LAST 3 DAYS  Recent Labs   Lab 11/27/23  0904 11/27/23  1512 11/28/23  0434   WBC 5.03 5.04 4.62   RBC 5.09 5.17 5.41*   HGB 14.4 14.5 15.3   HCT 44.2 45.1 46.9   MCV 87 87 87   MCH 28.3 28.0 28.3   MCHC 32.6 32.2 32.6   RDW 25.2* 25.1* 24.5*    157 159   MPV 9.8 SEE COMMENT 10.4   GRAN 63.7  3.2 64.3  3.2 50.7  2.3   LYMPH 24.3  1.2 24.6  1.2 34.2  1.6   MONO 7.0  0.4 7.9  0.4 9.7  0.5   BASO 0.07 0.05 0.05   NRBC 0 0 0         COAGULATION LAST 3 DAYS  No results for input(s): "LABPT", "INR", "APTT" in the last 168 hours.    CHEMISTRY LAST 3 DAYS  Recent Labs   Lab " "11/27/23  0904 11/28/23  0434    143   K 4.2 3.2*    106   CO2 23 29   ANIONGAP 9 8   BUN 16 17   CREATININE 0.9 0.8   * 91   CALCIUM 9.3 9.1   MG  --  1.4*   ALBUMIN 3.9 3.7   PROT 6.8 6.7   ALKPHOS 62 66   ALT 11 9*   AST 18 14   BILITOT 0.7 0.7         CARDIAC PROFILE LAST 3 DAYS  Recent Labs   Lab 11/27/23  0904 11/27/23  1038   BNP 1,352*  --    TROPONINIHS  --  14.0         ENDOCRINE LAST 3 DAYS  No results for input(s): "TSH", "PROCAL" in the last 168 hours.    LAST ARTERIAL BLOOD GAS  ABG  No results for input(s): "PH", "PO2", "PCO2", "HCO3", "BE" in the last 168 hours.    LAST 7 DAYS MICROBIOLOGY   Microbiology Results (last 7 days)       ** No results found for the last 168 hours. **            MOST RECENT IMAGING  X-Ray Chest AP  CLINICAL HISTORY:  89 years (11/3/1932) Female shortness of breath Shortness of breath    TECHNIQUE:  Portable AP radiograph the chest. One view.    COMPARISON:  Radiograph from July 26, 2023.    FINDINGS:  The lungs show mild perihilar pulmonary vascular prominence. There is blunting of both costophrenic angles suggesting trace pleural effusions and adjacent atelectasis. No pneumothorax is identified. The heart is mildly enlarged. The median sternotomy wires and the right sided pacemaker are unchanged. Atheromatous calcifications are seen at the aortic arch. Osseous structures show degenerative disc disease and degenerative changes in the shoulders. The visualized upper abdomen is unremarkable.    IMPRESSION:  Cardiomegaly and findings of mild pulmonary vascular congestion/trace edema.    .    Electronically signed by:  Gurpreet Loaiza MD  11/27/2023 09:03 AM [facility] Workstation: [id number]      ECHOCARDIOGRAM RESULTS (last 5)  Results for orders placed during the hospital encounter of 10/04/23    Echo    Interpretation Summary    Left Ventricle: The left ventricle is normal in size. Mildly increased wall thickness. Moderate global hypokinesis present. There is " low normal systolic function with a visually estimated ejection fraction of 50 - 55%. There is normal diastolic function.    Left Atrium: Left atrium is moderately dilated.    Right Ventricle: Normal right ventricular cavity size. Wall thickness is normal. Right ventricle wall motion  is normal. Systolic function is borderline low.    Aortic Valve: The aortic valve is a trileaflet valve. Mildly calcified left, right and noncoronary cusps. Severely restricted motion. There is severe stenosis. Aortic valve area by VTI is 0.75 cm². Aortic Valve VTI is 67.70 cm. Aortic valve peak velocity is 2.63 m/s. Mean gradient is 16 mmHg. Aortic Valve peak gradient is 28 mmHg. The dimensionless index is 0.24.    Tricuspid Valve: There is moderate regurgitation.    IVC/SVC: Normal venous pressure at 3 mmHg.    ICD/ Pacemaker lead seen in RA and RV.      Results for orders placed during the hospital encounter of 07/26/22    Echo    Interpretation Summary  · The left ventricle is normal in size with mild concentric hypertrophy and low normal systolic function.  · The estimated ejection fraction is 50%.  · Normal left ventricular diastolic function.  · Normal right ventricular size with normal right ventricular systolic function.  · Moderate left atrial enlargement.  · There is severe aortic valve stenosis.  · Aortic valve area is 0.92 cm2; peak velocity is 2.51 m/s; mean gradient is 25 mmHg.  · Moderate tricuspid regurgitation.  · Mild-to-moderate mitral regurgitation.      Results for orders placed during the hospital encounter of 01/07/22    Echo    Interpretation Summary  · The left ventricle is normal in size with mild concentric hypertrophy and low normal systolic function.  · The estimated ejection fraction is 50%.  · Normal left ventricular diastolic function.  · Normal right ventricular size with low normal right ventricular systolic function.  · Mild left atrial enlargement.  · There is severe aortic valve stenosis.  · Aortic  valve area is 0.81 cm2; peak velocity is 2.66 m/s; mean gradient is 18 mmHg.  · Mild tricuspid regurgitation.  · Mild mitral regurgitation.      Results for orders placed during the hospital encounter of 11/04/19    Echo Color Flow Doppler? Yes; Bubble Contrast? No    Interpretation Summary  · Mild eccentric left ventricular hypertrophy.  · Normal left ventricular systolic function. The estimated ejection fraction is 60%  · Normal LV diastolic function.  · Mild left atrial enlargement.  · Severe aortic valve stenosis.  · Aortic valve area is 0.88 cm2; peak velocity is 2.13 m/s; mean gradient is 11 mmHg.  · Mild mitral sclerosis.  · Mild-to-moderate mitral regurgitation.      CURRENT/PREVIOUS VISIT EKG  Results for orders placed or performed during the hospital encounter of 11/27/23   EKG 12-lead    Collection Time: 11/27/23  6:10 PM    Narrative    Test Reason : R06.02,    Vent. Rate : 060 BPM     Atrial Rate : 061 BPM     P-R Int : 292 ms          QRS Dur : 176 ms      QT Int : 478 ms       P-R-T Axes : 000 098 -57 degrees     QTc Int : 478 ms    Atrial-paced rhythm with prolonged AV conduction  Right bundle branch block  T wave abnormality, consider inferolateral ischemia  Abnormal ECG  When compared with ECG of 27-NOV-2023 09:00,  No significant change was found    Referred By: [email or web address]   SELF           Confirmed By:            ASSESSMENT/PLAN:     There are no active hospital problems to display for this patient.      ASSESSMENT & PLAN:     Acute on chronic HFpEF  Moderate to severe AS  CAD with hx of CABG  Hx of Hypotension  CKD stage III  Type II DM  Pacemaker in situ      RECOMMENDATIONS:    Would like the patient to be out of bed to chair and up moving around with staff.  She seems to be much clearer today and is hoping to dc home. Afternoon BP was on the low side. Hold further IV diuresis for now.   Resume furosemide 20 mg po every other day with KCL 10 meq po every other day at dc. Will need labs checked  in 1-2 weeks and close follow up with cardiology.   If she is able to get up and move around without issue and BP stabilizes, can plan for dc either this evening or in the morning. Defer discharge appropriateness/timing to hospitalist's discretion.      Erika Chin NP  Date of Service: 11/28/2023  4:06 PM    I have personally interviewed and examined the patient. I have reviewed the Nurse Practitioner's history and physical, assessment, and plan. I agree with the findings and made appropriate changes as necessary in recommendations.    1. Patient's lungs sounded much clear and she has responded well to Lasix.  Today her potassium was 3.2 make sure her potassium is therapeutic before she goes home.  And also replace her magnesium which is 1.4.  Aim to keep potassium at 4.2 and magnesium at 2.1.  2. Continue Eliquis 2.5 mg b.i.d. and she is on amiodarone 100 mg p.o. daily  3. Will see her back in the office after her discharge      sAhwin Herrera MD  Department of Cardiology  [facility]  11/28/2023 4:12 PM

## 2023-11-29 ENCOUNTER — PATIENT OUTREACH (OUTPATIENT)
Dept: ADMINISTRATIVE | Facility: CLINIC | Age: 88
End: 2023-11-29
Payer: MEDICARE

## 2023-11-29 ENCOUNTER — PATIENT OUTREACH (OUTPATIENT)
Dept: FAMILY MEDICINE | Facility: CLINIC | Age: 88
End: 2023-11-29

## 2023-11-29 ENCOUNTER — TELEPHONE (OUTPATIENT)
Dept: FAMILY MEDICINE | Facility: CLINIC | Age: 88
End: 2023-11-29

## 2023-11-29 NOTE — TELEPHONE ENCOUNTER
They were not advised by hospital on  how to take the medication. The last time rx was sent directions were take lasix 20mg 0.5tab in the morning for swelling.     The daughter is asking if they should cont this. Since she never had swelling, but had tpo have fluid drained from her lungs.

## 2023-11-29 NOTE — TELEPHONE ENCOUNTER
----- Message from Minnie Waddell LPN sent at 11/29/2023  8:47 AM CST -----  Saw cata last 2 times and scheduled with her next   ----- Message -----  From: Yue Arellano MA  Sent: 11/29/2023   8:40 AM CST  To: Casper Pratt Staff    Always sees victor manuel/cata. Can we move hfu?  ----- Message -----  From: Cata Cramer  Sent: 11/29/2023   8:38 AM CST  To: Aamir Rhodes Staff    Pt just got out of [facility] hospital yesterday for acute chf due to fluid. She has furosemide to only take it for swelling. She has never had swelling. She is not sure if she should take it every day or at all   Miranda chen [phone]

## 2023-11-29 NOTE — TELEPHONE ENCOUNTER
Pt daughter states pt went to ER for SOB. She was admitted for acute CHF. States she had fluid drained.  Miranda states overall pt is doing great. They do have some medication questions-they were under the impression pt was only to take lasix if she had visual swelling. Do you want her to take it daily?- will get this message to Cata. Pt has follow Follow up with Dr. Herrera on 12/20/23. HFU scheduled with Cata.

## 2023-11-29 NOTE — TELEPHONE ENCOUNTER
She absolutely needs to follow any instructions given on discharge. If she was admitted with CHF and they told her to take Lasix daily, then she should take it daily.

## 2023-11-29 NOTE — TELEPHONE ENCOUNTER
----- Message from Yue Arellano MA sent at 11/29/2023  2:29 PM CST -----    ----- Message -----  From: Karlie Penn  Sent: 11/29/2023   2:25 PM CST  To: Aamir Rhodes Staff    Pt is calling to get the answer to their question about the lasix. Pt #[phone]

## 2023-11-29 NOTE — TELEPHONE ENCOUNTER
"Let me clarify that she did not have to have fluid "removed" from her lungs. She was diuresed with IV Lasix.   Dr. Herrera instructed the following: "Resume furosemide 20 mg po every other day with KCL 10 meq po every other day at RI. Will need labs checked in 1-2 weeks and close follow up with cardiology."  Please advise of this. Thanks.  "

## 2023-11-29 NOTE — NURSING
Discharge instructions reviewed with pt. Questions answered. Copy of discharge instructions given to pt. Discharge home via wheelchair to vehicle. Accompanied by family. Oxygen set up to go home with pt.

## 2023-11-29 NOTE — PROGRESS NOTES
Discharge Information     Discharge Date:   11/28/23    Primary Discharge Diagnosis:  Acute CHF      Discharge Summary:  Reviewed      Medication & Order Review     Were medication changes made or new medications added?   No    If so, has the patient filled the prescriptions?  No     Was Home Health ordered? No    If so, has Home Health contacted patient and/or initiated services?  No    Name of Home Health Agency? N/A    Durable Medical Equipment ordered?  No     If so, has the DME provider contacted patient and delivered equipment?  N/A    Follow Up               Any problems since discharge? No    How is the patient feeling since returning home?      Have you set up recommended follow up appointments?      Schedule Hospital Follow-up appointment within 7-14 days (preferably 7).      Notes:  Pt daughter states pt went to ER for SOB. She was admitted for acute CHF. States she had fluid drained.  Miranda states overall pt is doing great. They do have some medication questions-they were under the impression pt was only to take lasix if she had visual swelling. Do you want her to take it daily?- will get this message to Cata. Pt has follow Follow up with Dr. Herrera on 12/20/23. HFU scheduled with Jolene Up

## 2023-11-29 NOTE — PROGRESS NOTES
C3 nurse spoke with Rosanna Raymundo  for a TCC post hospital discharge follow up call. The patient has a scheduled HOSFU appointment with Cata Cardoso on 12/7/23 @ 0299.

## 2023-11-30 ENCOUNTER — TELEPHONE (OUTPATIENT)
Dept: FAMILY MEDICINE | Facility: CLINIC | Age: 88
End: 2023-11-30

## 2023-12-06 ENCOUNTER — TELEPHONE (OUTPATIENT)
Dept: FAMILY MEDICINE | Facility: CLINIC | Age: 88
End: 2023-12-06

## 2023-12-06 NOTE — TELEPHONE ENCOUNTER
----- Message from Yue Arellano MA sent at 12/6/2023  8:29 AM CST -----    ----- Message -----  From: Cata Cramer  Sent: 12/6/2023   7:21 AM CST  To: Aamir Rhodes [facility]-12/6-5;15-pt is calling dr. Rhodes back   [phone]

## 2023-12-07 ENCOUNTER — OFFICE VISIT (OUTPATIENT)
Dept: FAMILY MEDICINE | Facility: CLINIC | Age: 88
End: 2023-12-07
Attending: FAMILY MEDICINE
Payer: MEDICARE

## 2023-12-07 VITALS
OXYGEN SATURATION: 97 % | HEART RATE: 57 BPM | WEIGHT: 144.38 LBS | BODY MASS INDEX: 27.26 KG/M2 | SYSTOLIC BLOOD PRESSURE: 107 MMHG | HEIGHT: 61 IN | DIASTOLIC BLOOD PRESSURE: 65 MMHG

## 2023-12-07 DIAGNOSIS — Z29.11 NEED FOR RSV IMMUNIZATION: ICD-10-CM

## 2023-12-07 DIAGNOSIS — I51.89 GRADE III DIASTOLIC DYSFUNCTION: ICD-10-CM

## 2023-12-07 DIAGNOSIS — I50.32 CHRONIC DIASTOLIC CONGESTIVE HEART FAILURE: Chronic | ICD-10-CM

## 2023-12-07 DIAGNOSIS — Z09 HOSPITAL DISCHARGE FOLLOW-UP: Primary | ICD-10-CM

## 2023-12-07 PROBLEM — Z86.2 HISTORY OF ANEMIA: Chronic | Status: RESOLVED | Noted: 2019-10-03 | Resolved: 2023-12-07

## 2023-12-07 PROBLEM — I10 ESSENTIAL HYPERTENSION: Chronic | Status: RESOLVED | Noted: 2019-10-03 | Resolved: 2023-12-07

## 2023-12-07 PROBLEM — I50.33 ACUTE ON CHRONIC HEART FAILURE WITH PRESERVED EJECTION FRACTION: Status: RESOLVED | Noted: 2023-11-28 | Resolved: 2023-12-07

## 2023-12-07 PROBLEM — R25.1 OCCASIONAL TREMORS: Chronic | Status: RESOLVED | Noted: 2019-12-31 | Resolved: 2023-12-07

## 2023-12-07 PROCEDURE — 99495 TCM SERVICES (MODERATE COMPLEXITY): ICD-10-PCS | Mod: S$GLB,,,

## 2023-12-07 PROCEDURE — 99495 TRANSJ CARE MGMT MOD F2F 14D: CPT | Mod: S$GLB,,,

## 2023-12-07 RX ORDER — RESPIRATORY SYNCYTIAL VISUS VACCINE RECOMBINANT, ADJUVANTED 120MCG/0.5
0.5 KIT INTRAMUSCULAR ONCE
Qty: 0.5 ML | Refills: 0 | Status: SHIPPED | OUTPATIENT
Start: 2023-12-07 | End: 2023-12-07

## 2023-12-07 NOTE — PROGRESS NOTES
"  SUBJECTIVE:    Patient ID: Rosanna Raymundo is a 89 y.o. female.    Chief Complaint: HFU (HFU for acute CHF on 11/27/2023, left lung filled with fluid with SOB no other swelling of the body, was at [facility]/ feeling better is able to breath, currently on O2 prn, discuss portable oxygen/discuss midodrine dosage to take, 2.5 mg or 5 mg //mp)    89 year old established female patient presents today for HFU for exacerbation of chronic CHF. She states she had not been taking her lasix because she was not having any swelling in her legs. She reports she feels great now that she "urinated out all that extra fluid." She reports no SOB or CP. "Back to her normal self." States "she just wants to enjoy her life in good condition." She states she "knows she isn't going to be perfectly healthy, but as long as she is feeling good and can get around, she is happy." Has 02 at home to use at night for comfort.  Discussed taking 5mg of midodrine twice a day to maintain blood pressure. Advise really should take lasix daily to avoid exacerbation of CHF again. States she just doesn't want to take it when she goes out.    "HPI:   89 yo F Hx dCHF with prn lasix , severe aortic stenosis, atrial fibrillation, history of symptomatic bradycardia, cardiac pacemaker, history of CABG, hypotension on midodrine, essential tremors on propranolol, type 2 diabetes, cataracts, CKD 3A.  Also History of breast cancer 30 years ago treated with mastectomy, chemotherapy, hormonal therapy, radiation, currently in remission. 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     Pt p/w progressive sob over 2 weeks, also dizziness and weakness, no chest pain. 92% on RA. XR with pulmonary edema. She is adherent to low salt diet, has not been taking lasix d/t no leg swelling.     * No surgery found *       Hospital Course:   Patient was admitted with worsening SOB, due to acute on " "chronic HFpEF Patient diuresed well with IV lasix. She had good UOP not all of it was charted due to incontinence. Patient feeling much better this am, wanting to go home. She states she does not have SOB with walking today. Home oxygen eval shows RA at rest 2 L on exertion. ANURAG was low this evening, given 500 cc fluid back. Midodrine increased to 5 mg TID. BP improved prior to discharge.      This is a 89-year-old female with history CAD, CHF, diabetes, presenting with complaint of shortness a breath, generalized weakness.  Patient says she has felt bad for the last 1-2 weeks with generalized weakness and no energy, and that this morning she had severe shortness of breath so came to the ED for evaluation.  She is had associated nausea.  She denies abdominal pain or vomiting.  She denies fever.  She denies cough.  She denies any chest pain.  Patient has p.r.n. Lasix but has not been taking her Lasix because she has not had any leg edema."             Admit Date: 11/27/23   Discharge Date: 11/28/23  Discharge Facility: Hospital    Medication Reconciliation:  No medication changes.   New Prescriptions filled after discharge: not applicable  Discharge summary reviewed:  yes  Pending test results at discharge reviewed:   not applicable  Follow up labs/tests ordered:   not applicable  Home Health ordered on discharge:   not applicable  DME ordered at discharge:   yes  How patient is feeling since discharge from the hospital?  great     Patient follow up phone call documented on separate encounter.      Admission on 11/27/2023, Discharged on 11/28/2023   Component Date Value Ref Range Status    WBC 11/27/2023 5.03  3.90 - 12.70 K/uL Final    RBC 11/27/2023 5.09  4.00 - 5.40 M/uL Final    Hemoglobin 11/27/2023 14.4  12.0 - 16.0 g/dL Final    Hematocrit 11/27/2023 44.2  37.0 - 48.5 % Final    MCV 11/27/2023 87  82 - 98 fL Final    MCH 11/27/2023 28.3  27.0 - 31.0 pg Final    MCHC 11/27/2023 32.6  32.0 - 36.0 g/dL Final    " RDW 11/27/2023 25.2 (H)  11.5 - 14.5 % Final    Platelets 11/27/2023 155  150 - 450 K/uL Final    MPV 11/27/2023 9.8  9.2 - 12.9 fL Final    Immature Granulocytes 11/27/2023 0.4  0.0 - 0.5 % Final    Gran # (ANC) 11/27/2023 3.2  1.8 - 7.7 K/uL Final    Immature Grans (Abs) 11/27/2023 0.02  0.00 - 0.04 K/uL Final    Lymph # 11/27/2023 1.2  1.0 - 4.8 K/uL Final    Mono # 11/27/2023 0.4  0.3 - 1.0 K/uL Final    Eos # 11/27/2023 0.2  0.0 - 0.5 K/uL Final    Baso # 11/27/2023 0.07  0.00 - 0.20 K/uL Final    nRBC 11/27/2023 0  0 /100 WBC Final    Gran % 11/27/2023 63.7  38.0 - 73.0 % Final    Lymph % 11/27/2023 24.3  18.0 - 48.0 % Final    Mono % 11/27/2023 7.0  4.0 - 15.0 % Final    Eosinophil % 11/27/2023 3.2  0.0 - 8.0 % Final    Basophil % 11/27/2023 1.4  0.0 - 1.9 % Final    Platelet Estimate 11/27/2023 Appears normal   Final    Aniso 11/27/2023 Slight   Final    Poik 11/27/2023 Slight   Final    Poly 11/27/2023 Occasional   Final    Target Cells 11/27/2023 Occasional   Final    Spherocytes 11/27/2023 Occasional   Final    Differential Method 11/27/2023 Automated   Final    Sodium 11/27/2023 139  136 - 145 mmol/L Final    Potassium 11/27/2023 4.2  3.5 - 5.1 mmol/L Final    Chloride 11/27/2023 107  95 - 110 mmol/L Final    CO2 11/27/2023 23  23 - 29 mmol/L Final    Glucose 11/27/2023 126 (H)  70 - 110 mg/dL Final    BUN 11/27/2023 16  10 - 30 mg/dL Final    Creatinine 11/27/2023 0.9  0.5 - 1.4 mg/dL Final    Calcium 11/27/2023 9.3  8.7 - 10.5 mg/dL Final    Total Protein 11/27/2023 6.8  6.0 - 8.4 g/dL Final    Albumin 11/27/2023 3.9  3.5 - 5.2 g/dL Final    Total Bilirubin 11/27/2023 0.7  0.1 - 1.0 mg/dL Final    Alkaline Phosphatase 11/27/2023 62  55 - 135 U/L Final    AST 11/27/2023 18  10 - 40 U/L Final    ALT 11/27/2023 11  10 - 44 U/L Final    eGFR 11/27/2023 >60.0  >60 mL/min/1.73 m^2 Final    Anion Gap 11/27/2023 9  8 - 16 mmol/L Final    BNP 11/27/2023 1,352 (H)  0 - 99 pg/mL Final    Specimen UA 11/27/2023  Urine, Clean Catch   Final    Color, UA 11/27/2023 Yellow  Yellow, Straw, Dorys Final    Appearance, UA 11/27/2023 Clear  Clear Final    pH, UA 11/27/2023 7.0  5.0 - 8.0 Final    Specific Gravity, UA 11/27/2023 1.010  1.005 - 1.030 Final    Protein, UA 11/27/2023 1+ (A)  Negative Final    Glucose, UA 11/27/2023 Negative  Negative Final    Ketones, UA 11/27/2023 Negative  Negative Final    Bilirubin (UA) 11/27/2023 Negative  Negative Final    Occult Blood UA 11/27/2023 Negative  Negative Final    Nitrite, UA 11/27/2023 Negative  Negative Final    Urobilinogen, UA 11/27/2023 Negative  Negative EU/dL Final    Leukocytes, UA 11/27/2023 Negative  Negative Final    SARS-CoV-2 RNA, Amplification, Qual 11/27/2023 Negative  Negative Final    Influenza A, Molecular 11/27/2023 Negative  Negative Final    Influenza B, Molecular 11/27/2023 Negative  Negative Final    Flu A & B Source 11/27/2023 Nasal swab   Final    Troponin I High Sensitivity 11/27/2023 14.0  0.0 - 14.9 pg/mL Final    RBC, UA 11/27/2023 3  0 - 4 /hpf Final    WBC, UA 11/27/2023 1  0 - 5 /hpf Final    Bacteria 11/27/2023 Negative  None-Occ /hpf Final    Squam Epithel, UA 11/27/2023 0  /hpf Final    Hyaline Casts, UA 11/27/2023 3 (A)  0-1/lpf /lpf Final    Microscopic Comment 11/27/2023 SEE COMMENT   Final    IVC diameter 11/28/2023 1.60  cm Final    Ao root annulus 11/28/2023 2.90  cm Final    Left Ventricular Outflow Tract Melinda* 11/28/2023 4.00  mmHg Final    Left Ventricular Outflow Tract Melinda* 11/28/2023 0.90  cm/s Final    HEENA by Velocity Ratio 11/28/2023 1.77  cm² Final    AV index (prosthetic) 11/28/2023 0.49   Final    AV Velocity Ratio 11/28/2023 0.51   Final    AV valve area 11/28/2023 1.70  cm² Final    LVOT peak VTI 11/28/2023 33.20  cm Final    Ao VTI 11/28/2023 67.50  cm Final    Ao peak radha 11/28/2023 2.66  m/s Final    AV peak gradient 11/28/2023 28  mmHg Final    AV mean gradient 11/28/2023 17  mmHg Final    LA size 11/28/2023 4.10  cm Final     RVDD 11/28/2023 2.96  cm Final    LV Mass Index 11/28/2023 140  g/m2 Final    LV mass 11/28/2023 234.93  g Final    LVOT peak radha 11/28/2023 1.36  m/s Final    Posterior Wall 11/28/2023 1.10  0.6 - 1.1 cm Final    Left Ventricle Relative Wall Thick* 11/28/2023 0.39  cm Final    FS 11/28/2023 13 (A)  28 - 44 % Final    LVOT area 11/28/2023 3.5  cm2 Final    LVOT diameter 11/28/2023 2.10  cm Final    IVS 11/28/2023 0.99  0.6 - 1.1 cm Final    LV Diastolic Volume Index 11/28/2023 92.86  mL/m2 Final    LV Diastolic Volume 11/28/2023 156.00  mL Final    LVIDs 11/28/2023 4.87 (A)  2.1 - 4.0 cm Final    LV Systolic Volume Index 11/28/2023 66.1  mL/m2 Final    LV Systolic Volume 11/28/2023 111.00  mL Final    LVIDd 11/28/2023 5.63  3.5 - 6.0 cm Final    LVOT stroke volume 11/28/2023 114.93  cm3 Final    ZLVIDS 11/28/2023 4.09   Final    ZLVIDD 11/28/2023 1.83   Final    AORTIC VALVE CUSP SEPERATION 11/28/2023 0.60  cm Final    BSA 11/28/2023 1.7  m2 Final    WBC 11/27/2023 5.04  3.90 - 12.70 K/uL Final    RBC 11/27/2023 5.17  4.00 - 5.40 M/uL Final    Hemoglobin 11/27/2023 14.5  12.0 - 16.0 g/dL Final    Hematocrit 11/27/2023 45.1  37.0 - 48.5 % Final    MCV 11/27/2023 87  82 - 98 fL Final    MCH 11/27/2023 28.0  27.0 - 31.0 pg Final    MCHC 11/27/2023 32.2  32.0 - 36.0 g/dL Final    RDW 11/27/2023 25.1 (H)  11.5 - 14.5 % Final    Platelets 11/27/2023 157  150 - 450 K/uL Final    MPV 11/27/2023 SEE COMMENT  9.2 - 12.9 fL Final    Immature Granulocytes 11/27/2023 0.4  0.0 - 0.5 % Final    Gran # (ANC) 11/27/2023 3.2  1.8 - 7.7 K/uL Final    Immature Grans (Abs) 11/27/2023 0.02  0.00 - 0.04 K/uL Final    Lymph # 11/27/2023 1.2  1.0 - 4.8 K/uL Final    Mono # 11/27/2023 0.4  0.3 - 1.0 K/uL Final    Eos # 11/27/2023 0.1  0.0 - 0.5 K/uL Final    Baso # 11/27/2023 0.05  0.00 - 0.20 K/uL Final    nRBC 11/27/2023 0  0 /100 WBC Final    Gran % 11/27/2023 64.3  38.0 - 73.0 % Final    Lymph % 11/27/2023 24.6  18.0 - 48.0 % Final     Mono % 11/27/2023 7.9  4.0 - 15.0 % Final    Eosinophil % 11/27/2023 1.8  0.0 - 8.0 % Final    Basophil % 11/27/2023 1.0  0.0 - 1.9 % Final    Platelet Estimate 11/27/2023 Appears normal   Final    Differential Method 11/27/2023 Automated   Final    Vitamin B-12 11/27/2023 667  210 - 950 pg/mL Final    POC Glucose 11/27/2023 96  70 - 110 Final    POC Glucose 11/27/2023 120 (H)  70 - 110 Final    Sodium 11/28/2023 143  136 - 145 mmol/L Final    Potassium 11/28/2023 3.2 (L)  3.5 - 5.1 mmol/L Final    Chloride 11/28/2023 106  95 - 110 mmol/L Final    CO2 11/28/2023 29  23 - 29 mmol/L Final    Glucose 11/28/2023 91  70 - 110 mg/dL Final    BUN 11/28/2023 17  10 - 30 mg/dL Final    Creatinine 11/28/2023 0.8  0.5 - 1.4 mg/dL Final    Calcium 11/28/2023 9.1  8.7 - 10.5 mg/dL Final    Total Protein 11/28/2023 6.7  6.0 - 8.4 g/dL Final    Albumin 11/28/2023 3.7  3.5 - 5.2 g/dL Final    Total Bilirubin 11/28/2023 0.7  0.1 - 1.0 mg/dL Final    Alkaline Phosphatase 11/28/2023 66  55 - 135 U/L Final    AST 11/28/2023 14  10 - 40 U/L Final    ALT 11/28/2023 9 (L)  10 - 44 U/L Final    eGFR 11/28/2023 >60.0  >60 mL/min/1.73 m^2 Final    Anion Gap 11/28/2023 8  8 - 16 mmol/L Final    Magnesium 11/28/2023 1.4 (L)  1.6 - 2.6 mg/dL Final    WBC 11/28/2023 4.62  3.90 - 12.70 K/uL Final    RBC 11/28/2023 5.41 (H)  4.00 - 5.40 M/uL Final    Hemoglobin 11/28/2023 15.3  12.0 - 16.0 g/dL Final    Hematocrit 11/28/2023 46.9  37.0 - 48.5 % Final    MCV 11/28/2023 87  82 - 98 fL Final    MCH 11/28/2023 28.3  27.0 - 31.0 pg Final    MCHC 11/28/2023 32.6  32.0 - 36.0 g/dL Final    RDW 11/28/2023 24.5 (H)  11.5 - 14.5 % Final    Platelets 11/28/2023 159  150 - 450 K/uL Final    MPV 11/28/2023 10.4  9.2 - 12.9 fL Final    Immature Granulocytes 11/28/2023 0.2  0.0 - 0.5 % Final    Gran # (ANC) 11/28/2023 2.3  1.8 - 7.7 K/uL Final    Immature Grans (Abs) 11/28/2023 0.01  0.00 - 0.04 K/uL Final    Lymph # 11/28/2023 1.6  1.0 - 4.8 K/uL Final    Mono  # 11/28/2023 0.5  0.3 - 1.0 K/uL Final    Eos # 11/28/2023 0.2  0.0 - 0.5 K/uL Final    Baso # 11/28/2023 0.05  0.00 - 0.20 K/uL Final    nRBC 11/28/2023 0  0 /100 WBC Final    Gran % 11/28/2023 50.7  38.0 - 73.0 % Final    Lymph % 11/28/2023 34.2  18.0 - 48.0 % Final    Mono % 11/28/2023 9.7  4.0 - 15.0 % Final    Eosinophil % 11/28/2023 4.1  0.0 - 8.0 % Final    Basophil % 11/28/2023 1.1  0.0 - 1.9 % Final    Differential Method 11/28/2023 Automated   Final    POC Glucose 11/28/2023 87  70 - 110 Final    POC Glucose 11/28/2023 132 (H)  70 - 110 Final   Hospital Outpatient Visit on 10/19/2023   Component Date Value Ref Range Status    Battery Voltage (V) 10/20/2023 2.98  V Final    P/R-wave RA Lead 10/20/2023 1.2  mV Final    Impedance RA Lead 10/20/2023 460  Ohms Final    P/R-wave RA Lead (native) 10/20/2023 >12.0  mV Final    Impedance RA Lead (native) 10/20/2023 490  Ohms Final    Threshold V RA Lead 10/20/2023 1.375  V Final    Theshold ms RA Lead 10/20/2023 0.4  ms Final   Hospital Outpatient Visit on 10/04/2023   Component Date Value Ref Range Status    BSA 10/04/2023 1.69  m2 Final    LVOT stroke volume 10/04/2023 50.55  cm3 Final    LVIDd 10/04/2023 5.82  3.5 - 6.0 cm Final    LV Systolic Volume 10/04/2023 126.00  mL Final    LV Systolic Volume Index 10/04/2023 76.4  mL/m2 Final    LVIDs 10/04/2023 5.13 (A)  2.1 - 4.0 cm Final    LV Diastolic Volume 10/04/2023 168.00  mL Final    LV Diastolic Volume Index 10/04/2023 101.82  mL/m2 Final    IVS 10/04/2023 1.12 (A)  0.6 - 1.1 cm Final    LVOT diameter 10/04/2023 2.00  cm Final    LVOT area 10/04/2023 3.1  cm2 Final    FS 10/04/2023 12 (A)  28 - 44 % Final    Left Ventricle Relative Wall Thick* 10/04/2023 0.38  cm Final    Posterior Wall 10/04/2023 1.12 (A)  0.6 - 1.1 cm Final    LV mass 10/04/2023 272.22  g Final    LV Mass Index 10/04/2023 165  g/m2 Final    MV Peak E Michael 10/04/2023 0.71  m/s Final    TDI LATERAL 10/04/2023 0.04  m/s Final    TDI SEPTAL  10/04/2023 0.04  m/s Final    E/E' ratio 10/04/2023 17.75  m/s Final    MV Peak A Michael 10/04/2023 0.52  m/s Final    TR Max Michael 10/04/2023 2.33  m/s Final    E/A ratio 10/04/2023 1.37   Final    IVRT 10/04/2023 85.00  msec Final    E wave deceleration time 10/04/2023 243.00  msec Final    LV SEPTAL E/E' RATIO 10/04/2023 17.75  m/s Final    LV LATERAL E/E' RATIO 10/04/2023 17.75  m/s Final    LVOT peak michael 10/04/2023 0.65  m/s Final    Left Ventricular Outflow Tract Melinda* 10/04/2023 0.44  cm/s Final    Left Ventricular Outflow Tract Melinda* 10/04/2023 1.00  mmHg Final    LA size 10/04/2023 4.50  cm Final    LA volume (mod) 10/04/2023 97.80  cm3 Final    LA Volume Index (Mod) 10/04/2023 59.3  mL/m2 Final    RVDD 10/04/2023 2.15  cm Final    RV S' 10/04/2023 6.31  cm/s Final    TAPSE 10/04/2023 1.17  cm Final    AV mean gradient 10/04/2023 16  mmHg Final    AV peak gradient 10/04/2023 28  mmHg Final    Ao peak michael 10/04/2023 2.63  m/s Final    Ao VTI 10/04/2023 67.70  cm Final    LVOT peak VTI 10/04/2023 16.10  cm Final    AV valve area 10/04/2023 0.75  cm² Final    AV Velocity Ratio 10/04/2023 0.25   Final    AV index (prosthetic) 10/04/2023 0.24   Final    HEENA by Velocity Ratio 10/04/2023 0.78  cm² Final    Mr max michael 10/04/2023 5.41  m/s Final    MV mean gradient 10/04/2023 1  mmHg Final    MV peak gradient 10/04/2023 3  mmHg Final    MV valve area by continuity eq 10/04/2023 2.14  cm2 Final    MV VTI 10/04/2023 23.6  cm Final    Triscuspid Valve Regurgitation Pea* 10/04/2023 22  mmHg Final    PV PEAK VELOCITY 10/04/2023 0.69  m/s Final    PV peak gradient 10/04/2023 2  mmHg Final    Pulmonary Valve Mean Velocity 10/04/2023 0.48  m/s Final    Ao root annulus 10/04/2023 2.90  cm Final    IVC diameter 10/04/2023 1.79  cm Final    Mean e' 10/04/2023 0.04  m/s Final    ZLVIDS 10/04/2023 4.59   Final    ZLVIDD 10/04/2023 2.28   Final    AORTIC VALVE CUSP SEPERATION 10/04/2023 0.40  cm Final    TV resting pulmonary artery  pressu* 10/04/2023 25  mmHg Final    RV TB RVSP 10/04/2023 5  mmHg Final    Est. RA pres 10/04/2023 3  mmHg Final   Office Visit on 09/25/2023   Component Date Value Ref Range Status    WBC 09/25/2023 7.1  3.8 - 10.8 Thousand/uL Final    RBC 09/25/2023 5.98 (H)  3.80 - 5.10 Million/uL Final    Hemoglobin 09/25/2023 13.8  11.7 - 15.5 g/dL Final    Hematocrit 09/25/2023 46.7 (H)  35.0 - 45.0 % Final    MCV 09/25/2023 78.1 (L)  80.0 - 100.0 fL Final    MCH 09/25/2023 23.1 (L)  27.0 - 33.0 pg Final    MCHC 09/25/2023 29.6 (L)  32.0 - 36.0 g/dL Final    RDW 09/25/2023 27.5 (H)  11.0 - 15.0 % Final    Platelets 09/25/2023 170  140 - 400 Thousand/uL Final    MPV 09/25/2023   7.5 - 12.5 fL Final    Neutrophils, Abs 09/25/2023 4,388  1,500 - 7,800 cells/uL Final    Lymph # 09/25/2023 1,676  850 - 3,900 cells/uL Final    Mono # 09/25/2023 781  200 - 950 cells/uL Final    Eos # 09/25/2023 199  15 - 500 cells/uL Final    Baso # 09/25/2023 57  0 - 200 cells/uL Final    Neutrophils Relative 09/25/2023 61.8  % Final    Lymph % 09/25/2023 23.6  % Final    Mono % 09/25/2023 11.0  % Final    Eosinophil % 09/25/2023 2.8  % Final    Basophil % 09/25/2023 0.8  % Final    Differential Comment 09/25/2023 Review of peripheral smear confirms automated results.   Final    Iron 09/25/2023 38 (L)  45 - 160 mcg/dL Final    TIBC 09/25/2023 376  250 - 450 mcg/dL (calc) Final    Iron Saturation 09/25/2023 10 (L)  16 - 45 % (calc) Final    Ferritin 09/25/2023 17  16 - 288 ng/mL Final    Glucose 09/25/2023 125 (H)  65 - 99 mg/dL Final    BUN 09/25/2023 20  7 - 25 mg/dL Final    Creatinine 09/25/2023 0.90  0.60 - 0.95 mg/dL Final    eGFR 09/25/2023 61  > OR = 60 mL/min/1.73m2 Final    BUN/Creatinine Ratio 09/25/2023 SEE NOTE:  6 - 22 (calc) Final    Sodium 09/25/2023 138  135 - 146 mmol/L Final    Potassium 09/25/2023 4.3  3.5 - 5.3 mmol/L Final    Chloride 09/25/2023 105  98 - 110 mmol/L Final    CO2 09/25/2023 21  20 - 32 mmol/L Final    Calcium  09/25/2023 9.8  8.6 - 10.4 mg/dL Final    BNP 09/25/2023 938 (H)  <100 pg/mL Final    Hemoglobin A1C 09/25/2023 6.3 (H)  <5.7 % of total Hgb Final    Magnesium 09/25/2023 1.7  1.5 - 2.5 mg/dL Final    TSH 09/25/2023 2.76  0.40 - 4.50 mIU/L Final    T4, Free 09/25/2023 1.3  0.8 - 1.8 ng/dL Final    Uric Acid 09/25/2023 4.9  2.5 - 7.0 mg/dL Final    Creatinine, Urine 09/25/2023 103  20 - 275 mg/dL Final    Microalb, Ur 09/25/2023 45.6  See Note: mg/dL Final    Microalb/Creat Ratio 09/25/2023 443 (H)  <30 mcg/mg creat Final   Admission on 08/10/2023, Discharged on 08/10/2023   Component Date Value Ref Range Status    POC Glucose 08/10/2023 113 (H)  70 - 110 Final   Admission on 07/26/2023, Discharged on 07/29/2023   Component Date Value Ref Range Status    WBC 07/26/2023 9.29  3.90 - 12.70 K/uL Final    RBC 07/26/2023 4.75  4.00 - 5.40 M/uL Final    Hemoglobin 07/26/2023 9.5 (L)  12.0 - 16.0 g/dL Final    Hematocrit 07/26/2023 31.6 (L)  37.0 - 48.5 % Final    MCV 07/26/2023 67 (L)  82 - 98 fL Final    MCH 07/26/2023 20.0 (L)  27.0 - 31.0 pg Final    MCHC 07/26/2023 30.1 (L)  32.0 - 36.0 g/dL Final    RDW 07/26/2023 21.1 (H)  11.5 - 14.5 % Final    Platelets 07/26/2023 224  150 - 450 K/uL Final    MPV 07/26/2023 10.1  9.2 - 12.9 fL Final    Immature Granulocytes 07/26/2023 0.3  0.0 - 0.5 % Final    Gran # (ANC) 07/26/2023 7.1  1.8 - 7.7 K/uL Final    Immature Grans (Abs) 07/26/2023 0.03  0.00 - 0.04 K/uL Final    Lymph # 07/26/2023 1.3  1.0 - 4.8 K/uL Final    Mono # 07/26/2023 0.6  0.3 - 1.0 K/uL Final    Eos # 07/26/2023 0.2  0.0 - 0.5 K/uL Final    Baso # 07/26/2023 0.08  0.00 - 0.20 K/uL Final    nRBC 07/26/2023 0  0 /100 WBC Final    Gran % 07/26/2023 76.0 (H)  38.0 - 73.0 % Final    Lymph % 07/26/2023 14.4 (L)  18.0 - 48.0 % Final    Mono % 07/26/2023 6.8  4.0 - 15.0 % Final    Eosinophil % 07/26/2023 1.6  0.0 - 8.0 % Final    Basophil % 07/26/2023 0.9  0.0 - 1.9 % Final    Differential Method 07/26/2023 Automated    Final    Sodium 07/26/2023 137  136 - 145 mmol/L Final    Potassium 07/26/2023 4.0  3.5 - 5.1 mmol/L Final    Chloride 07/26/2023 104  95 - 110 mmol/L Final    CO2 07/26/2023 22 (L)  23 - 29 mmol/L Final    Glucose 07/26/2023 161 (H)  70 - 110 mg/dL Final    BUN 07/26/2023 29 (H)  8 - 23 mg/dL Final    Creatinine 07/26/2023 1.0  0.5 - 1.4 mg/dL Final    Calcium 07/26/2023 9.3  8.7 - 10.5 mg/dL Final    Total Protein 07/26/2023 7.5  6.0 - 8.4 g/dL Final    Albumin 07/26/2023 4.1  3.5 - 5.2 g/dL Final    Total Bilirubin 07/26/2023 0.9  0.1 - 1.0 mg/dL Final    Alkaline Phosphatase 07/26/2023 121  55 - 135 U/L Final    AST 07/26/2023 47 (H)  10 - 40 U/L Final    ALT 07/26/2023 19  10 - 44 U/L Final    eGFR 07/26/2023 53.5 (A)  >60 mL/min/1.73 m^2 Final    Anion Gap 07/26/2023 11  8 - 16 mmol/L Final    Lipase 07/26/2023 53  4 - 60 U/L Final    Magnesium 07/26/2023 1.5 (L)  1.6 - 2.6 mg/dL Final    Troponin I High Sensitivity 07/26/2023 14.9  0.0 - 14.9 pg/mL Final    BNP 07/26/2023 742 (H)  0 - 99 pg/mL Final    Specimen UA 07/26/2023 Urine, Clean Catch   Final    Color, UA 07/26/2023 Yellow  Yellow, Straw, Dorys Final    Appearance, UA 07/26/2023 Clear  Clear Final    pH, UA 07/26/2023 6.0  5.0 - 8.0 Final    Specific Gravity, UA 07/26/2023 1.020  1.005 - 1.030 Final    Protein, UA 07/26/2023 Negative  Negative Final    Glucose, UA 07/26/2023 Negative  Negative Final    Ketones, UA 07/26/2023 1+ (A)  Negative Final    Bilirubin (UA) 07/26/2023 Negative  Negative Final    Occult Blood UA 07/26/2023 Trace (A)  Negative Final    Nitrite, UA 07/26/2023 Negative  Negative Final    Urobilinogen, UA 07/26/2023 Negative  Negative EU/dL Final    Leukocytes, UA 07/26/2023 Trace (A)  Negative Final    POC Creatinine 07/26/2023 1.0  0.5 - 1.4 mg/dL Final    Sample 07/26/2023 VENOUS   Final    RBC, UA 07/26/2023 1  0 - 4 /hpf Final    WBC, UA 07/26/2023 1  0 - 5 /hpf Final    Bacteria 07/26/2023 Negative  None-Occ /hpf Final     Squam Epithel, UA 07/26/2023 0  /hpf Final    Hyaline Casts, UA 07/26/2023 3 (A)  0-1/lpf /lpf Final    Microscopic Comment 07/26/2023 SEE COMMENT   Final    Troponin I High Sensitivity 07/27/2023 16.8 (H)  0.0 - 14.9 pg/mL Final    WBC 07/27/2023 6.28  3.90 - 12.70 K/uL Final    RBC 07/27/2023 4.63  4.00 - 5.40 M/uL Final    Hemoglobin 07/27/2023 9.3 (L)  12.0 - 16.0 g/dL Final    Hematocrit 07/27/2023 31.1 (L)  37.0 - 48.5 % Final    MCV 07/27/2023 67 (L)  82 - 98 fL Final    MCH 07/27/2023 20.1 (L)  27.0 - 31.0 pg Final    MCHC 07/27/2023 29.9 (L)  32.0 - 36.0 g/dL Final    RDW 07/27/2023 21.2 (H)  11.5 - 14.5 % Final    Platelets 07/27/2023 251  150 - 450 K/uL Final    MPV 07/27/2023 10.2  9.2 - 12.9 fL Final    Immature Granulocytes 07/27/2023 0.3  0.0 - 0.5 % Final    Gran # (ANC) 07/27/2023 3.8  1.8 - 7.7 K/uL Final    Immature Grans (Abs) 07/27/2023 0.02  0.00 - 0.04 K/uL Final    Lymph # 07/27/2023 1.6  1.0 - 4.8 K/uL Final    Mono # 07/27/2023 0.7  0.3 - 1.0 K/uL Final    Eos # 07/27/2023 0.1  0.0 - 0.5 K/uL Final    Baso # 07/27/2023 0.06  0.00 - 0.20 K/uL Final    nRBC 07/27/2023 0  0 /100 WBC Final    Gran % 07/27/2023 60.7  38.0 - 73.0 % Final    Lymph % 07/27/2023 25.5  18.0 - 48.0 % Final    Mono % 07/27/2023 10.4  4.0 - 15.0 % Final    Eosinophil % 07/27/2023 2.1  0.0 - 8.0 % Final    Basophil % 07/27/2023 1.0  0.0 - 1.9 % Final    Differential Method 07/27/2023 Automated   Final    Sodium 07/27/2023 136  136 - 145 mmol/L Final    Potassium 07/27/2023 3.4 (L)  3.5 - 5.1 mmol/L Final    Chloride 07/27/2023 106  95 - 110 mmol/L Final    CO2 07/27/2023 23  23 - 29 mmol/L Final    Glucose 07/27/2023 157 (H)  70 - 110 mg/dL Final    BUN 07/27/2023 25 (H)  8 - 23 mg/dL Final    Creatinine 07/27/2023 0.9  0.5 - 1.4 mg/dL Final    Calcium 07/27/2023 9.0  8.7 - 10.5 mg/dL Final    Anion Gap 07/27/2023 7 (L)  8 - 16 mmol/L Final    eGFR 07/27/2023 >60.0  >60 mL/min/1.73 m^2 Final    WBC 07/28/2023 6.11  3.90  - 12.70 K/uL Final    RBC 07/28/2023 4.64  4.00 - 5.40 M/uL Final    Hemoglobin 07/28/2023 9.2 (L)  12.0 - 16.0 g/dL Final    Hematocrit 07/28/2023 31.1 (L)  37.0 - 48.5 % Final    MCV 07/28/2023 67 (L)  82 - 98 fL Final    MCH 07/28/2023 19.8 (L)  27.0 - 31.0 pg Final    MCHC 07/28/2023 29.6 (L)  32.0 - 36.0 g/dL Final    RDW 07/28/2023 21.2 (H)  11.5 - 14.5 % Final    Platelets 07/28/2023 200  150 - 450 K/uL Final    MPV 07/28/2023 SEE COMMENT  9.2 - 12.9 fL Final    Immature Granulocytes 07/28/2023 0.3  0.0 - 0.5 % Final    Gran # (ANC) 07/28/2023 3.0  1.8 - 7.7 K/uL Final    Immature Grans (Abs) 07/28/2023 0.02  0.00 - 0.04 K/uL Final    Lymph # 07/28/2023 2.1  1.0 - 4.8 K/uL Final    Mono # 07/28/2023 0.7  0.3 - 1.0 K/uL Final    Eos # 07/28/2023 0.2  0.0 - 0.5 K/uL Final    Baso # 07/28/2023 0.05  0.00 - 0.20 K/uL Final    nRBC 07/28/2023 0  0 /100 WBC Final    Gran % 07/28/2023 49.3  38.0 - 73.0 % Final    Lymph % 07/28/2023 34.9  18.0 - 48.0 % Final    Mono % 07/28/2023 11.6  4.0 - 15.0 % Final    Eosinophil % 07/28/2023 3.1  0.0 - 8.0 % Final    Basophil % 07/28/2023 0.8  0.0 - 1.9 % Final    Differential Method 07/28/2023 Automated   Final    Sodium 07/28/2023 139  136 - 145 mmol/L Final    Potassium 07/28/2023 3.8  3.5 - 5.1 mmol/L Final    Chloride 07/28/2023 105  95 - 110 mmol/L Final    CO2 07/28/2023 25  23 - 29 mmol/L Final    Glucose 07/28/2023 111 (H)  70 - 110 mg/dL Final    BUN 07/28/2023 26 (H)  8 - 23 mg/dL Final    Creatinine 07/28/2023 1.1  0.5 - 1.4 mg/dL Final    Calcium 07/28/2023 9.1  8.7 - 10.5 mg/dL Final    Anion Gap 07/28/2023 9  8 - 16 mmol/L Final    eGFR 07/28/2023 47.7 (A)  >60 mL/min/1.73 m^2 Final    Total Protein 07/28/2023 7.5  6.0 - 8.4 g/dL Final    Albumin 07/28/2023 3.8  3.5 - 5.2 g/dL Final    Total Bilirubin 07/28/2023 0.5  0.1 - 1.0 mg/dL Final    Bilirubin, Direct 07/28/2023 0.1  0.1 - 0.3 mg/dL Final    AST 07/28/2023 37  10 - 40 U/L Final    ALT 07/28/2023 31  10 -  44 U/L Final    Alkaline Phosphatase 07/28/2023 116  55 - 135 U/L Final    Iron 07/28/2023 18 (L)  30 - 160 ug/dL Final    Transferrin 07/28/2023 340  200 - 375 mg/dL Final    TIBC 07/28/2023 476 (H)  250 - 450 ug/dL Final    Saturated Iron 07/28/2023 4 (L)  20 - 50 % Final    Folate 07/28/2023 15.5  4.0 - 24.0 ng/mL Final    Vitamin B-12 07/28/2023 358  210 - 950 pg/mL Final    Retic 07/28/2023 1.4  0.5 - 2.5 % Final    Ferritin 07/28/2023 9 (L)  20.0 - 300.0 ng/mL Final    WBC 07/29/2023 6.66  3.90 - 12.70 K/uL Final    RBC 07/29/2023 4.44  4.00 - 5.40 M/uL Final    Hemoglobin 07/29/2023 9.1 (L)  12.0 - 16.0 g/dL Final    Hematocrit 07/29/2023 30.0 (L)  37.0 - 48.5 % Final    MCV 07/29/2023 68 (L)  82 - 98 fL Final    MCH 07/29/2023 20.5 (L)  27.0 - 31.0 pg Final    MCHC 07/29/2023 30.3 (L)  32.0 - 36.0 g/dL Final    RDW 07/29/2023 21.5 (H)  11.5 - 14.5 % Final    Platelets 07/29/2023 192  150 - 450 K/uL Final    MPV 07/29/2023 SEE COMMENT  9.2 - 12.9 fL Final    Immature Granulocytes 07/29/2023 0.5  0.0 - 0.5 % Final    Gran # (ANC) 07/29/2023 3.9  1.8 - 7.7 K/uL Final    Immature Grans (Abs) 07/29/2023 0.03  0.00 - 0.04 K/uL Final    Lymph # 07/29/2023 1.7  1.0 - 4.8 K/uL Final    Mono # 07/29/2023 0.7  0.3 - 1.0 K/uL Final    Eos # 07/29/2023 0.2  0.0 - 0.5 K/uL Final    Baso # 07/29/2023 0.07  0.00 - 0.20 K/uL Final    nRBC 07/29/2023 0  0 /100 WBC Final    Gran % 07/29/2023 59.0  38.0 - 73.0 % Final    Lymph % 07/29/2023 26.0  18.0 - 48.0 % Final    Mono % 07/29/2023 10.8  4.0 - 15.0 % Final    Eosinophil % 07/29/2023 2.6  0.0 - 8.0 % Final    Basophil % 07/29/2023 1.1  0.0 - 1.9 % Final    Differential Method 07/29/2023 Automated   Final    Sodium 07/29/2023 139  136 - 145 mmol/L Final    Potassium 07/29/2023 4.4  3.5 - 5.1 mmol/L Final    Chloride 07/29/2023 106  95 - 110 mmol/L Final    CO2 07/29/2023 26  23 - 29 mmol/L Final    Glucose 07/29/2023 165 (H)  70 - 110 mg/dL Final    BUN 07/29/2023 28 (H)  8 -  23 mg/dL Final    Creatinine 07/29/2023 1.3  0.5 - 1.4 mg/dL Final    Calcium 07/29/2023 8.9  8.7 - 10.5 mg/dL Final    Anion Gap 07/29/2023 7 (L)  8 - 16 mmol/L Final    eGFR 07/29/2023 39.1 (A)  >60 mL/min/1.73 m^2 Final    Total Protein 07/29/2023 7.0  6.0 - 8.4 g/dL Final    Albumin 07/29/2023 3.7  3.5 - 5.2 g/dL Final    Total Bilirubin 07/29/2023 0.8  0.1 - 1.0 mg/dL Final    Bilirubin, Direct 07/29/2023 0.1  0.1 - 0.3 mg/dL Final    AST 07/29/2023 27  10 - 40 U/L Final    ALT 07/29/2023 25  10 - 44 U/L Final    Alkaline Phosphatase 07/29/2023 120  55 - 135 U/L Final   Hospital Outpatient Visit on 07/20/2023   Component Date Value Ref Range Status    Battery Voltage (V) 07/20/2023 2.99  V Final    P/R-wave RA Lead 07/20/2023 1.0  mV Final    P/R-wave RA Lead (native) 07/20/2023 >12.0  mV Final    Impedance RA Lead 07/20/2023 450  Ohms Final    Impedance RA Lead (native) 07/20/2023 430  Ohms Final    Threshold V RA Lead 07/20/2023 1.0  V Final    Theshold ms RA Lead 07/20/2023 0.4  ms Final   Office Visit on 07/20/2023   Component Date Value Ref Range Status    TSH w/reflex to FT4 07/20/2023 1.99  0.40 - 4.50 mIU/L Final    Glucose 12/27/2023 105 (H)  65 - 99 mg/dL Final    BUN 12/27/2023 21  7 - 25 mg/dL Final    Creatinine 12/27/2023 0.96 (H)  0.60 - 0.95 mg/dL Final    eGFR 12/27/2023 56 (L)  > OR = 60 mL/min/1.73m2 Final    BUN/Creatinine Ratio 12/27/2023 22  6 - 22 (calc) Final    Sodium 12/27/2023 141  135 - 146 mmol/L Final    Potassium 12/27/2023 4.6  3.5 - 5.3 mmol/L Final    Chloride 12/27/2023 102  98 - 110 mmol/L Final    CO2 12/27/2023 30  20 - 32 mmol/L Final    Calcium 12/27/2023 10.2  8.6 - 10.4 mg/dL Final   Refill on 07/07/2023   Component Date Value Ref Range Status    POC Blood, Urine 07/07/2023 Negative  Negative Final    POC Bilirubin, Urine 07/07/2023 Negative  Negative Final    POC Urobilinogen, Urine 07/07/2023 Negative  0.1 - 1.1 Final    POC Ketones, Urine 07/07/2023 Negative   Negative Final    POC Protein, Urine 07/07/2023 Negative  Negative Final    POC Nitrates, Urine 07/07/2023 Negative  Negative Final    POC Glucose, Urine 07/07/2023 Negative  Negative Final    pH, UA 07/07/2023 6.0   Final    POC Specific Gravity, Urine 07/07/2023 1.015  1.003 - 1.029 Final    POC Leukocytes, Urine 07/07/2023 Negative  Negative Final    Urine Culture, Routine 07/07/2023  (A)   Final   Patient Outreach on 06/20/2023   Component Date Value Ref Range Status    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       Past Medical History:   Diagnosis Date    Acute on chronic heart failure with preserved ejection fraction 11/28/2023    Anticoagulant long-term use     Cancer     breast/female/throat    Cataract     CHF (congestive heart failure)     Diabetes mellitus, type 2     Essential hypertension 10/03/2019    Heart murmur     History of anemia 10/03/2019    Hypertension     Iron deficiency anemia, unspecified     Anemia, iron def.    Occasional tremors     Occasional tremors 12/31/2019     Past Surgical History:   Procedure Laterality Date    ADENOIDECTOMY      APPENDECTOMY      COLONOSCOPY N/A 12/17/2019    Procedure: COLONOSCOPY;  Surgeon: Raleigh Quarles III, MD;  Location: [facility];  Service: Endoscopy;  Laterality: N/A;    COLONOSCOPY N/A 12/18/2019    Procedure: COLONOSCOPY;  Surgeon: Raleigh Quarles III, MD;  Location: [facility];  Service: Endoscopy;  Laterality: N/A;    ERCP N/A 8/10/2023    Procedure: ERCP (ENDOSCOPIC RETROGRADE CHOLANGIOPANCREATOGRAPHY);  Surgeon: Raleigh Quarles III, MD;  Location: [facility] ENDO;  Service: Endoscopy;  Laterality: N/A;    Gall bladder revmoval      HYSTERECTOMY      INSERTION OF PACEMAKER Right 1/2/2020    Procedure: INSERTION, PACEMAKER;  Surgeon: Fly Barragan MD;  Location: [facility] CATH/EP LAB;  Service: Cardiology;   Laterality: Right;    left mastectomy Left 1945    MASTECTOMY Left     MS CABG, ARTERY-VEIN, FOUR  2014    Coronary Artery Bypass, 4    SMALL BOWEL ENTEROSCOPY N/A 12/17/2019    Procedure: ENTEROSCOPY;  Surgeon: Raleigh Quarles III, MD;  Location: [facility];  Service: Endoscopy;  Laterality: N/A;    TONSILLECTOMY       Family History   Problem Relation Age of Onset    No Known Problems Mother     Heart disease Father        Marital Status:   Alcohol History:  reports no history of alcohol use.  Tobacco History:  reports that she has never smoked. She has never used smokeless tobacco.  Drug History:  reports no history of drug use.    Review of patient's allergies indicates:   Allergen Reactions    Penicillin g benzathine Other (See Comments)       Current Outpatient Medications:     allopurinoL (ZYLOPRIM) 100 MG tablet, Take 100 mg by mouth once daily., Disp: , Rfl:     amiodarone (PACERONE) 200 MG Tab, Take 0.5 tablets (100 mg total) by mouth once daily., Disp: 45 tablet, Rfl: 3    apixaban (ELIQUIS) 2.5 mg Tab, Take 1 tablet (2.5 mg total) by mouth 2 (two) times daily., Disp: 180 tablet, Rfl: 3    blood sugar diagnostic Strp, To check BG 3 times daily, to use with insurance preferred meter, Disp: 200 strip, Rfl: 1    blood-glucose meter kit, To check BG 3 times daily, to use with insurance preferred meter, Disp: 1 each, Rfl: 0    busPIRone (BUSPAR) 5 MG Tab, Take 2 tablets (10 mg total) by mouth 2 (two) times daily., Disp: 180 tablet, Rfl: 3    cholecalciferol, vitamin D3, (VITAMIN D3) 25 mcg (1,000 unit) capsule, Take 1 capsule (1,000 Units total) by mouth once daily., Disp: 100 capsule, Rfl: 1    cyanocobalamin 1,000 mcg/mL injection, Inject 1 ml B 12 subq monthly. (Patient taking differently: Inject 1,000 mcg into the muscle every 30 days. Inject 1 ml B 12 subq monthly.), Disp: 3 mL, Rfl: 4    furosemide (LASIX) 20 MG tablet, Take a half to one tablet in the morning if needed for swelling. (Patient  "taking differently: Take 20 mg by mouth once daily. Take a half to one tablet in the morning if needed for swelling. Taking PRN), Disp: 30 tablet, Rfl: 11    loratadine (CLARITIN) 10 mg tablet, Take 1 tablet (10 mg total) by mouth once daily., Disp: 90 tablet, Rfl: 3    magnesium oxide (MAG-OX) 400 mg (241.3 mg magnesium) tablet, Take 1 tablet (400 mg total) by mouth once daily., Disp: 90 tablet, Rfl: 3    meclizine (ANTIVERT) 12.5 mg tablet, Take daily in the morning, and then may also take two more times in a day as needed, every 8 hours. (Patient taking differently: Take 12.5 mg by mouth every 8 (eight) hours as needed for Dizziness or Nausea. Take daily in the morning, and then may also take two more times in a day as needed, every 8 hours.), Disp: 30 tablet, Rfl: 11    mupirocin (BACTROBAN) 2 % ointment, Apply topically once daily., Disp: 30 g, Rfl: 3    omeprazole (PRILOSEC) 20 MG capsule, Take 2 capsules (40 mg total) by mouth once daily., Disp: 180 capsule, Rfl: 1    potassium chloride SA (K-DUR,KLOR-CON M) 10 MEQ tablet, Take 1 tablet (10 mEq total) by mouth once daily., Disp: 30 tablet, Rfl: 11    propranoloL (INDERAL) 40 MG tablet, Take 1 tablet (40 mg total) by mouth 2 (two) times daily., Disp: 180 tablet, Rfl: 3    syringe with needle (TUBERCULIN SYRINGE) 1 mL 27 x 1/2" Syrg, Use for B 12 subq injection monthly., Disp: 3 each, Rfl: 4    vitamins A,C,E-zinc-copper (PRESERVISION AREDS) 4,296 mcg-226 mg-90 mg Cap, Take 1 capsule by mouth 2 (two) times daily., Disp: 180 capsule, Rfl: 3    docusate sodium (COLACE) 100 MG capsule, Take 100 mg by mouth as needed for Constipation., Disp: , Rfl:     ferrous sulfate 325 (65 FE) MG EC tablet, Take 1 tablet (325 mg total) by mouth 2 (two) times daily. (Patient not taking: Reported on 11/29/2023), Disp: 180 tablet, Rfl: 2    FREESTYLE LANCETS 28 gauge lancets, , Disp: , Rfl:     gabapentin (NEURONTIN) 100 MG capsule, Take 1 capsule (100 mg total) by mouth 3 (three) " "times daily as needed (back pain with sciatica)., Disp: 90 capsule, Rfl: 1    midodrine (PROAMATINE) 5 MG Tab, Take 1 tablet (5 mg total) by mouth 2 (two) times daily with meals. Take first thing in the morning and then again in the afternoon around 3 pm., Disp: 180 tablet, Rfl: 3    Review of Systems   Constitutional:  Negative for activity change, appetite change, chills, fatigue, fever and unexpected weight change.   HENT:  Negative for nasal congestion, ear pain, postnasal drip, rhinorrhea, sore throat and trouble swallowing.    Eyes:  Negative for discharge and visual disturbance.   Respiratory:  Negative for apnea, cough, shortness of breath and wheezing.    Cardiovascular:  Negative for chest pain, palpitations and leg swelling.   Gastrointestinal:  Negative for abdominal pain, blood in stool, constipation, diarrhea, nausea, vomiting and reflux.   Genitourinary:  Negative for bladder incontinence, dysuria, frequency and urgency.   Musculoskeletal:  Negative for arthralgias, gait problem, leg pain and myalgias.   Integumentary:  Negative for rash and mole/lesion.   Neurological:  Negative for dizziness, tremors, weakness, light-headedness, numbness and headaches.   Hematological:  Does not bruise/bleed easily.   Psychiatric/Behavioral:  Negative for dysphoric mood, sleep disturbance and suicidal ideas. The patient is not nervous/anxious.          Objective:      Vitals:    12/07/23 1513 12/07/23 1531   BP: (!) 82/42 107/65   Pulse: 61 (!) 57   SpO2: 97%    Weight: 65.5 kg (144 lb 6.4 oz)    Height: 5' 0.63" (1.54 m)      Physical Exam  Vitals and nursing note reviewed.   Constitutional:       General: She is not in acute distress.     Appearance: Normal appearance. She is well-developed, well-groomed and normal weight. She is not ill-appearing, toxic-appearing or diaphoretic.   HENT:      Head: Normocephalic and atraumatic.      Right Ear: External ear normal.      Left Ear: External ear normal.      Nose: " Nose normal. No rhinorrhea.      Mouth/Throat:      Lips: Pink.      Mouth: Mucous membranes are moist.      Pharynx: Oropharynx is clear.   Eyes:      General: No scleral icterus.        Right eye: No discharge.      Pupils: Pupils are equal, round, and reactive to light.   Neck:      Thyroid: No thyromegaly.      Vascular: No carotid bruit or JVD.   Cardiovascular:      Rate and Rhythm: Normal rate. Rhythm irregular. Occasional Extrasystoles are present.     Pulses:           Radial pulses are 2+ on the right side and 2+ on the left side.        Dorsalis pedis pulses are 1+ on the right side and 1+ on the left side.      Heart sounds: Murmur heard.      Systolic murmur is present with a grade of 4/6.   Pulmonary:      Effort: Pulmonary effort is normal.      Breath sounds: Normal breath sounds. No stridor or decreased air movement. No decreased breath sounds, wheezing, rhonchi or rales.   Abdominal:      Palpations: Abdomen is soft.      Tenderness: There is no abdominal tenderness.   Musculoskeletal:      Cervical back: Normal range of motion and neck supple.      Lumbar back: Normal. No spasms.      Right lower leg: No edema.      Left lower leg: No edema.   Skin:     General: Skin is warm and dry.      Capillary Refill: Capillary refill takes 2 to 3 seconds.      Coloration: Skin is not ashen, cyanotic, jaundiced, mottled, pale or sallow.      Findings: No rash.   Neurological:      General: No focal deficit present.      Mental Status: She is alert and oriented to person, place, and time. Mental status is at baseline.      Cranial Nerves: No facial asymmetry.      Sensory: Sensation is intact. No sensory deficit.      Coordination: Romberg sign negative.      Gait: Gait is intact.      Comments: Uses walker for ambulation   Psychiatric:         Attention and Perception: Attention and perception normal.         Mood and Affect: Mood and affect normal.         Speech: Speech normal.         Behavior: Behavior  normal. Behavior is cooperative.         Thought Content: Thought content normal.         Cognition and Memory: Cognition normal.         Judgment: Judgment normal.         Assessment:       1. Hospital discharge follow-up    2. Grade III diastolic dysfunction    3. Chronic diastolic congestive heart failure    4. Need for RSV immunization         Plan:       Hospital discharge follow-up  Grade III diastolic dysfunction  Chronic diastolic congestive heart failure  Continue current medication regimen as is.     Need for RSV immunization  Discussed the need for this vaccine. Agrees to  plan.  -     RSVPreF3 antigen-JUSTEN PF, (AREXVY, PF,) 120 mcg/0.5 mL SusR vaccine; Inject 0.5 mLs into the muscle once. for 1 dose  Dispense: 0.5 mL; Refill: 0      Follow up if symptoms worsen or fail to improve.

## 2023-12-18 ENCOUNTER — HOSPITAL ENCOUNTER (OUTPATIENT)
Dept: RADIOLOGY | Facility: HOSPITAL | Age: 88
Discharge: HOME | End: 2023-12-18
Payer: MEDICARE

## 2023-12-18 ENCOUNTER — TELEPHONE (OUTPATIENT)
Dept: FAMILY MEDICINE | Facility: CLINIC | Age: 88
End: 2023-12-18
Payer: MEDICARE

## 2023-12-18 DIAGNOSIS — M54.50 ACUTE MIDLINE LOW BACK PAIN, UNSPECIFIED WHETHER SCIATICA PRESENT: Primary | ICD-10-CM

## 2023-12-18 DIAGNOSIS — M54.31 RIGHT SCIATIC NERVE PAIN: Primary | ICD-10-CM

## 2023-12-18 DIAGNOSIS — M54.50 ACUTE MIDLINE LOW BACK PAIN, UNSPECIFIED WHETHER SCIATICA PRESENT: ICD-10-CM

## 2023-12-18 LAB
LEFT EYE DM RETINOPATHY: POSITIVE
RIGHT EYE DM RETINOPATHY: POSITIVE

## 2023-12-18 PROCEDURE — 72110 X-RAY EXAM L-2 SPINE 4/>VWS: CPT | Mod: TC,PO

## 2023-12-18 NOTE — PROGRESS NOTES
No fracture. Acute irritation of already degenerative disc disease and arthritis. Could be sciatic nerve also. Get a little more info about the pain, and find out what she wants us to prescribe. Gabapentin will help with nerve pain. Can apply heat, rest.

## 2023-12-18 NOTE — TELEPHONE ENCOUNTER
----- Message from LYNN Castro-CNP sent at 12/18/2023  2:45 PM CST -----  No fracture. Acute irritation of already degenerative disc disease and arthritis. Could be sciatic nerve also. Get a little more info about the pain, and find out what she wants us to prescribe. Gabapentin will help with nerve pain. Can apply heat, rest.

## 2023-12-18 NOTE — TELEPHONE ENCOUNTER
Called patient, no answer/no voicemail. Called TIMI Thomas, left mess to call me back and it was not urgent.

## 2023-12-18 NOTE — TELEPHONE ENCOUNTER
----- Message from Yue Arellano MA sent at 12/18/2023  8:14 AM CST -----    ----- Message -----  From: Cata Cramer  Sent: 12/18/2023   8:10 AM CST  To: [facility]-8:05- pt is having severe pain in her 1 and 3rd vertebra in her back. She would like an xray or mri done   [phone]

## 2023-12-18 NOTE — TELEPHONE ENCOUNTER
Spoke with Tosin foley is experiencing pain in her lower back and bottom with sitting and walking. States pain started after getting out of a chair on Friday. Denies any redness or swelling. Pain is about a 10 right now. Denies any falls. [facility] and would like order for xray  while they are out.

## 2023-12-19 ENCOUNTER — TELEPHONE (OUTPATIENT)
Dept: FAMILY MEDICINE | Facility: CLINIC | Age: 88
End: 2023-12-19
Payer: MEDICARE

## 2023-12-19 RX ORDER — GABAPENTIN 100 MG/1
100 CAPSULE ORAL 2 TIMES DAILY
Qty: 14 CAPSULE | Refills: 0 | Status: SHIPPED | OUTPATIENT
Start: 2023-12-19 | End: 2023-12-26 | Stop reason: SDUPTHER

## 2023-12-19 NOTE — TELEPHONE ENCOUNTER
"Please call Ms. Raymundo that I think it sounds like her sciatic nerve. Make sure she is not having any problems with sudden loss of bowels or bladder. For pain this severe,  or symptoms such as these, I would recommend going to the ER, but as I know she is going to say "absolutely not," I am also sending some gabapentin 100mg for her to take up to twice a day as needed for the next week and I want her to be seen by physical medicine to see if she can get some other treatment lined up. Tell her I said she will love Dr. Benz.  I put the referral in as urgent. If she is having loss of bowel or bladder control or has no sensation or urge of needing to go, has any new weakness in her legs, or begins to have trouble walking, she really does need to go to the ER in case it is a spinal cord issue, rather than a nerve.  "

## 2023-12-19 NOTE — TELEPHONE ENCOUNTER
----- Message from Yue Arellano MA sent at 12/19/2023  8:35 AM CST -----  Contact: Miranda    ----- Message -----  From: Karlie Penn  Sent: 12/19/2023   8:32 AM CST  To: [facility] Staff    Pt is returning the office call. Miranda @[phone]

## 2023-12-19 NOTE — TELEPHONE ENCOUNTER
Spoke with patient gave recommendations verbatim per Maribel HIGGINBOTHAM. Patient states when she bends over it really gives her relief. Patient states she wanted to make sure this was ok as PT from [facility] said to get the ok from her provider. Patient was thankful.

## 2023-12-19 NOTE — TELEPHONE ENCOUNTER
I don't see why it would hurt anything. If it's giving her relief, carefully, continue stretching.

## 2023-12-19 NOTE — TELEPHONE ENCOUNTER
Spoke to luther with result verbatim per Cata. Said she is having severe pain right now, 10 plus down by the crack of her butt. Started Friday. Has tried Tylenol which doesn't help. Said the pain is terrible and this is the first time she's been in pain like this. Hasn't fallen. Uses Deysi meds. Pain is running down left leg.

## 2023-12-19 NOTE — TELEPHONE ENCOUNTER
Spoke to patient and Miranda with all information verbatim per Cata. Verbalized understanding on all. Said she is having no bladder or bowel issues and understands if they develop to go to ER. Gave her Dr Benz name and number, advised they should be calling her for appt but if not to call their office. Remind me created to be sure ov scheduled. Aware rx sent to pharmacy for Gabapentin.    Said that she has been using a 2 lb weight and bending over with it. This gives her some relief. Wants to know if it is ok to do this.

## 2023-12-20 ENCOUNTER — OFFICE VISIT (OUTPATIENT)
Dept: CARDIOLOGY | Facility: CLINIC | Age: 88
End: 2023-12-20
Payer: MEDICARE

## 2023-12-20 VITALS
DIASTOLIC BLOOD PRESSURE: 68 MMHG | HEART RATE: 61 BPM | BODY MASS INDEX: 28.86 KG/M2 | WEIGHT: 147 LBS | HEIGHT: 60 IN | OXYGEN SATURATION: 97 % | SYSTOLIC BLOOD PRESSURE: 120 MMHG

## 2023-12-20 DIAGNOSIS — I25.10 CORONARY ARTERY DISEASE INVOLVING NATIVE CORONARY ARTERY OF NATIVE HEART WITHOUT ANGINA PECTORIS: Chronic | ICD-10-CM

## 2023-12-20 DIAGNOSIS — I50.20 HFREF (HEART FAILURE WITH REDUCED EJECTION FRACTION): ICD-10-CM

## 2023-12-20 DIAGNOSIS — I35.0 SEVERE AORTIC STENOSIS: ICD-10-CM

## 2023-12-20 DIAGNOSIS — I48.0 PAROXYSMAL ATRIAL FIBRILLATION: Primary | Chronic | ICD-10-CM

## 2023-12-20 DIAGNOSIS — I95.9 HYPOTENSION, UNSPECIFIED HYPOTENSION TYPE: ICD-10-CM

## 2023-12-20 DIAGNOSIS — I50.32 CHRONIC DIASTOLIC CONGESTIVE HEART FAILURE: Chronic | ICD-10-CM

## 2023-12-20 DIAGNOSIS — Z95.0 CARDIAC PACEMAKER: ICD-10-CM

## 2023-12-20 DIAGNOSIS — I70.0 AORTIC ATHEROSCLEROSIS: ICD-10-CM

## 2023-12-20 PROCEDURE — 99214 PR OFFICE/OUTPT VISIT, EST, LEVL IV, 30-39 MIN: ICD-10-PCS | Mod: S$PBB,,, | Performed by: NURSE PRACTITIONER

## 2023-12-20 PROCEDURE — 99215 OFFICE O/P EST HI 40 MIN: CPT | Mod: PBBFAC,PN | Performed by: NURSE PRACTITIONER

## 2023-12-20 PROCEDURE — 99999 PR PBB SHADOW E&M-EST. PATIENT-LVL V: CPT | Mod: PBBFAC,,, | Performed by: NURSE PRACTITIONER

## 2023-12-20 PROCEDURE — 99214 OFFICE O/P EST MOD 30 MIN: CPT | Mod: S$PBB,,, | Performed by: NURSE PRACTITIONER

## 2023-12-20 PROCEDURE — 99999 PR PBB SHADOW E&M-EST. PATIENT-LVL V: ICD-10-PCS | Mod: PBBFAC,,, | Performed by: NURSE PRACTITIONER

## 2023-12-20 RX ORDER — MIDODRINE HYDROCHLORIDE 5 MG/1
5 TABLET ORAL 2 TIMES DAILY WITH MEALS
Qty: 180 TABLET | Refills: 3 | Status: SHIPPED | OUTPATIENT
Start: 2023-12-20

## 2023-12-20 NOTE — ASSESSMENT & PLAN NOTE
She appears euvolemic on exam.  Continue furosemide 20 mg p.o. daily p.r.n. for swelling, shortness of breath, or weight gain of more than 2 lb overnight.  She should take potassium supplementation any time she takes furosemide.    Discussed with patient the recommendation for her to be on an Ace or an Arb however she can not currently take either medication due to hypotension and requiring midodrine presently.

## 2023-12-20 NOTE — ASSESSMENT & PLAN NOTE
Continue midodrine 5 mg p.o. b.i.d..  Can increase if needed.  However she is stable on current regimen.

## 2023-12-20 NOTE — PROGRESS NOTES
Subjective:    Patient ID:  Rosanna Raymundo is a 89 y.o. female  Chief Complaint   Patient presents with    Results       HPI:  Patient seen today for follow-up appointment.  Patient was recently hospitalized with acute CHF exacerbation.  Patient found to have reduced EF about 35-40%.  Unfortunately patient has significant hypotension and is unable to tolerate Ace/Arb.  She is on propranolol which she takes primarily for tremors.  She was also started on midodrine and her blood pressure seems to be low normal on her current regimen.  She states she feels wonderful and has no complaints of any shortness of breath or swelling.  She has been weighing herself daily.  She has not required any Lasix recently.    Review of patient's allergies indicates:   Allergen Reactions    Penicillin g benzathine Other (See Comments)       Past Medical History:   Diagnosis Date    Acute on chronic heart failure with preserved ejection fraction 11/28/2023    Anticoagulant long-term use     Cancer     breast/female/throat    Cataract     CHF (congestive heart failure)     Diabetes mellitus, type 2     Essential hypertension 10/03/2019    Heart murmur     History of anemia 10/03/2019    Hypertension     Iron deficiency anemia, unspecified     Anemia, iron def.    Occasional tremors     Occasional tremors 12/31/2019     Past Surgical History:   Procedure Laterality Date    ADENOIDECTOMY      APPENDECTOMY      COLONOSCOPY N/A 12/17/2019    Procedure: COLONOSCOPY;  Surgeon: Raleigh Quarles III, MD;  Location: [facility];  Service: Endoscopy;  Laterality: N/A;    COLONOSCOPY N/A 12/18/2019    Procedure: COLONOSCOPY;  Surgeon: Raleigh Quarles III, MD;  Location: [facility];  Service: Endoscopy;  Laterality: N/A;    ERCP N/A 8/10/2023    Procedure: ERCP (ENDOSCOPIC RETROGRADE CHOLANGIOPANCREATOGRAPHY);  Surgeon: Raleigh Quarles III, MD;  Location: [facility];  Service: Endoscopy;  Laterality: N/A;    Gall bladder revmoval       HYSTERECTOMY      INSERTION OF PACEMAKER Right 1/2/2020    Procedure: INSERTION, PACEMAKER;  Surgeon: Fly Barragan MD;  Location: [facility] CATH/EP LAB;  Service: Cardiology;  Laterality: Right;    left mastectomy Left 1945    MASTECTOMY Left     NH CABG, ARTERY-VEIN, FOUR  2014    Coronary Artery Bypass, 4    SMALL BOWEL ENTEROSCOPY N/A 12/17/2019    Procedure: ENTEROSCOPY;  Surgeon: Raleigh Quarles III, MD;  Location: [facility] ENDO;  Service: Endoscopy;  Laterality: N/A;    TONSILLECTOMY       Social History     Tobacco Use    Smoking status: Never    Smokeless tobacco: Never   Substance Use Topics    Alcohol use: Never    Drug use: Never     Family History   Problem Relation Age of Onset    No Known Problems Mother     Heart disease Father         Review of Systems:   Constitution: Negative for diaphoresis and fever.   HEENT: Negative for nosebleeds.    Cardiovascular: Negative for chest pain       No dyspnea on exertion       No leg swelling        No palpitations  Respiratory: Negative for shortness of breath and wheezing.    Hematologic/Lymphatic: Negative for bleeding problem. Does not bruise/bleed easily.   Skin: Negative for color change and rash.   Musculoskeletal: Negative for falls and myalgias.   Gastrointestinal: Negative for hematemesis and hematochezia.   Genitourinary: Negative for hematuria.   Neurological: Negative for dizziness and light-headedness.   Psychiatric/Behavioral: Negative for altered mental status and memory loss.          Objective:        Vitals:    12/20/23 0808   BP: 120/68   Pulse: 61       Lab Results   Component Value Date    WBC 4.62 11/28/2023    HGB 15.3 11/28/2023    HCT 46.9 11/28/2023     11/28/2023    CHOL 161 11/21/2022    TRIG 190 (H) 11/21/2022    HDL 42 (L) 11/21/2022    ALT 9 (L) 11/28/2023    AST 14 11/28/2023     11/28/2023    K 3.2 (L) 11/28/2023     11/28/2023    CREATININE 0.8 11/28/2023    BUN 17 11/28/2023    CO2 29 11/28/2023    TSH 2.76  09/25/2023    INR 1.1 01/01/2020    HGBA1C 6.3 (H) 09/25/2023    MICROALBUR 45.6 09/25/2023        ECHOCARDIOGRAM RESULTS  Results for orders placed during the hospital encounter of 11/27/23    Echo    Interpretation Summary    Left Ventricle: The left ventricle is mildly dilated. Mildly increased wall thickness. There is eccentric hypertrophy. Moderate global hyperkinesis and regional wall motion abnormalities present. There is moderately reduced systolic function with a visually estimated ejection fraction of 35 - 40%. Grade III diastolic dysfunction.    Right Ventricle: Mild right ventricular enlargement. Wall thickness is normal. Systolic function is moderately reduced.    Left Atrium: Left atrium is mildly dilated.    Aortic Valve: Mildly calcified left, right and noncoronary cusps. Mildly restricted motion. There is mild stenosis. Aortic valve area by VTI is 1.70 cm². Aortic valve peak velocity is 2.66 m/s. Mean gradient is 17 mmHg. The dimensionless index is 0.49. There is mild aortic regurgitation with a centrally directed jet.    Mitral Valve: Mildly calcified posterior subvalvular apparatus.    Tricuspid Valve: There is mild regurgitation with a centrally directed jet.    Pacemaker/ICD lead in the right atrium and right ventricle.        CURRENT/PREVIOUS VISIT EKG  Results for orders placed or performed during the hospital encounter of 11/27/23   EKG 12-lead    Collection Time: 11/27/23  6:10 PM    Narrative    Test Reason : R06.02,    Vent. Rate : 060 BPM     Atrial Rate : 061 BPM     P-R Int : 292 ms          QRS Dur : 176 ms      QT Int : 478 ms       P-R-T Axes : 000 098 -57 degrees     QTc Int : 478 ms    Atrial-paced rhythm with prolonged AV conduction  Right bundle branch block  T wave abnormality, consider inferolateral ischemia  Abnormal ECG  When compared with ECG of 27-NOV-2023 09:00,  No significant change was found  Confirmed by Cory Au MD ([id number]) on 12/10/2023 10:11:13 PM    Referred  By: [email or web address]   SELF           Confirmed By:Cory Au MD     No valid procedures specified.   No results found for this or any previous visit.      Physical Exam:  CONSTITUTIONAL: No fever, no chills  HEENT: Normocephalic, atraumatic,pupils reactive to light                 NECK:  No JVD no carotid bruit  CVS: S1S2+, RRR, positive murmur  LUNGS: Clear  ABDOMEN: Soft, NT, BS+  EXTREMITIES: No cyanosis, edema  : No vargas catheter  NEURO: AAO X 3  PSY: Normal affect      Medication List with Changes/Refills   Current Medications    ALLOPURINOL (ZYLOPRIM) 100 MG TABLET    Take 100 mg by mouth once daily.    AMIODARONE (PACERONE) 200 MG TAB    Take 0.5 tablets (100 mg total) by mouth once daily.    APIXABAN (ELIQUIS) 2.5 MG TAB    Take 1 tablet (2.5 mg total) by mouth 2 (two) times daily.    BLOOD SUGAR DIAGNOSTIC STRP    To check BG 3 times daily, to use with insurance preferred meter    BLOOD-GLUCOSE METER KIT    To check BG 3 times daily, to use with insurance preferred meter    BUSPIRONE (BUSPAR) 5 MG TAB    Take 2 tablets (10 mg total) by mouth 2 (two) times daily.    CHOLECALCIFEROL, VITAMIN D3, (VITAMIN D3) 25 MCG (1,000 UNIT) CAPSULE    Take 1 capsule (1,000 Units total) by mouth once daily.    CYANOCOBALAMIN 1,000 MCG/ML INJECTION    Inject 1 ml B 12 subq monthly.    DOCUSATE SODIUM (COLACE) 100 MG CAPSULE    Take 100 mg by mouth as needed for Constipation.    FERROUS SULFATE 325 (65 FE) MG EC TABLET    Take 1 tablet (325 mg total) by mouth 2 (two) times daily.    FREESTYLE LANCETS 28 GAUGE LANCETS        FUROSEMIDE (LASIX) 20 MG TABLET    Take a half to one tablet in the morning if needed for swelling.    GABAPENTIN (NEURONTIN) 100 MG CAPSULE    Take 1 capsule (100 mg total) by mouth 2 (two) times daily. for 7 days    LORATADINE (CLARITIN) 10 MG TABLET    Take 1 tablet (10 mg total) by mouth once daily.    MAGNESIUM OXIDE (MAG-OX) 400 MG (241.3 MG MAGNESIUM) TABLET    Take 1 tablet (400 mg total) by  "mouth once daily.    MECLIZINE (ANTIVERT) 12.5 MG TABLET    Take daily in the morning, and then may also take two more times in a day as needed, every 8 hours.    MUPIROCIN (BACTROBAN) 2 % OINTMENT    Apply topically once daily.    OMEPRAZOLE (PRILOSEC) 20 MG CAPSULE    Take 2 capsules (40 mg total) by mouth once daily.    POTASSIUM CHLORIDE SA (K-DUR,KLOR-CON M) 10 MEQ TABLET    Take 1 tablet (10 mEq total) by mouth once daily.    PROPRANOLOL (INDERAL) 40 MG TABLET    Take 1 tablet (40 mg total) by mouth 2 (two) times daily.    SYRINGE WITH NEEDLE (TUBERCULIN SYRINGE) 1 ML 27 X 1/2" SYRG    Use for B 12 subq injection monthly.    VITAMINS A,C,E-ZINC-COPPER (PRESERVISION AREDS) 4,296 MCG-226 MG-90 MG CAP    Take 1 capsule by mouth 2 (two) times daily.   Changed and/or Refilled Medications    Modified Medication Previous Medication    MIDODRINE (PROAMATINE) 5 MG TAB midodrine (PROAMATINE) 5 MG Tab       Take 1 tablet (5 mg total) by mouth 2 (two) times daily with meals. Take first thing in the morning and then again in the afternoon around 3 pm.    Take 1 tablet (5 mg total) by mouth every 8 (eight) hours. Take first thing in the morning and then again in the afternoon around 3 pm.             Assessment:       1. Paroxysmal atrial fibrillation    2. Coronary artery disease involving native coronary artery of native heart without angina pectoris    3. Chronic diastolic congestive heart failure    4. Cardiac pacemaker    5. Aortic atherosclerosis    6. HFrEF (heart failure with reduced ejection fraction)    7. Hypotension, unspecified hypotension type    8. Severe aortic stenosis         Plan:     Problem List Items Addressed This Visit          Unprioritized    Paroxysmal atrial fibrillation - Primary (Chronic)    Current Assessment & Plan     In sinus rhythm per auscultation.  Continue amiodarone 100 mg p.o. daily and Eliquis 2.5 mg p.o. b.i.d..         Relevant Orders    Basic Metabolic Panel    Lipid Panel    CBC " Without Differential    Coronary artery disease (Chronic)    Current Assessment & Plan     Maximize medical management.  Will check a lipid panel.         Relevant Orders    Basic Metabolic Panel    Lipid Panel    CBC Without Differential    Chronic diastolic congestive heart failure (Chronic)    Severe aortic stenosis (Chronic)    Relevant Orders    Echo    Cardiac pacemaker    Aortic atherosclerosis    Current Assessment & Plan     Maximize medical management.  Will check a lipid panel.         Hypotension    Current Assessment & Plan     Continue midodrine 5 mg p.o. b.i.d..  Can increase if needed.  However she is stable on current regimen.         Relevant Medications    midodrine (PROAMATINE) 5 MG Tab    HFrEF (heart failure with reduced ejection fraction)    Current Assessment & Plan     She appears euvolemic on exam.  Continue furosemide 20 mg p.o. daily p.r.n. for swelling, shortness of breath, or weight gain of more than 2 lb overnight.  She should take potassium supplementation any time she takes furosemide.    Discussed with patient the recommendation for her to be on an Ace or an Arb however she can not currently take either medication due to hypotension and requiring midodrine presently.         Relevant Orders    Basic Metabolic Panel    Lipid Panel    CBC Without Differential       Follow up in about 3 months (around 3/20/2024).

## 2023-12-20 NOTE — ASSESSMENT & PLAN NOTE
In sinus rhythm per auscultation.  Continue amiodarone 100 mg p.o. daily and Eliquis 2.5 mg p.o. b.i.d..

## 2023-12-21 ENCOUNTER — PATIENT OUTREACH (OUTPATIENT)
Dept: ADMINISTRATIVE | Facility: HOSPITAL | Age: 88
End: 2023-12-21
Payer: MEDICARE

## 2023-12-21 DIAGNOSIS — Z95.0 CARDIAC PACEMAKER: Primary | ICD-10-CM

## 2023-12-21 NOTE — PROGRESS NOTES
Population Health Chart Review & Patient Outreach Details    Outreach Performed: NO    Additional Pop Health Notes:           Updates Requested / Reviewed:      Updated Care Coordination Note         Health Maintenance Topics Overdue:    Health Maintenance Due   Topic Date Due    TETANUS VACCINE  Never done    Shingles Vaccine (1 of 2) Never done    RSV Vaccine (Age 60+ and Pregnant patients) (1 - 1-dose 60+ series) Never done    COVID-19 Vaccine (3 - 2023-24 season) 09/01/2023    Lipid Panel  11/21/2023         Health Maintenance Topic(s) Outreach Outcomes & Actions Taken:    Eye Exam - Outreach Outcomes & Actions Taken  : Diabetic Eye External Records Uploaded, Care Team & History Updated if Applicable

## 2023-12-25 NOTE — PROGRESS NOTES
Call patient.  X-rays of her lumbar spine shows multilevel degenerative discs and arthritis in her back.  She has mild slippage of L4 on L5 and L5 on S1.  Did the gabapentin help her pain at all ?

## 2023-12-26 ENCOUNTER — TELEPHONE (OUTPATIENT)
Dept: FAMILY MEDICINE | Facility: CLINIC | Age: 88
End: 2023-12-26
Payer: MEDICARE

## 2023-12-26 DIAGNOSIS — M54.31 RIGHT SCIATIC NERVE PAIN: ICD-10-CM

## 2023-12-26 DIAGNOSIS — M54.50 ACUTE MIDLINE LOW BACK PAIN, UNSPECIFIED WHETHER SCIATICA PRESENT: Primary | ICD-10-CM

## 2023-12-26 RX ORDER — PREDNISONE 20 MG/1
20 TABLET ORAL DAILY
Qty: 3 TABLET | Refills: 0 | Status: SHIPPED | OUTPATIENT
Start: 2023-12-26 | End: 2023-12-29

## 2023-12-26 RX ORDER — GABAPENTIN 100 MG/1
100 CAPSULE ORAL 3 TIMES DAILY PRN
Qty: 90 CAPSULE | Refills: 1 | Status: SHIPPED | OUTPATIENT
Start: 2023-12-26

## 2023-12-26 NOTE — TELEPHONE ENCOUNTER
I will happily give her whatever she needs to be comfortable. Please tell her I have sent enough gabapentin to get her to that appt and a 3 regimen of steroids to reduce the inflammation. These might make her feel flushed and have red cheeks. Just a side effect. Tell her if she takes the gabapentin daily, do not abruptly stop without weaning a little or she will feel like poop for a few days.

## 2023-12-26 NOTE — TELEPHONE ENCOUNTER
----- Message from Cheyenne Carrasco LPN sent at 12/26/2023  8:47 AM CST -----    ----- Message -----  From: Cata Cramer  Sent: 12/26/2023   8:33 AM CST  To: [facility] Staff    Pt is very uncomfortable with her lower back. She can not get into the physical med doctor till end feb and would like us to try and do something for her   [phone] luther

## 2023-12-26 NOTE — TELEPHONE ENCOUNTER
----- Message from Cata Cramer sent at 12/26/2023 11:14 AM CST -----  Miranda is calling back   [phone]

## 2023-12-28 LAB
BUN SERPL-MCNC: 21 MG/DL (ref 7–25)
BUN/CREAT SERPL: 22 (CALC) (ref 6–22)
CALCIUM SERPL-MCNC: 10.2 MG/DL (ref 8.6–10.4)
CHLORIDE SERPL-SCNC: 102 MMOL/L (ref 98–110)
CO2 SERPL-SCNC: 30 MMOL/L (ref 20–32)
CREAT SERPL-MCNC: 0.96 MG/DL (ref 0.6–0.95)
EGFR: 56 ML/MIN/1.73M2
GLUCOSE SERPL-MCNC: 105 MG/DL (ref 65–99)
POTASSIUM SERPL-SCNC: 4.6 MMOL/L (ref 3.5–5.3)
SODIUM SERPL-SCNC: 141 MMOL/L (ref 135–146)

## 2024-01-03 ENCOUNTER — TELEPHONE (OUTPATIENT)
Dept: HEMATOLOGY/ONCOLOGY | Facility: CLINIC | Age: 89
End: 2024-01-03

## 2024-01-03 NOTE — TELEPHONE ENCOUNTER
I spoke with pt and reminded her to have labs done at [facility] prior to appt here on 1/10/24 pt verbalized understanding.

## 2024-01-06 LAB
ALBUMIN SERPL-MCNC: 3.9 G/DL (ref 3.6–5.1)
ALBUMIN/GLOB SERPL: 1.3 (CALC) (ref 1–2.5)
ALP SERPL-CCNC: 75 U/L (ref 37–153)
ALT SERPL-CCNC: 11 U/L (ref 6–29)
AST SERPL-CCNC: 14 U/L (ref 10–35)
BASOPHILS # BLD AUTO: 38 CELLS/UL (ref 0–200)
BASOPHILS NFR BLD AUTO: 0.6 %
BILIRUB SERPL-MCNC: 0.7 MG/DL (ref 0.2–1.2)
BUN SERPL-MCNC: 22 MG/DL (ref 7–25)
BUN/CREAT SERPL: NORMAL (CALC) (ref 6–22)
CALCIUM SERPL-MCNC: 9.2 MG/DL (ref 8.6–10.4)
CHLORIDE SERPL-SCNC: 106 MMOL/L (ref 98–110)
CO2 SERPL-SCNC: 26 MMOL/L (ref 20–32)
CREAT SERPL-MCNC: 0.81 MG/DL (ref 0.6–0.95)
EGFR: 68 ML/MIN/1.73M2
EOSINOPHIL # BLD AUTO: 179 CELLS/UL (ref 15–500)
EOSINOPHIL NFR BLD AUTO: 2.8 %
ERYTHROCYTE [DISTWIDTH] IN BLOOD BY AUTOMATED COUNT: 19.8 % (ref 11–15)
FERRITIN SERPL-MCNC: 232 NG/ML (ref 16–288)
GLOBULIN SER CALC-MCNC: 3 G/DL (CALC) (ref 1.9–3.7)
GLUCOSE SERPL-MCNC: 97 MG/DL (ref 65–99)
HCT VFR BLD AUTO: 46.4 % (ref 35–45)
HGB BLD-MCNC: 14.5 G/DL (ref 11.7–15.5)
LYMPHOCYTES # BLD AUTO: 1299 CELLS/UL (ref 850–3900)
LYMPHOCYTES NFR BLD AUTO: 20.3 %
MCH RBC QN AUTO: 28.8 PG (ref 27–33)
MCHC RBC AUTO-ENTMCNC: 31.3 G/DL (ref 32–36)
MCV RBC AUTO: 92.2 FL (ref 80–100)
MONOCYTES # BLD AUTO: 736 CELLS/UL (ref 200–950)
MONOCYTES NFR BLD AUTO: 11.5 %
NEUTROPHILS # BLD AUTO: 4147 CELLS/UL (ref 1500–7800)
NEUTROPHILS NFR BLD AUTO: 64.8 %
PLATELET # BLD AUTO: 130 THOUSAND/UL (ref 140–400)
PMV BLD REES-ECKER: 12.1 FL (ref 7.5–12.5)
POTASSIUM SERPL-SCNC: 4 MMOL/L (ref 3.5–5.3)
PROT SERPL-MCNC: 6.9 G/DL (ref 6.1–8.1)
RBC # BLD AUTO: 5.03 MILLION/UL (ref 3.8–5.1)
SERVICE CMNT-IMP: ABNORMAL
SODIUM SERPL-SCNC: 140 MMOL/L (ref 135–146)
VIT B12 SERPL-MCNC: 521 PG/ML (ref 200–1100)
WBC # BLD AUTO: 6.4 THOUSAND/UL (ref 3.8–10.8)

## 2024-01-10 ENCOUNTER — OFFICE VISIT (OUTPATIENT)
Dept: HEMATOLOGY/ONCOLOGY | Facility: CLINIC | Age: 89
End: 2024-01-10
Payer: MEDICARE

## 2024-01-10 VITALS
HEIGHT: 60 IN | SYSTOLIC BLOOD PRESSURE: 108 MMHG | RESPIRATION RATE: 16 BRPM | DIASTOLIC BLOOD PRESSURE: 63 MMHG | WEIGHT: 145 LBS | HEART RATE: 59 BPM | TEMPERATURE: 97 F | BODY MASS INDEX: 28.47 KG/M2

## 2024-01-10 DIAGNOSIS — D50.0 IRON DEFICIENCY ANEMIA DUE TO CHRONIC BLOOD LOSS: Primary | ICD-10-CM

## 2024-01-10 DIAGNOSIS — D51.8 DIETARY VITAMIN B12 DEFICIENCY ANEMIA: ICD-10-CM

## 2024-01-10 PROCEDURE — 99214 OFFICE O/P EST MOD 30 MIN: CPT | Mod: S$GLB,,, | Performed by: INTERNAL MEDICINE

## 2024-01-10 RX ORDER — SYRINGE-NEEDLE,INSULIN,0.5 ML 28GX1/2"
SYRINGE, EMPTY DISPOSABLE MISCELLANEOUS
Qty: 3 EACH | Refills: 4 | Status: SHIPPED | OUTPATIENT
Start: 2024-01-10

## 2024-01-10 RX ORDER — CYANOCOBALAMIN 1000 UG/ML
INJECTION, SOLUTION INTRAMUSCULAR; SUBCUTANEOUS
Qty: 3 ML | Refills: 4 | Status: SHIPPED | OUTPATIENT
Start: 2024-01-10

## 2024-01-10 NOTE — PROGRESS NOTES
[facility] hematology Oncology in office Subsequent encounter note    1/10/24      Subjective:      Patient ID:   Rosanna Raymundo  89 y.o. female  11/3/1932  Aamir Rhodes MD      Chief Complaint:  Anemia      HPI:  89 y.o. female was referred for the evaluation of anemia.  She presented with a hemoglobin of 5 and had shortness of breath with exertion, her energy level was 3/10.  She was transfused up to a hemoglobin of 9.2 and energy level improved to 8/10.  She had upper endoscopy and was found to have AVMs, these were cauterized.  She was felt to have iron deficiency anemia and started on oral iron, iron pills constipate her.  She has been on iron and presently hemoglobin is improved to 13.8.  Ferritin level has improved from 5-17 on oral iron.    I have discussed with her that she appears to be responding to oral iron therapy with improvement in hemoglobin, however with GI symptoms of constipation.  I discussed with her the option of giving her intravenous iron replenishment to replace iron stores and facilitate red blood cell production here.  She would be able to come off of oral iron.  She wants to take the IV iron.  This will be given weekly x2 weeks.  There is a 1 or 2% risk of reaction to iron infusion, will premedicate her with Benadryl.  She will stop her oral iron once the IV iron has been initiated.  She denies craving of ice, pickle juice, Jannie corn starch or flour or angelic.    He has history of left breast cancer 30 years ago treated with mastectomy, chemotherapy, hormonal therapy, and radiation.  She has not had breast cancer recurrence.  Other history includes pacemaker placement.  She had complete hysterectomy at age 13 and 1943 for the management excessive gyn blood loss.  She is status post CABG surgery.    She has history of paroxysmal atrial fibrillation, coronary artery disease, high cholesterol, hypertension, congestive heart failure history, severe aortic stenosis, bradycardia,  aortic atherosclerosis proces, prolonged QT and episode of hypotension.  She also has history of chronic renal disease stage IIIA and is on long-term anticoagulation.  Other history includes diabetes with stage IIIA chronic kidney disease without long-term use of insulin.  She has history of GERD, abdominal pain with biliary dilation.  Other history includes gout of multiple joints.    Now she is S/P B 12 monthly injections and S/P Injectafer infusion x's 2.  Hgb has improved  from 5 to 14.  Ferritin is better at 232.  B 12 improved at 358 to 521.  Energy level is 10/10.  Fill B 12 at [facility] in [place].  Check lab in 4 months.  RTC 4 months.      Her medications include amiodarone and Eliquis, ferrous sulfate, midodrine and Prilosec and Inderal.    She does not smoke, she does not drink alcohol with regularity.  She is allergic to penicillin.    There is family history of heart disease.    She had onset of menses at age 8, she had the complete hysterectomy at age 13, she never had pregnancy.  She did have the breast cancer as outlined above.    There is no other family history of breast cancer in the family, she did have an aunt with lung cancer.    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      ROS:   GEN: normal without any fever, night sweats or weight loss  HEENT: normal with no HA's, sore throat, stiff neck, changes in vision  CV:  See history of present illness  PULM: normal with no SOB, cough, hemoptysis, sputum or pleuritic pain  GI:  See history of present illness  : normal with no hematuria, dysuria  BREAST:  See history of present illness  SKIN: normal with no rash, erythema, bruising, or swelling     Past Medical History:   Diagnosis Date    Acute on chronic heart failure with preserved ejection fraction 11/28/2023    Anticoagulant long-term use     Cancer     breast/female/throat    Cataract      CHF (congestive heart failure)     Diabetes mellitus, type 2     Essential hypertension 10/03/2019    Heart murmur     History of anemia 10/03/2019    Hypertension     Iron deficiency anemia, unspecified     Anemia, iron def.    Occasional tremors     Occasional tremors 12/31/2019     Past Surgical History:   Procedure Laterality Date    ADENOIDECTOMY      APPENDECTOMY      COLONOSCOPY N/A 12/17/2019    Procedure: COLONOSCOPY;  Surgeon: Raleigh Quarles III, MD;  Location: [facility] ENDO;  Service: Endoscopy;  Laterality: N/A;    COLONOSCOPY N/A 12/18/2019    Procedure: COLONOSCOPY;  Surgeon: Raleigh Quarles III, MD;  Location: [facility] ENDO;  Service: Endoscopy;  Laterality: N/A;    ERCP N/A 8/10/2023    Procedure: ERCP (ENDOSCOPIC RETROGRADE CHOLANGIOPANCREATOGRAPHY);  Surgeon: Raleigh Quarles III, MD;  Location: [facility] ENDO;  Service: Endoscopy;  Laterality: N/A;    Gall bladder revmoval      HYSTERECTOMY      INSERTION OF PACEMAKER Right 1/2/2020    Procedure: INSERTION, PACEMAKER;  Surgeon: Fly Barragan MD;  Location: [facility] CATH/EP LAB;  Service: Cardiology;  Laterality: Right;    left mastectomy Left 1945    MASTECTOMY Left     OR CABG, ARTERY-VEIN, FOUR  2014    Coronary Artery Bypass, 4    SMALL BOWEL ENTEROSCOPY N/A 12/17/2019    Procedure: ENTEROSCOPY;  Surgeon: Raleigh Quarles III, MD;  Location: [facility] ENDO;  Service: Endoscopy;  Laterality: N/A;    TONSILLECTOMY         Review of patient's allergies indicates:   Allergen Reactions    Penicillin g benzathine Other (See Comments)     Social History     Socioeconomic History    Marital status:    Tobacco Use    Smoking status: Never    Smokeless tobacco: Never   Substance and Sexual Activity    Alcohol use: Never    Drug use: Never    Sexual activity: Not Currently     Social Determinants of Health     Financial Resource Strain: Patient Declined (7/27/2023)    Overall Financial Resource Strain (CARDIA)     Difficulty of Paying Living  Expenses: Patient declined   Food Insecurity: No Food Insecurity (7/27/2023)    Hunger Vital Sign     Worried About Running Out of Food in the Last Year: Never true     Ran Out of Food in the Last Year: Never true   Transportation Needs: No Transportation Needs (7/27/2023)    PRAPARE - Transportation     Lack of Transportation (Medical): No     Lack of Transportation (Non-Medical): No   Physical Activity: Patient Declined (7/27/2023)    Exercise Vital Sign     Days of Exercise per Week: Patient declined     Minutes of Exercise per Session: Patient declined   Stress: Patient Declined (7/27/2023)    [employer] - Occupational Stress Questionnaire     Feeling of Stress : Patient declined   Social Connections: Unknown (7/27/2023)    Social Connection and Isolation Panel [NHANES]     Frequency of Communication with Friends and Family: Patient declined     Frequency of Social Gatherings with Friends and Family: Patient declined     Attends Jainism Services: Patient declined     Active Member of Clubs or Organizations: Patient declined     Attends Club or Organization Meetings: Patient declined     Marital Status:    Housing Stability: Unknown (7/27/2023)    Housing Stability Vital Sign     Unable to Pay for Housing in the Last Year: No     Unstable Housing in the Last Year: No         Current Outpatient Medications:     allopurinoL (ZYLOPRIM) 100 MG tablet, Take 100 mg by mouth once daily., Disp: , Rfl:     amiodarone (PACERONE) 200 MG Tab, Take 0.5 tablets (100 mg total) by mouth once daily., Disp: 45 tablet, Rfl: 3    apixaban (ELIQUIS) 2.5 mg Tab, Take 1 tablet (2.5 mg total) by mouth 2 (two) times daily., Disp: 180 tablet, Rfl: 3    blood sugar diagnostic Strp, To check BG 3 times daily, to use with insurance preferred meter, Disp: 200 strip, Rfl: 1    busPIRone (BUSPAR) 5 MG Tab, Take 2 tablets (10 mg total) by mouth 2 (two) times daily., Disp: 180 tablet, Rfl: 3     cholecalciferol, vitamin D3, (VITAMIN D3) 25 mcg (1,000 unit) capsule, Take 1 capsule (1,000 Units total) by mouth once daily., Disp: 100 capsule, Rfl: 1    docusate sodium (COLACE) 100 MG capsule, Take 100 mg by mouth as needed for Constipation., Disp: , Rfl:     FREESTYLE LANCETS 28 gauge lancets, , Disp: , Rfl:     furosemide (LASIX) 20 MG tablet, Take a half to one tablet in the morning if needed for swelling. (Patient taking differently: Take 20 mg by mouth once daily. Take a half to one tablet in the morning if needed for swelling. Taking PRN), Disp: 30 tablet, Rfl: 11    gabapentin (NEURONTIN) 100 MG capsule, Take 1 capsule (100 mg total) by mouth 3 (three) times daily as needed (back pain with sciatica)., Disp: 90 capsule, Rfl: 1    loratadine (CLARITIN) 10 mg tablet, Take 1 tablet (10 mg total) by mouth once daily., Disp: 90 tablet, Rfl: 3    magnesium oxide (MAG-OX) 400 mg (241.3 mg magnesium) tablet, Take 1 tablet (400 mg total) by mouth once daily., Disp: 90 tablet, Rfl: 3    meclizine (ANTIVERT) 12.5 mg tablet, Take daily in the morning, and then may also take two more times in a day as needed, every 8 hours. (Patient taking differently: Take 12.5 mg by mouth every 8 (eight) hours as needed for Dizziness or Nausea. Take daily in the morning, and then may also take two more times in a day as needed, every 8 hours.), Disp: 30 tablet, Rfl: 11    midodrine (PROAMATINE) 5 MG Tab, Take 1 tablet (5 mg total) by mouth 2 (two) times daily with meals. Take first thing in the morning and then again in the afternoon around 3 pm., Disp: 180 tablet, Rfl: 3    mupirocin (BACTROBAN) 2 % ointment, Apply topically once daily., Disp: 30 g, Rfl: 3    omeprazole (PRILOSEC) 20 MG capsule, Take 2 capsules (40 mg total) by mouth once daily., Disp: 180 capsule, Rfl: 1    potassium chloride SA (K-DUR,KLOR-CON M) 10 MEQ tablet, Take 1 tablet (10 mEq total) by mouth once daily., Disp: 30 tablet, Rfl: 11    propranoloL (INDERAL) 40  "MG tablet, Take 1 tablet (40 mg total) by mouth 2 (two) times daily., Disp: 180 tablet, Rfl: 3    vitamins A,C,E-zinc-copper (PRESERVISION AREDS) 4,296 mcg-226 mg-90 mg Cap, Take 1 capsule by mouth 2 (two) times daily., Disp: 180 capsule, Rfl: 3    blood-glucose meter kit, To check BG 3 times daily, to use with insurance preferred meter, Disp: 1 each, Rfl: 0    cyanocobalamin 1,000 mcg/mL injection, Inject 1 ml B 12 subq monthly., Disp: 3 mL, Rfl: 4    ferrous sulfate 325 (65 FE) MG EC tablet, Take 1 tablet (325 mg total) by mouth 2 (two) times daily. (Patient not taking: Reported on 11/29/2023), Disp: 180 tablet, Rfl: 2    syringe with needle (TUBERCULIN SYRINGE) 1 mL 27 x 1/2" Syrg, Use for B 12 subq injection monthly., Disp: 3 each, Rfl: 4          Objective:   Vitals:  Blood pressure 108/63, pulse (!) 59, temperature 97.2 °F (36.2 °C), resp. rate 16, height 5' 0.06" (1.526 m), weight 65.8 kg (145 lb).    Physical Examination:   GEN: no apparent distress, comfortable  HEAD: atraumatic and normocephalic  EYES: no pallor, no icterus  ENT:  no pharyngeal erythema, external ears WNL; no nasal discharge; no thrush  NECK: no masses, thyroid normal, trachea midline, no LAD/LN's, supple  CV: RRR with no murmur; normal pulse; normal S1 and S2; no pedal edema  CHEST: Normal respiratory effort; CTAB; normal breath sounds; no wheeze or crackles  ABDOM: nontender and nondistended; soft; no rebound/guarding, liver and spleen are not palpable  MUSC/Skeletal: ROM normal; no crepitus; joints normal  EXTREM: no clubbing, cyanosis, inflammation or swelling  SKIN: no rashes, lesions, ulcers, petechiae   : no CVAT  NEURO: grossly intact; motor/sensory WNL; no tremors  PSYCH: normal mood, affect and behavior  LYMPH: normal cervical, supraclavicular, axillary LN's  BREASTS:  Left chest shows postoperative change without palpable mass or lymphadenopathy.  Right breast is nontender without discharge and without palpable mass.  She does " not have axillary or supraclavicular palpable lymphadenopathy at the left or right.      Labs:       Assessment:   (1) 89 y.o. female with diagnosis of iron deficiency anemia secondary to chronic GI blood loss secondary to AVMs.  These have been cauterized.  She appears to be responding and improving with oral iron therapy.  However she has unacceptable GI symptoms of constipation while on oral iron, even while taking MiraLax and stool softeners.    (2) have discussed with her the option of going with IV iron to replenish iron stores, she would be able to come off of oral iron.  Several options include Venofer, injected for, and Ferrlecit.  Will see which product is covered by her carrier and get authorization.  There is a 1 or 2% risk of reaction to iron infusion, will premedicate her with Benadryl to try and prevent allergic reaction here.    After her iron infusions have been initiated, she will stop her oral iron.  She will follow-up here in 4 months with a repeat CBC and ferritin.  She is on chronic Eliquis anticoagulation, which does aggravate GI bleeding from AVMs.  The Eliquis is ongoing to reduce the risk of stroke event in the setting of her paroxysmal atrial fibrillation.    Will begin her on B12 1000 mcg subQ with each iron infusion to see if we can facilitate red blood cell production here, and then if she strengthens we will continue the B12 injection monthly to replenish B12 stores    I have explained and the patient understands all of  the current recommendation(s). I have answered all of their questions to the best of my ability and to their complete satisfaction.     (3)Subjectively and objectively improved after Injectafer infusions and on B 12 injections.    CBC B 12 Ferritin in 4 months.  RTC 4 months.

## 2024-01-10 NOTE — LETTER
January 14, 2024        Aamir Rhodes MD  [address]  [address]  [place] [address]             [facility] - Hematology Oncology  [address]  [place] [address]  Phone: [phone]  Fax: [phone]   Patient: Rosanna Raymundo   MR Number: [id number]   YOB: 1932   Date of Visit: 1/10/2024       Dear Dr. Rhodes:    Thank you for referring Rosanna Raymundo to me for evaluation. Below are the relevant portions of my assessment and plan of care.            If you have questions, please do not hesitate to call me. I look forward to following Rosanna along with you.    Sincerely,      CLAUDIA Turpin MD           CC    No Recipients

## 2024-01-12 ENCOUNTER — TELEPHONE (OUTPATIENT)
Dept: FAMILY MEDICINE | Facility: CLINIC | Age: 89
End: 2024-01-12
Payer: MEDICARE

## 2024-01-12 NOTE — TELEPHONE ENCOUNTER
----- Message from Jamyie Welch sent at 1/12/2024  8:35 AM CST -----  Doris from [facility] is calling to state that they will be faxing over a O2 SWO form per medicare for Dr. Rhodes to sign.    Doris: [phone] ext [phone]

## 2024-01-17 NOTE — TELEPHONE ENCOUNTER
Contacted [facility] and currently closed, have not received fax yet. Will be call once open in the morning.

## 2024-01-18 ENCOUNTER — HOSPITAL ENCOUNTER (OUTPATIENT)
Dept: CARDIOLOGY | Facility: CLINIC | Age: 89
Discharge: HOME | End: 2024-01-18
Attending: INTERNAL MEDICINE
Payer: MEDICARE

## 2024-01-18 DIAGNOSIS — Z95.0 CARDIAC PACEMAKER: ICD-10-CM

## 2024-01-18 PROCEDURE — 93288 INTERROG EVL PM/LDLS PM IP: CPT | Mod: 26,,, | Performed by: INTERNAL MEDICINE

## 2024-01-19 ENCOUNTER — TELEPHONE (OUTPATIENT)
Dept: FAMILY MEDICINE | Facility: CLINIC | Age: 89
End: 2024-01-19
Payer: MEDICARE

## 2024-01-19 NOTE — TELEPHONE ENCOUNTER
Spoke to pts niece and let her know tried to get in touch with pt. Niece is going to have pt call

## 2024-01-19 NOTE — TELEPHONE ENCOUNTER
----- Message from Fozia Quarles sent at 1/19/2024 11:12 AM CST -----  The patient is returning a call. Please call   Miranda her niece She is with her. #  [phone]   GH

## 2024-01-22 DIAGNOSIS — Z95.0 CARDIAC PACEMAKER: Primary | ICD-10-CM

## 2024-01-22 LAB
BATTERY VOLTAGE (V): 2.98 V
IMPEDANCE RA LEAD (NATIVE): 540 OHMS
IMPEDANCE RA LEAD: 530 OHMS
P/R-WAVE RA LEAD (NATIVE): >12 MV
P/R-WAVE RA LEAD: 1.7 MV
THRESHOLD MS RA LEAD (NATIVE): 0.4 MS
THRESHOLD MS RA LEAD: 0.4 MS
THRESHOLD V RA LEAD (NATIVE): 1.25 V
THRESHOLD V RA LEAD: 0.75 V

## 2024-01-29 NOTE — TELEPHONE ENCOUNTER
Completed forms and notes have been faxed to [facility]. Copy scanned into patient's media.

## 2024-02-01 ENCOUNTER — TELEPHONE (OUTPATIENT)
Dept: PAIN MEDICINE | Facility: CLINIC | Age: 89
End: 2024-02-01
Payer: MEDICARE

## 2024-02-01 NOTE — TELEPHONE ENCOUNTER
Patient appt is now cancelled per pt request. She has entered into hospice care. Appt is now notated and off the schedule.

## 2024-02-21 ENCOUNTER — TELEPHONE (OUTPATIENT)
Dept: FAMILY MEDICINE | Facility: CLINIC | Age: 89
End: 2024-02-21
Payer: MEDICARE

## 2024-02-21 NOTE — TELEPHONE ENCOUNTER
"----- Message from [facility], RT sent at 12/19/2023  2:17 PM CST -----  Regarding: did she see Dr Bezn?  12/19/23 Cata Cardoso APRN-CNP Nurse Practitioner [id number] PM    Delete     Copy    Please call Ms. Raymundo that I think it sounds like her sciatic nerve. Make sure she is not having any problems with sudden loss of bowels or bladder. For pain this severe,  or symptoms such as these, I would recommend going to the ER, but as I know she is going to say "absolutely not," I am also sending some gabapentin 100mg for her to take up to twice a day as needed for the next week and I want her to be seen by physical medicine to see if she can get some other treatment lined up. Tell her I said she will love Dr. Benz.  I put the referral in as urgent. If she is having loss of bowel or bladder control or has no sensation or urge of needing to go, has any new weakness in her legs, or begins to have trouble walking, she really does need to go to the ER in case it is a spinal cord issue, rather than a nerve.          "

## 2024-02-21 NOTE — TELEPHONE ENCOUNTER
SKIP Ortega. Patient canceled appt with Dr Benz. She is now on hospice. See 2/1 encounter. I marked reminder complete

## 2024-02-27 DIAGNOSIS — Z00.00 ENCOUNTER FOR MEDICARE ANNUAL WELLNESS EXAM: ICD-10-CM

## 2024-04-02 ENCOUNTER — TELEPHONE (OUTPATIENT)
Dept: HEMATOLOGY/ONCOLOGY | Facility: CLINIC | Age: 89
End: 2024-04-02

## 2024-04-02 NOTE — TELEPHONE ENCOUNTER
I spoke with pt and reminded her to have labs done prior to appt here on4/10/24 pt states that she plans on going tomorrow to do them .   Patient

## 2024-04-04 LAB
BASOPHILS # BLD AUTO: 50 CELLS/UL (ref 0–200)
BASOPHILS NFR BLD AUTO: 0.6 %
EOSINOPHIL # BLD AUTO: 50 CELLS/UL (ref 15–500)
EOSINOPHIL NFR BLD AUTO: 0.6 %
ERYTHROCYTE [DISTWIDTH] IN BLOOD BY AUTOMATED COUNT: 14.2 % (ref 11–15)
FERRITIN SERPL-MCNC: 272 NG/ML (ref 16–288)
HCT VFR BLD AUTO: 46.1 % (ref 35–45)
HGB BLD-MCNC: 15.1 G/DL (ref 11.7–15.5)
LYMPHOCYTES # BLD AUTO: 1128.8 CELLS/UL (ref 850–3900)
LYMPHOCYTES NFR BLD AUTO: 13.6 %
MCH RBC QN AUTO: 31.9 PG (ref 27–33)
MCHC RBC AUTO-ENTMCNC: 32.8 G/DL (ref 32–36)
MCV RBC AUTO: 97.3 FL (ref 80–100)
MONOCYTES # BLD AUTO: 390 CELLS/UL (ref 200–950)
MONOCYTES NFR BLD AUTO: 4.7 %
NEUTROPHILS # BLD AUTO: 6682 CELLS/UL (ref 1500–7800)
NEUTROPHILS NFR BLD AUTO: 80.5 %
PLATELET # BLD AUTO: 199 THOUSAND/UL (ref 140–400)
PMV BLD REES-ECKER: 11.6 FL (ref 7.5–12.5)
RBC # BLD AUTO: 4.74 MILLION/UL (ref 3.8–5.1)
VIT B12 SERPL-MCNC: 836 PG/ML (ref 200–1100)
WBC # BLD AUTO: 8.3 THOUSAND/UL (ref 3.8–10.8)

## 2024-04-18 ENCOUNTER — HOSPITAL ENCOUNTER (OUTPATIENT)
Dept: CARDIOLOGY | Facility: CLINIC | Age: 89
Discharge: HOME | End: 2024-04-18
Attending: INTERNAL MEDICINE
Payer: MEDICARE

## 2024-04-18 DIAGNOSIS — Z95.0 CARDIAC PACEMAKER: ICD-10-CM

## 2024-04-18 DIAGNOSIS — I48.0 PAROXYSMAL ATRIAL FIBRILLATION: Primary | ICD-10-CM

## 2024-04-18 LAB
BATTERY VOLTAGE (V): 2.98 V
IMPEDANCE RA LEAD (NATIVE): 540 OHMS
IMPEDANCE RA LEAD: 540 OHMS
P/R-WAVE RA LEAD (NATIVE): >12 MV
P/R-WAVE RA LEAD: 1.4 MV
THRESHOLD MS RA LEAD: 0.4 MS
THRESHOLD V RA LEAD: 0.75 V

## 2024-04-18 PROCEDURE — 93294 REM INTERROG EVL PM/LDLS PM: CPT | Mod: GW,,, | Performed by: INTERNAL MEDICINE

## 2024-07-18 ENCOUNTER — TELEPHONE (OUTPATIENT)
Dept: CARDIOLOGY | Facility: CLINIC | Age: 89
End: 2024-07-18
Payer: MEDICARE

## 2024-07-18 DIAGNOSIS — Z95.0 CARDIAC PACEMAKER: Primary | ICD-10-CM

## 2024-07-30 ENCOUNTER — TELEPHONE (OUTPATIENT)
Facility: CLINIC | Age: 89
End: 2024-07-30
Payer: MEDICARE

## 2024-07-30 NOTE — TELEPHONE ENCOUNTER
Spoke to Carmen at [facility]([phone]) who confirmed that patient enrolled with their hospice services.I informed that family reached out wanting to schedule f/u with Dr. Turpin and we were not sure if this would even be permitted with her being enrolled on hospice services. I informed her that I spoke to patient to get update and patient states she was getting B12 through Dr. Turpin and also needed labs done as hospice is no longer doing them. Carmen states that it is possible to get approval for office visit but that she will first reach out to the family to get update on what they are looking to do and will call me back. I requested she please so prior to getting office visit set up as Dr. Turpin was not requesting a visit with the patient so I will need to double check with him as to if he feels necessary to see her. Verbalized understanding.

## 2024-08-05 LAB
LEFT EYE DM RETINOPATHY: POSITIVE
RIGHT EYE DM RETINOPATHY: POSITIVE

## 2024-08-06 ENCOUNTER — TELEPHONE (OUTPATIENT)
Facility: CLINIC | Age: 89
End: 2024-08-06
Payer: MEDICARE

## 2024-08-06 DIAGNOSIS — D50.9 IRON DEFICIENCY ANEMIA, UNSPECIFIED IRON DEFICIENCY ANEMIA TYPE: ICD-10-CM

## 2024-08-06 DIAGNOSIS — D51.8 DIETARY VITAMIN B12 DEFICIENCY ANEMIA: ICD-10-CM

## 2024-08-06 DIAGNOSIS — D50.0 IRON DEFICIENCY ANEMIA DUE TO CHRONIC BLOOD LOSS: Primary | ICD-10-CM

## 2024-08-07 ENCOUNTER — TELEPHONE (OUTPATIENT)
Facility: CLINIC | Age: 89
End: 2024-08-07
Payer: MEDICARE

## 2024-08-07 ENCOUNTER — PATIENT OUTREACH (OUTPATIENT)
Dept: ADMINISTRATIVE | Facility: HOSPITAL | Age: 89
End: 2024-08-07
Payer: MEDICARE

## 2024-09-03 ENCOUNTER — OFFICE VISIT (OUTPATIENT)
Facility: CLINIC | Age: 89
End: 2024-09-03
Payer: MEDICARE

## 2024-09-03 VITALS
WEIGHT: 132 LBS | TEMPERATURE: 98 F | RESPIRATION RATE: 18 BRPM | HEART RATE: 59 BPM | DIASTOLIC BLOOD PRESSURE: 48 MMHG | BODY MASS INDEX: 25.73 KG/M2 | SYSTOLIC BLOOD PRESSURE: 90 MMHG

## 2024-09-03 DIAGNOSIS — D50.9 IRON DEFICIENCY ANEMIA, UNSPECIFIED IRON DEFICIENCY ANEMIA TYPE: Primary | ICD-10-CM

## 2024-09-03 DIAGNOSIS — D51.8 DIETARY VITAMIN B12 DEFICIENCY ANEMIA: ICD-10-CM

## 2024-09-03 PROCEDURE — 99215 OFFICE O/P EST HI 40 MIN: CPT | Mod: S$PBB,,, | Performed by: INTERNAL MEDICINE

## 2024-09-03 PROCEDURE — G2211 COMPLEX E/M VISIT ADD ON: HCPCS | Mod: S$PBB,,, | Performed by: INTERNAL MEDICINE

## 2024-09-03 PROCEDURE — 99213 OFFICE O/P EST LOW 20 MIN: CPT | Mod: PBBFAC,PN | Performed by: INTERNAL MEDICINE

## 2024-09-03 PROCEDURE — 99999 PR PBB SHADOW E&M-EST. PATIENT-LVL III: CPT | Mod: PBBFAC,,, | Performed by: INTERNAL MEDICINE

## 2024-09-03 RX ORDER — NAPROXEN SODIUM 220 MG/1
81 TABLET, FILM COATED ORAL
COMMUNITY
Start: 2024-08-22

## 2024-09-03 RX ORDER — LORAZEPAM 0.5 MG/1
0.5 TABLET ORAL 3 TIMES DAILY
COMMUNITY
Start: 2024-08-26

## 2024-09-03 RX ORDER — PREDNISONE 20 MG/1
TABLET ORAL
COMMUNITY
Start: 2023-12-26

## 2024-09-03 RX ORDER — PRIMIDONE 50 MG/1
125 TABLET ORAL 2 TIMES DAILY
COMMUNITY
Start: 2024-08-22

## 2024-09-03 RX ORDER — TRAMADOL HYDROCHLORIDE 50 MG/1
50 TABLET ORAL EVERY 6 HOURS PRN
COMMUNITY
Start: 2024-08-22

## 2024-09-03 NOTE — PROGRESS NOTES
[facility] hematology Oncology in office Subsequent encounter note    9/3/24      Subjective:      Patient ID:   Rosanna Raymundo  89 y.o. female  11/3/1932  Aamir Rhodes MD      Chief Complaint:  Anemia      HPI:  89 y.o. female was referred for the evaluation of anemia.  She presented with a hemoglobin of 5 and had shortness of breath with exertion, her energy level was 3/10.  She was transfused up to a hemoglobin of 9.2 and energy level improved to 8/10.  She had upper endoscopy and was found to have AVMs, these were cauterized.  She was felt to have iron deficiency anemia and started on oral iron, iron pills constipate her.  She has been on iron and presently hemoglobin is improved to 13.8.  Ferritin level has improved from 5-17 on oral iron.    I have discussed with her that she appears to be responding to oral iron therapy with improvement in hemoglobin, however with GI symptoms of constipation.  I discussed with her the option of giving her intravenous iron replenishment to replace iron stores and facilitate red blood cell production here.  She would be able to come off of oral iron.  She wants to take the IV iron.  This will be given weekly x2 weeks.  There is a 1 or 2% risk of reaction to iron infusion, will premedicate her with Benadryl.  She will stop her oral iron once the IV iron has been initiated.  She denies craving of ice, pickle juice, [place] corn starch or flour or angelic.    He has history of left breast cancer 30 years ago treated with mastectomy, chemotherapy, hormonal therapy, and radiation.  She has not had breast cancer recurrence.  Other history includes pacemaker placement.  She had complete hysterectomy at age 13 and 1943 for the management excessive gyn blood loss.  She is status post CABG surgery.    She has history of paroxysmal atrial fibrillation, coronary artery disease, high cholesterol, hypertension, congestive heart failure history, severe aortic stenosis, bradycardia,  aortic atherosclerosis proces, prolonged QT and episode of hypotension.  She also has history of chronic renal disease stage IIIA and is on long-term anticoagulation.  Other history includes diabetes with stage IIIA chronic kidney disease without long-term use of insulin.  She has history of GERD, abdominal pain with biliary dilation.  Other history includes gout of multiple joints.    Now she is S/P B 12 monthly injections and S/P Injectafer infusion x's 2.  Hgb has improved  from 5 to 14.  Ferritin is better at 232.  B 12 improved at 358 to 521.  Energy level is 10/10.  Fill B 12 at [facility] in [place].    She rates her stamina level at an 8 out of 10, her niece rates her stamina at a 4/10.  She is status post iron infusion and continues on B12 injections monthly.  Her hemoglobin has gone from 5-15.  Her ferritin level is acceptable at 272.  Her B12 is normal at 836, she will continue on B12 injections monthly.    She continues on [facility] for her congestive heart failure diagnosis.      Her medications include amiodarone and Eliquis, ferrous sulfate, midodrine and Prilosec and Inderal.    She does not smoke, she does not drink alcohol with regularity.  She is allergic to penicillin.    There is family history of heart disease.    She had onset of menses at age 8, she had the complete hysterectomy at age 13, she never had pregnancy.  She did have the breast cancer as outlined above.    There is no other family history of breast cancer in the family, she did have an aunt with lung cancer.    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      ROS:   GEN: normal without any fever, night sweats or weight loss  HEENT: normal with no HA's, sore throat, stiff neck, changes in vision  CV:  See history of present illness  PULM: normal with no SOB, cough, hemoptysis, sputum or pleuritic pain  GI:  See history of  present illness  : normal with no hematuria, dysuria  BREAST:  See history of present illness  SKIN: normal with no rash, erythema, bruising, or swelling     Past Medical History:   Diagnosis Date    Acute on chronic heart failure with preserved ejection fraction 11/28/2023    Anticoagulant long-term use     Cancer     breast/female/throat    Cataract     CHF (congestive heart failure)     Diabetes mellitus, type 2     Essential hypertension 10/03/2019    Heart murmur     History of anemia 10/03/2019    Hypertension     Iron deficiency anemia, unspecified     Anemia, iron def.    Mild nonproliferative diabetic retinopathy of both eyes 08/05/2024    Brett Trevino MD    Occasional tremors     Occasional tremors 12/31/2019     Past Surgical History:   Procedure Laterality Date    ADENOIDECTOMY      APPENDECTOMY      COLONOSCOPY N/A 12/17/2019    Procedure: COLONOSCOPY;  Surgeon: Raleigh Quarles III, MD;  Location: [facility] ENDO;  Service: Endoscopy;  Laterality: N/A;    COLONOSCOPY N/A 12/18/2019    Procedure: COLONOSCOPY;  Surgeon: Raleigh Quarles III, MD;  Location: [facility] ENDO;  Service: Endoscopy;  Laterality: N/A;    ERCP N/A 8/10/2023    Procedure: ERCP (ENDOSCOPIC RETROGRADE CHOLANGIOPANCREATOGRAPHY);  Surgeon: Raleigh Quarles III, MD;  Location: [facility] ENDO;  Service: Endoscopy;  Laterality: N/A;    Gall bladder revmoval      HYSTERECTOMY      INSERTION OF PACEMAKER Right 1/2/2020    Procedure: INSERTION, PACEMAKER;  Surgeon: Fly Barragan MD;  Location: [facility] CATH/EP LAB;  Service: Cardiology;  Laterality: Right;    left mastectomy Left 1945    MASTECTOMY Left     IA CABG, ARTERY-VEIN, FOUR  2014    Coronary Artery Bypass, 4    SMALL BOWEL ENTEROSCOPY N/A 12/17/2019    Procedure: ENTEROSCOPY;  Surgeon: Raleigh Quarles III, MD;  Location: [facility] ENDO;  Service: Endoscopy;  Laterality: N/A;    TONSILLECTOMY         Review of patient's allergies indicates:   Allergen Reactions    Penicillin g  benzathine Other (See Comments)    Doxycycline Nausea And Vomiting     Social History     Socioeconomic History    Marital status:    Tobacco Use    Smoking status: Never    Smokeless tobacco: Never   Substance and Sexual Activity    Alcohol use: Never    Drug use: Never    Sexual activity: Not Currently     Social Determinants of Health     Financial Resource Strain: Low Risk  (4/3/2024)    Overall Financial Resource Strain (CARDIA)     Difficulty of Paying Living Expenses: Not hard at all   Food Insecurity: No Food Insecurity (4/3/2024)    Hunger Vital Sign     Worried About Running Out of Food in the Last Year: Never true     Ran Out of Food in the Last Year: Never true   Transportation Needs: No Transportation Needs (4/3/2024)    PRAPARE - Transportation     Lack of Transportation (Medical): No     Lack of Transportation (Non-Medical): No   Physical Activity: Insufficiently Active (4/3/2024)    Exercise Vital Sign     Days of Exercise per Week: 3 days     Minutes of Exercise per Session: 10 min   Stress: No Stress Concern Present (4/3/2024)    [employer] - Occupational Stress Questionnaire     Feeling of Stress : Not at all   Housing Stability: Low Risk  (4/3/2024)    Housing Stability Vital Sign     Unable to Pay for Housing in the Last Year: No     Number of Places Lived in the Last Year: 1     Unstable Housing in the Last Year: No         Current Outpatient Medications:     allopurinoL (ZYLOPRIM) 100 MG tablet, Take 100 mg by mouth once daily., Disp: , Rfl:     apixaban (ELIQUIS) 2.5 mg Tab, Take 1 tablet (2.5 mg total) by mouth 2 (two) times daily., Disp: 180 tablet, Rfl: 3    aspirin 81 MG Chew, Take 81 mg by mouth., Disp: , Rfl:     blood sugar diagnostic Strp, To check BG 3 times daily, to use with insurance preferred meter, Disp: 200 strip, Rfl: 1    busPIRone (BUSPAR) 5 MG Tab, Take 2 tablets (10 mg total) by mouth 2 (two) times daily., Disp: 180 tablet, Rfl: 3     cholecalciferol, vitamin D3, (VITAMIN D3) 25 mcg (1,000 unit) capsule, Take 1 capsule (1,000 Units total) by mouth once daily., Disp: 100 capsule, Rfl: 1    cyanocobalamin 1,000 mcg/mL injection, Inject 1 ml B 12 subq monthly., Disp: 3 mL, Rfl: 4    docusate sodium (COLACE) 100 MG capsule, Take 100 mg by mouth as needed for Constipation., Disp: , Rfl:     FREESTYLE LANCETS 28 gauge lancets, , Disp: , Rfl:     furosemide (LASIX) 20 MG tablet, Take a half to one tablet in the morning if needed for swelling. (Patient taking differently: Take 20 mg by mouth once daily. Take a half to one tablet in the morning if needed for swelling. Taking PRN), Disp: 30 tablet, Rfl: 11    gabapentin (NEURONTIN) 100 MG capsule, Take 1 capsule (100 mg total) by mouth 3 (three) times daily as needed (back pain with sciatica)., Disp: 90 capsule, Rfl: 1    loratadine (CLARITIN) 10 mg tablet, Take 1 tablet (10 mg total) by mouth once daily., Disp: 90 tablet, Rfl: 3    LORazepam (ATIVAN) 0.5 MG tablet, Take 0.5 mg by mouth 3 (three) times daily., Disp: , Rfl:     magnesium oxide (MAG-OX) 400 mg (241.3 mg magnesium) tablet, Take 1 tablet (400 mg total) by mouth once daily., Disp: 90 tablet, Rfl: 3    meclizine (ANTIVERT) 12.5 mg tablet, Take daily in the morning, and then may also take two more times in a day as needed, every 8 hours. (Patient taking differently: Take 12.5 mg by mouth every 8 (eight) hours as needed for Dizziness or Nausea. Take daily in the morning, and then may also take two more times in a day as needed, every 8 hours.), Disp: 30 tablet, Rfl: 11    midodrine (PROAMATINE) 5 MG Tab, Take 1 tablet (5 mg total) by mouth 2 (two) times daily with meals. Take first thing in the morning and then again in the afternoon around 3 pm., Disp: 180 tablet, Rfl: 3    mupirocin (BACTROBAN) 2 % ointment, Apply topically once daily., Disp: 30 g, Rfl: 3    omeprazole (PRILOSEC) 20 MG capsule, Take 2 capsules (40 mg total) by mouth once daily.,  "Disp: 180 capsule, Rfl: 1    potassium chloride SA (K-DUR,KLOR-CON M) 10 MEQ tablet, Take 1 tablet (10 mEq total) by mouth once daily., Disp: 30 tablet, Rfl: 11    predniSONE (DELTASONE) 20 MG tablet, , Disp: , Rfl:     primidone (MYSOLINE) 50 MG Tab, Take 125 mg by mouth 2 (two) times daily., Disp: , Rfl:     syringe with needle (TUBERCULIN SYRINGE) 1 mL 27 x 1/2" Syrg, Use for B 12 subq injection monthly., Disp: 3 each, Rfl: 4    traMADoL (ULTRAM) 50 mg tablet, Take 50 mg by mouth every 6 (six) hours as needed., Disp: , Rfl:     vitamins A,C,E-zinc-copper (PRESERVISION AREDS) 4,296 mcg-226 mg-90 mg Cap, Take 1 capsule by mouth 2 (two) times daily., Disp: 180 capsule, Rfl: 3    amiodarone (PACERONE) 200 MG Tab, Take 0.5 tablets (100 mg total) by mouth once daily., Disp: 45 tablet, Rfl: 3    blood-glucose meter kit, To check BG 3 times daily, to use with insurance preferred meter, Disp: 1 each, Rfl: 0    ferrous sulfate 325 (65 FE) MG EC tablet, Take 1 tablet (325 mg total) by mouth 2 (two) times daily. (Patient not taking: Reported on 11/29/2023), Disp: 180 tablet, Rfl: 2    propranoloL (INDERAL) 40 MG tablet, Take 1 tablet (40 mg total) by mouth 2 (two) times daily., Disp: 180 tablet, Rfl: 3          Objective:   Vitals:  Blood pressure (!) 90/48, pulse (!) 59, temperature 98.1 °F (36.7 °C), resp. rate 18, weight 59.9 kg (132 lb).    Physical Examination:   GEN: no apparent distress, comfortable  HEAD: atraumatic and normocephalic  EYES: no pallor, no icterus  ENT:  no pharyngeal erythema, external ears WNL; no nasal discharge; no thrush  NECK: no masses, thyroid normal, trachea midline, no LAD/LN's, supple  CV: RRR with no murmur; normal pulse; normal S1 and S2; no pedal edema  CHEST: Normal respiratory effort; CTAB; normal breath sounds; no wheeze or crackles  ABDOM: nontender and nondistended; soft; no rebound/guarding, liver and spleen are not palpable  MUSC/Skeletal: ROM normal; no crepitus; joints " normal  EXTREM: no clubbing, cyanosis, inflammation or swelling  SKIN: no rashes, lesions, ulcers, petechiae   : no CVAT  NEURO: grossly intact; motor/sensory WNL; no tremors  PSYCH: normal mood, affect and behavior  LYMPH: normal cervical, supraclavicular, axillary LN's  BREASTS:  Left chest shows postoperative change without palpable mass or lymphadenopathy.  Right breast is nontender without discharge and without palpable mass.  She does not have axillary or supraclavicular palpable lymphadenopathy at the left or right.      Labs:       Assessment:   (1) 89 y.o. female with diagnosis of iron deficiency anemia secondary to chronic GI blood loss secondary to AVMs.  These have been cauterized.  She appears to be responding and improving with oral iron therapy.  However she has unacceptable GI symptoms of constipation while on oral iron, even while taking MiraLax and stool softeners.    (2) have discussed with her the option of going with IV iron to replenish iron stores, she would be able to come off of oral iron.  Several options include Venofer, injected for, and Ferrlecit.  Will see which product is covered by her carrier and get authorization.  There is a 1 or 2% risk of reaction to iron infusion, will premedicate her with Benadryl to try and prevent allergic reaction here.    After her iron infusions have been initiated, she will stop her oral iron.  She will follow-up here in 4 months with a repeat CBC and ferritin.  She is on chronic Eliquis anticoagulation, which does aggravate GI bleeding from AVMs.  The Eliquis is ongoing to reduce the risk of stroke event in the setting of her paroxysmal atrial fibrillation.    Will begin her on B12 1000 mcg subQ with each iron infusion to see if we can facilitate red blood cell production here, and then if she strengthens we will continue the B12 injection monthly to replenish B12 stores    I have explained and the patient understands all of  the current  recommendation(s). I have answered all of their questions to the best of my ability and to their complete satisfaction.     (3)Subjectively and objectively improved after Injectafer infusions and on B 12 injections.    CBC B 12 Ferritin in 6 months.  RTC 6 months.

## 2024-09-20 DIAGNOSIS — Z95.0 CARDIAC PACEMAKER: Primary | ICD-10-CM

## 2024-10-11 ENCOUNTER — HOSPITAL ENCOUNTER (OUTPATIENT)
Dept: CARDIOLOGY | Facility: CLINIC | Age: 89
Discharge: HOME | End: 2024-10-11
Attending: INTERNAL MEDICINE
Payer: MEDICARE

## 2024-10-11 ENCOUNTER — CLINICAL SUPPORT (OUTPATIENT)
Dept: CARDIOLOGY | Facility: CLINIC | Age: 89
End: 2024-10-11

## 2024-10-11 DIAGNOSIS — R00.1 BRADYCARDIA, UNSPECIFIED: ICD-10-CM

## 2024-10-11 DIAGNOSIS — Z95.0 PRESENCE OF CARDIAC PACEMAKER: ICD-10-CM

## 2024-10-17 ENCOUNTER — HOSPITAL ENCOUNTER (OUTPATIENT)
Dept: CARDIOLOGY | Facility: CLINIC | Age: 89
Discharge: HOME | End: 2024-10-17
Attending: INTERNAL MEDICINE
Payer: MEDICARE

## 2024-10-17 DIAGNOSIS — R00.1 BRADYCARDIA, UNSPECIFIED: ICD-10-CM

## 2024-10-17 DIAGNOSIS — Z95.0 PRESENCE OF CARDIAC PACEMAKER: ICD-10-CM

## 2024-10-17 PROCEDURE — 93294 REM INTERROG EVL PM/LDLS PM: CPT | Mod: ,,, | Performed by: INTERNAL MEDICINE

## 2024-10-28 ENCOUNTER — TELEPHONE (OUTPATIENT)
Dept: CARDIOLOGY | Facility: CLINIC | Age: 89
End: 2024-10-28
Payer: MEDICARE

## 2025-01-01 ENCOUNTER — CLINICAL SUPPORT (OUTPATIENT)
Dept: CARDIOLOGY | Facility: CLINIC | Age: OVER 89
End: 2025-01-01

## 2025-01-01 DIAGNOSIS — Z95.0 PRESENCE OF CARDIAC PACEMAKER: ICD-10-CM

## 2025-01-01 DIAGNOSIS — R00.1 BRADYCARDIA, UNSPECIFIED: ICD-10-CM

## 2025-01-14 ENCOUNTER — CLINICAL SUPPORT (OUTPATIENT)
Dept: CARDIOLOGY | Facility: CLINIC | Age: OVER 89
End: 2025-01-14
Payer: MEDICARE

## 2025-01-14 DIAGNOSIS — Z95.0 PRESENCE OF CARDIAC PACEMAKER: ICD-10-CM

## 2025-01-14 DIAGNOSIS — Z00.00 ENCOUNTER FOR MEDICARE ANNUAL WELLNESS EXAM: ICD-10-CM

## 2025-01-14 DIAGNOSIS — R00.1 BRADYCARDIA, UNSPECIFIED: ICD-10-CM

## 2025-01-16 ENCOUNTER — HOSPITAL ENCOUNTER (OUTPATIENT)
Dept: CARDIOLOGY | Facility: CLINIC | Age: OVER 89
Discharge: HOME | End: 2025-01-16
Attending: INTERNAL MEDICINE
Payer: MEDICARE

## 2025-01-16 DIAGNOSIS — R00.1 BRADYCARDIA, UNSPECIFIED: ICD-10-CM

## 2025-01-16 DIAGNOSIS — Z95.0 PRESENCE OF CARDIAC PACEMAKER: ICD-10-CM

## 2025-01-16 PROCEDURE — 93294 REM INTERROG EVL PM/LDLS PM: CPT | Mod: ,,, | Performed by: INTERNAL MEDICINE

## 2025-02-11 LAB
OHS CV AF BURDEN PERCENT: < 1
OHS CV DC REMOTE DEVICE TYPE: NORMAL
OHS CV ICD SHOCK: NO
OHS CV RV PACING PERCENT: 6.2 %
OHS CV RV PACING PERCENT: 7.6 %
OHS CV RV PACING PERCENT: 7.7 %

## 2025-04-01 DIAGNOSIS — D51.8 DIETARY VITAMIN B12 DEFICIENCY ANEMIA: ICD-10-CM

## 2025-04-01 NOTE — TELEPHONE ENCOUNTER
----- Message from Debi sent at 4/1/2025 11:04 AM CDT -----  Miranda the patient's caregiver called to ask if Dr. Lopez would order her smaller syringes for her B12 shots. She said they are giving her 3,l syringes and they are too long. Please send to [facility] in [place]. # [phone]

## 2025-04-01 NOTE — TELEPHONE ENCOUNTER
----- Message from Debi sent at 4/1/2025 11:04 AM CDT -----  Miranda the patient's caregiver called to ask if Dr. Lopez would order her smaller syringes for her B12 shots. She said they are giving her 3,l syringes and they are too long. Please send to [facility] in [place]. # [phone]

## 2025-04-01 NOTE — TELEPHONE ENCOUNTER
Spoke to Martha, Patient's caregiver.  She explained she was almost out of B12.  She had a 1/3 of a vial left at this time.  She requested a different syringe than what she had. Orders pended to Dr. Lopez. Patient's RTC is 5/12/25 @ [id number].

## 2025-04-02 RX ORDER — SYRINGE-NEEDLE,INSULIN,0.5 ML 28GX1/2"
SYRINGE, EMPTY DISPOSABLE MISCELLANEOUS
Qty: 3 EACH | Refills: 4 | Status: SHIPPED | OUTPATIENT
Start: 2025-04-02

## 2025-04-02 RX ORDER — CYANOCOBALAMIN 1000 UG/ML
INJECTION, SOLUTION INTRAMUSCULAR; SUBCUTANEOUS
Qty: 3 ML | Refills: 4 | Status: SHIPPED | OUTPATIENT
Start: 2025-04-02

## 2025-04-25 ENCOUNTER — TELEPHONE (OUTPATIENT)
Facility: CLINIC | Age: OVER 89
End: 2025-04-25
Payer: MEDICARE

## 2025-04-25 NOTE — TELEPHONE ENCOUNTER
Unable to LVM to confirm pt upcoming appt with Dr. Turpin on 05/12/2025 and notify pt that lab orders were [facility] to [facility] to get done prior to appt.

## 2025-04-30 ENCOUNTER — TELEPHONE (OUTPATIENT)
Facility: CLINIC | Age: OVER 89
End: 2025-04-30
Payer: MEDICARE

## 2025-04-30 DIAGNOSIS — D51.8 DIETARY VITAMIN B12 DEFICIENCY ANEMIA: ICD-10-CM

## 2025-04-30 DIAGNOSIS — D50.0 IRON DEFICIENCY ANEMIA DUE TO CHRONIC BLOOD LOSS: Primary | ICD-10-CM

## 2025-05-01 LAB
BASOPHILS # BLD AUTO: 62 CELLS/UL (ref 0–200)
BASOPHILS NFR BLD AUTO: 1 %
EOSINOPHIL # BLD AUTO: 118 CELLS/UL (ref 15–500)
EOSINOPHIL NFR BLD AUTO: 1.9 %
ERYTHROCYTE [DISTWIDTH] IN BLOOD BY AUTOMATED COUNT: 12.8 % (ref 11–15)
FERRITIN SERPL-MCNC: 128 NG/ML (ref 16–288)
HCT VFR BLD AUTO: 43.6 % (ref 35–45)
HGB BLD-MCNC: 14.5 G/DL (ref 11.7–15.5)
LYMPHOCYTES # BLD AUTO: 1699 CELLS/UL (ref 850–3900)
LYMPHOCYTES NFR BLD AUTO: 27.4 %
MCH RBC QN AUTO: 32.2 PG (ref 27–33)
MCHC RBC AUTO-ENTMCNC: 33.3 G/DL (ref 32–36)
MCV RBC AUTO: 96.7 FL (ref 80–100)
MONOCYTES # BLD AUTO: 614 CELLS/UL (ref 200–950)
MONOCYTES NFR BLD AUTO: 9.9 %
NEUTROPHILS # BLD AUTO: 3708 CELLS/UL (ref 1500–7800)
NEUTROPHILS NFR BLD AUTO: 59.8 %
PLATELET # BLD AUTO: 205 THOUSAND/UL (ref 140–400)
PMV BLD REES-ECKER: 11.8 FL (ref 7.5–12.5)
RBC # BLD AUTO: 4.51 MILLION/UL (ref 3.8–5.1)
VIT B12 SERPL-MCNC: 518 PG/ML (ref 200–1100)
WBC # BLD AUTO: 6.2 THOUSAND/UL (ref 3.8–10.8)

## 2025-05-08 ENCOUNTER — OFFICE VISIT (OUTPATIENT)
Facility: CLINIC | Age: OVER 89
End: 2025-05-08
Payer: MEDICARE

## 2025-05-08 VITALS
HEART RATE: 71 BPM | SYSTOLIC BLOOD PRESSURE: 107 MMHG | TEMPERATURE: 98 F | OXYGEN SATURATION: 95 % | HEIGHT: 60 IN | RESPIRATION RATE: 16 BRPM | WEIGHT: 151 LBS | BODY MASS INDEX: 29.64 KG/M2 | DIASTOLIC BLOOD PRESSURE: 69 MMHG

## 2025-05-08 DIAGNOSIS — D51.8 DIETARY VITAMIN B12 DEFICIENCY ANEMIA: ICD-10-CM

## 2025-05-08 DIAGNOSIS — D50.9 IRON DEFICIENCY ANEMIA, UNSPECIFIED IRON DEFICIENCY ANEMIA TYPE: Primary | ICD-10-CM

## 2025-05-08 PROCEDURE — 99999 PR PBB SHADOW E&M-EST. PATIENT-LVL V: CPT | Mod: PBBFAC,,, | Performed by: INTERNAL MEDICINE

## 2025-05-08 PROCEDURE — 99215 OFFICE O/P EST HI 40 MIN: CPT | Mod: PBBFAC,PN | Performed by: INTERNAL MEDICINE

## 2025-05-08 NOTE — PROGRESS NOTES
[facility] hematology Oncology in office Subsequent encounter note    5/8/25      Subjective:      Patient ID:   Rosanna Raymundo  89 y.o. female  11/3/1932  Aamir Rhodes MD      Chief Complaint:  Anemia      HPI:  89 y.o. female was referred for the evaluation of anemia.  She presented with a hemoglobin of 5 and had shortness of breath with exertion, her energy level was 3/10.  She was transfused up to a hemoglobin of 9.2 and energy level improved to 8/10.  She had upper endoscopy and was found to have AVMs, these were cauterized.  She was felt to have iron deficiency anemia and started on oral iron, iron pills constipate her.  She has been on iron and presently hemoglobin is improved to 13.8.  Ferritin level has improved from 5-17 on oral iron.    I have discussed with her that she appears to be responding to oral iron therapy with improvement in hemoglobin, however with GI symptoms of constipation.  I discussed with her the option of giving her intravenous iron replenishment to replace iron stores and facilitate red blood cell production here.  She would be able to come off of oral iron.  She wants to take the IV iron.  This will be given weekly x2 weeks.  There is a 1 or 2% risk of reaction to iron infusion, will premedicate her with Benadryl.  She will stop her oral iron once the IV iron has been initiated.  She denies craving of ice, pickle juice, [place] corn starch or flour or angelic.    He has history of left breast cancer 30 years ago treated with mastectomy, chemotherapy, hormonal therapy, and radiation.  She has not had breast cancer recurrence.  Other history includes pacemaker placement.  She had complete hysterectomy at age 13 and 1943 for the management excessive gyn blood loss.  She is status post CABG surgery.    She has history of paroxysmal atrial fibrillation, coronary artery disease, high cholesterol, hypertension, congestive heart failure history, severe aortic stenosis, bradycardia,  aortic atherosclerosis proces, prolonged QT and episode of hypotension.  She also has history of chronic renal disease stage IIIA and is on long-term anticoagulation.  Other history includes diabetes with stage IIIA chronic kidney disease without long-term use of insulin.  She has history of GERD, abdominal pain with biliary dilation.  Other history includes gout of multiple joints.    Now she is S/P B 12 monthly injections and S/P Injectafer infusion x's 2.  Hgb has improved  from 5 to 14.  Ferritin is better at 232.  B 12 improved at 358 to 521.  Energy level is 10/10.  Fill B 12 at [facility] in [place].    She rates her stamina level at an 8 out of 10, her niece rates her stamina at a 4/10.  She is status post iron infusion and continues on B12 injections monthly.  Her hemoglobin has gone from 5-15.  Her ferritin level is acceptable at 272.  Her B12 is normal at 836, she will continue on B12 injections monthly.    She continues on [facility] for her congestive heart failure diagnosis.      Her medications include amiodarone and Eliquis, ferrous sulfate, midodrine and Prilosec and Inderal.    She does not smoke, she does not drink alcohol with regularity.  She is allergic to penicillin.    There is family history of heart disease.    She had onset of menses at age 8, she had the complete hysterectomy at age 13, she never had pregnancy.  She did have the breast cancer as outlined above.    There is no other family history of breast cancer in the family, she did have an aunt with lung cancer.    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  His iron deficiency anemia, treated with Ferrlecit infusions and B12 deficiency treated with B12 injections.  Hemoglobin is now 14.5, ferritin is at 128 and B12 is at 518.    She does not require further Ferrlecit infusion now.  She will continue on her B12 injections.   Follow-up lab work will be at Dr. Rhodes's office in January and I will see her in February 2026.    ROS:   GEN: normal without any fever, night sweats or weight loss  HEENT: normal with no HA's, sore throat, stiff neck, changes in vision  CV:  See history of present illness  PULM: normal with no SOB, cough, hemoptysis, sputum or pleuritic pain  GI:  See history of present illness  : normal with no hematuria, dysuria  BREAST:  See history of present illness  SKIN: normal with no rash, erythema, bruising, or swelling     Past Medical History:   Diagnosis Date    Acute on chronic heart failure with preserved ejection fraction 11/28/2023    Anticoagulant long-term use     Cancer     breast/female/throat    Cataract     CHF (congestive heart failure)     Diabetes mellitus, type 2     Essential hypertension 10/03/2019    Heart murmur     History of anemia 10/03/2019    Hypertension     Iron deficiency anemia, unspecified     Anemia, iron def.    Mild nonproliferative diabetic retinopathy of both eyes 08/05/2024    Brett Trevino MD    Occasional tremors     Occasional tremors 12/31/2019     Past Surgical History:   Procedure Laterality Date    ADENOIDECTOMY      APPENDECTOMY      COLONOSCOPY N/A 12/17/2019    Procedure: COLONOSCOPY;  Surgeon: Raleigh Quarles III, MD;  Location: [facility] ENDO;  Service: Endoscopy;  Laterality: N/A;    COLONOSCOPY N/A 12/18/2019    Procedure: COLONOSCOPY;  Surgeon: Raleigh Quarles III, MD;  Location: [facility] ENDO;  Service: Endoscopy;  Laterality: N/A;    ERCP N/A 8/10/2023    Procedure: ERCP (ENDOSCOPIC RETROGRADE CHOLANGIOPANCREATOGRAPHY);  Surgeon: Raleigh Quarles III, MD;  Location: [facility] ENDO;  Service: Endoscopy;  Laterality: N/A;    Gall bladder revmoval      HYSTERECTOMY      INSERTION OF PACEMAKER Right 1/2/2020    Procedure: INSERTION, PACEMAKER;  Surgeon: Fly Barragan MD;  Location: [facility] CATH/EP LAB;  Service: Cardiology;  Laterality: Right;    left mastectomy Left  1945    MASTECTOMY Left     NE CABG, ARTERY-VEIN, FOUR  2014    Coronary Artery Bypass, 4    SMALL BOWEL ENTEROSCOPY N/A 12/17/2019    Procedure: ENTEROSCOPY;  Surgeon: Raleigh Quarles III, MD;  Location[facility];  Service: Endoscopy;  Laterality: N/A;    TONSILLECTOMY         Review of patient's allergies indicates:   Allergen Reactions    Penicillin g benzathine Other (See Comments)    Doxycycline Nausea And Vomiting     Social History     Socioeconomic History    Marital status:    Tobacco Use    Smoking status: Never    Smokeless tobacco: Never   Substance and Sexual Activity    Alcohol use: Never    Drug use: Never    Sexual activity: Not Currently     Social Drivers of Health     Financial Resource Strain: Low Risk  (4/3/2024)    Overall Financial Resource Strain (CARDIA)     Difficulty of Paying Living Expenses: Not hard at all   Food Insecurity: No Food Insecurity (4/3/2024)    Hunger Vital Sign     Worried About Running Out of Food in the Last Year: Never true     Ran Out of Food in the Last Year: Never true   Transportation Needs: No Transportation Needs (4/3/2024)    PRAPARE - Transportation     Lack of Transportation (Medical): No     Lack of Transportation (Non-Medical): No   Physical Activity: Insufficiently Active (4/3/2024)    Exercise Vital Sign     Days of Exercise per Week: 3 days     Minutes of Exercise per Session: 10 min   Stress: No Stress Concern Present (4/3/2024)    [employer] - Occupational Stress Questionnaire     Feeling of Stress : Not at all   Housing Stability: Low Risk  (4/3/2024)    Housing Stability Vital Sign     Unable to Pay for Housing in the Last Year: No     Number of Places Lived in the Last Year: 1     Unstable Housing in the Last Year: No         Current Outpatient Medications:     allopurinoL (ZYLOPRIM) 100 MG tablet, Take 100 mg by mouth once daily., Disp: , Rfl:     apixaban (ELIQUIS) 2.5 mg Tab, Take 1 tablet (2.5 mg total) by  mouth 2 (two) times daily., Disp: 180 tablet, Rfl: 3    aspirin 81 MG Chew, Take 81 mg by mouth., Disp: , Rfl:     blood sugar diagnostic Strp, To check BG 3 times daily, to use with insurance preferred meter, Disp: 200 strip, Rfl: 1    busPIRone (BUSPAR) 5 MG Tab, Take 2 tablets (10 mg total) by mouth 2 (two) times daily., Disp: 180 tablet, Rfl: 3    cholecalciferol, vitamin D3, (VITAMIN D3) 25 mcg (1,000 unit) capsule, Take 1 capsule (1,000 Units total) by mouth once daily., Disp: 100 capsule, Rfl: 1    cyanocobalamin 1,000 mcg/mL injection, Inject 1 ml B 12 subq monthly., Disp: 3 mL, Rfl: 4    docusate sodium (COLACE) 100 MG capsule, Take 100 mg by mouth as needed for Constipation., Disp: , Rfl:     FREESTYLE LANCETS 28 gauge lancets, , Disp: , Rfl:     furosemide (LASIX) 20 MG tablet, Take a half to one tablet in the morning if needed for swelling. (Patient taking differently: Take 20 mg by mouth once daily. Take a half to one tablet in the morning if needed for swelling. Taking PRN), Disp: 30 tablet, Rfl: 11    gabapentin (NEURONTIN) 100 MG capsule, Take 1 capsule (100 mg total) by mouth 3 (three) times daily as needed (back pain with sciatica)., Disp: 90 capsule, Rfl: 1    loratadine (CLARITIN) 10 mg tablet, Take 1 tablet (10 mg total) by mouth once daily., Disp: 90 tablet, Rfl: 3    LORazepam (ATIVAN) 0.5 MG tablet, Take 0.5 mg by mouth 3 (three) times daily., Disp: , Rfl:     magnesium oxide (MAG-OX) 400 mg (241.3 mg magnesium) tablet, Take 1 tablet (400 mg total) by mouth once daily., Disp: 90 tablet, Rfl: 3    meclizine (ANTIVERT) 12.5 mg tablet, Take daily in the morning, and then may also take two more times in a day as needed, every 8 hours. (Patient taking differently: Take 12.5 mg by mouth every 8 (eight) hours as needed for Dizziness or Nausea. Take daily in the morning, and then may also take two more times in a day as needed, every 8 hours.), Disp: 30 tablet, Rfl: 11    midodrine (PROAMATINE) 5 MG  "Tab, Take 1 tablet (5 mg total) by mouth 2 (two) times daily with meals. Take first thing in the morning and then again in the afternoon around 3 pm., Disp: 180 tablet, Rfl: 3    mupirocin (BACTROBAN) 2 % ointment, Apply topically once daily., Disp: 30 g, Rfl: 3    omeprazole (PRILOSEC) 20 MG capsule, Take 2 capsules (40 mg total) by mouth once daily., Disp: 180 capsule, Rfl: 1    predniSONE (DELTASONE) 20 MG tablet, , Disp: , Rfl:     primidone (MYSOLINE) 50 MG Tab, Take 125 mg by mouth 2 (two) times daily., Disp: , Rfl:     syringe with needle (TUBERCULIN SYRINGE) 1 mL 27 x 1/2" Syrg, Use for B 12 subq injection monthly., Disp: 3 each, Rfl: 4    traMADoL (ULTRAM) 50 mg tablet, Take 50 mg by mouth every 6 (six) hours as needed., Disp: , Rfl:     vitamins A,C,E-zinc-copper (PRESERVISION AREDS) 4,296 mcg-226 mg-90 mg Cap, Take 1 capsule by mouth 2 (two) times daily., Disp: 180 capsule, Rfl: 3    amiodarone (PACERONE) 200 MG Tab, Take 0.5 tablets (100 mg total) by mouth once daily., Disp: 45 tablet, Rfl: 3    blood-glucose meter kit, To check BG 3 times daily, to use with insurance preferred meter, Disp: 1 each, Rfl: 0    ferrous sulfate 325 (65 FE) MG EC tablet, Take 1 tablet (325 mg total) by mouth 2 (two) times daily. (Patient not taking: Reported on 5/8/2025), Disp: 180 tablet, Rfl: 2    propranoloL (INDERAL) 40 MG tablet, Take 1 tablet (40 mg total) by mouth 2 (two) times daily., Disp: 180 tablet, Rfl: 3          Objective:   Vitals:  Blood pressure 107/69, pulse 71, temperature 98.2 °F (36.8 °C), temperature source Temporal, resp. rate 16, height 5' 0.06" (1.526 m), weight 68.5 kg (151 lb), SpO2 95%.    Physical Examination:   GEN: no apparent distress, comfortable  HEAD: atraumatic and normocephalic  EYES: no pallor, no icterus  ENT:  no pharyngeal erythema, external ears WNL; no nasal discharge; no thrush  NECK: no masses, thyroid normal, trachea midline, no LAD/LN's, supple  CV: RRR with no murmur; normal " pulse; normal S1 and S2; no pedal edema  CHEST: Normal respiratory effort; CTAB; normal breath sounds; no wheeze or crackles  ABDOM: nontender and nondistended; soft; no rebound/guarding, liver and spleen are not palpable  MUSC/Skeletal: ROM normal; no crepitus; joints normal  EXTREM: no clubbing, cyanosis, inflammation or swelling  SKIN: no rashes, lesions, ulcers, petechiae   : no CVAT  NEURO: grossly intact; motor/sensory WNL; no tremors  PSYCH: normal mood, affect and behavior  LYMPH: normal cervical, supraclavicular, axillary LN's  BREASTS:  Left chest shows postoperative change without palpable mass or lymphadenopathy.  Right breast is nontender without discharge and without palpable mass.  She does not have axillary or supraclavicular palpable lymphadenopathy at the left or right.      Labs:       Assessment:   (1) 89 y.o. female with diagnosis of iron deficiency anemia secondary to chronic GI blood loss secondary to AVMs.  These have been cauterized.  She appears to be responding and improving with oral iron therapy.  However she has unacceptable GI symptoms of constipation while on oral iron, even while taking MiraLax and stool softeners.    (2) have discussed with her the option of going with IV iron to replenish iron stores, she would be able to come off of oral iron.  Several options include Venofer, injected for, and Ferrlecit.  Will see which product is covered by her carrier and get authorization.  There is a 1 or 2% risk of reaction to iron infusion, will premedicate her with Benadryl to try and prevent allergic reaction here.    After her iron infusions have been initiated, she will stop her oral iron.  She will follow-up here in 4 months with a repeat CBC and ferritin.  She is on chronic Eliquis anticoagulation, which does aggravate GI bleeding from AVMs.  The Eliquis is ongoing to reduce the risk of stroke event in the setting of her paroxysmal atrial fibrillation.    Will begin her on B12 1000  mcg subQ with each iron infusion to see if we can facilitate red blood cell production here, and then if she strengthens we will continue the B12 injection monthly to replenish B12 stores    I have explained and the patient understands all of  the current recommendation(s). I have answered all of their questions to the best of my ability and to their complete satisfaction.     (3)Subjectively and objectively improved after Injectafer infusions and on B 12 injections.  Hemoglobin is up to 14.5 and ferritin is now 128 and B12 is 518.  She does not require iron infusions at this time.  She will continue on her B12 injections.    CBC B 12 Ferritin in 8 months.  RTC 9 months.    Zane Lopez MD  [facility]  5/8/25

## 2025-05-11 DIAGNOSIS — D50.9 IRON DEFICIENCY ANEMIA, UNSPECIFIED IRON DEFICIENCY ANEMIA TYPE: Primary | ICD-10-CM

## 2025-05-11 DIAGNOSIS — D51.8 DIETARY VITAMIN B12 DEFICIENCY ANEMIA: ICD-10-CM

## 2025-05-22 NOTE — PATIENT INSTRUCTIONS
About Arrhythmias    Electrical impulses cause the normal heart to beat 60 to 100 times a minute while at rest. These impulses come from a natural pacemaker deep inside the heart muscle. Each impulse causes the heart muscle to contract. This causes the blood to flow through the heart and out to the tissues and organs of your body.  An arrhythmia is a change from the normal speed or pattern of these electrical impulses. This can cause the heart to beat too fast (tachycardia); or too slow (bradycardia); or in an unsteady pattern (irregular rhythm).  Symptoms of arrhythmias  Different people experience arrhythmias differently. Sometimes they may not have symptoms, but just notice a change in their pulse. Symptoms can include:  · Fluttering feeling in the chest  · Shortness of breath  · Chest pain or pressure  · Neck fullness  · Lightheadedness or dizziness  · Fainting or almost fainting  · Palpitations (the sense that your heart is fluttering or beating fast or hard or irregularly)  · Tiredness, fatigue, or weakness  · Cardiac arrest  Causes of arrhythmias  Arrhythmias are most often due to heart disease such as:  · Coronary artery disease  · Heart valve disease  · Enlarged heart  · High blood pressure  · Heart failure  Other causes of  arrhythmia include:  · Certain medicines (such as asthma inhalers and decongestants)  · Some herbal supplements  · Cardiac stimulant drugs (such as cocaine, amphetamine, diet pills, certain decongestant cold medicines, caffeine, and nicotine)  · Excessive alcohol use  · Anxiety and panic disorder  · Thyroid disease  · Anemia  · Diabetes  · Sleep apnea  · Obesity  · Congenital heart disease  · Cardiac genetic diseases  Arrhythmias can often be prevented. The cause and type of arrhythmia determines the best treatment. Sometimes your doctor may want to monitor your heart rate over a 24-hour period or longer. This can help identify the cause of your arrhythmia and find the best treatment.  The sheath was inserted into the right femoral vein. This can be done with a Holter monitor, a portable EKG recording device attached by wires to your chest. Or you may get an event monitor, which you can place over the skin in front of your heart to record heart rhythms. You can carry this with you as you go about your routine activities during the monitoring period. Implantable loop recorders may also be used to monitor the heart rhythm for up to 2 years. This miniature device is placed underneath the skin overlying the heart.  Home care  The following guidelines will help you care for yourself at home:  · Avoid cardiac stimulants (such as cocaine, amphetamine, diet pills, certain decongestant cold medicines, caffeine, and nicotine).  · If you smoke, stop smoking. Contact your doctor or a local stop-smoking program for help.  · Tell your doctor about any prescription, over-the-counter, or herbal medicines you take. These may be affecting your heart rhythm.  Follow-up care  Follow up with your healthcare provider, or as advised. If a Holter monitor has been recommended, contact the cardiologist you have been referred to as soon as you can  the device. Other outpatient tests may also be arranged for you at that time.  Call 911  This is the fastest and safest way to get to the emergency department. The paramedics can also start treatment on the way to the hospital, if needed.  Don't wait until your symptoms are severe to call 911. Other reasons to call 911 besides chest pain include:  · Chest, shoulder, arm, neck, or back pain  · Shortness of breath  · Feeling lightheaded, faint, or dizzy  · Unexplained fainting  · Rapid heart beat  · Slower than usual heart rate compared to your normal  · Very irregular heartbeat  · Chest pain (angina) with weakness, dizziness, heavy sweating, nausea, or vomiting  · Extreme drowsiness, or confusion  · Weakness of an arm or leg or one side of the face  · Difficulty with speech or vision  When to seek medical advice  Remember,  "things are not always like they are on TV. Sometimes it is not so obvious. You may only feel weak or just "not right." If it is not clear or if you have any doubt, call for advice.  · Seek help for chest pain, or it feels different from usual, even if your symptoms are mild.  · Don't drive yourself. Have someone else drive. If no one can drive you, call 911.  · If your doctor has given you medicines to take when you have symptoms, take them, but do not delay getting help while trying to find them.  Date Last Reviewed: 4/25/2016 © 2000-2017 [employer]. [address] [place] [address], [place] [address]. All rights reserved. This information is not intended as a substitute for professional medical care. Always follow your healthcare professional's instructions.        Anemia  Anemia is a condition that occurs when your body does not have enough healthy red blood cells (RBCs). RBCs are the parts of your blood that carry oxygen throughout your body. A protein called hemoglobin allows your RBCs to absorb and release oxygen. Without enough RBCs or hemoglobin, your body doesn't get enough oxygen. Symptoms of anemia may then occur.    What are the symptoms of anemia?  Some people with anemia have no symptoms. But most people have symptoms that range from mild to severe. These can include:  · Tiredness (fatigue)  · Weakness  · Pale skin  · Shortness of breath  · Dizziness or fainting  · Rapid heartbeat  · Trouble doing normal amounts of activity  · Jaundice (yellowing of your eyes, skin, or mouth; dark urine)  What causes anemia?  Anemia can occur when your body:  · Loses too much blood  · Does not make enough RBCs  · Destroys your RBCs at a faster rate than it can replace them  · Does not make a normal amount of hemoglobin in your RBCs  These problems can occur for many reasons, including:  · A condition that you are born with (congenital or inherited), such as sickle cell disease or thalassemia  · Heavy bleeding for " any reason, including injury, surgery, childbirth, or even heavy menstrual periods  · Being low in certain nutrients, such as iron, folate, or vitamin B12, possibly from a poor diet or a condition like celiac disease or Crohn's disease  · Certain chronic conditions like diabetes, arthritis, or kidney disease  · Certain chronic infections like tuberculosis or HIV  · Exposure to certain medicines, such as those used for chemotherapy  There are different types of anemia. Your healthcare provider can tell you more about the type of anemia you have and what may have caused it.  How is anemia diagnosed?  To diagnose anemia, your healthcare provider orders blood tests. These can include:  · Complete blood cell count (CBC). This test measures the amounts of the different types of blood cells.  · Blood smear. This test checks the size and shape of your blood cells. To do the test, a drop of your blood is viewed under a microscope. A stain is used to make the blood cells easier to see.  · Iron studies. These tests measure the amount of iron in your blood. Your body needs iron to make hemoglobin in your RBCs.  · Vitamin B12 and folate studies. These tests check for some of the components that help give RBCs a normal size and shape.  · Reticulocyte count. This test measures the amount of new RBCs that your bone marrow makes.  · Hemoglobin electrophoresis. This test checks for problems with your hemoglobin in RBCs.  How is anemia treated?  Treatment for anemia is based on the type of anemia, its cause, and the severity of your symptoms. Treatments may include:  · Diet changes. This involves increasing the amount of certain nutrients in your diet, such as iron, vitamin B12, or folate. Your healthcare provider may also prescribe nutrient supplements.  · Medicines. Certain medicines treat the cause of your anemia. Others help build new RBCs or relieve symptoms. If a medicine is the cause of your anemia, you may need to stop or  change it.  · Blood transfusions. Replacing some of your blood can increase the number of healthy RBCs in your body.  · Surgery. In some cases, your doctor may do surgery to treat the underlying cause of anemia. If you need surgery, your healthcare provider will explain the procedure and outline the risks and benefits for you.  What are the long-term concerns?  If you have a certain type of anemia, you can expect a full recovery after treatment. If you have other types of anemia (especially a type you're born with), you will need to manage it for life. Your doctor can tell you more.  Date Last Reviewed: 12/1/2016  © 7250-3830 [employer]. [address] [place] [address], [place] [address]. All rights reserved. This information is not intended as a substitute for professional medical care. Always follow your healthcare professional's instructions.

## 2025-06-09 ENCOUNTER — TELEPHONE (OUTPATIENT)
Dept: FAMILY MEDICINE | Facility: CLINIC | Age: OVER 89
End: 2025-06-09
Payer: MEDICARE

## 2025-06-09 NOTE — TELEPHONE ENCOUNTER
----- Message from Anisha sent at 6/9/2025 11:34 AM CDT -----  Contact: krista Rowe with [facility] calling in regarding to Patient passing away on Friday. [phone]

## (undated) DEVICE — PENCIL ROCKER SWITCH 10FT CORD

## (undated) DEVICE — HEMOSTAT FLOWABLE DRY 4000

## (undated) DEVICE — DRESSING AQUACEL AG W/SILVER 3.5X6

## (undated) DEVICE — SUT CTD VICRYL CT-1 UND BR

## (undated) DEVICE — SUT 4/0 18IN COATED VICRYL

## (undated) DEVICE — SHEATH INTRODUCER PEELABLE OPTISEAL 6FR